# Patient Record
Sex: MALE | Race: WHITE | NOT HISPANIC OR LATINO | Employment: OTHER | URBAN - METROPOLITAN AREA
[De-identification: names, ages, dates, MRNs, and addresses within clinical notes are randomized per-mention and may not be internally consistent; named-entity substitution may affect disease eponyms.]

---

## 2018-07-31 NOTE — PROGRESS NOTES
SUBJECTIVE:   Robert Archer is a 67 year old male who presents for Preventive Visit.    New patient to this clinic.  Has been seeing a cardiologist for ongoing care of hypertension and hyperlipidemia.  Is on medications which been stable.  No major side effects.  No cardiac issues today.    Are you in the first 12 months of your Medicare coverage?  Yes  Physical   Annual:     Getting at least 3 servings of Calcium per day:  Yes    Bi-annual eye exam:  Yes    Dental care twice a year:  Yes    Sleep apnea or symptoms of sleep apnea:  None    Diet:  Regular (no restrictions)    Frequency of exercise:  6-7 days/week    Duration of exercise:  45-60 minutes    Taking medications regularly:  No    Barriers to taking medications:  None    Medication side effects:  Lightheadedness    Additional concerns today:  No    Ability to successfully perform activities of daily living: no assistance needed    Home Safety:  No safety concerns identified    Hearing Impairment: no hearing concerns (right ear plugged)    VISION   Wears glasses: worn for testing  Tool used: Rodriguez   Right eye:        10/12.5 (20/25)  Left eye:          10/25 (20/50)  Visual Acuity: Pass              Fall risk:  Fallen 2 or more times in the past year?: No  Any fall with injury in the past year?: No    COGNITIVE SCREEN  1) Repeat 3 items (Leader, Season, Table)    2) Clock draw: NORMAL  3) 3 item recall: Recalls 3 objects  Results: 3 items recalled: COGNITIVE IMPAIRMENT LESS LIKELY    Mini-CogTM Copyright S Ryan. Licensed by the author for use in [facility] [place]; reprinted with permission (grace@.[email or web address]). All rights reserved.        Reviewed and updated as needed this visit by clinical staff  Tobacco  Allergies  Meds  Med Hx  Surg Hx  Fam Hx  Soc Hx        Reviewed and updated as needed this visit by Provider        Social History   Substance Use Topics     Smoking status: Former Smoker     Years: 10.00     Smokeless tobacco: Never Used       Comment: quit 15 years ago      Alcohol use Yes      Comment: occ / 15 drinks a month        Alcohol Use 8/6/2018   If you drink alcohol do you typically have greater than 3 drinks per day OR greater than 7 drinks per week? No               Today's PHQ-2 Score:   PHQ-2 ( 1999 Pfizer) 8/6/2018   Q1: Little interest or pleasure in doing things 0   Q2: Feeling down, depressed or hopeless 0   PHQ-2 Score 0   Q1: Little interest or pleasure in doing things Not at all   Q2: Feeling down, depressed or hopeless Not at all   PHQ-2 Score 0       Do you feel safe in your environment - Yes    Do you have a Health Care Directive?: No: Advance care planning reviewed with patient; information given to patient to review.    Current providers sharing in care for this patient include:   Patient Care Team:  No Ref-Primary, Physician as PCP - General    The following health maintenance items are reviewed in Epic and correct as of today:  Health Maintenance   Topic Date Due     PHQ-2 Q1 YR  08/12/1960     TETANUS IMMUNIZATION (SYSTEM ASSIGNED)  08/12/1966     HEPATITIS C SCREENING  08/12/1966     LIPID SCREEN Q5 YR MALE (SYSTEM ASSIGNED)  08/12/1983     COLON CANCER SCREEN (SYSTEM ASSIGNED)  08/12/1998     ADVANCE DIRECTIVE PLANNING Q5 YRS  08/12/2003     FALL RISK ASSESSMENT  08/12/2013     PNEUMOCOCCAL (1 of 2 - PCV13) 08/12/2013     AORTIC ANEURYSM SCREENING (SYSTEM ASSIGNED)  08/12/2013     INFLUENZA VACCINE (1) 09/01/2018     BP Readings from Last 3 Encounters:   08/06/18 118/86    Wt Readings from Last 3 Encounters:   08/06/18 200 lb 6.4 oz (90.9 kg)                  Current Outpatient Prescriptions   Medication Sig Dispense Refill     hydrochlorothiazide (HYDRODIURIL) 25 MG tablet Take 25 mg by mouth       lisinopril (PRINIVIL/ZESTRIL) 10 MG tablet Take 10 mg by mouth       meclizine (ANTIVERT) 25 MG tablet Take 1 tablet (25 mg) by mouth every 6 hours as needed for dizziness 30 tablet 1     rosuvastatin (CRESTOR) 20 MG tablet  Take 20 mg by mouth         Pneumonia Vaccine:Adults age 65+ who received Pneumovax (PPSV23) at 65 years or older: Should be given PCV13 > 1 year after their most recent PPSV23    Screening Questionnaire for Adult Immunization    Are you sick today?   No   Do you have allergies to medications, food, a vaccine component or latex?   No   Have you ever had a serious reaction after receiving a vaccination?   No   Do you have a long-term health problem with heart disease, lung disease, asthma, kidney disease, metabolic disease (e.g. diabetes), anemia, or other blood disorder?   No   Do you have cancer, leukemia, HIV/AIDS, or any other immune system problem?   No   In the past 3 months, have you taken medications that affect  your immune system, such as prednisone, other steroids, or anticancer drugs; drugs for the treatment of rheumatoid arthritis, Crohn s disease, or psoriasis; or have you had radiation treatments?   No   Have you had a seizure, or a brain or other nervous system problem?   No   During the past year, have you received a transfusion of blood or blood     products, or been given immune (gamma) globulin or antiviral drug?   No   For women: Are you pregnant or is there a chance you could become        pregnant during the next month?   No   Have you received any vaccinations in the past 4 weeks?   No     Immunization questionnaire answers were all negative.        Per orders of Dr. Cota, injection of TDAP and PCV13 given by Luana Justice. Patient instructed to remain in clinic for 15 minutes afterwards, and to report any adverse reaction to me immediately.       Screening performed by Luana Justice on 8/6/2018 at 12:27 PM.        Review of Systems  CONSTITUTIONAL: NEGATIVE for fever, chills, change in weight  INTEGUMENTARY/SKIN: NEGATIVE for worrisome rashes, moles or lesions  EYES: NEGATIVE for vision changes or irritation  ENT/MOUTH: Reports a 5 year history of intermittent hearing changes involving the  "right ear along with recurring vertigo.  He does utilize Meclizine in the past on an as needed basis.  He has not been evaluated with an audiogram or ENT.  No major pain in the right ear.  No major sinus congestion.  RESP: NEGATIVE for significant cough or SOB  BREAST: NEGATIVE for masses, tenderness or discharge  CV: NEGATIVE for chest pain, palpitations or peripheral edema  CV: Receiving care through a cardiologist for hypertension and hyperlipidemia.  Reports no cardiac disease.  GI: NEGATIVE for nausea, abdominal pain, heartburn, or change in bowel habits  GI: Has not been screened for colorectal cancer.  Advised colonoscopy but the patient declined.  Will do a home study.  : NEGATIVE for frequency, dysuria, or hematuria   male : No major prostate symptoms noted.  MUSCULOSKELETAL: NEGATIVE for significant arthralgias or myalgia  NEURO: NEGATIVE for weakness, dizziness or paresthesias  ENDOCRINE: NEGATIVE for temperature intolerance, skin/hair changes  HEME: NEGATIVE for bleeding problems  PSYCHIATRIC: NEGATIVE for changes in mood or affect    OBJECTIVE:   /86  Pulse 65  Temp 97.3  F (36.3  C) (Temporal)  Resp 14  Ht 5' 10\" (1.778 m)  Wt 200 lb 6.4 oz (90.9 kg)  SpO2 97%  BMI 28.75 kg/m2 Estimated body mass index is 28.75 kg/(m^2) as calculated from the following:    Height as of this encounter: 5' 10\" (1.778 m).    Weight as of this encounter: 200 lb 6.4 oz (90.9 kg).  Physical Exam  GENERAL: healthy, alert and no distress  EYES: Eyes grossly normal to inspection, PERRL and conjunctivae and sclerae normal  HENT: ear canals and TM's normal, nose and mouth without ulcers or lesions  NECK: no adenopathy, no asymmetry, masses, or scars and thyroid normal to palpation  RESP: lungs clear to auscultation - no rales, rhonchi or wheezes  BREAST: normal without masses, tenderness or nipple discharge and no palpable axillary masses or adenopathy  CV: regular rate and rhythm, normal S1 S2, no S3 or S4, no " murmur, click or rub, no peripheral edema and peripheral pulses strong  ABDOMEN: soft, nontender, no hepatosplenomegaly, no masses and bowel sounds normal   (male): normal male genitalia without lesions or urethral discharge, no hernia  RECTAL: normal sphincter tone, no rectal masses, prostate 2+ enlarged, nontender and no presence of nodules or asymmetry.  MS: no gross musculoskeletal defects noted, no edema  SKIN: no suspicious lesions or rashes  NEURO: Normal strength and tone, mentation intact and speech normal  PSYCH: mentation appears normal, affect normal/bright  LYMPH: no cervical, supraclavicular, axillary, or inguinal adenopathy  Diabetic foot exam: normal DP and PT pulses, no trophic changes or ulcerative lesions and normal sensory exam    Diagnostic Test Results:  Will be returning for fasting blood studies in the next few weeks.    ASSESSMENT / PLAN:   1. Medicare annual wellness visit, initial  Initial preventative exam completed with findings as noted.  - INITIAL PREVENTIVE EXAM    2. Essential hypertension, benign  Current medications show good control of his blood pressure.  At this time, he will continue to be managed through his cardiologist.  - hydrochlorothiazide (HYDRODIURIL) 25 MG tablet; Take 25 mg by mouth  - lisinopril (PRINIVIL/ZESTRIL) 10 MG tablet; Take 10 mg by mouth  - Albumin Random Urine Quantitative with Creat Ratio; Future  - Comprehensive metabolic panel; Future  - CBC with platelets; Future  - OFFICE/OUTPT VISIT,NEW,LEVL III    3. Hyperlipidemia LDL goal <130  Lipids will be checked with fasting blood studies.  - rosuvastatin (CRESTOR) 20 MG tablet; Take 20 mg by mouth  - Comprehensive metabolic panel; Future  - Lipid panel reflex to direct LDL Fasting; Future  - TSH with free T4 reflex; Future  - OFFICE/OUTPT VISIT,CLIFLEVL III    4. Recurrent vertigo  Given the hearing changes in his right ear, may possibly related to mild Ménière's disease.  Screening studies to be done an  ENT evaluation recommended.  - meclizine (ANTIVERT) 25 MG tablet; Take 1 tablet (25 mg) by mouth every 6 hours as needed for dizziness  Dispense: 30 tablet; Refill: 1  - AUDIOLOGY ADULT REFERRAL; Future  - OTOLARYNGOLOGY REFERRAL; Future  - CBC with platelets; Future  - OFFICE/OUTPT VISIT,[facility],LEV III    5. Hearing loss of right ear, unspecified hearing loss type  As noted, audiology and ENT evaluation.  - AUDIOLOGY ADULT REFERRAL; Future  - OTOLARYNGOLOGY REFERRAL; Future  - CBC with platelets; Future  - OFFICE/OUTPT VISIT,NEW,LEVL III    6. Screen for colon cancer  Patient declined doing a colonoscopy but will look to at least do a stool screening study for early detection.  - Fecal colorectal cancer screen FIT; Future    7. Need for hepatitis C screening test  Low risk but patient agreed to have this performed  - Hepatitis C antibody; Future    8. Need for prophylactic vaccination against Streptococcus pneumoniae (pneumococcus)  Initial Prevnar 13 given with Pneumovax 23 due in 1 year.  - Pneumococcal vaccine 13 valent PCV13 IM (Prevnar) [23281]  - ADMIN: Vaccine, Initial (97755)    9. Need for prophylactic vaccination with tetanus-diphtheria (TD)  Patient has 4 grandchildren and are around the grandchildren frequently.  He does not remember when his last tetanus booster was.  Will look to vaccinate with a Tdap due to exposure to children.  - TDAP, IM (10 - 64 YRS) - Adacel  - EA ADD'L VACCINE    10. Special screening for malignant neoplasm of prostate  PSA pending  - Prostate spec antigen screen; Future    11. Screening for AAA (abdominal aortic aneurysm)  Patient did have some smoking history and AAA to be screened for.  - Abdominal Aortic Aneurysm Screening/Tracking    End of Life Planning:  Patient currently has an advanced directive: No.  I have verified the patient's ablity to prepare an advanced directive/make health care decisions.  Literature was provided to assist patient in preparing an advanced  directive.    COUNSELING:  Reviewed preventive health counseling, as reflected in patient instructions  Special attention given to:       Consider AAA screening for ages 65-75 and smoking history       Regular exercise       Healthy diet/nutrition       Hearing screening       Immunizations    Vaccinated for: Pneumococcal and TDAP             Hepatitis C screening       Colon cancer screening       Prostate cancer screening    BP Readings from Last 1 Encounters:   08/06/18 118/86            reports that he has quit smoking. He quit after 10.00 years of use. He has never used smokeless tobacco.      Appropriate preventive services were discussed with this patient, including applicable screening as appropriate for cardiovascular disease, diabetes, osteopenia/osteoporosis, and glaucoma.  As appropriate for age/gender, discussed screening for colorectal cancer, prostate cancer, breast cancer, and cervical cancer. Checklist reviewing preventive services available has been given to the patient.    Reviewed patients plan of care and provided an AVS. The Basic Care Plan (routine screening as documented in Health Maintenance) for Robert meets the Care Plan requirement. This Care Plan has been established and reviewed with the Patient.    Counseling Resources:  ATP IV Guidelines  Pooled Cohorts Equation Calculator  Breast Cancer Risk Calculator  FRAX Risk Assessment  ICSI Preventive Guidelines  Dietary Guidelines for Americans, 2010  USDA's MyPlate  ASA Prophylaxis  Lung CA Screening    Follow-up in 1 year or as needed.  Return for fasting blood studies.    The patient understood the rational for the diagnosis and treatment plan. All questions were answered to best of my ability and the patient's satisfaction.    Note: Chart documentation done in part with Dragon Voice Recognition software. Although reviewed after completion, some word and grammatical errors may remain.  Please consider this when interpreting information in  this chart.    Jun Cota MD  [facility]    Addended on 8/20/18 after visual acuity completed.  Functioning and passing visual acuity with some managed vision in left eye with corrective lenses.  Will continue with ophthalmologic care as planned.  Formal hearing to be done through audiology.    Jun Cota MD

## 2018-08-06 ENCOUNTER — OFFICE VISIT (OUTPATIENT)
Dept: FAMILY MEDICINE | Facility: CLINIC | Age: 68
End: 2018-08-06
Payer: COMMERCIAL

## 2018-08-06 ENCOUNTER — DOCUMENTATION ONLY (OUTPATIENT)
Dept: VASCULAR SURGERY | Facility: CLINIC | Age: 68
End: 2018-08-06

## 2018-08-06 VITALS
SYSTOLIC BLOOD PRESSURE: 118 MMHG | RESPIRATION RATE: 14 BRPM | DIASTOLIC BLOOD PRESSURE: 86 MMHG | TEMPERATURE: 97.3 F | OXYGEN SATURATION: 97 % | HEART RATE: 65 BPM | WEIGHT: 200.4 LBS | BODY MASS INDEX: 28.69 KG/M2 | HEIGHT: 70 IN

## 2018-08-06 DIAGNOSIS — H91.91 HEARING LOSS OF RIGHT EAR, UNSPECIFIED HEARING LOSS TYPE: ICD-10-CM

## 2018-08-06 DIAGNOSIS — R42 RECURRENT VERTIGO: ICD-10-CM

## 2018-08-06 DIAGNOSIS — Z12.11 SCREEN FOR COLON CANCER: ICD-10-CM

## 2018-08-06 DIAGNOSIS — I10 ESSENTIAL HYPERTENSION, BENIGN: ICD-10-CM

## 2018-08-06 DIAGNOSIS — E78.5 HYPERLIPIDEMIA LDL GOAL <130: ICD-10-CM

## 2018-08-06 DIAGNOSIS — Z23 NEED FOR PROPHYLACTIC VACCINATION AGAINST STREPTOCOCCUS PNEUMONIAE (PNEUMOCOCCUS): ICD-10-CM

## 2018-08-06 DIAGNOSIS — Z11.59 NEED FOR HEPATITIS C SCREENING TEST: ICD-10-CM

## 2018-08-06 DIAGNOSIS — Z00.00 MEDICARE ANNUAL WELLNESS VISIT, INITIAL: Primary | ICD-10-CM

## 2018-08-06 DIAGNOSIS — Z13.6 SCREENING FOR AAA (ABDOMINAL AORTIC ANEURYSM): ICD-10-CM

## 2018-08-06 DIAGNOSIS — Z23 NEED FOR PROPHYLACTIC VACCINATION WITH TETANUS-DIPHTHERIA (TD): ICD-10-CM

## 2018-08-06 DIAGNOSIS — Z87.891 FORMER TOBACCO USE: Primary | ICD-10-CM

## 2018-08-06 DIAGNOSIS — Z12.5 SPECIAL SCREENING FOR MALIGNANT NEOPLASM OF PROSTATE: ICD-10-CM

## 2018-08-06 PROCEDURE — 99214 OFFICE O/P EST MOD 30 MIN: CPT | Mod: 25 | Performed by: FAMILY MEDICINE

## 2018-08-06 PROCEDURE — 90715 TDAP VACCINE 7 YRS/> IM: CPT | Performed by: FAMILY MEDICINE

## 2018-08-06 PROCEDURE — 90471 IMMUNIZATION ADMIN: CPT | Performed by: FAMILY MEDICINE

## 2018-08-06 PROCEDURE — G0009 ADMIN PNEUMOCOCCAL VACCINE: HCPCS | Mod: 59 | Performed by: FAMILY MEDICINE

## 2018-08-06 PROCEDURE — 99207 ZZC FOR CODING REVIEW: CPT | Performed by: FAMILY MEDICINE

## 2018-08-06 PROCEDURE — 90670 PCV13 VACCINE IM: CPT | Performed by: FAMILY MEDICINE

## 2018-08-06 PROCEDURE — G0402 INITIAL PREVENTIVE EXAM: HCPCS | Performed by: FAMILY MEDICINE

## 2018-08-06 RX ORDER — ROSUVASTATIN CALCIUM 20 MG/1
20 TABLET, COATED ORAL
COMMUNITY
Start: 2018-04-20 | End: 2022-09-26

## 2018-08-06 RX ORDER — MECLIZINE HYDROCHLORIDE 25 MG/1
25 TABLET ORAL EVERY 6 HOURS PRN
Qty: 30 TABLET | Refills: 1 | Status: SHIPPED | OUTPATIENT
Start: 2018-08-06 | End: 2018-08-22

## 2018-08-06 RX ORDER — HYDROCHLOROTHIAZIDE 25 MG/1
25 TABLET ORAL
COMMUNITY
Start: 2018-04-20 | End: 2020-09-21

## 2018-08-06 RX ORDER — LISINOPRIL 10 MG/1
10 TABLET ORAL
COMMUNITY
Start: 2018-04-20 | End: 2021-09-22

## 2018-08-06 ASSESSMENT — ACTIVITIES OF DAILY LIVING (ADL)
CURRENT_FUNCTION: NO ASSISTANCE NEEDED
I_NEED_ASSISTANCE_FOR_THE_FOLLOWING_DAILY_ACTIVITIES:: NO ASSISTANCE IS NEEDED

## 2018-08-06 ASSESSMENT — PAIN SCALES - GENERAL: PAINLEVEL: NO PAIN (0)

## 2018-08-06 NOTE — MR AVS SNAPSHOT
After Visit Summary   8/6/2018    Robert Archer    MRN: [id number]           Patient Information     Date Of Birth          1950        Visit Information        Provider Department      8/6/2018 10:00 AM Jun Cota MD [facility]        Today's Diagnoses     Medicare annual wellness visit, initial    -  1    Essential hypertension, benign        Hyperlipidemia LDL goal <130        Screen for colon cancer        Need for hepatitis C screening test        Need for prophylactic vaccination against Streptococcus pneumoniae (pneumococcus)        Need for prophylactic vaccination with tetanus-diphtheria (TD)        Special screening for malignant neoplasm of prostate        Recurrent vertigo        Hearing loss of right ear, unspecified hearing loss type        Screening for AAA (abdominal aortic aneurysm)        Need for vaccination with 13-polyvalent pneumococcal conjugate vaccine        Need for Tdap vaccination          Care Instructions    These are new changes to your current plan of care based on today's visit:    Medications stopped    Medications to be started    Change dose of this medication None   New treatments        Follow up appointments:    1.  FOLLOW UP WITH YOUR PRIMARY CARE PROVIDER: 1 year(s) for physical    2. FOLLOW UP WITH SPECIALIST: ENT and Audiogram    3. FOLLOW UP WITH NURSE VISIT:     4. IMAGING STUDIES TO BE SCHEDULED: AAA screening    5. PROCEDURES TO BE SCHEDULED: Complete the stool examination at home    6. LABS TO BE COMPLETED PRIOR TO NEXT VISIT: Fasting lab work as planned    Jun Cota MD                Follow-ups after your visit        Additional Services     AUDIOLOGY ADULT REFERRAL       Your provider has referred you to: [facility]: [facility] [phone]   [email or web address]    Treatment:  Evaluation & Treatment  Specialty Testing:      Please be aware  that coverage of these services is subject to the terms and limitations of your health insurance plan.  Call member services at your health plan with any benefit or coverage questions.      Please bring the following to your appointment:  >>   Any x-rays, CTs or MRIs which have been performed.  Contact the facility where they were done to arrange for  prior to your scheduled appointment.   >>   List of current medications  >>   This referral request   >>   Any documents/labs given to you for this referral            OTOLARYNGOLOGY REFERRAL       Your provider has referred you to: [facility]: [facility] [phone]   [email or web address]    Please be aware that coverage of these services is subject to the terms and limitations of your health insurance plan.  Call member services at your health plan with any benefit or coverage questions.      Please bring the following with you to your appointment:    (1) Any X-Rays, CTs or MRIs which have been performed.  Contact the facility where they were done to arrange for  prior to your scheduled appointment.   (2) List of current medications  (3) This referral request   (4) Any documents/labs given to you for this referral                  Future tests that were ordered for you today     Open Future Orders        Priority Expected Expires Ordered    Prostate spec antigen screen Routine 8/7/2018 8/31/2018 8/6/2018    Albumin Random Urine Quantitative with Creat Ratio Routine 8/7/2018 8/31/2018 8/6/2018    Comprehensive metabolic panel Routine 8/7/2018 8/31/2018 8/6/2018    CBC with platelets Routine 8/7/2018 8/31/2018 8/6/2018    Lipid panel reflex to direct LDL Fasting Routine 8/7/2018 8/31/2018 8/6/2018    TSH with free T4 reflex Routine 8/7/2018 8/31/2018 8/6/2018    Hepatitis C antibody Routine 8/7/2018 8/31/2018 8/6/2018    Fecal colorectal cancer screen FIT Routine 8/13/2018  "2018    AUDIOLOGY ADULT REFERRAL Routine 2018    OTOLARYNGOLOGY REFERRAL Routine 2018            Who to contact     If you have questions or need follow up information about today's clinic visit or your schedule please contact [facility] directly at [phone].  Normal or non-critical lab and imaging results will be communicated to you by [employer], letter or phone within 4 business days after the clinic has received the results. If you do not hear from us within 7 days, please contact the clinic through [employer] or phone. If you have a critical or abnormal lab result, we will notify you by phone as soon as possible.  Submit refill requests through [employer] or call your pharmacy and they will forward the refill request to us. Please allow 3 business days for your refill to be completed.          Additional Information About Your Visit        [employer] Information     [employer] lets you send messages to your doctor, view your test results, renew your prescriptions, schedule appointments and more. To sign up, go to [email or web address] . Click on \"Log in\" on the left side of the screen, which will take you to the Welcome page. Then click on \"Sign up Now\" on the right side of the page.     You will be asked to enter the access code listed below, as well as some personal information. Please follow the directions to create your username and password.     Your access code is: [email or web address]  Expires: 2018 10:39 AM     Your access code will  in 90 days. If you need help or a new code, please call your [place] clinic or [phone].        Care EveryWhere ID     This is your Care EveryWhere ID. This could be used by other organizations to access your [place] medical records  [id number]        Your Vitals Were     Pulse Temperature Respirations Height Pulse Oximetry BMI (Body Mass Index)    65 97.3  F (36.3  C) (Temporal) 14 5' 10\" (1.778 m) " 97% 28.75 kg/m2       Blood Pressure from Last 3 Encounters:   08/06/18 118/86    Weight from Last 3 Encounters:   08/06/18 200 lb 6.4 oz (90.9 kg)              We Performed the Following     Abdominal Aortic Aneurysm Screening/Tracking     ADMIN: Vaccine, Initial (09949)     Pneumococcal vaccine 13 valent PCV13 IM (Prevnar) [41018]     TDAP, IM (10 - 64 YRS) - Adacel          Today's Medication Changes          These changes are accurate as of 8/6/18 10:39 AM.  If you have any questions, ask your nurse or doctor.               Start taking these medicines.        Dose/Directions    meclizine 25 MG tablet   Commonly known as:  ANTIVERT   Used for:  Recurrent vertigo   Started by:  Jun Cota MD        Dose:  25 mg   Take 1 tablet (25 mg) by mouth every 6 hours as needed for dizziness   Quantity:  30 tablet   Refills:  1            Where to get your medicines      These medications were sent to [facility]/pharmacy #[id number] [facility] [address] [facility] AT [address] [address]     Phone:  [phone]     meclizine 25 MG tablet                Primary Care Provider Fax #    Physician No Ref-Primary [phone]       No address on file        Equal Access to Services     JUNAID AGEE AH: Hadii aad ku hadasho Sokatharineali, waaxda luqadaha, qaybta kaalmada adeegyada, danielle calloway. So [facility] [phone].    ATENCIÓN: Si habla español, tiene a suarez disposición servicios gratuitos de asistencia lingüística. Llame al [phone].    We comply with applicable federal civil rights laws and [place] laws. We do not discriminate on the basis of race, color, national origin, age, disability, sex, sexual orientation, or gender identity.            Thank you!     Thank you for choosing [facility]  for your care. Our goal is always to provide you with excellent care. Hearing back from our patients is one way we can continue to improve our  services. Please take a few minutes to complete the written survey that you may receive in the mail after your visit with us. Thank you!             Your Updated Medication List - Protect others around you: Learn how to safely use, store and throw away your medicines at www.disposemymeds.org.          This list is accurate as of 8/6/18 10:39 AM.  Always use your most recent med list.                   Brand Name Dispense Instructions for use Diagnosis    hydrochlorothiazide 25 MG tablet    HYDRODIURIL     Take 25 mg by mouth        lisinopril 10 MG tablet    PRINIVIL/ZESTRIL     Take 10 mg by mouth        meclizine 25 MG tablet    ANTIVERT    30 tablet    Take 1 tablet (25 mg) by mouth every 6 hours as needed for dizziness    Recurrent vertigo       rosuvastatin 20 MG tablet    CRESTOR     Take 20 mg by mouth

## 2018-08-06 NOTE — PATIENT INSTRUCTIONS
These are new changes to your current plan of care based on today's visit:    Medications stopped    Medications to be started    Change dose of this medication None   New treatments        Follow up appointments:    1.  FOLLOW UP WITH YOUR PRIMARY CARE PROVIDER: 1 year(s) for physical    2. FOLLOW UP WITH SPECIALIST: ENT and Audiogram    3. FOLLOW UP WITH NURSE VISIT:     4. IMAGING STUDIES TO BE SCHEDULED: AAA screening    5. PROCEDURES TO BE SCHEDULED: Complete the stool examination at home    6. LABS TO BE COMPLETED PRIOR TO NEXT VISIT: Fasting lab work as planned    Jun Cota MD

## 2018-08-10 DIAGNOSIS — Z12.11 SCREEN FOR COLON CANCER: ICD-10-CM

## 2018-08-10 PROCEDURE — 82274 ASSAY TEST FOR BLOOD FECAL: CPT | Performed by: FAMILY MEDICINE

## 2018-08-12 LAB — HEMOCCULT STL QL IA: NEGATIVE

## 2018-08-14 DIAGNOSIS — E78.5 HYPERLIPIDEMIA LDL GOAL <130: ICD-10-CM

## 2018-08-14 DIAGNOSIS — Z11.59 NEED FOR HEPATITIS C SCREENING TEST: ICD-10-CM

## 2018-08-14 DIAGNOSIS — Z12.5 SPECIAL SCREENING FOR MALIGNANT NEOPLASM OF PROSTATE: ICD-10-CM

## 2018-08-14 DIAGNOSIS — H91.91 HEARING LOSS OF RIGHT EAR, UNSPECIFIED HEARING LOSS TYPE: ICD-10-CM

## 2018-08-14 DIAGNOSIS — I10 ESSENTIAL HYPERTENSION, BENIGN: ICD-10-CM

## 2018-08-14 DIAGNOSIS — R42 RECURRENT VERTIGO: ICD-10-CM

## 2018-08-14 LAB
ALBUMIN SERPL-MCNC: 4.1 G/DL (ref 3.4–5)
ALP SERPL-CCNC: 58 U/L (ref 40–150)
ALT SERPL W P-5'-P-CCNC: 31 U/L (ref 0–70)
ANION GAP SERPL CALCULATED.3IONS-SCNC: 8 MMOL/L (ref 3–14)
AST SERPL W P-5'-P-CCNC: 27 U/L (ref 0–45)
BILIRUB SERPL-MCNC: 1.2 MG/DL (ref 0.2–1.3)
BUN SERPL-MCNC: 14 MG/DL (ref 7–30)
CALCIUM SERPL-MCNC: 8.9 MG/DL (ref 8.5–10.1)
CHLORIDE SERPL-SCNC: 106 MMOL/L (ref 94–109)
CHOLEST SERPL-MCNC: 144 MG/DL
CO2 SERPL-SCNC: 26 MMOL/L (ref 20–32)
CREAT SERPL-MCNC: 1.05 MG/DL (ref 0.66–1.25)
CREAT UR-MCNC: 219 MG/DL
ERYTHROCYTE [DISTWIDTH] IN BLOOD BY AUTOMATED COUNT: 12.7 % (ref 10–15)
GFR SERPL CREATININE-BSD FRML MDRD: 70 ML/MIN/1.7M2
GLUCOSE SERPL-MCNC: 88 MG/DL (ref 70–99)
HCT VFR BLD AUTO: 46.1 % (ref 40–53)
HDLC SERPL-MCNC: 54 MG/DL
HGB BLD-MCNC: 15.8 G/DL (ref 13.3–17.7)
LDLC SERPL CALC-MCNC: 73 MG/DL
MCH RBC QN AUTO: 33.3 PG (ref 26.5–33)
MCHC RBC AUTO-ENTMCNC: 34.3 G/DL (ref 31.5–36.5)
MCV RBC AUTO: 97 FL (ref 78–100)
MICROALBUMIN UR-MCNC: 18 MG/L
MICROALBUMIN/CREAT UR: 8.31 MG/G CR (ref 0–17)
NONHDLC SERPL-MCNC: 90 MG/DL
PLATELET # BLD AUTO: 187 10E9/L (ref 150–450)
POTASSIUM SERPL-SCNC: 4.1 MMOL/L (ref 3.4–5.3)
PROT SERPL-MCNC: 7.4 G/DL (ref 6.8–8.8)
PSA SERPL-ACNC: 0.09 UG/L (ref 0–4)
RBC # BLD AUTO: 4.74 10E12/L (ref 4.4–5.9)
SODIUM SERPL-SCNC: 140 MMOL/L (ref 133–144)
TRIGL SERPL-MCNC: 85 MG/DL
TSH SERPL DL<=0.005 MIU/L-ACNC: 0.9 MU/L (ref 0.4–4)
WBC # BLD AUTO: 5.4 10E9/L (ref 4–11)

## 2018-08-14 PROCEDURE — 36415 COLL VENOUS BLD VENIPUNCTURE: CPT | Performed by: FAMILY MEDICINE

## 2018-08-14 PROCEDURE — 85027 COMPLETE CBC AUTOMATED: CPT | Performed by: FAMILY MEDICINE

## 2018-08-14 PROCEDURE — 82043 UR ALBUMIN QUANTITATIVE: CPT | Performed by: FAMILY MEDICINE

## 2018-08-14 PROCEDURE — G0103 PSA SCREENING: HCPCS | Performed by: FAMILY MEDICINE

## 2018-08-14 PROCEDURE — 84443 ASSAY THYROID STIM HORMONE: CPT | Performed by: FAMILY MEDICINE

## 2018-08-14 PROCEDURE — 80053 COMPREHEN METABOLIC PANEL: CPT | Performed by: FAMILY MEDICINE

## 2018-08-14 PROCEDURE — G0472 HEP C SCREEN HIGH RISK/OTHER: HCPCS | Performed by: FAMILY MEDICINE

## 2018-08-14 PROCEDURE — 80061 LIPID PANEL: CPT | Performed by: FAMILY MEDICINE

## 2018-08-15 LAB — HCV AB SERPL QL IA: NONREACTIVE

## 2018-08-20 ENCOUNTER — ALLIED HEALTH/NURSE VISIT (OUTPATIENT)
Dept: FAMILY MEDICINE | Facility: CLINIC | Age: 68
End: 2018-08-20
Payer: COMMERCIAL

## 2018-08-20 DIAGNOSIS — Z00.00 ENCOUNTER FOR ROUTINE ADULT HEALTH EXAMINATION: Primary | ICD-10-CM

## 2018-08-20 PROCEDURE — 99207 ZZC NO CHARGE NURSE ONLY: CPT

## 2018-08-20 NOTE — PROGRESS NOTES
VISION   Wears glasses: worn for testing  Tool used: Rodriguez   Right eye:        10/25 (20/50)  Left eye:          10/25 (20/50)  Visual Acuity: Pass    Completed vision screening and routed results to provider for medicare wellness visit done on 8/6/18.    Ivett Levi CMA (AAMA)

## 2018-08-20 NOTE — MR AVS SNAPSHOT
After Visit Summary   8/20/2018    Robert Archer    MRN: [id number]           Patient Information     Date Of Birth          1950        Visit Information        Provider Department      8/20/2018 10:30 AM RG FLOAT 1 [facility]        Today's Diagnoses     Encounter for routine adult health examination    -  1       Follow-ups after your visit        Your next 10 appointments already scheduled     Aug 29, 2018  8:45 AM CDT   [facility]    [address]  [place] [id number] [phone]            Aug 29, 2018  9:15 AM CDT   [facility] Visit [facility]ado [facility]   [facility]    [address]  [place] [address] [phone]              Who to contact     If you have questions or need follow up information about today's clinic visit or your schedule please contact [facility] directly at [phone].  Normal or non-critical lab and imaging results will be communicated to you by Bringmehart, letter or phone within 4 business days after the clinic has received the results. If you do not hear from us within 7 days, please contact the clinic through Bringmehart or phone. If you have a critical or abnormal lab result, we will notify you by phone as soon as possible.  Submit refill requests through Attensa or call your pharmacy and they will forward the refill request to us. Please allow 3 business days for your refill to be completed.          Additional Information About Your Visit        MyChart Information     Attensa gives you secure access to your electronic health record. If you see a primary care provider, you can also send messages to your care team and make appointments. If you have questions, please call your primary care clinic.  If you do not have a primary care provider, please call [phone] and they will  assist you.        Care EveryWhere ID     This is your Care EveryWhere ID. This could be used by other organizations to access your [place] medical records  [id number]         Blood Pressure from Last 3 Encounters:   08/06/18 118/86    Weight from Last 3 Encounters:   08/06/18 200 lb 6.4 oz (90.9 kg)              Today, you had the following     No orders found for display       Primary Care Provider Office Phone # Fax #    Jun Cota [phone] [address] [facility] [address]        Equal Access to Services     [facility]: Hadii aad ku hadasho Soomaali, waaxda luqadaha, qaybta kaalmada adeegyada, waxviraj rosenbergin hayolvin adeabundio chavira . So [facility] [phone].    ATENCIÓN: Si habla español, tiene a suarez disposición servicios gratuitos de asistencia lingüística. [facility] [phone].    We comply with applicable federal civil rights laws and [place] laws. We do not discriminate on the basis of race, color, national origin, age, disability, sex, sexual orientation, or gender identity.            Thank you!     [facility]  [facility]. Our goal is always to provide you with excellent care. Hearing back from our patients is one way we can continue to improve our services. Please take a few minutes to complete the written survey that you may receive in the mail after your visit with us. Thank you!             Your Updated Medication List - Protect others around you: Learn how to safely use, store and throw away your medicines at www.disposemymeds.org.          This list is accurate as of 8/20/18  1:45 PM.  Always use your most recent med list.                   Brand Name Dispense Instructions for use Diagnosis    hydrochlorothiazide 25 MG tablet    HYDRODIURIL     Take 25 mg by mouth    Essential hypertension, benign       lisinopril 10 MG tablet    PRINIVIL/ZESTRIL     Take 10 mg by mouth    Essential hypertension, benign        meclizine 25 MG tablet    ANTIVERT    30 tablet    Take 1 tablet (25 mg) by mouth every 6 hours as needed for dizziness    Recurrent vertigo       rosuvastatin 20 MG tablet    CRESTOR     Take 20 mg by mouth    Hyperlipidemia LDL goal <130

## 2018-08-22 DIAGNOSIS — R42 RECURRENT VERTIGO: ICD-10-CM

## 2018-08-22 RX ORDER — MECLIZINE HYDROCHLORIDE 25 MG/1
25 TABLET ORAL 3 TIMES DAILY PRN
Qty: 270 TABLET | Refills: 1 | Status: SHIPPED | OUTPATIENT
Start: 2018-08-22 | End: 2019-09-17

## 2018-08-22 NOTE — TELEPHONE ENCOUNTER
Reason for Call:  Medication or medication refill:    Do you use a [facility]?  Name of the pharmacy and phone number for the current request:  [facility] mail order    Name of the medication requested: meclizine (ANTIVERT) 25 MG tablet    Other request: patient would like a 90 day supply sent to his mail order pharmacy. Pharmacy called to request medication.        Call taken on 8/22/2018 at 8:37 AM by Ioana Reyna

## 2018-08-27 NOTE — PROGRESS NOTES
ENT Consultation    Robert Archer who is a 68 year old male seen in consultation at the request of Jun Cota MD.      History of Present Illness - Robert Archer is a 68 year old male here today with dizziness. Patient reports that about 4 years he had true vertigo that lasted at least a day, he was hospitalized. He reports that after this it turned into constant dizziness, lightheadedness, and balance issues. He denies any vertigo symptoms. No episodes of blacking out or fainting. He has not had any seizures in the past. No history of migraines. Patient has tinnitus in both ears. No scans of his head when he was hospitalized.       Body mass index is 28.7 kg/(m^2).    BP Readings from Last 1 Encounters:   08/06/18 118/86     BP noted to be well controlled today in office.     Robert IS NOT a smoker/uses chewing tobacco.       Past Medical History - No past medical history on file.    Current Medications -   Current Outpatient Prescriptions:      hydrochlorothiazide (HYDRODIURIL) 25 MG tablet, Take 25 mg by mouth, Disp: , Rfl:      lisinopril (PRINIVIL/ZESTRIL) 10 MG tablet, Take 10 mg by mouth, Disp: , Rfl:      meclizine (ANTIVERT) 25 MG tablet, Take 1 tablet (25 mg) by mouth 3 times daily as needed for dizziness, Disp: 270 tablet, Rfl: 1     rosuvastatin (CRESTOR) 20 MG tablet, Take 20 mg by mouth, Disp: , Rfl:     Allergies - No Known Allergies    Social History -   Social History     Social History     Marital status:      Spouse name: N/A     Number of children: N/A     Years of education: N/A     Social History Main Topics     Smoking status: Former Smoker     Years: 10.00     Smokeless tobacco: Never Used      Comment: quit 15 years ago      Alcohol use Yes      Comment: occ / 15 drinks a month      Drug use: No     Sexual activity: Yes     Partners: Female      Comment:       Other Topics Concern     Not on file     Social History Narrative     No narrative on file       Family  History -   Family History   Problem Relation Age of Onset     Hypertension Mother      Hypertension Father        Review of Systems - As per HPI and PMHx, otherwise review of system review of the head and neck negative.    Physical Exam  There were no vitals taken for this visit.  BMI: There is no height or weight on file to calculate BMI.    General - The patient is well nourished and well developed, and appears to have good nutritional status.  Alert and oriented to person and place, answers questions and cooperates with examination appropriately.    SKIN - No suspicious lesions or rashes.  Respiration - No respiratory distress.  Head and Face - Normocephalic and atraumatic, with no gross asymmetry noted of the contour of the facial features.  The facial nerve is intact, with strong symmetric movements.    Voice and Breathing - The patient was breathing comfortably without the use of accessory muscles. The patients voice was clear and strong, and had appropriate pitch and quality.    Ears - Bilateral pinna and EACs with normal appearing overlying skin. Tympanic membrane intact with good mobility on pneumatic otoscopy bilaterally. Bony landmarks of the ossicular chain are normal. The tympanic membranes are normal in appearance. No retraction, perforation, or masses.  No fluid or purulence was seen in the external canal or the middle ear.     Eyes - Extraocular movements intact.  Sclera were not icteric or injected, conjunctiva were pink and moist.    Mouth - Examination of the oral cavity showed pink, healthy oral mucosa. No lesions or ulcerations noted.  The tongue was mobile and midline, and the dentition were in good condition.      Throat - The walls of the oropharynx were smooth, pink, moist, symmetric, and had no lesions or ulcerations.  The tonsillar pillars and soft palate were symmetric.  The uvula was midline on elevation.    Neck - Normal midline excursion of the laryngotracheal complex during  swallowing.  Full range of motion on passive movement.  Palpation of the occipital, submental, submandibular, internal jugular chain, and supraclavicular nodes did not demonstrate any abnormal lymph nodes or masses.  The carotid pulse was palpable bilaterally.  Palpation of the thyroid was soft and smooth, with no nodules or goiter appreciated.  The trachea was mobile and midline.    Nose - External contour is symmetric, no gross deflection or scars.  Nasal mucosa is pink and moist with no abnormal mucus.  The septum was midline and non-obstructive, turbinates of normal size and position.  No polyps, masses, or purulence noted on examination.    Neuro - Nonfocal neuro exam is normal, CN 2 through 12 intact, normal gait and muscle tone. Some unsteadiness with eyes closed.       Performed in clinic today:  Audiologic Studies - An audiogram and tympanogram were performed today as part of the evaluation and personally reviewed. The tympanogram shows Type A curves on the right and Type A curves on the left, with Normal canal volumes and middle ear pressures.  The audiogram showed Mild to moderate high-frequency SNHL on the right and Mild to moderate high-frequency SNHLon the left.        A/P - Robert Archer is a 68 year old male with dizziness. I would like patient to obtain a MRI of the brain at the [facility]. I also would like patient to be evaluated at the [facility].       This document serves as a record of the services and decisions personally performed and made by Dr. Krish Hurtado MD. It was created on his behalf by Meena Matthews, a trained medical scribe. The creation of this document is based the provider's statements to the medical scribe.  Meena Matthews 8/29/2018    Provider:   The information in this document, created by the medical scribe for me, accurately reflects the services I personally performed and the decisions made by me. I have reviewed and approved this  document for accuracy prior to leaving the patient care area.  Dr. Krish Hurtado MD 8/29/2018    Krish Hurtado MD.

## 2018-08-29 ENCOUNTER — OFFICE VISIT (OUTPATIENT)
Dept: AUDIOLOGY | Facility: OTHER | Age: 68
End: 2018-08-29
Payer: COMMERCIAL

## 2018-08-29 ENCOUNTER — OFFICE VISIT (OUTPATIENT)
Dept: OTOLARYNGOLOGY | Facility: OTHER | Age: 68
End: 2018-08-29
Payer: COMMERCIAL

## 2018-08-29 VITALS
HEART RATE: 67 BPM | SYSTOLIC BLOOD PRESSURE: 118 MMHG | BODY MASS INDEX: 28.7 KG/M2 | OXYGEN SATURATION: 98 % | WEIGHT: 200 LBS | TEMPERATURE: 97.2 F | DIASTOLIC BLOOD PRESSURE: 82 MMHG

## 2018-08-29 DIAGNOSIS — H91.91 HEARING LOSS OF RIGHT EAR, UNSPECIFIED HEARING LOSS TYPE: ICD-10-CM

## 2018-08-29 DIAGNOSIS — R42 DIZZINESS: ICD-10-CM

## 2018-08-29 DIAGNOSIS — H93.13 TINNITUS OF BOTH EARS: ICD-10-CM

## 2018-08-29 DIAGNOSIS — R42 VERTIGO: Primary | ICD-10-CM

## 2018-08-29 DIAGNOSIS — H90.3 SENSORINEURAL HEARING LOSS (SNHL) OF BOTH EARS: Primary | ICD-10-CM

## 2018-08-29 DIAGNOSIS — R42 RECURRENT VERTIGO: ICD-10-CM

## 2018-08-29 DIAGNOSIS — R26.89 BALANCE PROBLEM: ICD-10-CM

## 2018-08-29 PROCEDURE — 92567 TYMPANOMETRY: CPT | Performed by: AUDIOLOGIST

## 2018-08-29 PROCEDURE — 99203 OFFICE O/P NEW LOW 30 MIN: CPT | Performed by: OTOLARYNGOLOGY

## 2018-08-29 PROCEDURE — 92557 COMPREHENSIVE HEARING TEST: CPT | Performed by: AUDIOLOGIST

## 2018-08-29 PROCEDURE — 99207 ZZC NO CHARGE LOS: CPT | Performed by: AUDIOLOGIST

## 2018-08-29 NOTE — MR AVS SNAPSHOT
After Visit Summary   8/29/2018    Robert Archer    MRN: [id number]           Patient Information     Date Of Birth          1950        Visit Information        Provider Department      8/29/2018 9:15 AM Krish Hurtado MD [facility]        Today's Diagnoses     Vertigo    -  1    Dizziness        Balance problem           Follow-ups after your visit        Additional Services     AUDIOLOGY ADULT REFERRAL           AUDIOLOGY BALANCE REFERRAL       Your provider has referred you to: [facility] [phone] [email or web address]    Audiology Balance Referral (Comprehensive) includes Videonystagmography (VNG), Rotational Chair testing, and Computerized Dynamic Posturography.  You may also select individual tests from the list below.    Procedure(s): Audiology Balance Referral Comprehensive (includes Videonystagmography (VNG), Rotational Chair Testing and Computerized Dynamic Posturography). [facility]                  Your next 10 appointments already scheduled     Sep 26, 2018  9:15 AM CDT   [facility] Visit with Krish [facility]ado [facility]   [facility]    [address] [place] [address] [address]  [facility] [id number] [phone]              Future tests that were ordered for you today     Open Future Orders        Priority Expected Expires Ordered    MR Brain w/o & w Contrast Routine  8/29/2019 8/29/2018            Who to contact     If you have questions or need follow up information about today's clinic visit or your schedule please contact [facility] directly at [phone].  Normal or non-critical lab and imaging results will be communicated to you by MyChart, letter or phone within 4 business days after the clinic has received the results. If you do not hear from us within 7 days, please contact the clinic through MyChart or phone. If you have a  critical or abnormal lab result, we will notify you by phone as soon as possible.  Submit refill requests through VANDOLAY or call your pharmacy and they will forward the refill request to us. Please allow 3 business days for your refill to be completed.          Additional Information About Your Visit        Millicanhart Information     VANDOLAY gives you secure access to your electronic health record. If you see a primary care provider, you can also send messages to your care team and make appointments. If you have questions, please call your primary care clinic.  If you do not have a primary care provider, please call [phone] and they will assist you.        Care EveryWhere ID     This is your Care EveryWhere ID. This could be used by other organizations to access your [place] medical records  [id number]        Your Vitals Were     Pulse Temperature Pulse Oximetry BMI (Body Mass Index)          67 97.2  F (36.2  C) (Temporal) 98% 28.7 kg/m2         Blood Pressure from Last 3 Encounters:   08/29/18 118/82   08/06/18 118/86    Weight from Last 3 Encounters:   08/29/18 90.7 kg (200 lb)   08/06/18 90.9 kg (200 lb 6.4 oz)              We Performed the Following     AUDIOLOGY ADULT REFERRAL     AUDIOLOGY BALANCE REFERRAL     OTOLARYNGOLOGY REFERRAL        Primary Care Provider Office Phone # Fax #    Jun Cota [phone] [address] [facility] [address]        Equal Access to Services     [facility]: Hadii aad ku hadasho Soomaali, waaxda luqadaha, qaybta kaalmada adeegyada, waxay lavaro hayseann haider chavira . So [facility] [phone].    ATENCIÓN: Si habla español, tiene a suarez disposición servicios gratuitos de asistencia lingüística. Estella al [phone].    We comply with applicable federal civil rights laws and [place] laws. We do not discriminate on the basis of race, color, national origin, age, disability, sex, sexual orientation, or gender identity.             Thank you!     Thank you for choosing [facility]  for your care. Our goal is always to provide you with excellent care. Hearing back from our patients is one way we can continue to improve our services. Please take a few minutes to complete the written survey that you may receive in the mail after your visit with us. Thank you!             Your Updated Medication List - Protect others around you: Learn how to safely use, store and throw away your medicines at www.disposemymeds.org.          This list is accurate as of 8/29/18 10:50 AM.  Always use your most recent med list.                   Brand Name Dispense Instructions for use Diagnosis    hydrochlorothiazide 25 MG tablet    HYDRODIURIL     Take 25 mg by mouth    Essential hypertension, benign       lisinopril 10 MG tablet    PRINIVIL/ZESTRIL     Take 10 mg by mouth    Essential hypertension, benign       meclizine 25 MG tablet    ANTIVERT    270 tablet    Take 1 tablet (25 mg) by mouth 3 times daily as needed for dizziness    Recurrent vertigo       rosuvastatin 20 MG tablet    CRESTOR     Take 20 mg by mouth    Hyperlipidemia LDL goal <130

## 2018-08-29 NOTE — PROGRESS NOTES
AUDIOLOGY REPORT: HEARING EXAM    SUBJECTIVE:  Robert Archer is a 68 year old male referred to audiology from ENT by Dr. Hurtado for a hearing examination. Patient reports persistent lightheadedness and imbalance for the past 2 years, aural fullness in his right ear for the past 2 months, longstanding ringing in both ears, and believes he hears better in his right ear than his left ear.    OBJECTIVE:    Otoscopy:   RIGHT: clear ear canal   LEFT:  non-occluding cerumen    Tympanometry:   RIGHT:  normal eardrum mobility   LEFT:   normal eardrum mobility    Thresholds:   Pure Tone Thresholds assessed using standard techniques with good  reliability from 250-8000 Hz bilaterally using circumaural headphones.   RIGHT:  mild sensorineural hearing loss   LEFT:   mild sensorineural hearing loss    Speech Reception Threshold:   RIGHT:  15 dB HL   LEFT:    20 dB HL    Word Recognition Score:    RIGHT:  100% at 55 dB HL using NU-6 recorded word list   LEFT:    100% at 55 dB HL using NU-6 recorded word list    ASSESSMENT:  Sensorineural hearing loss of both ears  Tinnitus of both ears    Discussed results with the patient.     PLAN:  Patient was returned to ENT for follow up.     Venkata Caputo.  Licensed Audiologist, [place] #[id number]  [facility]  8/29/2018

## 2018-08-29 NOTE — LETTER
8/29/2018         RE: Robert Archer  [id number] [address] [facility] [address]        Dear Colleague,    Thank you for referring your patient, Robert Archer, to the [facility]. Please see a copy of my visit note below.    ENT Consultation    Robert Archer who is a 68 year old male seen in consultation at the request of Jun Cota MD.      History of Present Illness - Robert Archer is a 68 year old male here today with dizziness. Patient reports that about 4 years he had true vertigo that lasted at least a day, he was hospitalized. He reports that after this it turned into constant dizziness, lightheadedness, and balance issues. He denies any vertigo symptoms. No episodes of blacking out or fainting. He has not had any seizures in the past. No history of migraines. Patient has tinnitus in both ears. No scans of his head when he was hospitalized.       Body mass index is 28.7 kg/(m^2).    BP Readings from Last 1 Encounters:   08/06/18 118/86     BP noted to be well controlled today in office.     Robert IS NOT a smoker/uses chewing tobacco.       Past Medical History - No past medical history on file.    Current Medications -   Current Outpatient Prescriptions:      hydrochlorothiazide (HYDRODIURIL) 25 MG tablet, Take 25 mg by mouth, Disp: , Rfl:      lisinopril (PRINIVIL/ZESTRIL) 10 MG tablet, Take 10 mg by mouth, Disp: , Rfl:      meclizine (ANTIVERT) 25 MG tablet, Take 1 tablet (25 mg) by mouth 3 times daily as needed for dizziness, Disp: 270 tablet, Rfl: 1     rosuvastatin (CRESTOR) 20 MG tablet, Take 20 mg by mouth, Disp: , Rfl:     Allergies - No Known Allergies    Social History -   Social History     Social History     Marital status:      Spouse name: N/A     Number of children: N/A     Years of education: N/A     Social History Main Topics     Smoking status: Former Smoker     Years: 10.00     Smokeless tobacco: Never Used      Comment: quit 15 years ago      Alcohol use Yes       Comment: occ / 15 drinks a month      Drug use: No     Sexual activity: Yes     Partners: Female      Comment:       Other Topics Concern     Not on file     Social History Narrative     No narrative on file       Family History -   Family History   Problem Relation Age of Onset     Hypertension Mother      Hypertension Father        Review of Systems - As per HPI and PMHx, otherwise review of system review of the head and neck negative.    Physical Exam  There were no vitals taken for this visit.  BMI: There is no height or weight on file to calculate BMI.    General - The patient is well nourished and well developed, and appears to have good nutritional status.  Alert and oriented to person and place, answers questions and cooperates with examination appropriately.    SKIN - No suspicious lesions or rashes.  Respiration - No respiratory distress.  Head and Face - Normocephalic and atraumatic, with no gross asymmetry noted of the contour of the facial features.  The facial nerve is intact, with strong symmetric movements.    Voice and Breathing - The patient was breathing comfortably without the use of accessory muscles. The patients voice was clear and strong, and had appropriate pitch and quality.    Ears - Bilateral pinna and EACs with normal appearing overlying skin. Tympanic membrane intact with good mobility on pneumatic otoscopy bilaterally. Bony landmarks of the ossicular chain are normal. The tympanic membranes are normal in appearance. No retraction, perforation, or masses.  No fluid or purulence was seen in the external canal or the middle ear.     Eyes - Extraocular movements intact.  Sclera were not icteric or injected, conjunctiva were pink and moist.    Mouth - Examination of the oral cavity showed pink, healthy oral mucosa. No lesions or ulcerations noted.  The tongue was mobile and midline, and the dentition were in good condition.      Throat - The walls of the oropharynx were smooth,  pink, moist, symmetric, and had no lesions or ulcerations.  The tonsillar pillars and soft palate were symmetric.  The uvula was midline on elevation.    Neck - Normal midline excursion of the laryngotracheal complex during swallowing.  Full range of motion on passive movement.  Palpation of the occipital, submental, submandibular, internal jugular chain, and supraclavicular nodes did not demonstrate any abnormal lymph nodes or masses.  The carotid pulse was palpable bilaterally.  Palpation of the thyroid was soft and smooth, with no nodules or goiter appreciated.  The trachea was mobile and midline.    Nose - External contour is symmetric, no gross deflection or scars.  Nasal mucosa is pink and moist with no abnormal mucus.  The septum was midline and non-obstructive, turbinates of normal size and position.  No polyps, masses, or purulence noted on examination.    Neuro - Nonfocal neuro exam is normal, CN 2 through 12 intact, normal gait and muscle tone. Some unsteadiness with eyes closed.       Performed in clinic today:  Audiologic Studies - An audiogram and tympanogram were performed today as part of the evaluation and personally reviewed. The tympanogram shows Type A curves on the right and Type A curves on the left, with Normal canal volumes and middle ear pressures.  The audiogram showed Mild to moderate high-frequency SNHL on the right and Mild to moderate high-frequency SNHLon the left.        A/P - Robert Archer is a 68 year old male with dizziness. I would like patient to obtain a MRI of the brain at the [facility]. I also would like patient to be evaluated at the [facility].       This document serves as a record of the services and decisions personally performed and made by Dr. Krish Hurtado MD. It was created on his behalf by Meena Matthews, a trained medical scribe. The creation of this document is based the provider's statements to the medical scribe.  Meena Matthews  8/29/2018    Provider:   The information in this document, created by the medical scribe for me, accurately reflects the services I personally performed and the decisions made by me. I have reviewed and approved this document for accuracy prior to leaving the patient care area.  Dr. Krish Hurtado MD 8/29/2018    Krish Hurtado MD.      Again, thank you for allowing me to participate in the care of your patient.        Sincerely,        Krish Hurtado MD, MD

## 2018-08-29 NOTE — MR AVS SNAPSHOT
After Visit Summary   8/29/2018    Robert Archer    MRN: [id number]           Patient Information     Date Of Birth          1950        Visit Information        Provider Department      8/29/2018 8:45 AM Min Angulo [facility]D [facility]        Today's Diagnoses     Sensorineural hearing loss (SNHL) of both ears    -  1    Hearing loss of right ear, unspecified hearing loss type        Recurrent vertigo        Tinnitus of both ears           Follow-ups after your visit        Who to contact     If you have questions or need follow up information about today's clinic visit or your schedule please contact [facility] directly at [phone].  Normal or non-critical lab and imaging results will be communicated to you by RescueTimehart, letter or phone within 4 business days after the clinic has received the results. If you do not hear from us within 7 days, please contact the clinic through RescueTimehart or phone. If you have a critical or abnormal lab result, we will notify you by phone as soon as possible.  Submit refill requests through GEO'Supp or call your pharmacy and they will forward the refill request to us. Please allow 3 business days for your refill to be completed.          Additional Information About Your Visit        MyChart Information     GEO'Supp gives you secure access to your electronic health record. If you see a primary care provider, you can also send messages to your care team and make appointments. If you have questions, please call your primary care clinic.  If you do not have a primary care provider, please call [phone] and they will assist you.        Care EveryWhere ID     This is your Care EveryWhere ID. This could be used by other organizations to access your [place] medical records  [id number]         Blood Pressure from Last 3 Encounters:   08/29/18 118/82   08/06/18 118/86    Weight from Last 3 Encounters:   08/29/18 200 lb (90.7 kg)    08/06/18 200 lb 6.4 oz (90.9 kg)              We Performed the Following     AUDIOGRAM/TYMPANOGRAM - INTERFACE     AUDIOLOGY ADULT REFERRAL     COMPREHENSIVE HEARING TEST     TYMPANOMETRY        Primary Care Provider Office Phone # Fax #    Jun Cota [phone] [address]  [place] [address]        Equal Access to Services     [facility]: Hadii aad ku hadasho Soomaali, waaxda luqadaha, qaybta kaalmada adeegyada, waxay idiin hayaan adeeg kharash la'aan . So [facility] [phone].    ATENCIÓN: Si habla español, tiene a suarez disposición servicios gratuitos de asistencia lingüística. Llame al [phone].    We comply with applicable federal civil rights laws and [place] laws. We do not discriminate on the basis of race, color, national origin, age, disability, sex, sexual orientation, or gender identity.            Thank you!     Thank you for choosing [facility]  for your care. Our goal is always to provide you with excellent care. Hearing back from our patients is one way we can continue to improve our services. Please take a few minutes to complete the written survey that you may receive in the mail after your visit with us. Thank you!             Your Updated Medication List - Protect others around you: Learn how to safely use, store and throw away your medicines at www.disposemymeds.org.          This list is accurate as of 8/29/18  9:48 AM.  Always use your most recent med list.                   Brand Name Dispense Instructions for use Diagnosis    hydrochlorothiazide 25 MG tablet    HYDRODIURIL     Take 25 mg by mouth    Essential hypertension, benign       lisinopril 10 MG tablet    PRINIVIL/ZESTRIL     Take 10 mg by mouth    Essential hypertension, benign       meclizine 25 MG tablet    ANTIVERT    270 tablet    Take 1 tablet (25 mg) by mouth 3 times daily as needed for dizziness    Recurrent vertigo       rosuvastatin 20 MG tablet    CRESTOR      Take 20 mg by mouth    Hyperlipidemia LDL goal <130

## 2018-10-08 ENCOUNTER — TRANSFERRED RECORDS (OUTPATIENT)
Dept: HEALTH INFORMATION MANAGEMENT | Facility: CLINIC | Age: 68
End: 2018-10-08

## 2018-10-24 ENCOUNTER — ALLIED HEALTH/NURSE VISIT (OUTPATIENT)
Dept: FAMILY MEDICINE | Facility: CLINIC | Age: 68
End: 2018-10-24
Payer: COMMERCIAL

## 2018-10-24 DIAGNOSIS — Z23 NEED FOR PROPHYLACTIC VACCINATION AND INOCULATION AGAINST INFLUENZA: Primary | ICD-10-CM

## 2018-10-24 PROCEDURE — 90662 IIV NO PRSV INCREASED AG IM: CPT

## 2018-10-24 PROCEDURE — 99207 ZZC NO CHARGE NURSE ONLY: CPT

## 2018-10-24 PROCEDURE — 90471 IMMUNIZATION ADMIN: CPT

## 2018-10-24 NOTE — PROGRESS NOTES
Injectable Influenza Immunization Documentation    1.  Is the person to be vaccinated sick today?   No    2. Does the person to be vaccinated have an allergy to a component   of the vaccine?   No  Egg Allergy Algorithm Link    3. Has the person to be vaccinated ever had a serious reaction   to influenza vaccine in the past?   No    4. Has the person to be vaccinated ever had Guillain-Barré syndrome?   No    Form completed by Jeanne Merrill CMA    Prior to injection verified patient identity using patient's name and date of birth.  Due to injection administration, patient instructed to remain in clinic for 15 minutes  afterwards, and to report any adverse reaction to me immediately.

## 2018-10-24 NOTE — MR AVS SNAPSHOT
After Visit Summary   10/24/2018    Robert Archer    MRN: [id number]           Patient Information     Date Of Birth          1950        Visit Information        Provider Department      10/24/2018 9:00 AM RG FLU SHOT [place]        Today's Diagnoses     Need for prophylactic vaccination and inoculation against influenza    -  1       Follow-ups after your visit        Who to contact     If you have questions or need follow up information about today's clinic visit or your schedule please contact [facility] [place] directly at [phone].  Normal or non-critical lab and imaging results will be communicated to you by Solve Mediahart, letter or phone within 4 business days after the clinic has received the results. If you do not hear from us within 7 days, please contact the clinic through [employer] Systemst or phone. If you have a critical or abnormal lab result, we will notify you by phone as soon as possible.  Submit refill requests through Widemile or call your pharmacy and they will forward the refill request to us. Please allow 3 business days for your refill to be completed.          Additional Information About Your Visit        MyChart Information     Widemile gives you secure access to your electronic health record. If you see a primary care provider, you can also send messages to your care team and make appointments. If you have questions, please call your primary care clinic.  If you do not have a primary care provider, please call [phone] and they will assist you.        Care EveryWhere ID     This is your Care EveryWhere ID. This could be used by other organizations to access your [place] medical records  [id number]         Blood Pressure from Last 3 Encounters:   08/29/18 118/82   08/06/18 118/86    Weight from Last 3 Encounters:   08/29/18 200 lb (90.7 kg)   08/06/18 200 lb 6.4 oz (90.9 kg)              We Performed the Following     FLU VACCINE, INCREASED ANTIGEN, PRESV  FREE, AGE 65+ [44483]     Vaccine Administration, Initial [[id number]]        Primary Care Provider Office Phone # Fax #    Jun Cota [phone] [address] [facility] [id number]        Equal Access to Services     [facility] CYNDIE : Hadii aad ku hadasho Soomaali, waaxda luqadaha, qaybta kaalmada adeegyada, waxay shakirain hayaan adeabundio fredaoscar lalukasz calloway. So [facility] [phone].    ATENCIÓN: Si habla español, tiene a suarez disposición servicios gratuitos de asistencia lingüística. Estella al [phone].    We comply with applicable federal civil rights laws and [place] laws. We do not discriminate on the basis of race, color, national origin, age, disability, sex, sexual orientation, or gender identity.            Thank you!     Thank you for choosing [facility]  for your care. Our goal is always to provide you with excellent care. Hearing back from our patients is one way we can continue to improve our services. Please take a few minutes to complete the written survey that you may receive in the mail after your visit with us. Thank you!             Your Updated Medication List - Protect others around you: Learn how to safely use, store and throw away your medicines at www.disposemymeds.org.          This list is accurate as of 10/24/18  9:10 AM.  Always use your most recent med list.                   Brand Name Dispense Instructions for use Diagnosis    hydrochlorothiazide 25 MG tablet    HYDRODIURIL     Take 25 mg by mouth    Essential hypertension, benign       lisinopril 10 MG tablet    PRINIVIL/ZESTRIL     Take 10 mg by mouth    Essential hypertension, benign       meclizine 25 MG tablet    ANTIVERT    270 tablet    Take 1 tablet (25 mg) by mouth 3 times daily as needed for dizziness    Recurrent vertigo       rosuvastatin 20 MG tablet    CRESTOR     Take 20 mg by mouth    Hyperlipidemia LDL goal <130

## 2018-12-21 NOTE — TELEPHONE ENCOUNTER
Forms faxed    Pt informed.  We will see what happens when he tries to fill the mediation in January.  If pharmacy informs us that a PA is needed.  We can pursue one at that time.

## 2018-12-21 NOTE — TELEPHONE ENCOUNTER
Please notify patient that his insurance has requested a change to his meclizine. The other alternatives that are recommended are not appropriate for vertigo if that is why he is on this.     Please schedule follow up if needed.

## 2018-12-21 NOTE — TELEPHONE ENCOUNTER
Forms from [employer] placed in SF box to review and sign    requesting a chosen alternative to meclizine.  Please review form and check appropriate alternative medication and fax back to [employer] at [phone]

## 2019-09-11 NOTE — PATIENT INSTRUCTIONS
Patient Education   Personalized Prevention Plan  You are due for the preventive services outlined below.  Your care team is available to assist you in scheduling these services.  If you have already completed any of these items, please share that information with your care team to update in your medical record.  Health Maintenance Due   Topic Date Due     Discuss Advance Care Planning  1950     Colonscopy  08/12/1958     Zoster (Shingles) Vaccine (1 of 2) 08/12/1998     PHQ-2  01/01/2019     Annual Wellness Visit  08/06/2019     FALL RISK ASSESSMENT  08/06/2019     Pneumococcal Vaccine (2 of 2 - PPSV23) 08/06/2019     Flu Vaccine (1) 09/01/2019        Patient Education   Personalized Prevention Plan  You are due for the preventive services outlined below.  Your care team is available to assist you in scheduling these services.  If you have already completed any of these items, please share that information with your care team to update in your medical record.  Health Maintenance Due   Topic Date Due     Discuss Advance Care Planning  1950     Colonscopy  08/12/1958     Zoster (Shingles) Vaccine (1 of 2) 08/12/1998     PHQ-2  01/01/2019     Annual Wellness Visit  08/06/2019     FALL RISK ASSESSMENT  08/06/2019     Pneumococcal Vaccine (2 of 2 - PPSV23) 08/06/2019     Flu Vaccine (1) 09/01/2019

## 2019-09-11 NOTE — PROGRESS NOTES
"SUBJECTIVE:   Robert Archer is a 69 year old male who presents for Preventive Visit.    Are you in the first 12 months of your Medicare coverage?  No    Healthy Habits:     In general, how would you rate your overall health?  Good    Frequency of exercise:  6-7 days/week    Duration of exercise:  45-60 minutes    Do you usually eat at least 4 servings of fruit and vegetables a day, include whole grains    & fiber and avoid regularly eating high fat or \"junk\" foods?  Yes    Taking medications regularly:  Yes    Barriers to taking medications:  None    Medication side effects:  Lightheadedness    Ability to successfully perform activities of daily living:  No assistance needed    Home Safety:  No safety concerns identified    Hearing Impairment:  No hearing concerns    In the past 6 months, have you been bothered by leaking of urine?  No    In general, how would you rate your overall mental or emotional health?  Excellent      PHQ-2 Total Score: 0    Additional concerns today:  No    Do you feel safe in your environment? Yes    Do you have a Health Care Directive? No: Advance care planning reviewed with patient; information given to patient to review.    Fall risk  Fallen 2 or more times in the past year?: No  Any fall with injury in the past year?: No    Cognitive Screening   1) Repeat 3 items (Leader, Season, Table)    2) Clock draw: NORMAL  3) 3 item recall: Recalls 3 objects  Results: 3 items recalled: COGNITIVE IMPAIRMENT LESS LIKELY    Mini-CogTM Copyright S Ryan. Licensed by the author for use in [facility]; reprinted with permission (grace@.[facility]). All rights reserved.      Do you have sleep apnea, excessive snoring or daytime drowsiness?: no    Reviewed and updated as needed this visit by clinical staff  Tobacco  Allergies  Meds  Med Hx  Surg Hx  Fam Hx  Soc Hx        Reviewed and updated as needed this visit by Provider        Social History     Tobacco Use     Smoking status: Former " Smoker     Years: 10.00     Smokeless tobacco: Never Used     Tobacco comment: quit 15 years ago    Substance Use Topics     Alcohol use: Yes     Comment: occ / 15 drinks a month      If you drink alcohol do you typically have >3 drinks per day or >7 drinks per week? No    Alcohol Use 9/17/2019   Prescreen: >3 drinks/day or >7 drinks/week? No       Dizziness  Onset: 6 years ago    Description:   Do you feel faint:  no   Does it feel like the surroundings (bed, room) are moving: no   Unsteady/off balance: YES  Have you passed out or fallen: no     Intensity: moderate    Progression of Symptoms:  same    Accompanying Signs & Symptoms:  Heart palpitations: no   Nausea, vomiting: no   Weakness in arms or legs: no   Fatigue: no   Vision or speech changes: no   Ringing in ears (Tinnitus): YES  Hearing Loss: no     History:   Head trauma/concussion hx: no   Previous similar symptoms: no   Recent bleeding history: no     Precipitating factors:   Worse with activity or head movement: no   Any new medications (BP?): no   Alcohol/drug abuse/withdrawal: no     Alleviating factors:   Does staying in a fixed position give relief:  YES    Therapies Tried and outcome: none   The patient states that he used to Meclizine for his dizziness. He notes that this medication helped his dizziness. He states that the dizziness occurs once a week.   He states that he does not have any medication and would like refills. He declines difficulty with medication or worsening dizziness.   He notes that the dizziness occurred after a vertigo attack.       Exercise   The patient states that he walks at least 10,000 steps daily for exercise.     Cardiology   The patient states that there is plaque noted around blood vessels surrounding his heart. He notes that he has an appointment to follow up with Cardiology in October. No history of myocardial infarction. No chest pain or shortness of breath. No other concerns.     Colon Cancer screening   The  patient states that he completed ColoGuard.     Current providers sharing in care for this patient include:   Patient Care Team:  Yanique Granados MD as PCP - General (Family Practice)  Jun Cota MD as Assigned PCP    The following health maintenance items are reviewed in Epic and correct as of today:  Health Maintenance   Topic Date Due     ADVANCE CARE PLANNING  1950     COLONOSCOPY  08/12/1958     ZOSTER IMMUNIZATION (1 of 2) 08/12/1998     PHQ-2  01/01/2019     MEDICARE ANNUAL WELLNESS VISIT  08/06/2019     FALL RISK ASSESSMENT  08/06/2019     PNEUMOCOCCAL IMMUNIZATION 65+ LOW/MEDIUM RISK (2 of 2 - PPSV23) 08/06/2019     INFLUENZA VACCINE (1) 09/01/2019     LIPID  08/14/2023     DTAP/TDAP/TD IMMUNIZATION (2 - Td) 08/06/2028     HEPATITIS C SCREENING  Completed     AORTIC ANEURYSM SCREENING (SYSTEM ASSIGNED)  Completed     IPV IMMUNIZATION  Aged Out     MENINGITIS IMMUNIZATION  Aged Out     BP Readings from Last 3 Encounters:   09/17/19 126/82   08/29/18 118/82   08/06/18 118/86    Wt Readings from Last 3 Encounters:   09/17/19 95.2 kg (209 lb 14.4 oz)   08/29/18 90.7 kg (200 lb)   08/06/18 90.9 kg (200 lb 6.4 oz)                  Patient Active Problem List   Diagnosis     Essential hypertension, benign     Hyperlipidemia LDL goal <130     History reviewed. No pertinent surgical history.    Social History     Tobacco Use     Smoking status: Former Smoker     Years: 10.00     Smokeless tobacco: Never Used     Tobacco comment: quit 15 years ago    Substance Use Topics     Alcohol use: Yes     Comment: occ / 15 drinks a month      Family History   Problem Relation Age of Onset     Hypertension Mother      Hypertension Father          Current Outpatient Medications   Medication Sig Dispense Refill     hydrochlorothiazide (HYDRODIURIL) 25 MG tablet Take 25 mg by mouth       lisinopril (PRINIVIL/ZESTRIL) 10 MG tablet Take 10 mg by mouth       meclizine (ANTIVERT) 25 MG tablet Take 1 tablet (25 mg)  "by mouth 3 times daily as needed for dizziness 270 tablet 1     rosuvastatin (CRESTOR) 20 MG tablet Take 20 mg by mouth       No Known Allergies  Recent Labs   Lab Test 08/14/18  0827   LDL 73   HDL 54   TRIG 85   ALT 31   CR 1.05   GFRESTIMATED 70   GFRESTBLACK 84   POTASSIUM 4.1   TSH 0.90        Review of Systems   Constitutional: Negative for chills and fever.   HENT: Positive for congestion. Negative for ear pain, hearing loss and sore throat.    Eyes: Negative for visual disturbance.   Respiratory: Negative for cough and shortness of breath.    Cardiovascular: Negative for chest pain, palpitations and peripheral edema.   Gastrointestinal: Negative for abdominal pain, constipation, diarrhea, heartburn, hematochezia and nausea.   Genitourinary: Negative for discharge, dysuria, frequency, genital sores, hematuria, impotence and urgency.   Musculoskeletal: Negative for arthralgias, joint swelling and myalgias.   Skin: Negative for rash.   Neurological: Positive for dizziness. Negative for weakness, headaches and paresthesias.   Psychiatric/Behavioral: Negative for mood changes. The patient is not nervous/anxious.      This document serves as a record of the services and decisions personally performed and made by Yanique Granados MD. It was created on her behalf by Ana Masterson, a trained medical scribe. The creation of this document is based the provider's statements to the medical scribe.    Ana Masterson September 17, 2019 11:38 AM  OBJECTIVE:   /82 (BP Location: Left arm, Patient Position: Sitting, Cuff Size: Adult Regular)   Pulse 69   Temp 97.9  F (36.6  C) (Temporal)   Resp 12   Ht 1.782 m (5' 10.16\")   Wt 95.2 kg (209 lb 14.4 oz)   SpO2 98%   BMI 29.98 kg/m   Estimated body mass index is 29.98 kg/m  as calculated from the following:    Height as of this encounter: 1.782 m (5' 10.16\").    Weight as of this encounter: 95.2 kg (209 lb 14.4 oz).  Physical Exam  GENERAL: healthy, alert and no " distress  EYES: Eyes grossly normal to inspection, PERRL and conjunctivae and sclerae normal  HENT: ear canals and TM's normal, nose and mouth without ulcers or lesions  NECK: no adenopathy, no asymmetry, masses, or scars and thyroid normal to palpation  RESP: lungs clear to auscultation - no rales, rhonchi or wheezes  CV: regular rate and rhythm, normal S1 S2, no S3 or S4, no murmur, click or rub, no peripheral edema and peripheral pulses strong  ABDOMEN: soft, nontender, no hepatosplenomegaly, no masses and bowel sounds normal  RECTAL: patient declined rectal and prostate exam  MS: no gross musculoskeletal defects noted, no edema  SKIN: no suspicious lesions or rashes  NEURO: Normal strength and tone, mentation intact and speech normal  PSYCH: mentation appears normal, affect normal/bright    Diagnostic Test Results:  No results found for this or any previous visit (from the past 24 hour(s)).    ASSESSMENT / PLAN:   1. Encounter for Medicare annual wellness exam  Medicare annual wellness exam was administered today.   See counseling messages below.     2. Needs flu shot  Advised patient of Flu vaccine recommendations as flu season approaches.    Flu vaccine was administered today.   - HC FLU VACCINE, INCREASED ANTIGEN, PRESV FREE [72168]    3. Recurrent vertigo  Patient reports previously utilizing Meclizine for dizziness- is out and would like refills.   He reports Meclizine relieves dizziness.   He declines side effects from medication or worsening dizziness.   Patient reports dizziness started after vertigo attack.   Doing well. Well controlled. Tolerating medication.  No change in plan.   Refills have been ordered.   - meclizine (ANTIVERT) 25 MG tablet; Take 1 tablet (25 mg) by mouth 3 times daily as needed for dizziness  Dispense: 270 tablet; Refill: 1    4. Hyperlipidemia LDL goal <130  Patient will have labs completed with Cardiology as he is not fasting today.   No change in plan.     5. Essential  "hypertension, benign  Managed with cardiology.     End of Life Planning:  Patient currently has an advanced directive: No.  I have verified the patient's ablity to prepare an advanced directive/make health care decisions.  Literature was provided to assist patient in preparing an advanced directive.    COUNSELING:  Special attention given to:       Regular exercise       Healthy diet/nutrition       Vision screening       Hearing screening       Dental care       Bladder control       Fall risk prevention    Estimated body mass index is 29.98 kg/m  as calculated from the following:    Height as of this encounter: 1.782 m (5' 10.16\").    Weight as of this encounter: 95.2 kg (209 lb 14.4 oz).    Weight management plan: Discussed healthy diet and exercise guidelines     reports that he has quit smoking. He quit after 10.00 years of use. He has never used smokeless tobacco.      Appropriate preventive services were discussed with this patient, including applicable screening as appropriate for cardiovascular disease, diabetes, osteopenia/osteoporosis, and glaucoma.  As appropriate for age/gender, discussed screening for colorectal cancer, prostate cancer, breast cancer, and cervical cancer. Checklist reviewing preventive services available has been given to the patient.    Reviewed patients plan of care and provided an AVS. The Basic Care Plan (routine screening as documented in Health Maintenance) for Robert meets the Care Plan requirement. This Care Plan has been established and reviewed with the Patient.    Counseling Resources:  ATP IV Guidelines  Pooled Cohorts Equation Calculator  Breast Cancer Risk Calculator  FRAX Risk Assessment  ICSI Preventive Guidelines  Dietary Guidelines for Americans, 2010  USDA's MyPlate  ASA Prophylaxis  Lung CA Screening    The information in this document, created by the medical scribe for me, accurately reflects the services I personally performed and the decisions made by me. I have " reviewed and approved this document for accuracy prior to leaving the patient care area.    MICHELLE ESPINOZA MD, MD  [facility]    Identified Health Risks:

## 2019-09-17 ENCOUNTER — OFFICE VISIT (OUTPATIENT)
Dept: FAMILY MEDICINE | Facility: CLINIC | Age: 69
End: 2019-09-17
Payer: COMMERCIAL

## 2019-09-17 VITALS
BODY MASS INDEX: 30.05 KG/M2 | RESPIRATION RATE: 12 BRPM | DIASTOLIC BLOOD PRESSURE: 82 MMHG | OXYGEN SATURATION: 98 % | WEIGHT: 209.9 LBS | TEMPERATURE: 97.9 F | SYSTOLIC BLOOD PRESSURE: 126 MMHG | HEART RATE: 69 BPM | HEIGHT: 70 IN

## 2019-09-17 DIAGNOSIS — Z00.00 ENCOUNTER FOR MEDICARE ANNUAL WELLNESS EXAM: Primary | ICD-10-CM

## 2019-09-17 DIAGNOSIS — I10 ESSENTIAL HYPERTENSION, BENIGN: ICD-10-CM

## 2019-09-17 DIAGNOSIS — Z23 NEEDS FLU SHOT: ICD-10-CM

## 2019-09-17 DIAGNOSIS — E78.5 HYPERLIPIDEMIA LDL GOAL <130: ICD-10-CM

## 2019-09-17 DIAGNOSIS — R42 RECURRENT VERTIGO: ICD-10-CM

## 2019-09-17 PROCEDURE — 99213 OFFICE O/P EST LOW 20 MIN: CPT | Mod: 25 | Performed by: FAMILY MEDICINE

## 2019-09-17 PROCEDURE — G0439 PPPS, SUBSEQ VISIT: HCPCS | Performed by: FAMILY MEDICINE

## 2019-09-17 PROCEDURE — 90662 IIV NO PRSV INCREASED AG IM: CPT | Performed by: FAMILY MEDICINE

## 2019-09-17 PROCEDURE — 90732 PPSV23 VACC 2 YRS+ SUBQ/IM: CPT | Performed by: FAMILY MEDICINE

## 2019-09-17 PROCEDURE — G0009 ADMIN PNEUMOCOCCAL VACCINE: HCPCS | Performed by: FAMILY MEDICINE

## 2019-09-17 PROCEDURE — G0008 ADMIN INFLUENZA VIRUS VAC: HCPCS | Performed by: FAMILY MEDICINE

## 2019-09-17 RX ORDER — MECLIZINE HYDROCHLORIDE 25 MG/1
25 TABLET ORAL 3 TIMES DAILY PRN
Qty: 270 TABLET | Refills: 1 | Status: SHIPPED | OUTPATIENT
Start: 2019-09-17 | End: 2020-09-15

## 2019-09-17 ASSESSMENT — ENCOUNTER SYMPTOMS
HEADACHES: 0
WEAKNESS: 0
DYSURIA: 0
DIZZINESS: 1
CONSTIPATION: 0
NERVOUS/ANXIOUS: 0
ABDOMINAL PAIN: 0
COUGH: 0
MYALGIAS: 0
JOINT SWELLING: 0
HEARTBURN: 0
FEVER: 0
PALPITATIONS: 0
HEMATOCHEZIA: 0
SHORTNESS OF BREATH: 0
PARESTHESIAS: 0
HEMATURIA: 0
SORE THROAT: 0
FREQUENCY: 0
NAUSEA: 0
ARTHRALGIAS: 0
CHILLS: 0
DIARRHEA: 0

## 2019-09-17 ASSESSMENT — PAIN SCALES - GENERAL: PAINLEVEL: NO PAIN (0)

## 2019-09-17 ASSESSMENT — MIFFLIN-ST. JEOR: SCORE: 1715.84

## 2019-09-17 ASSESSMENT — ACTIVITIES OF DAILY LIVING (ADL): CURRENT_FUNCTION: NO ASSISTANCE NEEDED

## 2019-09-17 NOTE — NURSING NOTE
Prior to immunization administration, verified patients identity using patient s name and date of birth. Please see Immunization Activity for additional information.     Screening Questionnaire for Adult Immunization    Are you sick today?   No   Do you have allergies to medications, food, a vaccine component or latex?   No   Have you ever had a serious reaction after receiving a vaccination?   No   Do you have a long-term health problem with heart disease, lung disease, asthma, kidney disease, metabolic disease (e.g. diabetes), anemia, or other blood disorder?   No   Do you have cancer, leukemia, HIV/AIDS, or any other immune system problem?   No   In the past 3 months, have you taken medications that affect  your immune system, such as prednisone, other steroids, or anticancer drugs; drugs for the treatment of rheumatoid arthritis, Crohn s disease, or psoriasis; or have you had radiation treatments?   No   Have you had a seizure, or a brain or other nervous system problem?   No   During the past year, have you received a transfusion of blood or blood     products, or been given immune (gamma) globulin or antiviral drug?   No   For women: Are you pregnant or is there a chance you could become        pregnant during the next month?   No   Have you received any vaccinations in the past 4 weeks?   No     Immunization questionnaire answers were all negative.        Per orders of Dr. Granados, injection of PP23 and flu given by Joseph Cantu MA. Patient instructed to remain in clinic for 15 minutes afterwards, and to report any adverse reaction to me immediately.       Screening performed by Joseph Cantu MA on 9/17/2019 at 12:10 PM.

## 2019-09-29 ENCOUNTER — HEALTH MAINTENANCE LETTER (OUTPATIENT)
Age: 69
End: 2019-09-29

## 2019-11-06 LAB
CHOLEST SERPL-MCNC: 151 MG/DL (ref 100–199)
CREAT SERPL-MCNC: 0.96 MG/DL (ref 0.72–1.25)
GFR SERPL CREATININE-BSD FRML MDRD: >60 ML/MIN/1.73M2
GLUCOSE SERPL-MCNC: 102 MG/DL (ref 65–100)
HDLC SERPL-MCNC: 49 MG/DL
LDLC SERPL CALC-MCNC: 82 MG/DL
NONHDLC SERPL-MCNC: 102 MG/DL
POTASSIUM SERPL-SCNC: 3.9 MMOL/L (ref 3.5–5)
TRIGL SERPL-MCNC: 99 MG/DL

## 2020-09-13 DIAGNOSIS — R42 RECURRENT VERTIGO: ICD-10-CM

## 2020-09-14 NOTE — PROGRESS NOTES
"SUBJECTIVE:   Robert Archer is a 70 year old male who presents for Preventive Visit.    Are you in the first 12 months of your Medicare coverage?  No    Healthy Habits:    In general, how would you rate your overall health?  Good    Frequency of exercise:  6-7 days/week    Duration of exercise:  Greater than 60 minutes    Do you usually eat at least 4 servings of fruit and vegetables a day, include whole grains    & fiber and avoid regularly eating high fat or \"junk\" foods?  Yes    Taking medications regularly:  Yes    Barriers to taking medications:  Not applicable    Medication side effects:  Not applicable    Ability to successfully perform activities of daily living:  No assistance needed    Home Safety:  No safety concerns identified    Hearing Impairment:  Feel that people are mumbling or not speaking clearly and need to ask people to speak up or repeat themselves    In the past 6 months, have you been bothered by leaking of urine?  No    In general, how would you rate your overall mental or emotional health?  Very good      PHQ-2 Total Score:    Additional concerns today:  Yes (possible peripheral neuropathy )    Patient reports feeling numbness in his bilateral feet from the midfoot to the toes for last 3-4 years. Has not progressed. Feels it affects his balance. No other concerns. He has not fallen yet because of this, but would be ok with further work-up and PT.    Discussed sun screen and hat use.    Ok with flu shot today. Encouraged shingles vaccine at his pharmacy.    Do you feel safe in your environment? Yes    Have you ever done Advance Care Planning? (For example, a Health Directive, POLST, or a discussion with a medical provider or your loved ones about your wishes): No, advance care planning information given to patient to review.  Patient plans to discuss their wishes with loved ones or provider.      Declines hearing test.    Fall risk  Fallen 2 or more times in the past year?: No  Any fall with " injury in the past year?: No    Cognitive Screening   1) Repeat 3 items (Leader, Season, Table)    2) Clock draw: NORMAL  3) 3 item recall: .Recalls 2 objects   Results: NORMAL clock, 1-2 items recalled: COGNITIVE IMPAIRMENT LESS LIKELY    Mini-CogTM Copyright S Ryan. Licensed by the author for use in [facility]; reprinted with permission (grace@[facility]). All rights reserved.      Do you have sleep apnea, excessive snoring or daytime drowsiness?: no    Reviewed and updated as needed this visit by clinical staff  Tobacco  Allergies  Meds  Problems  Med Hx  Surg Hx  Fam Hx  Soc Hx        Reviewed and updated as needed this visit by Provider  Tobacco  Allergies  Meds  Problems  Med Hx  Surg Hx  Fam Hx  Soc Hx         Social History     Tobacco Use     Smoking status: Former Smoker     Years: 10.00     Smokeless tobacco: Never Used     Tobacco comment: quit 15 years ago    Substance Use Topics     Alcohol use: Yes     Comment: occ / 15 drinks a month      Alcohol Use 9/17/2019   Prescreen: >3 drinks/day or >7 drinks/week? No   Prescreen: >3 drinks/day or >7 drinks/week? -     Current providers sharing in care for this patient include:   Patient Care Team:  Jose Barrera MD as PCP - General (Family Practice)  Yanique Granados MD as Assigned PCP    The following health maintenance items are reviewed in Epic and correct as of today:  Health Maintenance   Topic Date Due     ADVANCE CARE PLANNING  1950     ZOSTER IMMUNIZATION (1 of 2) 08/12/1998     PHQ-2  01/01/2020     INFLUENZA VACCINE (1) 09/01/2020     FALL RISK ASSESSMENT  09/17/2020     MEDICARE ANNUAL WELLNESS VISIT  09/21/2021     LIPID  08/14/2023     DTAP/TDAP/TD IMMUNIZATION (2 - Td) 08/06/2028     HEPATITIS C SCREENING  Completed     PNEUMOCOCCAL IMMUNIZATION 65+ LOW/MEDIUM RISK  Completed     AORTIC ANEURYSM SCREENING (SYSTEM ASSIGNED)  Completed     IPV IMMUNIZATION  Aged Out     MENINGITIS IMMUNIZATION  Aged Out  "    HEPATITIS B IMMUNIZATION  Aged Out     COLORECTAL CANCER SCREENING  Discontinued     BP Readings from Last 3 Encounters:   09/21/20 130/82   09/17/19 126/82   08/29/18 118/82    Wt Readings from Last 3 Encounters:   09/21/20 91.6 kg (202 lb)   09/17/19 95.2 kg (209 lb 14.4 oz)   08/29/18 90.7 kg (200 lb)          Patient Active Problem List   Diagnosis     Essential hypertension, benign     Hyperlipidemia LDL goal <130     Past Surgical History:   Procedure Laterality Date     NO HISTORY OF SURGERY         Social History     Tobacco Use     Smoking status: Former Smoker     Years: 10.00     Smokeless tobacco: Never Used     Tobacco comment: quit 15 years ago    Substance Use Topics     Alcohol use: Yes     Comment: occ / 15 drinks a month      Family History   Problem Relation Age of Onset     Hypertension Mother      Ovarian Cancer Mother      Hypertension Father      Heart Disease Father 83     Colon Cancer No family hx of      Prostate Cancer No family hx of      Breast Cancer No family hx of      Ulcerative Colitis No family hx of      Celiac Disease No family hx of      Crohn's Disease No family hx of          Current Outpatient Medications   Medication Sig Dispense Refill     lisinopril (PRINIVIL/ZESTRIL) 10 MG tablet Take 10 mg by mouth       meclizine (ANTIVERT) 25 MG tablet TAKE 1 TABLET BY MOUTH 3 TIMES DAILY AS NEEDED FOR DIZZINESS 90 tablet 0     rosuvastatin (CRESTOR) 20 MG tablet Take 20 mg by mouth       No Known Allergies    Review of Systems  Constitutional, HEENT, cardiovascular, pulmonary, GI, , musculoskeletal, neuro, skin, endocrine and psych systems are negative, except as otherwise noted.    OBJECTIVE:   /82   Pulse 68   Temp 97.4  F (36.3  C) (Temporal)   Ht 1.782 m (5' 10.16\")   Wt 91.6 kg (202 lb)   SpO2 99%   BMI 28.85 kg/m   Estimated body mass index is 28.85 kg/m  as calculated from the following:    Height as of this encounter: 1.782 m (5' 10.16\").    Weight as of this " encounter: 91.6 kg (202 lb).  Physical Exam  GENERAL: healthy, alert and no distress  EYES: Eyes grossly normal to inspection, PERRL and conjunctivae and sclerae normal  HENT: Cerumen impaction. ear canals and TM's normal, nose and mouth without ulcers or lesions  NECK: no adenopathy, no asymmetry, masses, or scars and thyroid normal to palpation  RESP: lungs clear to auscultation - no rales, rhonchi or wheezes  CV: regular rate and rhythm, normal S1 S2, no S3 or S4, no murmur, click or rub, no peripheral edema and peripheral pulses strong  ABDOMEN: soft, nontender, no hepatosplenomegaly, no masses and bowel sounds normal  MS: no gross musculoskeletal defects noted, no edema  SKIN: Multiple actinic keratosis on ears. No suspicious lesions or rashes  NEURO: Normal strength and tone, mentation intact and speech normal  PSYCH: mentation appears normal, affect normal/bright    Diagnostic Test Results:  Labs reviewed in Epic  Results for orders placed or performed in visit on 09/21/20 (from the past 24 hour(s))   Basic metabolic panel  (Ca, Cl, CO2, Creat, Gluc, K, Na, BUN)   Result Value Ref Range    Sodium 140 133 - 144 mmol/L    Potassium 4.0 3.4 - 5.3 mmol/L    Chloride 107 94 - 109 mmol/L    Carbon Dioxide 26 20 - 32 mmol/L    Anion Gap 7 3 - 14 mmol/L    Glucose 96 70 - 99 mg/dL    Urea Nitrogen 9 7 - 30 mg/dL    Creatinine 0.80 0.66 - 1.25 mg/dL    GFR Estimate 89 >60 mL/min/[1.73_m2]    GFR Estimate If Black >90 >60 mL/min/[1.73_m2]    Calcium 9.3 8.5 - 10.1 mg/dL   CBC with platelets   Result Value Ref Range    WBC 7.0 4.0 - 11.0 10e9/L    RBC Count 4.79 4.4 - 5.9 10e12/L    Hemoglobin 16.0 13.3 - 17.7 g/dL    Hematocrit 47.8 40.0 - 53.0 %     78 - 100 fl    MCH 33.4 (H) 26.5 - 33.0 pg    MCHC 33.5 31.5 - 36.5 g/dL    RDW 12.9 10.0 - 15.0 %    Platelet Count 195 150 - 450 10e9/L   TSH with free T4 reflex   Result Value Ref Range    TSH 0.66 0.40 - 4.00 mU/L   Lipid panel reflex to direct LDL Fasting    Result Value Ref Range    Cholesterol 181 <200 mg/dL    Triglycerides 135 <150 mg/dL    HDL Cholesterol 68 >39 mg/dL    LDL Cholesterol Calculated 86 <100 mg/dL    Non HDL Cholesterol 113 <130 mg/dL     ASSESSMENT / PLAN:   1. Encounter for Medicare annual wellness exam: Discussed personal health and safety. Flu shot given. Check routine labs and for conditions outlined. Follow-up in 1 year. Prevent falls with referrals. Should have skin check given multiple actinic keratosis.  - HC FLU VACCINE, INCREASED ANTIGEN, PRESV FREE [90662]  - VACCINE ADMINISTRATION, INITIAL  - DERMATOLOGY ADULT REFERRAL; Future  - PHYSICAL THERAPY REFERRAL; Future  - NEUROLOGY ADULT REFERRAL  - Basic metabolic panel  (Ca, Cl, CO2, Creat, Gluc, K, Na, BUN)  - CBC with platelets  - TSH with free T4 reflex  - Lipid panel reflex to direct LDL Fasting    2. Essential hypertension, benign: Controlled. Continue on current meds. Due for labs which are normal today.  - Basic metabolic panel  (Ca, Cl, CO2, Creat, Gluc, K, Na, BUN)    3. Hyperlipidemia LDL goal <130: Normal labs. Continue Crestor.  - Lipid panel reflex to direct LDL Fasting    4. Loss of balance: Foot exam normal and monofilament test normal. Encourage PT referral for gate training and EMG with neurology looking for neuropathy. Stable per patient for a few years now.  - PHYSICAL THERAPY REFERRAL; Future  - NEUROLOGY ADULT REFERRAL    5. Actinic keratosis  - DERMATOLOGY ADULT REFERRAL; Future    6. Need for vaccination  - HC FLU VACCINE, INCREASED ANTIGEN, PRESV FREE [90662]  - VACCINE ADMINISTRATION, INITIAL    7. Screening for diabetes mellitus  - Basic metabolic panel  (Ca, Cl, CO2, Creat, Gluc, K, Na, BUN)    8. Screening for thyroid disorder  - TSH with free T4 reflex    9. Bilateral impacted cerumen: Noted on exam. Irrigated by MA staff and then removed by myself using curette.  - HC REMOVAL IMPACTED CERUMEN IRRIGATION/LVG UNILAT  - REMOVE IMPACTED  "CERUMEN      COUNSELING:  Reviewed preventive health counseling, as reflected in patient instructions       Regular exercise       Healthy diet/nutrition       Vision screening       Hearing screening       Dental care       Bladder control       Fall risk prevention       Immunizations    Vaccinated for: Influenza         Colon cancer screening - up to date.       Prostate cancer screening - declines         Estimated body mass index is 28.85 kg/m  as calculated from the following:    Height as of this encounter: 1.782 m (5' 10.16\").    Weight as of this encounter: 91.6 kg (202 lb).    Weight management plan: Discussed healthy diet and exercise guidelines    He reports that he has quit smoking. He quit after 10.00 years of use. He has never used smokeless tobacco.      Appropriate preventive services were discussed with this patient, including applicable screening as appropriate for cardiovascular disease, diabetes, osteopenia/osteoporosis, and glaucoma.  As appropriate for age/gender, discussed screening for colorectal cancer, prostate cancer, breast cancer, and cervical cancer. Checklist reviewing preventive services available has been given to the patient.    Reviewed patients plan of care and provided an AVS. The Basic Care Plan (routine screening as documented in Health Maintenance) for Robert meets the Care Plan requirement. This Care Plan has been established and reviewed with the Patient.    Counseling Resources:  ATP IV Guidelines  Pooled Cohorts Equation Calculator  Breast Cancer Risk Calculator  Breast Cancer: Medication to Reduce Risk  FRAX Risk Assessment  ICSI Preventive Guidelines  Dietary Guidelines for Americans, 2010  USDA's MyPlate  ASA Prophylaxis  Lung CA Screening    Jose Barrera MD  [facility]    This chart is completed utilizing dictation software; typos and/or incorrect word substitutions may unintentionally occur.      Identified Health Risks:  "

## 2020-09-14 NOTE — PATIENT INSTRUCTIONS
Patient Education   Personalized Prevention Plan  You are due for the preventive services outlined below.  Your care team is available to assist you in scheduling these services.  If you have already completed any of these items, please share that information with your care team to update in your medical record.  Health Maintenance Due   Topic Date Due     Discuss Advance Care Planning  1950     Zoster (Shingles) Vaccine (1 of 2) 08/12/1998     PHQ-2  01/01/2020     Flu Vaccine (1) 09/01/2020     Annual Wellness Visit  09/17/2020     FALL RISK ASSESSMENT  09/17/2020

## 2020-09-15 RX ORDER — MECLIZINE HYDROCHLORIDE 25 MG/1
TABLET ORAL
Qty: 90 TABLET | Refills: 0 | Status: SHIPPED | OUTPATIENT
Start: 2020-09-15 | End: 2022-06-17

## 2020-09-15 NOTE — TELEPHONE ENCOUNTER
Pending Prescriptions:                       Disp   Refills    meclizine (ANTIVERT) 25 MG tablet [Pharma*90 tab*0            Sig: TAKE 1 TABLET BY MOUTH 3 TIMES DAILY AS NEEDED           FOR DIZZINESS    Prescription approved per [facility] Refill Protocol.    Ruby Izquierdo RN BSN

## 2020-09-21 ENCOUNTER — TELEPHONE (OUTPATIENT)
Dept: FAMILY MEDICINE | Facility: CLINIC | Age: 70
End: 2020-09-21

## 2020-09-21 ENCOUNTER — OFFICE VISIT (OUTPATIENT)
Dept: FAMILY MEDICINE | Facility: CLINIC | Age: 70
End: 2020-09-21
Payer: COMMERCIAL

## 2020-09-21 VITALS
SYSTOLIC BLOOD PRESSURE: 130 MMHG | WEIGHT: 202 LBS | HEIGHT: 70 IN | OXYGEN SATURATION: 99 % | BODY MASS INDEX: 28.92 KG/M2 | TEMPERATURE: 97.4 F | HEART RATE: 68 BPM | DIASTOLIC BLOOD PRESSURE: 82 MMHG

## 2020-09-21 DIAGNOSIS — E78.5 HYPERLIPIDEMIA LDL GOAL <130: ICD-10-CM

## 2020-09-21 DIAGNOSIS — L57.0 ACTINIC KERATOSIS: ICD-10-CM

## 2020-09-21 DIAGNOSIS — H61.23 BILATERAL IMPACTED CERUMEN: ICD-10-CM

## 2020-09-21 DIAGNOSIS — Z23 NEED FOR VACCINATION: ICD-10-CM

## 2020-09-21 DIAGNOSIS — Z00.00 ENCOUNTER FOR MEDICARE ANNUAL WELLNESS EXAM: Primary | ICD-10-CM

## 2020-09-21 DIAGNOSIS — Z13.29 SCREENING FOR THYROID DISORDER: ICD-10-CM

## 2020-09-21 DIAGNOSIS — Z13.1 SCREENING FOR DIABETES MELLITUS: ICD-10-CM

## 2020-09-21 DIAGNOSIS — I10 ESSENTIAL HYPERTENSION, BENIGN: ICD-10-CM

## 2020-09-21 DIAGNOSIS — R26.89 LOSS OF BALANCE: ICD-10-CM

## 2020-09-21 LAB
ANION GAP SERPL CALCULATED.3IONS-SCNC: 7 MMOL/L (ref 3–14)
BUN SERPL-MCNC: 9 MG/DL (ref 7–30)
CALCIUM SERPL-MCNC: 9.3 MG/DL (ref 8.5–10.1)
CHLORIDE SERPL-SCNC: 107 MMOL/L (ref 94–109)
CHOLEST SERPL-MCNC: 181 MG/DL
CO2 SERPL-SCNC: 26 MMOL/L (ref 20–32)
CREAT SERPL-MCNC: 0.8 MG/DL (ref 0.66–1.25)
ERYTHROCYTE [DISTWIDTH] IN BLOOD BY AUTOMATED COUNT: 12.9 % (ref 10–15)
GFR SERPL CREATININE-BSD FRML MDRD: 89 ML/MIN/{1.73_M2}
GLUCOSE SERPL-MCNC: 96 MG/DL (ref 70–99)
HCT VFR BLD AUTO: 47.8 % (ref 40–53)
HDLC SERPL-MCNC: 68 MG/DL
HGB BLD-MCNC: 16 G/DL (ref 13.3–17.7)
LDLC SERPL CALC-MCNC: 86 MG/DL
MCH RBC QN AUTO: 33.4 PG (ref 26.5–33)
MCHC RBC AUTO-ENTMCNC: 33.5 G/DL (ref 31.5–36.5)
MCV RBC AUTO: 100 FL (ref 78–100)
NONHDLC SERPL-MCNC: 113 MG/DL
PLATELET # BLD AUTO: 195 10E9/L (ref 150–450)
POTASSIUM SERPL-SCNC: 4 MMOL/L (ref 3.4–5.3)
RBC # BLD AUTO: 4.79 10E12/L (ref 4.4–5.9)
SODIUM SERPL-SCNC: 140 MMOL/L (ref 133–144)
TRIGL SERPL-MCNC: 135 MG/DL
TSH SERPL DL<=0.005 MIU/L-ACNC: 0.66 MU/L (ref 0.4–4)
WBC # BLD AUTO: 7 10E9/L (ref 4–11)

## 2020-09-21 PROCEDURE — 36415 COLL VENOUS BLD VENIPUNCTURE: CPT | Performed by: FAMILY MEDICINE

## 2020-09-21 PROCEDURE — 85027 COMPLETE CBC AUTOMATED: CPT | Performed by: FAMILY MEDICINE

## 2020-09-21 PROCEDURE — 69210 REMOVE IMPACTED EAR WAX UNI: CPT | Mod: 59 | Performed by: FAMILY MEDICINE

## 2020-09-21 PROCEDURE — 84443 ASSAY THYROID STIM HORMONE: CPT | Performed by: FAMILY MEDICINE

## 2020-09-21 PROCEDURE — 99397 PER PM REEVAL EST PAT 65+ YR: CPT | Mod: 25 | Performed by: FAMILY MEDICINE

## 2020-09-21 PROCEDURE — 90662 IIV NO PRSV INCREASED AG IM: CPT | Performed by: FAMILY MEDICINE

## 2020-09-21 PROCEDURE — 90471 IMMUNIZATION ADMIN: CPT | Performed by: FAMILY MEDICINE

## 2020-09-21 PROCEDURE — 80048 BASIC METABOLIC PNL TOTAL CA: CPT | Performed by: FAMILY MEDICINE

## 2020-09-21 PROCEDURE — 99214 OFFICE O/P EST MOD 30 MIN: CPT | Mod: 25 | Performed by: FAMILY MEDICINE

## 2020-09-21 PROCEDURE — 80061 LIPID PANEL: CPT | Performed by: FAMILY MEDICINE

## 2020-09-21 ASSESSMENT — PAIN SCALES - GENERAL: PAINLEVEL: NO PAIN (0)

## 2020-09-21 ASSESSMENT — MIFFLIN-ST. JEOR: SCORE: 1675.01

## 2020-09-21 ASSESSMENT — ACTIVITIES OF DAILY LIVING (ADL): CURRENT_FUNCTION: NO ASSISTANCE NEEDED

## 2020-09-21 NOTE — TELEPHONE ENCOUNTER
----- Message from Jose Barrera MD sent at 9/21/2020 11:20 AM CDT -----  Please call with results.     Your cell count lab results came back normal.    Please call the clinic with any questions you may have.     Have a great day,    Dr. Sepulveda

## 2020-09-21 NOTE — TELEPHONE ENCOUNTER
----- Message from Jose Barrera MD sent at 9/21/2020  1:38 PM CDT -----  Please call with results.     Your thyroid, cholesterol, kidney function, and electrolyte lab results came back normal.    Please call the clinic with any questions you may have.     Have a great day,    Dr. Sepulveda

## 2020-09-21 NOTE — NURSING NOTE
After obtaining informed consent, the immunization is given by Cecilia Bocanegra CMA ([facility])  Patient identified using two patient identifiers.  Ear exam showing wax occlusion completed by provider.  Solution: warm water was placed in the bilateral ear(s) via irrigation tool: elephant ear.  .Cecilia Bocanegra CMA ([facility])

## 2020-11-11 ENCOUNTER — TELEPHONE (OUTPATIENT)
Dept: FAMILY MEDICINE | Facility: CLINIC | Age: 70
End: 2020-11-11

## 2020-11-11 NOTE — TELEPHONE ENCOUNTER
You placed a referral for patient to neurology on 9/21/20 Patient has not scheduled as of yet.      Please review and forward back to me if follow up with the patient is needed.     Thank you!  [place]/Clinic Referrals [facility]

## 2021-01-20 ENCOUNTER — OFFICE VISIT (OUTPATIENT)
Dept: DERMATOLOGY | Facility: CLINIC | Age: 71
End: 2021-01-20
Payer: COMMERCIAL

## 2021-01-20 DIAGNOSIS — L57.0 AK (ACTINIC KERATOSIS): Primary | ICD-10-CM

## 2021-01-20 DIAGNOSIS — L81.4 LENTIGINES: ICD-10-CM

## 2021-01-20 DIAGNOSIS — D18.01 CHERRY ANGIOMA: ICD-10-CM

## 2021-01-20 PROCEDURE — 99203 OFFICE O/P NEW LOW 30 MIN: CPT | Performed by: PHYSICIAN ASSISTANT

## 2021-01-20 RX ORDER — FLUOROURACIL 50 MG/G
CREAM TOPICAL
Qty: 40 G | Refills: 0 | Status: SHIPPED | OUTPATIENT
Start: 2021-01-20 | End: 2022-04-27

## 2021-01-20 ASSESSMENT — PAIN SCALES - GENERAL: PAINLEVEL: NO PAIN (0)

## 2021-01-20 NOTE — PATIENT INSTRUCTIONS
Efudex Treatment    Today, you are being prescribed Fluorouracil (Efudex) a topical cream used for the treatment of Actinic Keratosis (AK's).  The medication is working to eliminate the unhealthy cells. Even though this treatment may be unattractive and somewhat uncomfortable.    Your treatment will last twice daily for 2-3 weeks or until the onset of irritation on the cheeks, forehead, temples and ears.  You may experience some mild discomfort while being treated.    You will want to stop any other creams such as glycolic acid products, retin A, Tazorac, etc. to the area. You may use bland makeup/cover-up as long as it doesn't sting or cause you discomfort.    Apply the cream at night as your physician recommends. Use a cotton-tipped applicator, or use gloves if applying it with your fingertips. If applied with unprotected fingertips, it is important to wash your hands well after you apply this medicine.     Keep this medication away from pets.    We recommend avoiding excessive sun exposure to the treated area    You may use moisturizing creams over bothersome areas such as Vanicream or Cetaphil cream if the reaction becomes too bothersome. Please, call the clinic if this occurs.   Potential Side Effects    Your treated areas may be unsightly during therapy.  This will improve slowly following the discontinuation of therapy.     During the first week of application, mild inflammation may occur.     During the following weeks, redness, and swelling may occur with some crusting and burning.     Lesions resolve as the skin exfoliates.     Over 1 to 2 weeks, new skin grows into the treatment area.    Keep this medication away from pets  Specific side effects that usually do not require medical attention (report to your doctor or health care professional if they continue or are bothersome) include: Red or dark-colored skin     Mild erosion (loss of upper layer of skin)     Mild eye irritation including burning,  itching, sensitivity, stinging, or watering     Increased sensitivity of the skin to sun and ultraviolet light     Pain and burning of the affected area     Dryness, scaling or swelling of the affected area     Skin rash, itching of the affected area     Tenderness     If you have severe pain, please, call the clinic immediately and indicate that you have pain. Ask for a call from the RN.   Who should I call with questions?     [facility]: [phone]     [facility]: [phone]     For urgent needs outside of business hours call the [facility] at [phone]  and ask for the dermatology resident on call        Efudex Treatment    Today, you are being prescribed Fluorouracil (Efudex) a topical cream used for the treatment of Actinic Keratosis (AK's).  The medication is working to eliminate the unhealthy cells. Even though this treatment may be unattractive and somewhat uncomfortable.    Your treatment will last twice daily for 2-3 weeks or until the onset of irritation on the face and ears.  You may experience some mild discomfort while being treated.    You will want to stop any other creams such as glycolic acid products, retin A, Tazorac, etc. to the area. You may use bland makeup/cover-up as long as it doesn't sting or cause you discomfort.    Apply the cream at night as your physician recommends. Use a cotton-tipped applicator, or use gloves if applying it with your fingertips. If applied with unprotected fingertips, it is important to wash your hands well after you apply this medicine.     Keep this medication away from pets.    We recommend avoiding excessive sun exposure to the treated area    You may use moisturizing creams over bothersome areas such as Vanicream or Cetaphil cream if the reaction becomes too bothersome. Please, call the clinic if this occurs.   Potential Side Effects    Your treated areas may be unsightly during  therapy.  This will improve slowly following the discontinuation of therapy.     During the first week of application, mild inflammation may occur.     During the following weeks, redness, and swelling may occur with some crusting and burning.     Lesions resolve as the skin exfoliates.     Over 1 to 2 weeks, new skin grows into the treatment area.    Keep this medication away from pets  Specific side effects that usually do not require medical attention (report to your doctor or health care professional if they continue or are bothersome) include: Red or dark-colored skin     Mild erosion (loss of upper layer of skin)     Mild eye irritation including burning, itching, sensitivity, stinging, or watering     Increased sensitivity of the skin to sun and ultraviolet light     Pain and burning of the affected area     Dryness, scaling or swelling of the affected area     Skin rash, itching of the affected area     Tenderness   Who should I call with questions?     [facility]: [phone]     [facility]: [phone]     For urgent needs outside of business hours call the [facility] at [phone]  and ask for the dermatology resident on call            The ABCDEs of Melanoma    Skin cancer can develop anywhere on the skin. Ask someone for help when checking your skin, especially in hard to see places. If you notice a mole different from others, or that changes, enlarges, itches, or bleeds (even if it is small), you should see a dermatologist.              Sun protective clothing and Resources     Stickybits (www.[employer])  Athleta (www.[employer] That Bug.Mind-[employer]G)  TruTag Technologies (www.[employer].Mind-NRG)  Carve Designs ([employer]ading [employer]) - affordable  Skinz (uvskinz.com)    Long sleeve - Jeff Cool DRI UPF 50 or [place] PFG UPF 50  Hoodie - [place] PFG UPF 50  Swimshirt/Rash Guard - Venus UPF 50 (on Amazon)  Neck - Outdoor [employer] Ubertubes  (www.outdoorresearch.com)

## 2021-01-20 NOTE — PROGRESS NOTES
[facility] Dermatology Note  Encounter Date: Jan 20, 2021  Office Visit     Dermatology Problem List:  1. AKs - head and ears  -current tx: Efudex initiated 1/20/21    Last FBSE: 1/20/21    ____________________________________________    Assessment & Plan:  # Seborrheic keratosis, non irritated.    - No further intervention needed.     # Solar lentigines.    - No further intervention needed.     # Cherry angioma(s).   - No further intervention needed.     # Actinic keratosis.   - Edu on etiology  - Start Efudex BID for 2-3 weeks or until the onset of irritation. Anticipated reaction discussed. Recommend washing hands after use and/or using gloves to apply, keeping medication away from pets, and avoiding sun exposure to treated area.  -Hand out provided    Procedures Performed:   None.    Follow-up: 2 months in-person, or earlier for new or changing lesions  Follow up in 1 year for an annual skin exam.    Staff and Scribe:     Scribe Disclosure:   I, Julian Rivera, am serving as a scribe to document services personally performed by Zora Peacock PA-C, based on data collection and the provider's statements to me.  Provider Disclosure:   The documentation recorded by the scribe accurately reflects the services I personally performed and the decisions made by me.    All risks, benefits and alternatives were discussed with patient.  Patient is in agreement and understands the assessment and plan.  All questions were answered.    Zora Peacock PA-C, [facility]  [facility] [place]: Phone: [phone], Fax: [phone]  [facility]: Phone: [phone],  Fax: [phone]    ____________________________________________    CC: Skin Check (Areas of concern on cheeks, temples and ears. No personal hx of SC. No family hx of SC. )      HPI:  Mr. Robert Archer is a(n) 70 year old male who presents today as a new patient for a  "skin check.    Referred to dermatology by Dr. Barrera. He has never seen a dermatologist. Note from 09/21/20 shows that on exam \"multiple actinic keratosis on ears\" were noted.    Today, she has areas of concern on the cheeks, temples and ears. His cheeks and ears are slightly tender. An area on left cheek will \"peel off\" occasionally. He rarely uses sunscreen. He worked for [employer]. He spends a fair amount of time outside, and wears a visor when outside. He uses an ointment from [employer], can not recall name. Denies family or personal history of skin cancer. Declines FBSE, waist up only.    Patient is otherwise feeling well, without additional concerns.    Labs:  NA    Physical Exam:  Vitals: There were no vitals taken for this visit.  SKIN: Waist up skin exam was performed. The exam included the head/face, neck, both arms, chest, back, abdomen, digits and/or nails.   - Handley Type I  - Scattered brown macules on sun exposed areas.   - There are waxy stuck on tan to brown papules on the face and trunk.  - There are dome shaped bright red papules on the trunk.   - There are diffuse erythematous macules with overyling adherent scale on the cheeks, ears, forehead and temples.  - No other lesions of concern on areas examined.     Medications:  Current Outpatient Medications   Medication     lisinopril (PRINIVIL/ZESTRIL) 10 MG tablet     meclizine (ANTIVERT) 25 MG tablet     rosuvastatin (CRESTOR) 20 MG tablet     No current facility-administered medications for this visit.       Past Medical/Surgical History:   Patient Active Problem List   Diagnosis     Essential hypertension, benign     Hyperlipidemia LDL goal <130     No past medical history on file.     CC [facility]  [address] [facility] [address] on close of this encounter.    CC Dr. Ribeiro on the close of this encounter.  "

## 2021-01-20 NOTE — NURSING NOTE
Robert Archer's goals for this visit include:   Chief Complaint   Patient presents with     Skin Check     Areas of concern on cheeks, temples and ears. No personal hx of SC. No family hx of SC.      He requests these members of his care team be copied on today's visit information:     PCP: Jose Barrera    Referring Provider:  Jose Barrera MD  [address] [place] [address]    There were no vitals taken for this visit.    Do you need any medication refills at today's visit? No    Lizette Matamoros LPN

## 2021-01-20 NOTE — LETTER
1/20/2021         RE: Robert Archer  [address] [facility] [address]        Dear Colleague,    Thank you for referring your patient, Robert Archer, to the [facility]. Please see a copy of my visit note below.    [facility] Dermatology Note  Encounter Date: Jan 20, 2021  Office Visit     Dermatology Problem List:  1. AKs - head and ears  -current tx: Efudex initiated 1/20/21    Last FBSE: 1/20/21    ____________________________________________    Assessment & Plan:  # Seborrheic keratosis, non irritated.    - No further intervention needed.     # Solar lentigines.    - No further intervention needed.     # Cherry angioma(s).   - No further intervention needed.     # Actinic keratosis.   - Edu on etiology  - Start Efudex BID for 2-3 weeks or until the onset of irritation. Anticipated reaction discussed. Recommend washing hands after use and/or using gloves to apply, keeping medication away from pets, and avoiding sun exposure to treated area.  -Hand out provided    Procedures Performed:   None.    Follow-up: 2 months in-person, or earlier for new or changing lesions  Follow up in 1 year for an annual skin exam.    Staff and Scribe:     Scribe Disclosure:   I, Julian Rivera, am serving as a scribe to document services personally performed by Zora Peacock PA-C, based on data collection and the provider's statements to me.  Provider Disclosure:   The documentation recorded by the scribe accurately reflects the services I personally performed and the decisions made by me.    All risks, benefits and alternatives were discussed with patient.  Patient is in agreement and understands the assessment and plan.  All questions were answered.    Zora Peacock PA-C, MPAS  [facility] [place]: Phone: [phone], Fax: [phone]  [facility]: Phone: [phone],  Fax:  "[phone]    ____________________________________________    CC: Skin Check (Areas of concern on cheeks, temples and ears. No personal hx of SC. No family hx of SC. )      HPI:  Mr. Robert Archer is a(n) 70 year old male who presents today as a new patient for a skin check.    Referred to dermatology by Dr. Barrera. He has never seen a dermatologist. Note from 09/21/20 shows that on exam \"multiple actinic keratosis on ears\" were noted.    Today, she has areas of concern on the cheeks, temples and ears. His cheeks and ears are slightly tender. An area on left cheek will \"peel off\" occasionally. He rarely uses sunscreen. He worked for [employer]. He spends a fair amount of time outside, and wears a visor when outside. He uses an ointment from [employer], can not recall name. Denies family or personal history of skin cancer. Declines FBSE, waist up only.    Patient is otherwise feeling well, without additional concerns.    Labs:  NA    Physical Exam:  Vitals: There were no vitals taken for this visit.  SKIN: Waist up skin exam was performed. The exam included the head/face, neck, both arms, chest, back, abdomen, digits and/or nails.   - Handley Type I  - Scattered brown macules on sun exposed areas.   - There are waxy stuck on tan to brown papules on the face and trunk.  - There are dome shaped bright red papules on the trunk.   - There are diffuse erythematous macules with overyling adherent scale on the cheeks, ears, forehead and temples.  - No other lesions of concern on areas examined.     Medications:  Current Outpatient Medications   Medication     lisinopril (PRINIVIL/ZESTRIL) 10 MG tablet     meclizine (ANTIVERT) 25 MG tablet     rosuvastatin (CRESTOR) 20 MG tablet     No current facility-administered medications for this visit.       Past Medical/Surgical History:   Patient Active Problem List   Diagnosis     Essential hypertension, benign     Hyperlipidemia LDL goal <130     No past medical " history on file.     CC [place]  [address] [place] [address] on close of this encounter.    CC Dr. Ribeiro on the close of this encounter.      Again, thank you for allowing me to participate in the care of your patient.        Sincerely,        Zora Peacock PA-C

## 2021-02-09 ENCOUNTER — TRANSFERRED RECORDS (OUTPATIENT)
Dept: HEALTH INFORMATION MANAGEMENT | Facility: CLINIC | Age: 71
End: 2021-02-09

## 2021-02-26 ENCOUNTER — MYC MEDICAL ADVICE (OUTPATIENT)
Dept: FAMILY MEDICINE | Facility: CLINIC | Age: 71
End: 2021-02-26

## 2021-03-17 ENCOUNTER — OFFICE VISIT (OUTPATIENT)
Dept: DERMATOLOGY | Facility: CLINIC | Age: 71
End: 2021-03-17
Payer: COMMERCIAL

## 2021-03-17 DIAGNOSIS — L57.0 AK (ACTINIC KERATOSIS): Primary | ICD-10-CM

## 2021-03-17 PROCEDURE — 17000 DESTRUCT PREMALG LESION: CPT | Performed by: PHYSICIAN ASSISTANT

## 2021-03-17 PROCEDURE — 17003 DESTRUCT PREMALG LES 2-14: CPT | Performed by: PHYSICIAN ASSISTANT

## 2021-03-17 ASSESSMENT — PAIN SCALES - GENERAL: PAINLEVEL: NO PAIN (0)

## 2021-03-17 NOTE — PATIENT INSTRUCTIONS
[facility] Dermatology Visit    Thank you for allowing us to participate in your care. Your findings, instructions and follow-up plan are as follows:         When should I call my doctor?    If you are worsening or not improving, please, contact us or seek urgent care as noted below.     Who should I call with questions (adults)?    [facility] (adult and pediatric): [phone]     [facility] (adult): [phone]    For urgent needs outside of business hours call the [facility] at [phone] and ask for the dermatology resident on call    If this is a medical emergency and you are unable to reach an ER, Call 911      Who should I call with questions (pediatric)?  [facility]- Pediatric Dermatology  Dr. Letty Sun, Dr. Deisy Underwood, Dr. Shira Weiss, Jeny Lau, PA  Dr. Tracy Minor, Dr. Annita Minaya & Dr. Donell Thompson  Non Urgent  Nurse Triage Line; [phone]- Lorelei and Reena RN Care Coordinators   Yamileth (/Complex ) [phone]    If you need a prescription refill, please contact your pharmacy. Refills are approved or denied by our Physicians during normal business hours, Monday through Fridays  Per office policy, refills will not be granted if you have not been seen within the past year (or sooner depending on your child's condition)    Scheduling Information:  Pediatric Appointment Scheduling and Call Center [phone]  Radiology Scheduling- [phone]  Sedation Unit Scheduling- [phone]  [place] Scheduling- General [phone]; Pediatric Dermatology [phone]  Main  Services: [phone]  Telugu: [phone]  Sierra Leonean: [phone]  Hmong/Upper sorbian/Vietnamese: [phone]  Preadmission Nursing Department Fax Number: [phone] (Fax all pre-operative paperwork to this number)    For urgent matters arising during evenings,  weekends, or holidays that cannot wait for normal business hours please call [phone] and ask for the Dermatology Resident On-Call to be paged.        Patient Education     Checking for Skin Cancer  You can find cancer early by checking your skin each month. There are 3 kinds of skin cancer. They are melanoma, basal cell carcinoma, and squamous cell carcinoma. Doing monthly skin checks is the best way to find new marks or skin changes. Follow the instructions below for checking your skin.   The ABCDEs of checking moles for melanoma   Check your moles or growths for signs of melanoma using ABCDE:     Asymmetry: the sides of the mole or growth don t match    Border: the edges are ragged, notched, or blurred    Color: the color within the mole or growth varies    Diameter: the mole or growth is larger than 6 mm (size of a pencil eraser)    Evolving: the size, shape, or color of the mole or growth is changing (evolving is not shown in the images below)    Checking for other types of skin cancer  Basal cell carcinoma or squamous cell carcinoma have symptoms such as:       A spot or mole that looks different from all other marks on your skin    Changes in how an area feels, such as itching, tenderness, or pain    Changes in the skin's surface, such as oozing, bleeding, or scaliness    A sore that does not heal    New swelling or redness beyond the border of a mole    Who s at risk?  Anyone can get skin cancer. But you are at greater risk if you have:     Fair skin, light-colored hair, or light-colored eyes    Many moles or abnormal moles on your skin    A history of sunburns from sunlight or tanning beds    A family history of skin cancer    A history of exposure to radiation or chemicals    A weakened immune system  If you have had skin cancer in the past, you are at risk for recurring skin cancer.   How to check your skin  Do your monthly skin checkups in front of a full-length mirror. Check all parts of your  body, including your:     Head (ears, face, neck, and scalp)    Torso (front, back, and sides)    Arms (tops, undersides, upper, and lower armpits)    Hands (palms, backs, and fingers, including under the nails)    Buttocks and genitals    Legs (front, back, and sides)    Feet (tops, soles, toes, including under the nails, and between toes)  If you have a lot of moles, take digital photos of them each month. Make sure to take photos both up close and from a distance. These can help you see if any moles change over time.   Most skin changes are not cancer. But if you see any changes in your skin, call your doctor right away. Only he or she can diagnose a problem. If you have skin cancer, seeing your doctor can be the first step toward getting the treatment that could save your life.   [employer] last reviewed this educational content on 4/1/2019 2000-2020 [employer]. [address] [place] [address]. All rights reserved. This information is not intended as a substitute for professional medical care. Always follow your healthcare professional's instructions.       When should I call my doctor?    If you are worsening or not improving, please, contact us or seek urgent care as noted below.     Who should I call with questions (adults)?    [facility] (adult and pediatric): [phone]     [facility] (adult): [phone]    For urgent needs outside of business hours call the [facility] at [phone] and ask for the dermatology resident on call    If this is a medical emergency and you are unable to reach an ER, Call [phone]      Who should I call with questions (pediatric)?  [facility]- Pediatric Dermatology  Dr. Letty Sun, Dr. Deisy Underwood, Dr. Shira Weiss, Jeny Lau, PA  Dr. Tracy Minor, Dr. Annita Minaya & Dr. Donell Thompson  Non Urgent  Nurse Triage Line; [phone]- Lorelei and  Reena RN Care Coordinators   Yamileth (/Complex ) [phone]    If you need a prescription refill, please contact your pharmacy. Refills are approved or denied by our Physicians during normal business hours, Monday through Fridays  Per office policy, refills will not be granted if you have not been seen within the past year (or sooner depending on your child's condition)    Scheduling Information:  Pediatric Appointment Scheduling and Call Center [phone]  Radiology Scheduling- [phone]  Sedation Unit Scheduling- [phone]  [place] Scheduling- General [phone]; Pediatric Dermatology [phone]  Main  Services: [phone]  Sammarinese: [phone]  Irish: [phone]  Hmong/Macedonian/Aakash: [phone]  Preadmission Nursing Department Fax Number: [phone] (Fax all pre-operative paperwork to this number)    For urgent matters arising during evenings, weekends, or holidays that cannot wait for normal business hours please call [phone] and ask for the Dermatology Resident On-Call to be paged.

## 2021-03-17 NOTE — NURSING NOTE
Robert Archer's goals for this visit include:   Chief Complaint   Patient presents with     Derm Problem     post efudex follow up        He requests these members of his care team be copied on today's visit information: Yes     PCP: Jose Barrera    Referring Provider:  No referring provider defined for this encounter.    There were no vitals taken for this visit.    Do you need any medication refills at today's visit? No   LXIONG3, MEDICAL ASSISTANT

## 2021-03-17 NOTE — PROGRESS NOTES
[facility] Dermatology Note  Encounter Date: Mar 17, 2021  Office Visit     Dermatology Problem List:  1. AKs - head and ears  -current tx: Efudex initiated 1/20/21 - completed with great response  -cryo to ears x2 sites -3/17/21     Last FBSE: 1/20/21  ____________________________________________    Assessment & Plan:     # Actinic keratosis; head and ears.   - s/p efudex, had a great response  - cryotherapy to two sites today - one on each ear       Procedures Performed:    Cryotherapy procedure note: After verbal consent and discussion of risks and benefits including but no limited to dyspigmentation/scar, blister, and pain, 2 was(were) treated with 1-2mm freeze border for 2 cycles with liquid nitrogen. Post cryotherapy instructions were provided.     Follow-up: 10 month(s) in-person, or earlier for new or changing lesions    Staff:     Scribe Disclosure:   INadeen, am serving as a scribe to document services personally performed by Zora Peacock, based on data collection and the provider's statements to me.   MATTG3, MEDICAL ASSISTANT     Provider Disclosure:   The documentation recorded by the scribe accurately reflects the services I personally performed and the decisions made by me.    All risks, benefits and alternatives were discussed with patient.  Patient is in agreement and understands the assessment and plan.  All questions were answered.    Zora Peacock PA-C, MPAS  [facility] Surgery [place]: Phone: [phone], Fax: [phone]  [facility]: Phone: [phone],  Fax: [phone]  ____________________________________________    CC: Derm Problem (post efudex follow up )    HPI:  Mr. Robert Archer is a(n) 70 year old male who presents today as a return patient for post efudex follow up. He used the cream for 2 weeks  The head, face and ears. He notes he got really red and rough, but since everything has  healed.     Patient is otherwise feeling well. No changes in medical problems. Nothing bleeding, crusting, or changing.     Labs Reviewed:  N/A    Physical Exam:  Vitals: There were no vitals taken for this visit.  SKIN: Focused examination of the head and ears was performed.  - Handley skin type: I   -scaly macule on R ear x1, also L ear x1  -Face is free from actinic-like lesions, great response to efudex  - No other lesions of concern on areas examined.     Medications:  Current Outpatient Medications   Medication     fluorouracil (EFUDEX) 5 % external cream     lisinopril (PRINIVIL/ZESTRIL) 10 MG tablet     meclizine (ANTIVERT) 25 MG tablet     rosuvastatin (CRESTOR) 20 MG tablet     No current facility-administered medications for this visit.       Past Medical/Surgical History:   Patient Active Problem List   Diagnosis     Essential hypertension, benign     Hyperlipidemia LDL goal <130     No past medical history on file.    CC Dr. Ribeiro on close of this encounter.

## 2021-03-17 NOTE — LETTER
3/17/2021         RE: Robert Archer  [address] [facility] [address]        Dear Colleague,    Thank you for referring your patient, Robert Archer, to the [facility]. Please see a copy of my visit note below.    [facility] Dermatology Note  Encounter Date: Mar 17, 2021  Office Visit     Dermatology Problem List:  1. AKs - head and ears  -current tx: Efudex initiated 1/20/21 - completed with great response  -cryo to ears x2 sites -3/17/21     Last FBSE: 1/20/21  ____________________________________________    Assessment & Plan:     # Actinic keratosis; head and ears.   - s/p efudex, had a great response  - cryotherapy to two sites today - one on each ear       Procedures Performed:    Cryotherapy procedure note: After verbal consent and discussion of risks and benefits including but no limited to dyspigmentation/scar, blister, and pain, 2 was(were) treated with 1-2mm freeze border for 2 cycles with liquid nitrogen. Post cryotherapy instructions were provided.     Follow-up: 10 month(s) in-person, or earlier for new or changing lesions    Staff:     Scribe Disclosure:   I, Nadeen, am serving as a scribe to document services personally performed by Zora Peacock, based on data collection and the provider's statements to me.   LXIONG3, MEDICAL ASSISTANT     Provider Disclosure:   The documentation recorded by the scribe accurately reflects the services I personally performed and the decisions made by me.    All risks, benefits and alternatives were discussed with patient.  Patient is in agreement and understands the assessment and plan.  All questions were answered.    Zora Peacock PA-C, MPAS  [facility] Surgery [place]: Phone: [phone], Fax: [phone]  [facility]: Phone: [phone],  Fax: [phone]  ____________________________________________    CC: Derm Problem (post efudex  follow up )    HPI:  Mr. Robert Archer is a(n) 70 year old male who presents today as a return patient for post efudex follow up. He used the cream for 2 weeks  The head, face and ears. He notes he got really red and rough, but since everything has healed.     Patient is otherwise feeling well. No changes in medical problems. Nothing bleeding, crusting, or changing.     Labs Reviewed:  N/A    Physical Exam:  Vitals: There were no vitals taken for this visit.  SKIN: Focused examination of the head and ears was performed.  - Handley skin type: I   -scaly macule on R ear x1, also L ear x1  -Face is free from actinic-like lesions, great response to efudex  - No other lesions of concern on areas examined.     Medications:  Current Outpatient Medications   Medication     fluorouracil (EFUDEX) 5 % external cream     lisinopril (PRINIVIL/ZESTRIL) 10 MG tablet     meclizine (ANTIVERT) 25 MG tablet     rosuvastatin (CRESTOR) 20 MG tablet     No current facility-administered medications for this visit.       Past Medical/Surgical History:   Patient Active Problem List   Diagnosis     Essential hypertension, benign     Hyperlipidemia LDL goal <130     No past medical history on file.    CC Dr. Ribeiro on close of this encounter.        Again, thank you for allowing me to participate in the care of your patient.        Sincerely,        Zora Peacock PA-C

## 2021-04-01 ENCOUNTER — TELEPHONE (OUTPATIENT)
Dept: FAMILY MEDICINE | Facility: CLINIC | Age: 71
End: 2021-04-01

## 2021-04-01 NOTE — TELEPHONE ENCOUNTER
Patient Quality Outreach Summary      Summary:    Patient is due/failing the following:   FIT test or cologuard   Type of outreach:    Phone, left message for patient/parent to call back.    Questions for provider review:    None                                                                                                                    Saadia Thomas CMA     Chart routed to Care Team.

## 2021-04-02 ENCOUNTER — OFFICE VISIT (OUTPATIENT)
Dept: FAMILY MEDICINE | Facility: CLINIC | Age: 71
End: 2021-04-02
Payer: COMMERCIAL

## 2021-04-02 ENCOUNTER — TELEPHONE (OUTPATIENT)
Dept: FAMILY MEDICINE | Facility: CLINIC | Age: 71
End: 2021-04-02

## 2021-04-02 VITALS
HEIGHT: 69 IN | RESPIRATION RATE: 18 BRPM | DIASTOLIC BLOOD PRESSURE: 80 MMHG | OXYGEN SATURATION: 100 % | TEMPERATURE: 97.6 F | WEIGHT: 201 LBS | SYSTOLIC BLOOD PRESSURE: 128 MMHG | BODY MASS INDEX: 29.77 KG/M2 | HEART RATE: 74 BPM

## 2021-04-02 DIAGNOSIS — E87.6 HYPOKALEMIA: ICD-10-CM

## 2021-04-02 DIAGNOSIS — R55 PRE-SYNCOPE: Primary | ICD-10-CM

## 2021-04-02 DIAGNOSIS — I10 ESSENTIAL (PRIMARY) HYPERTENSION: ICD-10-CM

## 2021-04-02 LAB
ANION GAP SERPL CALCULATED.3IONS-SCNC: 4 MMOL/L (ref 3–14)
BUN SERPL-MCNC: 10 MG/DL (ref 7–30)
CALCIUM SERPL-MCNC: 8.8 MG/DL (ref 8.5–10.1)
CHLORIDE SERPL-SCNC: 107 MMOL/L (ref 94–109)
CO2 SERPL-SCNC: 26 MMOL/L (ref 20–32)
CREAT SERPL-MCNC: 0.85 MG/DL (ref 0.66–1.25)
GFR SERPL CREATININE-BSD FRML MDRD: 86 ML/MIN/{1.73_M2}
GLUCOSE SERPL-MCNC: 91 MG/DL (ref 70–99)
POTASSIUM SERPL-SCNC: 4.1 MMOL/L (ref 3.4–5.3)
SODIUM SERPL-SCNC: 137 MMOL/L (ref 133–144)
TSH SERPL DL<=0.005 MIU/L-ACNC: 1.01 MU/L (ref 0.4–4)

## 2021-04-02 PROCEDURE — 36415 COLL VENOUS BLD VENIPUNCTURE: CPT | Performed by: FAMILY MEDICINE

## 2021-04-02 PROCEDURE — 80048 BASIC METABOLIC PNL TOTAL CA: CPT | Performed by: FAMILY MEDICINE

## 2021-04-02 PROCEDURE — 99214 OFFICE O/P EST MOD 30 MIN: CPT | Performed by: FAMILY MEDICINE

## 2021-04-02 PROCEDURE — 84443 ASSAY THYROID STIM HORMONE: CPT | Performed by: FAMILY MEDICINE

## 2021-04-02 RX ORDER — HYDROCODONE BITARTRATE AND ACETAMINOPHEN 5; 325 MG/1; MG/1
1-2 TABLET ORAL
COMMUNITY
Start: 2021-04-01 | End: 2021-09-15

## 2021-04-02 RX ORDER — IBUPROFEN 600 MG/1
600 TABLET, FILM COATED ORAL
COMMUNITY
Start: 2021-04-01 | End: 2021-04-09

## 2021-04-02 RX ORDER — METHOCARBAMOL 500 MG/1
500 TABLET, FILM COATED ORAL
COMMUNITY
Start: 2021-04-01 | End: 2021-05-21

## 2021-04-02 ASSESSMENT — MIFFLIN-ST. JEOR: SCORE: 1656.07

## 2021-04-02 ASSESSMENT — PAIN SCALES - GENERAL: PAINLEVEL: MILD PAIN (3)

## 2021-04-02 NOTE — PATIENT INSTRUCTIONS
Patient Education     Possible Causes of Dizziness or Fainting    Dizziness and fainting can have many causes. Below are some examples of possible causes your healthcare provider will look to rule out.   Benign paroxysmal positional vertigo (BPPV)  BPPV results when calcium crystals inside the inner ear shift into the wrong position. BPPV causes episodes of vertigo, a spinning sensation. Episodes most often happen when the head is moved in a certain way. This is more common in people age 65 and older.    Infection or inflammation  The semicircular canals of the ear may become infected or inflamed. In this case, they can send the wrong balance signals. This can cause vertigo.   Meniere disease  Meniere disease happens when there is too much fluid in the semicircular canals. This can cause vertigo. It also can cause hearing problems and buzzing or ringing in the ears (called tinnitus). You may also have a feeling of pressure or fullness in the ear.   Syncope  Syncope is fainting that happens when the brain doesn t get enough oxygen-rich blood. It can be caused by low heart rate or low blood pressure. This is called vasovagal syncope. It can also be caused by sitting or standing up too quickly. This is called orthostatic hypotension. Syncope may also be due to a heart valve problem, an abnormal heart rhythm, or other heart problems. Dizziness can also happen from stroke, hemorrhage in the brain, or other problems in the brain. Your healthcare provider may do certain tests to rule out these conditions.   Other causes  Other causes include:    Medicines. Certain medicines can cause dizziness and even fainting. In some cases, stopping a medicine too quickly can lead to withdrawal symptoms, including dizziness and fainting.    Anxiety. Being anxious can lead to breathing changes, such as hyperventilation. These can lead to dizziness and fainting.  Additional causes for dizziness and fainting also exist. Talk with your  healthcare provider for more information.      [employer] last reviewed this educational content on 5/1/2018 2000-2020 [employer]. All rights reserved. This information is not intended as a substitute for professional medical care. Always follow your healthcare professional's instructions.

## 2021-04-02 NOTE — TELEPHONE ENCOUNTER
Spouse calling and stating patient is having ongoing issues with vertigo. Had a fall yesterday and was seen in the ER at the [facility]. Patient stating his medication, meclizine does not seem to be working for his dizziness. Scheduled patient with provider for further evaluation today, 4/2 at 11:00 am. Karen Watts LPN

## 2021-04-02 NOTE — PROGRESS NOTES
Assessment & Plan   1. Pre-syncope: Given positional changes may be due to blood pressure.  Recommend Holter patch monitor looking for any episodes of arrhythmia.  May need to decrease his lisinopril down to 10 mg again; however, he states without the increase in changes blood pressures typically in the 180s or 160s.  Also check thyroid and BMP.  Orthostatics were unremarkable here in clinic.  Not currently symptomatic here in clinic.  I did discuss that he should go approximate 30 seconds between positional changes to allow his body to adjust to help prevent future falls in the meantime.  Will call with results when available.  Patient agreeable plan.  - Basic metabolic panel  (Ca, Cl, CO2, Creat, Gluc, K, Na, BUN)  - Orthostatic blood pressure and pulse  - Zio Patch Holter 48 Hour Adult Pediatric; Future  - TSH with free T4 reflex    2. Hypokalemia: Commended increase dietary intake.  Only mildly low.  Continue to monitor.  - Basic metabolic panel  (Ca, Cl, CO2, Creat, Gluc, K, Na, BUN)    3. Essential (primary) hypertension : Consider reducing his lisinopril to 10 mg instead of 20 mg after work-up is completed.  Currently controlled today.  - TSH with free T4 reflex     Return in about 1 week (around 4/9/2021), or if symptoms worsen or fail to improve.    Jose Barrera MD  [facility]     This chart is completed utilizing dictation software; typos and/or incorrect word substitutions may unintentionally occur.      Damon Jones is a 70 year old who presents for the following health issues  accompanied by his spouse:    [facility]     ED/UC Followup:    Facility:  [facility]  Date of visit: 4/1/2021  Reason for visit: Fall  Current Status: having back pain     Patient reports approximately 3 weeks of dizziness spells.  Spells typically occur multiple times a day.  He states these are more like his vision is going dark and feels like he is going to pass out.  They  "are mainly occur when changing positions.  He saw his cardiologist in February through [facility] and doubled his dose of lisinopril to 20 mg from 10 mg.  He denies any other recent illnesses or diarrhea etc.  He has not noticed any additional side effects with this medication.    Yesterday he suffered a fall after having 1 of these episodes and tumbled down the stairs.  He was given pain medications at the [facility] and told to follow-up with his primary care provider.  Labs there showed a slight hypokalemia with potassium of 3.4.  EKG showing sinus rhythm with occasional PVCs, nonspecific T wave findings, and leftward axis per ED note.  I do not have available to review formally.    Does have history of vertigo in the past treated with meclizine.  His meclizine is not helping with the symptoms.  Does not feel similar.  Previously was more room spinning.    Denies any headache, extremity numbness, weakness, tingling, hematochezia, abnormal stools.  Was not anemic on CBC in the ER.    Review of Systems   Constitutional, HEENT, neuro, Derm, MSK, cardiovascular, pulmonary, gi and gu systems are negative, except as otherwise noted.      Objective    /80 (BP Location: Left arm, Patient Position: Chair, Cuff Size: Adult Regular)   Pulse 74   Temp 97.6  F (36.4  C) (Temporal)   Resp 18   Ht 1.759 m (5' 9.25\")   Wt 91.2 kg (201 lb)   SpO2 100%   BMI 29.47 kg/m    Body mass index is 29.47 kg/m .  Physical Exam   General: Appears well and in no acute distress.  Accompanied by wife.  Cardiovascular: Regular rate and rhythm, normal S1 and S2 without murmur. No extra heartsounds or friction rub. Radial pulses present and equal bilaterally.  Respiratory: Lungs clear to auscultation bilaterally. No wheezing or crackles. No prolonged expiration. Symmetrical chest rise.  Musculoskeletal: No gross extremity deformities. No peripheral edema. Normal muscle bulk.   Neuro: Cranial nerves II through XII intact.  Clear " coherent speech. Good  strength. 5/5 strength of wrist, elbow, shoulder, hip, knee, and ankle flexion and extension. 5/5 strength of shoulder and hip adduction and abduction. Light touch sensation of upper and lower extremities intact. Negative Romberg, no pronator drift. No difficulty rapid alternating movements or finger to nose test. Unassisted gait stable. Biceps, triceps, patellar , and ankle reflexes are equal bilaterally and without hyperreflexia. No ankle clonus.  Able to recall 2 out of 3 objects.  Able to perform serial sevens without difficulty.  Able to name common objects.  Eldridge-Hallpike maneuver does not elicit symptoms.    Labs pending

## 2021-04-03 ENCOUNTER — E-VISIT (OUTPATIENT)
Dept: FAMILY MEDICINE | Facility: CLINIC | Age: 71
End: 2021-04-03
Payer: COMMERCIAL

## 2021-04-03 DIAGNOSIS — R55 PRE-SYNCOPE: Primary | ICD-10-CM

## 2021-04-05 NOTE — TELEPHONE ENCOUNTER
Called patient.  This submission was a mistake.  He has a Holter monitor placement scheduled for tomorrow at 2 PM.    Jose Barrera MD  [facility]

## 2021-04-06 ENCOUNTER — ANCILLARY PROCEDURE (OUTPATIENT)
Dept: CARDIOLOGY | Facility: CLINIC | Age: 71
End: 2021-04-06
Attending: FAMILY MEDICINE
Payer: COMMERCIAL

## 2021-04-06 DIAGNOSIS — R55 PRE-SYNCOPE: ICD-10-CM

## 2021-04-06 PROCEDURE — 93224 XTRNL ECG REC UP TO 48 HRS: CPT | Performed by: INTERNAL MEDICINE

## 2021-04-06 NOTE — PATIENT INSTRUCTIONS
Patient has been prescribed a ZioPatch holter for 2 days. Patient was instructed regarding the indication, function, care and prompt return of the ZioPatch holter monitor. The monitor, with S/N [id number],  was placed on the patient with instructions regarding care of the skin and monitor, as well as documentation in the patient diary. Patient demonstrated understanding of this information and [facility] questions or [facility].

## 2021-04-15 ENCOUNTER — OFFICE VISIT (OUTPATIENT)
Dept: FAMILY MEDICINE | Facility: CLINIC | Age: 71
End: 2021-04-15
Payer: COMMERCIAL

## 2021-04-15 ENCOUNTER — TELEPHONE (OUTPATIENT)
Dept: FAMILY MEDICINE | Facility: CLINIC | Age: 71
End: 2021-04-15

## 2021-04-15 VITALS
RESPIRATION RATE: 16 BRPM | HEART RATE: 73 BPM | SYSTOLIC BLOOD PRESSURE: 140 MMHG | BODY MASS INDEX: 29.03 KG/M2 | WEIGHT: 198 LBS | DIASTOLIC BLOOD PRESSURE: 80 MMHG | TEMPERATURE: 98.1 F | OXYGEN SATURATION: 98 %
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
## 2021-04-15 PROCEDURE — 99213 OFFICE O/P EST LOW 20 MIN: CPT | Performed by: FAMILY MEDICINE

## 2021-04-15 RX ORDER — LIDOCAINE 50 MG/G
OINTMENT TOPICAL PRN
Qty: 30 G | Refills: 0 | Status: SHIPPED | OUTPATIENT
Start: 2021-04-15 | End: 2021-04-28

## 2021-04-15 RX ORDER — CHLORTHALIDONE 25 MG/1
25 TABLET ORAL DAILY
COMMUNITY
Start: 2021-04-15 | End: 2022-02-16

## 2021-04-15 ASSESSMENT — PAIN SCALES - GENERAL: PAINLEVEL: EXTREME PAIN (8)

## 2021-04-15 NOTE — PROGRESS NOTES
Assessment & Plan   1. Fall down stairs, subsequent encounter: No new fractures noted on previous x-rays.  Symptoms consist continue consider dedicated CT for better visualization.  Will offer Tylenol 3 for pain control.  Discussed not take more than 4000 mg Tylenol daily.  Can also attempt topical lidocaine.  Recommended trying more active as this can also cause stiffening and prolongation of pain.  Patient agreeable.  Given concern for lightheadedness and awaiting Holter monitor results will avoid muscle relaxers at this time.  Call when these results are available.   reviewed.  - acetaminophen-codeine (TYLENOL #3) 300-30 MG tablet; Take 1 tablet by mouth every 8 hours as needed for severe pain  Dispense: 21 tablet; Refill: 0  - lidocaine (XYLOCAINE) 5 % external ointment; Apply topically as needed for moderate pain  Dispense: 30 g; Refill: 0     Return in about 1 week (around 4/22/2021), or if symptoms worsen or fail to improve.    Jose Barrera MD  [facility]    This chart is completed utilizing dictation software; typos and/or incorrect word substitutions may unintentionally occur.    Subjective     Robert Archer is a 70 year old male who presents to clinic today for the following health issues accompanied by his spouse:    HPI   Answers for HPI/ROS submitted by the patient on 4/15/2021   Your back pain is: recurring-for about 2-3 weeks  Where is your back pain located? : right lower back, left lower back, left middle of back  How would you describe your back pain? : burning  Where does your back pain spread? : nowhere  Since you noticed your back pain, how has it changed? : unchanged  Does your back pain interfere with your job?: Not applicable  Tried heat, extra strength tylenol at max dose and oxy from ER-none have helped    Patient is here for follow-up of back pain since fall on 4/1/2021.  Has been having lightheadedness spells which are improving but still  present on occasion.  This was the cause of his fall on this date.  He was previously seen on 4/2/2021.  I am awaiting results of his Holter monitor ordered at that time.  Given lightheadedness episodes have been lenient with blood pressure care.    Main concern is his back pain has been stable.  He has been mostly immobile.  Denies any radicular symptoms, saddle anesthesia.  X-ray during ER visit did show old T12 compression fracture but no other evidence of fracture.  He is looking for something else for pain control at this time as he states Tylenol is not cutting it.    Review of Systems   Constitutional, lymph, Derm, MSK, cardiovascular, pulmonary, gi and gu systems are negative, except as otherwise noted.      Objective    BP (!) 140/80   Pulse 73   Temp 98.1  F (36.7  C) (Temporal)   Resp 16   Wt 89.8 kg (198 lb)   SpO2 98%   BMI 29.03 kg/m    Body mass index is 29.03 kg/m .  Physical Exam   General: Appears well and in no acute distress.  Cardiovascular: Regular rate and rhythm, normal S1 and S2 without murmur. No extra heartsounds or friction rub. Radial pulses present and equal bilaterally.  Respiratory: Lungs clear to auscultation bilaterally. No wheezing or crackles. No prolonged expiration. Symmetrical chest rise.  Musculoskeletal: Tenderness to palpation over left paraspinal muscles and left flank.  No bony tenderness and only mild right-sided paraspinal muscle tenderness.  Straight leg raise is negative.  No gross extremity deformities. No peripheral edema. Normal muscle bulk.     Labs and imaging: None      Answers for HPI/ROS submitted by the patient on 4/15/2021   Your back pain is: recurring  Where is your back pain located? : right lower back, left lower back, left middle of back  How would you describe your back pain? : burning  Where does your back pain spread? : nowhere  Since you noticed your back pain, how has it changed? : unchanged  Does your back pain interfere with your job?: Not  applicable

## 2021-04-15 NOTE — PATIENT INSTRUCTIONS
Important Takeaway Points From This Visit:    You can take up to 4000 mg daily of tylenol.    Take 1000 mg of tylenol 3 times daily.    You can then take the tylenol 3 with codeine 3 times daily as well.      As always, please call with any questions or concerns. I look forward to seeing you again soon!    Take care,  Dr. Barrera    Your current medication list is printed. Please keep this with you - it is helpful to bring this current list to any other medical appointments. It can also be helpful if you ever go to the emergency room or hospital.    If you had lab testing today we will call you with the results. The phone number we will call with your results is # [phone] (home) . If this is not the best number please call our clinic and change the number.    If you need any refills, please call your pharmacy and they will contact us.    If you have any further concerns or wish to schedule another appointment, please call our office at [phone].    If you have a medical emergency, please call [phone].    Thank you for coming to [facility]!

## 2021-04-15 NOTE — TELEPHONE ENCOUNTER
Reason for Call:  Medication or medication refill:    Do you use a [facility]?  Name of the pharmacy and phone number for the current request:  [facility] - [phone] [facility]    Name of the medication requested: A pain med that is NOT oxy or similar.  Tylenol is not enough for the pain    Other request: n/a    Can we leave a detailed message on this number? YES    Phone number patient can be reached at: Home number on file [phone] (home)    Best Time: any    Call taken on 4/15/2021 at 11:33 AM by Shannon Dorman

## 2021-04-15 NOTE — TELEPHONE ENCOUNTER
Increase in what I assume in back pain 2 weeks after initial ER visit. Needs visit to eval. Please schedule and collect more information.    Jose Barrera MD  [facility]

## 2021-04-19 ENCOUNTER — TRANSFERRED RECORDS (OUTPATIENT)
Dept: HEALTH INFORMATION MANAGEMENT | Facility: CLINIC | Age: 71
End: 2021-04-19

## 2021-04-19 LAB
CREAT SERPL-MCNC: 0.94 MG/DL (ref 0.72–1.25)
GFR SERPL CREATININE-BSD FRML MDRD: >60 ML/MIN/1.73M2
GLUCOSE SERPL-MCNC: 97 MG/DL (ref 65–100)
POTASSIUM SERPL-SCNC: 4.2 MMOL/L (ref 3.5–5)
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
## 2021-04-27 NOTE — TELEPHONE ENCOUNTER
Per provider's note patient was to return if he is not improving.    His back still hurts. It is a little better.    He is just wanting to refill his lidocaine cream- this does seem to be helping his pain.    Please advise.    Arin Sapp RN on 4/27/2021 at 3:49 PM

## 2021-04-28 ENCOUNTER — TELEPHONE (OUTPATIENT)
Dept: FAMILY MEDICINE | Facility: CLINIC | Age: 71
End: 2021-04-28

## 2021-04-28 RX ORDER — LIDOCAINE 50 MG/G
OINTMENT TOPICAL PRN
Qty: 30 G | Refills: 0 | Status: SHIPPED | OUTPATIENT
Start: 2021-04-28 | End: 2021-09-22

## 2021-04-28 NOTE — TELEPHONE ENCOUNTER
Called to discus holter results. No answer. Left message. Please forward to me when he calls back.    Jose Barrera MD  [facility]

## 2021-04-29 NOTE — TELEPHONE ENCOUNTER
No correlation with symptoms and arrhythmia on heart monitor. Symptoms improving. Recommend monitoring symptoms for now.    Jose Barrera MD  [facility]

## 2021-05-03 ENCOUNTER — MYC MEDICAL ADVICE (OUTPATIENT)
Dept: FAMILY MEDICINE | Facility: CLINIC | Age: 71
End: 2021-05-03

## 2021-05-03 DIAGNOSIS — R42 DIZZINESS: Primary | ICD-10-CM

## 2021-05-19 ENCOUNTER — TELEPHONE (OUTPATIENT)
Dept: FAMILY MEDICINE | Facility: CLINIC | Age: 71
End: 2021-05-19

## 2021-05-19 NOTE — PROGRESS NOTES
Assessment & Plan   1. Chronic left-sided low back pain without sciatica: Ongoing pain 7-8 out of 10 for greater than 8 weeks.  Posttraumatic.  Old compression fracture noticed at the T12 area on prior x-ray from the emergency department through [facility].  Given duration of pain recommend MRI for further evaluation.  Lidocaine and helpful but will try Voltaren.  Will give another short course of Tylenol T3.  Would not use any other form of narcotic pain medication at this time.  Encourage physical therapy.  Call with results when available.  Patient agreeable plan.   reviewed.  - MR Lumbar Spine w/o Contrast; Future  - acetaminophen-codeine (TYLENOL #3) 300-30 MG tablet; Take 1 tablet by mouth every 8 hours as needed for severe pain  Dispense: 30 tablet; Refill: 0  - diclofenac (VOLTAREN) 1 % topical gel; Apply 2 g topically 3 times daily as needed for moderate pain  Dispense: 100 g; Refill: 1  - PHYSICAL THERAPY REFERRAL; Future    2. Vertigo: Symptoms ongoing.  Recommend physical therapy as above for lower extremity strengthening and gait training.  Additionally appears previously had a MRI ordered by ENT for the symptoms; however, was never obtained in 2018.  I have reordered this today as his neurology appointment is not until July.  Will call with results unavailable.  - MR Brain w/o & w Contrast; Future        Return in about 2 weeks (around 6/4/2021), or if symptoms worsen or fail to improve.    Jose Barrera MD  [facility]    This chart is completed utilizing dictation software; typos and/or incorrect word substitutions may unintentionally occur.    Damon Jones is a 70 year old who presents for the following health issues  accompanied by his spouse:    HPI     Answers for HPI/ROS submitted by the patient on 5/21/2021   Where is your back pain located? : right lower back, left lower back, left middle of back  How would you describe your back pain? :  "fullness  Where does your back pain spread? : nowhere  Since you noticed your back pain, how has it changed? : unchanged  Does your back pain interfere with your job?: Yes  If yes, which:: acetaminophen (Tylenol), Chiropractor, cold, topical pain relievers    Patient is here to follow-up in regards to his low back pain.  Is ongoing with minimal improvement.  Currently rated at 7 8 out of 10.  Topical lidocaine is not been helpful.  Tylenol with codeine takes the edge off.  No medication side effects with this.  No reinjury..  First occurred after a fall due to vertigo symptoms down stairs.  Vertigo symptoms are ongoing and longstanding.  Meclizine has been unhelpful.  He was given a neurology referral but this is not scheduled until July.  He previously saw ENT for this and an MRI was ordered but never obtained it appears.    Review of Systems   Constitutional, derm, msk, neuro, cardiovascular, pulmonary, gi and gu systems are negative, except as otherwise noted.      Objective    /88   Pulse 71   Temp 97.1  F (36.2  C) (Temporal)   Resp 14   Ht 1.759 m (5' 9.25\")   Wt 86.6 kg (191 lb)   SpO2 98%   BMI 28.00 kg/m    Body mass index is 28 kg/m .  Physical Exam   General: Appears well and in no acute distress. Accompanied by his wife.  Musculoskeletal: Tenderness to palpation over left paraspinal muscles and left flank.  No bony tenderness and only mild right-sided paraspinal muscle tenderness.  Straight leg raise is negative.  No gross extremity deformities. No peripheral edema. Normal muscle bulk.     Labs/imaging: pending.          "

## 2021-05-19 NOTE — TELEPHONE ENCOUNTER
Patient Quality Outreach Summary      Summary:    Patient is due/failing the following:   Back pain follow up and BP check     Type of outreach:    Phone, left message for patient/parent to call back.    Questions for provider review:    None                                                                                                                    Saadia Thomas CMA       Chart routed to Care Team.

## 2021-05-21 ENCOUNTER — OFFICE VISIT (OUTPATIENT)
Dept: FAMILY MEDICINE | Facility: CLINIC | Age: 71
End: 2021-05-21
Payer: COMMERCIAL

## 2021-05-21 VITALS
WEIGHT: 191 LBS | HEART RATE: 71 BPM | OXYGEN SATURATION: 98 % | DIASTOLIC BLOOD PRESSURE: 88 MMHG | SYSTOLIC BLOOD PRESSURE: 138 MMHG | RESPIRATION RATE: 14 BRPM | HEIGHT: 69 IN | BODY MASS INDEX: 28.29 KG/M2 | TEMPERATURE: 97.1 F
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
## 2021-05-21 DIAGNOSIS — R42 VERTIGO: ICD-10-CM

## 2021-05-21 DIAGNOSIS — M54.50 CHRONIC LEFT-SIDED LOW BACK PAIN WITHOUT SCIATICA: Primary | ICD-10-CM

## 2021-05-21 DIAGNOSIS — G89.29 CHRONIC LEFT-SIDED LOW BACK PAIN WITHOUT SCIATICA: Primary | ICD-10-CM

## 2021-05-21 PROCEDURE — 99214 OFFICE O/P EST MOD 30 MIN: CPT | Performed by: FAMILY MEDICINE

## 2021-05-21 ASSESSMENT — MIFFLIN-ST. JEOR: SCORE: 1610.71

## 2021-05-21 NOTE — PATIENT INSTRUCTIONS
Patient Education     Back Care Tips     Caring for your back  These are things you can do to prevent a recurrence of acute back pain and to reduce symptoms from chronic back pain:    Stay at a healthy weight. If you are overweight, losing weight will help most types of back pain.    Exercise is an important part of recovery from most types of back pain. The muscles behind and in front of the spine support the back. This means strengthening both the back muscles and the abdominal muscles will provide better support for your spine.     Swimming and brisk walking are good overall exercises to improve your fitness level.    Practice safe lifting methods (see below).    Practice good posture when sitting, standing, and walking. Don't sit for a long time. This puts more stress on the lower back than standing or walking.    Wear quality shoes with good arch support. Foot and ankle alignment can affect back symptoms. Don't wear high heels.    Therapeutic massage can help relax the back muscles without stretching them.    During the first 24 to 72 hours after an acute injury or flare-up of chronic back pain, put an ice pack on the painful area for 20 minutes and then remove it for 20 minutes. Do thisover a period of 60 to 90 minutes, or several times a day. As a safety precaution, don't use a heating pad at bedtime. Sleeping on a heating pad can lead to skin burns or tissue damage.    You can alternate using ice and heat.  Medicines  Talk with your healthcare provider before using medicines, especially if you have other health problems or are taking other medicines.    You may use over-the-counter medicines, such as acetaminophen, ibuprofen, or naprosyn to control pain, unless your healthcare provider prescribed other pain medicine. Talk with your healthcare provider before taking any medicines if you have a chronic condition such as diabetes, liver or kidney disease, stomach ulcers, or digestive bleeding, or are taking  blood thinners.    Be careful if you are given prescription pain medicines, opioids, or medicine for muscle spasm. They can cause drowsiness, and affect your coordination, reflexes, and judgment. Don't drive or operate heavy machinery while taking these types of medicines. Take prescription pain medicine only as prescribed by your healthcare provider.  Lumbar stretch  This simple stretch will help relax muscle spasm and keep your back more limber. If exercise makes your back pain worse, don t do it.    Lie on your back with your knees bent and both feet on the ground.    Slowly raise your left knee to your chest as you flatten your lower back against the floor. Hold for 5 seconds.    Relax and repeat the exercise with your right knee.    Do 10 of these exercises for each leg.  Safe lifting method    Don t bend over at the waist to lift an object off the floor.  Instead, bend your knees and hips in a squat.     Keep your back and head upright    Hold the object close to your body, directly in front of you.    Straighten your legs to lift the object.     Lower the object to the floor in the reverse fashion.    If you must slide something across the floor, push it.    Posture tips  Sitting  Sit in chairs with straight backs or low-back support. Keep your knees lower than your hips, with your feet flat on the floor.  When driving, sit up straight. Adjust the seat forward so you are not leaning toward the steering wheel.  A small pillow or rolled towel behind your lower back may help if you are driving long distances.   Standing  When standing for long periods, shift most of your weight to one leg at a time. Switch legs every few minutes.   Sleeping  The best way to sleep is on your side with your knees bent. Put a low pillow under your head to support your neck in a neutral spine position. Don't use thick pillows that bend your neck to one side. Put a pillow between your legs to further relax your lower back. If you  sleep on your back, put pillows under your knees to support your legs in a slightly flexed position. Use a firm mattress. If your mattress sags, replace it, or use a 1/2-inch plywood board under the mattress to add support.  Follow-up care  Follow up with your healthcare provider, or as advised.  If X-rays, a CT scan or an MRI scan were taken, they may be reviewed by a radiologist. You will be told of any new findings that may affect your care.  Call 911  Call 911 if any of the following occur:    Trouble breathing    Confusion    Very drowsy    Fainting or loss of consciousness    Rapid or very slow heart rate    Loss of  bowel or bladder control  When to seek medical advice  Call your healthcare provider right away if any of the following occur:    Pain becomes worse or spreads to your arms or legs    Weakness or numbness in one or both arms or legs    Numbness in the groin area  [employer] last reviewed this educational content on 11/1/2019 2000-2021 [employer]. All rights reserved. This information is not intended as a substitute for professional medical care. Always follow your healthcare professional's instructions.

## 2021-06-08 ENCOUNTER — THERAPY VISIT (OUTPATIENT)
Dept: PHYSICAL THERAPY | Facility: CLINIC | Age: 71
End: 2021-06-08
Attending: FAMILY MEDICINE
Payer: COMMERCIAL

## 2021-06-08 DIAGNOSIS — G89.29 CHRONIC LEFT-SIDED LOW BACK PAIN WITHOUT SCIATICA: ICD-10-CM

## 2021-06-08 DIAGNOSIS — M54.50 ACUTE BILATERAL LOW BACK PAIN WITHOUT SCIATICA: ICD-10-CM

## 2021-06-08 DIAGNOSIS — M54.50 CHRONIC LEFT-SIDED LOW BACK PAIN WITHOUT SCIATICA: ICD-10-CM

## 2021-06-08 PROCEDURE — 97110 THERAPEUTIC EXERCISES: CPT | Mod: GP | Performed by: PHYSICAL THERAPIST

## 2021-06-08 PROCEDURE — 97161 PT EVAL LOW COMPLEX 20 MIN: CPT | Mod: GP | Performed by: PHYSICAL THERAPIST

## 2021-06-08 NOTE — PROGRESS NOTES
Physical Therapy Initial Evaluation  Subjective:  The history is provided by the patient. No  was used.   Therapist Generated HPI Evaluation  Problem details: Was carrying two chairs and fell down a flight of stairs and low back had started to hurt. Had x-rays with no signficant findings. Then saw a doctor because of pain and was given some med. 4/2/21 was fall. Now 50% better now but still has pain. .         Type of problem:  Lumbar.    This is a new (4/2/21) condition.  Condition occurred with:  A fall/slip.  Where condition occurred: at home.  Patient reports pain:  Lumbar spine left and lumbar spine right.  Pain is described as aching and is intermittent.  Pain radiates to:  No radiation. Pain is the same all the time.  Since onset symptoms are gradually improving.  Associated with: dizziness. Exacerbated by: trying to lift things, getting out of bed going to situp.    and relieved by nothing.    Previous treatment includes chiropractic (manipulation and exercises). There was none improvement following previous treatment.  Barriers include:  None as reported by patient.    Patient Health History  Robert Archer being seen for low back pain.     Problem began: 4/2/2021.   Problem occurred: fall down stair   Pain is reported as 3/10 on pain scale.  General health as reported by patient is good.  Pertinent medical history includes: concussions/dizziness and high blood pressure.   Red flags:  None as reported by patient.  Medical allergies: none.   Surgeries include:  Orthopedic surgery. Other surgery history details: arthscopic R knee.    Current medications:  High blood pressure medication and cardiac medication.    Current occupation is retired. .   Primary job tasks include:  Lifting/carrying.   Other job/home tasks details: getting out of bed in the morning. .                                  Objective:  System         Lumbar/SI Evaluation    Lumbar Myotomes:  normal                Lumbar  Dermtomes:  normal                Neural Tension/Mobility:      Left side:Slump  negative.     Right side:   Slump  negative.   Lumbar Palpation:  Palpation (lumbar): at T12-L1 level.  Tenderness present at Left:    Erector Spinae  Tenderness present at Right: Erector Spinae    Lumbar Provocation:      Left negative with:  Mobility and PROM hip    Right negative with:  Mobility and PROM hip  Spinal Segmental Conclusions:     Level: Hypo noted at T10, T11, T12, L1, L2 and L3                                                     Adri Lumbar Evaluation    Posture:  Sitting: fair  Standing: fair  Lordosis: Reduced  Lateral Shift: no  Correction of Posture: no effect  Other Observations: thoracic kyphosis / rounded shoulders.   Movement Loss:  Flexion (Flex): nil and pain  Extension (EXT): min  Side Glide R (SG R): nil  Side Glide L (SG L): nil  Test Movements:  FIS: During: produces  After: no worse    Repeat FIS: During: produces  After: no worse    EIS: During: produces  After: no worse  Mechanical Response: no effect  Repeat EIS: During: produces  After: no worse            Static Tests:          Lying Prone in Extension: no effect x 2 min                                              ROS    Assessment/Plan:    Patient is a 70 year old male with lumbar complaints.    Patient has the following significant findings with corresponding treatment plan.                Diagnosis 1:  Acute low back pain  Pain -  hot/cold therapy, US, manual therapy, self management, education, directional preference exercise and home program  Decreased ROM/flexibility - manual therapy, therapeutic exercise, therapeutic activity and home program  Decreased strength - therapeutic exercise, therapeutic activities and home program  Decreased function - therapeutic activities and home program  Impaired posture - neuro re-education, therapeutic activities and home program    Therapy Evaluation Codes:   1) History comprised of:   Personal factors  that impact the plan of care:      None.    Comorbidity factors that impact the plan of care are:      None.     Medications impacting care: None.  2) Examination of Body Systems comprised of:   Body structures and functions that impact the plan of care:      Lumbar spine.   Activity limitations that impact the plan of care are:      Lifting and Sleeping.  3) Clinical presentation characteristics are:   Stable/Uncomplicated.  4) Decision-Making    Low complexity using standardized patient assessment instrument and/or measureable assessment of functional outcome.  Cumulative Therapy Evaluation is: Low complexity.    Previous and current functional limitations:  (See Goal Flow Sheet for this information)    Short term and Long term goals: (See Goal Flow Sheet for this information)     Communication ability:  Patient appears to be able to clearly communicate and understand verbal and written communication and follow directions correctly.  Treatment Explanation - The following has been discussed with the patient:   RX ordered/plan of care  Anticipated outcomes  Possible risks and side effects  This patient would benefit from PT intervention to resume normal activities.   Rehab potential is good.    Frequency:  1 X week, once daily  Duration:  for 8 weeks  Discharge Plan:  Achieve all LTG.  Independent in home treatment program.  Reach maximal therapeutic benefit.    Please refer to the daily flowsheet for treatment today, total treatment time and time spent performing 1:1 timed codes.

## 2021-06-11 ENCOUNTER — ANCILLARY PROCEDURE (OUTPATIENT)
Dept: MRI IMAGING | Facility: CLINIC | Age: 71
End: 2021-06-11
Attending: FAMILY MEDICINE
Payer: COMMERCIAL

## 2021-06-11 DIAGNOSIS — R42 VERTIGO: ICD-10-CM

## 2021-06-11 DIAGNOSIS — G89.29 CHRONIC LEFT-SIDED LOW BACK PAIN WITHOUT SCIATICA: ICD-10-CM

## 2021-06-11 DIAGNOSIS — M54.50 CHRONIC LEFT-SIDED LOW BACK PAIN WITHOUT SCIATICA: ICD-10-CM

## 2021-06-11 LAB
CREAT BLD-MCNC: 0.8 MG/DL (ref 0.66–1.25)
GFR SERPL CREATININE-BSD FRML MDRD: >90 ML/MIN/{1.73_M2}

## 2021-06-11 PROCEDURE — 72148 MRI LUMBAR SPINE W/O DYE: CPT | Performed by: RADIOLOGY

## 2021-06-11 PROCEDURE — 70553 MRI BRAIN STEM W/O & W/DYE: CPT | Performed by: RADIOLOGY

## 2021-06-11 PROCEDURE — 82565 ASSAY OF CREATININE: CPT | Performed by: RADIOLOGY

## 2021-06-11 PROCEDURE — A9585 GADOBUTROL INJECTION: HCPCS | Mod: JW | Performed by: RADIOLOGY

## 2021-06-11 RX ORDER — GADOBUTROL 604.72 MG/ML
10 INJECTION INTRAVENOUS ONCE
Status: COMPLETED | OUTPATIENT
Start: 2021-06-11 | End: 2021-06-11

## 2021-06-11 RX ADMIN — GADOBUTROL 9 ML: 604.72 INJECTION INTRAVENOUS at 13:38

## 2021-06-15 ENCOUNTER — THERAPY VISIT (OUTPATIENT)
Dept: PHYSICAL THERAPY | Facility: CLINIC | Age: 71
End: 2021-06-15
Payer: COMMERCIAL

## 2021-06-15 DIAGNOSIS — M54.50 ACUTE BILATERAL LOW BACK PAIN WITHOUT SCIATICA: ICD-10-CM

## 2021-06-15 PROCEDURE — 97110 THERAPEUTIC EXERCISES: CPT | Mod: GP | Performed by: PHYSICAL THERAPIST

## 2021-06-15 NOTE — PROGRESS NOTES
Subjective:  HPI  Physical Exam  Oswestry Score: 6 %                 Objective:  System    Physical Exam    General     ROS    Assessment/Plan:    DISCHARGE REPORT    Progress reporting period is from 6/8/21 to 6/15/21.       SUBJECTIVE  Patient reports he did all the exercises so not sure which ones made him worse or better. feels better overall. Getting up first thing in the morning still dealing with pain but gets better thoughout the day.     Current Pain level: 1/10.     Initial Pain level: 5/10.   Changes in function:  Yes (See Goal flowsheet attached for changes in current functional level)  Adverse reaction to treatment or activity: None    OBJECTIVE  Changes noted in objective findings:  Yes, Flex hands to toes no pain with return to standing, Lumbar extension Moderate limitation no pain bilateral SGIS WNL no pain. kyphotic thoracic spine,      ASSESSMENT/PLAN  Updated problem list and treatment plan: Diagnosis 1:  Acute low back pain  Decreased strength - home program  STG/LTGs have been met or progress has been made towards goals:  Yes (See Goal flow sheet completed today.)  Assessment of Progress: The patient has met all of their long term goals.  Self Management Plans:  Patient is independent in a home treatment program.  I have re-evaluated this patient and find that the nature, scope, duration and intensity of the therapy is appropriate for the medical condition of the patient.  Robert continues to require the following intervention to meet STG and LTG's:  PT intervention is no longer required to meet STG/LTG.    Recommendations:  This patient is ready to be discharged from therapy and continue their home treatment program.    Please refer to the daily flowsheet for treatment today, total treatment time and time spent performing 1:1 timed codes.

## 2021-06-16 DIAGNOSIS — R42 VERTIGO: Primary | ICD-10-CM

## 2021-06-16 NOTE — PROGRESS NOTES
I called with MRI results.  Unremarkable brain MRI except for chronic vascular changes.  Patient is on Crestor as well as has his blood pressure controlled.    Recommend referral back to ENT and consider PT/OT for vestibular physical therapy.    Jose Barrera MD  [facility]

## 2021-06-18 NOTE — PROGRESS NOTES
Pt has read mychart with no response. Can close.    Marcella Watts CMA ([facility])

## 2021-06-18 NOTE — PROGRESS NOTES
{2021 PROVIDER CHARTING PREFERENCE:388543}    Subjective     Robert Archer is a 70 year old male who presents to clinic today for the following health issues accompanied by his {accompanied to visit:023702}:    HPI     {SUPERLIST (Optional):465193}  {additonal problems for provider to add (Optional):214666}    Review of Systems   {ROS COMP (Optional):719215}      Objective    There were no vitals taken for this visit.  There is no height or weight on file to calculate BMI.  Physical Exam   {Exam List (Optional):280490}    {Diagnostic Test Results (Optional):461871}

## 2021-07-12 ENCOUNTER — TELEPHONE (OUTPATIENT)
Dept: FAMILY MEDICINE | Facility: CLINIC | Age: 71
End: 2021-07-12

## 2021-07-12 NOTE — TELEPHONE ENCOUNTER
Patient Quality Outreach Summary      Summary:    Patient is due/failing the following:   Colonoscopy    Type of outreach:    Sent [employer] message.    Questions for provider review:    None                                                                                                                    Saadia Thomas CMA       Chart routed to Care Team.

## 2021-07-20 ENCOUNTER — TELEPHONE (OUTPATIENT)
Dept: NEUROLOGY | Facility: CLINIC | Age: 71
End: 2021-07-20

## 2021-07-20 NOTE — TELEPHONE ENCOUNTER
[facility] Call Center    Phone Message    May a detailed message be left on voicemail: yes     Reason for Call: Other: Scheduled appointment  11/2/2021 with Dr. Zeng for dizziness. Per protocol needs clinic review. Please advise. Thank you    Action Taken: Message routed to:  Adult Clinics: Neurology p [phone]    Travel Screening: Not Applicable

## 2021-08-27 ENCOUNTER — OFFICE VISIT (OUTPATIENT)
Dept: OPTOMETRY | Facility: CLINIC | Age: 71
End: 2021-08-27
Payer: COMMERCIAL

## 2021-08-27 DIAGNOSIS — H52.4 PRESBYOPIA: ICD-10-CM

## 2021-08-27 DIAGNOSIS — H25.13 SENILE NUCLEAR SCLEROSIS, BILATERAL: Primary | ICD-10-CM

## 2021-08-27 PROCEDURE — 92015 DETERMINE REFRACTIVE STATE: CPT | Performed by: OPTOMETRIST

## 2021-08-27 PROCEDURE — 92004 COMPRE OPH EXAM NEW PT 1/>: CPT | Performed by: OPTOMETRIST

## 2021-08-27 RX ORDER — LISINOPRIL 20 MG/1
30 TABLET ORAL DAILY
COMMUNITY
Start: 2021-07-28 | End: 2022-09-26

## 2021-08-27 ASSESSMENT — SLIT LAMP EXAM - LIDS
COMMENTS: NORMAL
COMMENTS: NORMAL

## 2021-08-27 ASSESSMENT — REFRACTION_WEARINGRX
OS_SPHERE: +1.00
OS_CYLINDER: +0.25
OD_CYLINDER: +0.25
OS_ADD: +2.75
SPECS_TYPE: PAL
OD_AXIS: 037
OD_ADD: +2.75
OD_SPHERE: +1.50
OS_AXIS: 001

## 2021-08-27 ASSESSMENT — VISUAL ACUITY
METHOD: SNELLEN - LINEAR
OD_CC+: +2
OD_CC: 20/40
OS_PH_SC+: -1
OS_CC+: -2
OS_CC: 20/25
CORRECTION_TYPE: GLASSES
OD_PH_SC: 20/40
OS_PH_SC: 20/20
OD_PH_SC+: +1

## 2021-08-27 ASSESSMENT — CONF VISUAL FIELD
OS_NORMAL: 1
OD_NORMAL: 1

## 2021-08-27 ASSESSMENT — REFRACTION_MANIFEST
OS_SPHERE: +1.25
OD_SPHERE: +1.00
OD_ADD: +2.50
OD_AXIS: 037
OS_ADD: +2.50
OD_CYLINDER: +0.50
OS_CYLINDER: +0.50
OS_AXIS: 150

## 2021-08-27 ASSESSMENT — EXTERNAL EXAM - RIGHT EYE: OD_EXAM: NORMAL

## 2021-08-27 ASSESSMENT — CUP TO DISC RATIO
OS_RATIO: 0.25
OD_RATIO: 0.25

## 2021-08-27 ASSESSMENT — TONOMETRY
OD_IOP_MMHG: 18
IOP_METHOD: ICARE
OS_IOP_MMHG: 14

## 2021-08-27 ASSESSMENT — EXTERNAL EXAM - LEFT EYE: OS_EXAM: NORMAL

## 2021-08-27 NOTE — NURSING NOTE
Chief Complaints and History of Present Illnesses   Patient presents with     Annual Eye Exam       Chief Complaint(s) and History of Present Illness(es)     Annual Eye Exam     Laterality: both eyes    Associated symptoms: tearing.  Negative for eye pain, floaters and flashes              Comments     Patient here for annual eye exam. Patient states he had an eye exam last year and was not happy with the glasses he got after that exam, too much glare off the lenses. The pair he is wearing is 3 years old. Eyes tend to water a lot. No drops used.     No Pain, No Flashes, No Floaters  No hx of eye surgery.                 Domenico Condon, Ophthalmic Assistant

## 2021-08-27 NOTE — PROGRESS NOTES
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  Plan: Monitor for now. Return to clinic with vision changes- especially new difficulty with ADLs such as driving and reading.     (H52.4) Presbyopia  Comment: Mild Rx shift today  Plan: REFRACTION [[id number]]        Discussed findings with patient. New spectacle prescription dispensed to patient. Patient is welcome to return to clinic with prolonged adaptation difficulties.       Complete documentation of historical and exam elements from today's encounter can  be found in the full encounter summary report (not reduplicated in this progress  note). I personally obtained the chief complaint(s) and history of present illness. I  confirmed and edited as necessary the review of systems, past medical/surgical  history, family history, social history, and examination findings as documented by  others; and I examined the patient myself. I personally reviewed the relevant tests,  images, and reports as documented above. I formulated and edited as necessary the  assessment and plan and discussed the findings and management plan with the  patient and family.    Chris Judd, OD

## 2021-09-15 ENCOUNTER — OFFICE VISIT (OUTPATIENT)
Dept: NEUROLOGY | Facility: CLINIC | Age: 71
End: 2021-09-15
Attending: FAMILY MEDICINE
Payer: COMMERCIAL

## 2021-09-15 VITALS
SYSTOLIC BLOOD PRESSURE: 137 MMHG | HEART RATE: 68 BPM | RESPIRATION RATE: 16 BRPM | WEIGHT: 188 LBS | BODY MASS INDEX: 27.56 KG/M2 | DIASTOLIC BLOOD PRESSURE: 89 MMHG

## 2021-09-15 DIAGNOSIS — G62.9 LENGTH-DEPENDENT PERIPHERAL NEUROPATHY: Primary | ICD-10-CM

## 2021-09-15 DIAGNOSIS — G89.29 CHRONIC MIDLINE LOW BACK PAIN WITHOUT SCIATICA: ICD-10-CM

## 2021-09-15 DIAGNOSIS — R26.89 IMBALANCE: ICD-10-CM

## 2021-09-15 DIAGNOSIS — M54.50 CHRONIC MIDLINE LOW BACK PAIN WITHOUT SCIATICA: ICD-10-CM

## 2021-09-15 DIAGNOSIS — R79.89 OTHER SPECIFIED ABNORMAL FINDINGS OF BLOOD CHEMISTRY: ICD-10-CM

## 2021-09-15 PROCEDURE — 99204 OFFICE O/P NEW MOD 45 MIN: CPT | Performed by: STUDENT IN AN ORGANIZED HEALTH CARE EDUCATION/TRAINING PROGRAM

## 2021-09-15 RX ORDER — GABAPENTIN 300 MG/1
300 CAPSULE ORAL 3 TIMES DAILY
Qty: 90 CAPSULE | Refills: 3 | Status: SHIPPED | OUTPATIENT
Start: 2021-09-15 | End: 2022-01-20

## 2021-09-15 NOTE — PATIENT INSTRUCTIONS
Start gabapentin 300 mg nightly. After about one week, take 300mg twice daily. After another week, take 300mg three times daily for a total of 900mg per day  A common side effect of gabapentin is fatigue, which is why we advise patients to start taking the medication at nighttime. The side effect of fatigue should resolve with time.  Other possible side effects include swelling, and less likely diarrhea.  It is commonly a well tolerated medicine.      Try physical therapy for balance  Neuropathy blood work  Follow up February

## 2021-09-15 NOTE — PROGRESS NOTES
[facility]  Section of General Neurology  New Patient Visit      Robert Archer MRN# [id number]   Age: 71 year old YOB: 1950     Requesting physician: Jose Harmon     Reason for Consultation: gait difficulty, falls, low back pain, dizziness        History of Presenting Symptoms:   Robert Archer is a 71 year old male who presents today for evaluation of gait difficulty, falls, low back pain, dizziness,foot numbness.    He is here with his wife Ioana  They live in [place].    He is a retired hospital      Dizziness:    Dizziness specific questions:  Use a word other than dizziness to describe the sensation:  Lightheadedness/presyncopal feeling--none  Room spinning--rarely  Instability/unsteadiness--mostly an unsteadiness  Getting up, walking.    Duration of episodes? (less than 1 minute can be suggestive of BPPV)--none  Triggers: (head movements e.g.)--none  He is able to do yard work, mow the lawn somewhat easily.  The balance is the biggest limitation.    Work up that has been done prior to this appointment (appointments, imaging, testing, treatments)    He notes 2-3 years of numbness in his toes.  It hasn't progressed up word.    Neuropathy specific questions:  Location of numbness/tingling  Duration of symptoms there all day  H/o X1PF--sb   Etoh screen--no  Pain/pain affecting life--no  Falls--yes, fell down the stairs end of March, 1 prior to that too hurt his.  He might have slipped in this one.    Family history --none  Normal diet.    Low back pain: Has hx of T12 compression fracture.  MRI L spine as below reviewed with patient.   Back pain occurs everyday.  Used to limit sleep, doesn't as much anymore.  Interested in another medicine for this.  No weakness/clear radicular symptoms right now.           Past Medical History:     Patient Active Problem List   Diagnosis     Essential hypertension, benign     Hyperlipidemia LDL goal <130      No past medical history on file.     Past Surgical History:     Past Surgical History:   Procedure Laterality Date     NO HISTORY OF SURGERY          Social History:     Social History     Tobacco Use     Smoking status: Former Smoker     Years: 10.00     Smokeless tobacco: Never Used     Tobacco comment: quit 15 years ago    Substance Use Topics     Alcohol use: Yes     Comment: occ / 15 drinks a month      Drug use: No        Family History:     Family History   Problem Relation Age of Onset     Hypertension Mother      Ovarian Cancer Mother      Cataracts Mother      Hypertension Father      Heart Disease Father 83     Colon Cancer No family hx of      Prostate Cancer No family hx of      Breast Cancer No family hx of      Ulcerative Colitis No family hx of      Celiac Disease No family hx of      Crohn's Disease No family hx of         Medications:     Current Outpatient Medications   Medication Sig     acetaminophen-codeine (TYLENOL #3) 300-30 MG tablet Take 1 tablet by mouth every 8 hours as needed for severe pain     chlorthalidone (HYGROTON) 25 MG tablet Take 25 mg by mouth daily     diclofenac (VOLTAREN) 1 % topical gel Apply 2 g topically 3 times daily as needed for moderate pain     fluorouracil (EFUDEX) 5 % external cream Apply to clean skin 2 times daily for 14-21 days, or until the onset of irritation. Avoid eye area. Wash hands after use.     HYDROcodone-acetaminophen (NORCO) 5-325 MG tablet Take 1-2 tablets by mouth     lidocaine (XYLOCAINE) 5 % external ointment Apply topically as needed for moderate pain     lisinopril (PRINIVIL/ZESTRIL) 10 MG tablet Take 10 mg by mouth (Patient not taking: Reported on 8/27/2021)     lisinopril (ZESTRIL) 20 MG tablet      meclizine (ANTIVERT) 25 MG tablet TAKE 1 TABLET BY MOUTH 3 TIMES DAILY AS NEEDED FOR DIZZINESS     rosuvastatin (CRESTOR) 20 MG tablet Take 20 mg by mouth     No current facility-administered medications for this visit.        Allergies:   No  Known Allergies     Review of Systems:   As noted above     Physical Exam:   Vitals: /89   Pulse 68   Resp 16   Wt 85.3 kg (188 lb)   BMI 27.56 kg/m       CV: peripheral pulse appreciated  Lungs: breathing comfortably  Extremities: no edema  Skin: No rashes    Neuro:   General Appearance: No apparent distress, well-nourished, well-groomed, pleasant     Mental Status: Alert and oriented to person, place, and time. Speech fluent and comprehension intact. No dysarthria. Normal memory, fund of knowledge, attention/concentration, and language    Cranial Nerves:   II: Visual fields: normal  III: Pupils: 3 mm, equal, round, reactive to light   III,IV,VI: Extraocular Movements: intact   V: Facial sensation: intact to light touch  VII: Facial strength: intact without asymmetry  VIII: Hearing: intact grossly  IX: Palate: intact   XI: Shoulder shrug: intact  XII: Tongue movement: normal     Motor Exam:   Upper Extremities  Deltoid  Bicep  Tricep  Wrist Extensors  strength Intrinsic Muscles    Right  5  5  5  5 5 5    Left  5  5  5  5 5 5      Lower Extremities  Hip Flexors  Knee Extensors  Knee   Flexors  Dorsi Flexion  Plantar   Flexion    Right  5  5  5  5  5    Left  5  5  5  5  5    Cut off tip of R thumb at age 13    No drift is present. No abnormal movements. Tone is normal throughout.    Sensory: diminished vibration at R>L toe, diminished pinprick primarily dorsum of L foot but patchily    Coordination: no dysmetria with finger-to-nose bilaterally, heel-to-shin normal    Reflexes: biceps, triceps, brachioradialis 1+, patellar trace, and ankle jerks absent and symmetric. Toes are downgoing bilaterally    Gait: Cautious casual gait, short strides.  Cannot do tandem, romberg without sway.           Data: Pertinent prior to visit   Imaging:  MRI L spine 6/11/2021                                                                 Impression:   1. Subacute to chronic T12 compression fracture with loss of  60-70%  vertebral body height.  2. Extensive multilevel lumbar spondylosis, most pronounced at L3-4  where there is severe spinal canal stenosis and moderate bilateral  neural foraminal stenosis with impingement on the exiting right L3  nerve root. Also moderate to severe neural foraminal stenosis at L4-5  and L5-S1.    MRI Brain 6/11/2021    Impression:    1. No acute intracranial pathology.  2. Normal bilateral auditory and vestibular structures.  3. Mild chronic small vessel ischemic disease.    I personally reviewed the above imaging and agree with the findings in the report.        Procedures:      Laboratory:  Last Comprehensive Metabolic Panel:  Sodium   Date Value Ref Range Status   04/02/2021 137 133 - 144 mmol/L Final     Potassium   Date Value Ref Range Status   04/19/2021 4.2 3.5 - 5.0 mmol/L Final     Chloride   Date Value Ref Range Status   04/02/2021 107 94 - 109 mmol/L Final     Carbon Dioxide   Date Value Ref Range Status   04/02/2021 26 20 - 32 mmol/L Final     Anion Gap   Date Value Ref Range Status   04/02/2021 4 3 - 14 mmol/L Final     Glucose   Date Value Ref Range Status   04/19/2021 97 65 - 100 mg/dL Final     Urea Nitrogen   Date Value Ref Range Status   04/02/2021 10 7 - 30 mg/dL Final     Creatinine   Date Value Ref Range Status   04/19/2021 0.94 0.72 - 1.25 mg/dL Final     GFR Estimate   Date Value Ref Range Status   06/11/2021 >90 >60 mL/min/[1.73_m2] Final     Calcium   Date Value Ref Range Status   04/02/2021 8.8 8.5 - 10.1 mg/dL Final     Lab Results   Component Value Date    WBC 7.0 09/21/2020     Lab Results   Component Value Date    RBC 4.79 09/21/2020     Lab Results   Component Value Date    HGB 16.0 09/21/2020     Lab Results   Component Value Date    HCT 47.8 09/21/2020     No components found for: MCT  Lab Results   Component Value Date     09/21/2020     Lab Results   Component Value Date    MCH 33.4 09/21/2020     Lab Results   Component Value Date    MCHC 33.5  09/21/2020     Lab Results   Component Value Date    RDW 12.9 09/21/2020     Lab Results   Component Value Date     09/21/2020     No results found for: A1C  TSH   Date Value Ref Range Status   04/02/2021 1.01 0.40 - 4.00 mU/L Final     No b12 found         Assessment and Plan:   Assessment:  Robert Archer is a very pleasant 71 year old male who presents today for evaluation of gait difficulty, falls, low back pain, dizziness, foot numbness.  Regarding his dizziness: It sounds to be more of an unsteadiness on his feet, no clear pre syncope or vertigo to description.  Unclear, MRI brain reviewed and non contributory.  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  It is not changing per the patient, will get routine neuropathy labs  --B12/A1C/IFIX  Low back pain:  MRI reviewed, he does have some pain probably from the compression fracture and some nerve impingement but nothing clearly to exam.  Asked he try PT exercises in this regard as well  --Gabapentin titrated up to 300 mg TID given to help with pain, side effects discussed       -return to clinic in 4-5 months sooner with issues or questions.           Payam Guzman MD   of Neurology   [facility]      The total time of this encounter today amounted to 50 minutes. This time included time spent with the patient, prep work, ordering tests, and performing post visit documentation.

## 2021-09-15 NOTE — LETTER
9/15/2021         RE: Robert Archer  [id number] [facility] [address]        Dear Colleague,    Thank you for referring your patient, Robert Archer, to the [facility] [place]. Please see a copy of my visit note below.    [facility]/[place]  Section of General Neurology  New Patient Visit      Robert Archer MRN# [id number]   Age: 71 year old YOB: 1950     Requesting physician: Jose Harmon     Reason for Consultation: gait difficulty, falls, low back pain, dizziness        History of Presenting Symptoms:   Robert Archer is a 71 year old male who presents today for evaluation of gait difficulty, falls, low back pain, dizziness,foot numbness.    He is here with his wife Ioana  They live in [place].    He is a retired hospital      Dizziness:    Dizziness specific questions:  Use a word other than dizziness to describe the sensation:  Lightheadedness/presyncopal feeling--none  Room spinning--rarely  Instability/unsteadiness--mostly an unsteadiness  Getting up, walking.    Duration of episodes? (less than 1 minute can be suggestive of BPPV)--none  Triggers: (head movements e.g.)--none  He is able to do yard work, mow the lawn somewhat easily.  The balance is the biggest limitation.    Work up that has been done prior to this appointment (appointments, imaging, testing, treatments)    He notes 2-3 years of numbness in his toes.  It hasn't progressed up word.    Neuropathy specific questions:  Location of numbness/tingling  Duration of symptoms there all day  H/o H7EC--or   Etoh screen--no  Pain/pain affecting life--no  Falls--yes, fell down the stairs end of March, 1 prior to that too hurt his.  He might have slipped in this one.    Family history --none  Normal diet.    Low back pain: Has hx of T12 compression fracture.  MRI L spine as below reviewed with patient.   Back pain occurs everyday.  Used to limit sleep,  doesn't as much anymore.  Interested in another medicine for this.  No weakness/clear radicular symptoms right now.           Past Medical History:     Patient Active Problem List   Diagnosis     Essential hypertension, benign     Hyperlipidemia LDL goal <130     No past medical history on file.     Past Surgical History:     Past Surgical History:   Procedure Laterality Date     NO HISTORY OF SURGERY          Social History:     Social History     Tobacco Use     Smoking status: Former Smoker     Years: 10.00     Smokeless tobacco: Never Used     Tobacco comment: quit 15 years ago    Substance Use Topics     Alcohol use: Yes     Comment: occ / 15 drinks a month      Drug use: No        Family History:     Family History   Problem Relation Age of Onset     Hypertension Mother      Ovarian Cancer Mother      Cataracts Mother      Hypertension Father      Heart Disease Father 83     Colon Cancer No family hx of      Prostate Cancer No family hx of      Breast Cancer No family hx of      Ulcerative Colitis No family hx of      Celiac Disease No family hx of      Crohn's Disease No family hx of         Medications:     Current Outpatient Medications   Medication Sig     acetaminophen-codeine (TYLENOL #3) 300-30 MG tablet Take 1 tablet by mouth every 8 hours as needed for severe pain     chlorthalidone (HYGROTON) 25 MG tablet Take 25 mg by mouth daily     diclofenac (VOLTAREN) 1 % topical gel Apply 2 g topically 3 times daily as needed for moderate pain     fluorouracil (EFUDEX) 5 % external cream Apply to clean skin 2 times daily for 14-21 days, or until the onset of irritation. Avoid eye area. Wash hands after use.     HYDROcodone-acetaminophen (NORCO) 5-325 MG tablet Take 1-2 tablets by mouth     lidocaine (XYLOCAINE) 5 % external ointment Apply topically as needed for moderate pain     lisinopril (PRINIVIL/ZESTRIL) 10 MG tablet Take 10 mg by mouth (Patient not taking: Reported on 8/27/2021)     lisinopril (ZESTRIL)  20 MG tablet      meclizine (ANTIVERT) 25 MG tablet TAKE 1 TABLET BY MOUTH 3 TIMES DAILY AS NEEDED FOR DIZZINESS     rosuvastatin (CRESTOR) 20 MG tablet Take 20 mg by mouth     No current facility-administered medications for this visit.        Allergies:   No Known Allergies     Review of Systems:   As noted above     Physical Exam:   Vitals: /89   Pulse 68   Resp 16   Wt 85.3 kg (188 lb)   BMI 27.56 kg/m       CV: peripheral pulse appreciated  Lungs: breathing comfortably  Extremities: no edema  Skin: No rashes    Neuro:   General Appearance: No apparent distress, well-nourished, well-groomed, pleasant     Mental Status: Alert and oriented to person, place, and time. Speech fluent and comprehension intact. No dysarthria. Normal memory, fund of knowledge, attention/concentration, and language    Cranial Nerves:   II: Visual fields: normal  III: Pupils: 3 mm, equal, round, reactive to light   III,IV,VI: Extraocular Movements: intact   V: Facial sensation: intact to light touch  VII: Facial strength: intact without asymmetry  VIII: Hearing: intact grossly  IX: Palate: intact   XI: Shoulder shrug: intact  XII: Tongue movement: normal     Motor Exam:   Upper Extremities  Deltoid  Bicep  Tricep  Wrist Extensors  strength Intrinsic Muscles    Right  5  5  5  5 5 5    Left  5  5  5  5 5 5      Lower Extremities  Hip Flexors  Knee Extensors  Knee   Flexors  Dorsi Flexion  Plantar   Flexion    Right  5  5  5  5  5    Left  5  5  5  5  5    Cut off tip of R thumb at age 13    No drift is present. No abnormal movements. Tone is normal throughout.    Sensory: diminished vibration at R>L toe, diminished pinprick primarily dorsum of L foot but patchily    Coordination: no dysmetria with finger-to-nose bilaterally, heel-to-shin normal    Reflexes: biceps, triceps, brachioradialis 1+, patellar trace, and ankle jerks absent and symmetric. Toes are downgoing bilaterally    Gait: Cautious casual gait, short strides.   Cannot do tandem, romberg without sway.           Data: Pertinent prior to visit   Imaging:  MRI L spine 6/11/2021                                                                 Impression:   1. Subacute to chronic T12 compression fracture with loss of 60-70%  vertebral body height.  2. Extensive multilevel lumbar spondylosis, most pronounced at L3-4  where there is severe spinal canal stenosis and moderate bilateral  neural foraminal stenosis with impingement on the exiting right L3  nerve root. Also moderate to severe neural foraminal stenosis at L4-5  and L5-S1.    MRI Brain 6/11/2021    Impression:    1. No acute intracranial pathology.  2. Normal bilateral auditory and vestibular structures.  3. Mild chronic small vessel ischemic disease.    I personally reviewed the above imaging and agree with the findings in the report.        Procedures:      Laboratory:  Last Comprehensive Metabolic Panel:  Sodium   Date Value Ref Range Status   04/02/2021 137 133 - 144 mmol/L Final     Potassium   Date Value Ref Range Status   04/19/2021 4.2 3.5 - 5.0 mmol/L Final     Chloride   Date Value Ref Range Status   04/02/2021 107 94 - 109 mmol/L Final     Carbon Dioxide   Date Value Ref Range Status   04/02/2021 26 20 - 32 mmol/L Final     Anion Gap   Date Value Ref Range Status   04/02/2021 4 3 - 14 mmol/L Final     Glucose   Date Value Ref Range Status   04/19/2021 97 65 - 100 mg/dL Final     Urea Nitrogen   Date Value Ref Range Status   04/02/2021 10 7 - 30 mg/dL Final     Creatinine   Date Value Ref Range Status   04/19/2021 0.94 0.72 - 1.25 mg/dL Final     GFR Estimate   Date Value Ref Range Status   06/11/2021 >90 >60 mL/min/[1.73_m2] Final     Calcium   Date Value Ref Range Status   04/02/2021 8.8 8.5 - 10.1 mg/dL Final     Lab Results   Component Value Date    WBC 7.0 09/21/2020     Lab Results   Component Value Date    RBC 4.79 09/21/2020     Lab Results   Component Value Date    HGB 16.0 09/21/2020     Lab Results    Component Value Date    HCT 47.8 09/21/2020     No components found for: MCT  Lab Results   Component Value Date     09/21/2020     Lab Results   Component Value Date    MCH 33.4 09/21/2020     Lab Results   Component Value Date    MCHC 33.5 09/21/2020     Lab Results   Component Value Date    RDW 12.9 09/21/2020     Lab Results   Component Value Date     09/21/2020     No results found for: A1C  TSH   Date Value Ref Range Status   04/02/2021 1.01 0.40 - 4.00 mU/L Final     No b12 found         Assessment and Plan:   Assessment:  Robert Archer is a very pleasant 71 year old male who presents today for evaluation of gait difficulty, falls, low back pain, dizziness, foot numbness.  Regarding his dizziness: It sounds to be more of an unsteadiness on his feet, no clear pre syncope or vertigo to description.  Unclear, MRI brain reviewed and non contributory.  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  It is not changing per the patient, will get routine neuropathy labs  --B12/A1C/IFIX  Low back pain:  MRI reviewed, he does have some pain probably from the compression fracture and some nerve impingement but nothing clearly to exam.  Asked he try PT exercises in this regard as well  --Gabapentin titrated up to 300 mg TID given to help with pain, side effects discussed       -return to clinic in 4-5 months sooner with issues or questions.           Payam Guzman MD   of Neurology   [facility]      The total time of this encounter today amounted to 50 minutes. This time included time spent with the patient, prep work, ordering tests, and performing post visit documentation.        Again, thank you for allowing me to participate in the care of  your patient.        Sincerely,        Storm Guzman MD

## 2021-09-19 ENCOUNTER — MYC MEDICAL ADVICE (OUTPATIENT)
Dept: FAMILY MEDICINE | Facility: CLINIC | Age: 71
End: 2021-09-19

## 2021-09-19 ASSESSMENT — ENCOUNTER SYMPTOMS
HEARTBURN: 0
CHILLS: 0
PARESTHESIAS: 0
WEAKNESS: 0
DIZZINESS: 1
FEVER: 0
HEMATOCHEZIA: 0
SHORTNESS OF BREATH: 0
SORE THROAT: 0
NAUSEA: 0
NERVOUS/ANXIOUS: 0
PALPITATIONS: 0
DIARRHEA: 0
COUGH: 0
HEMATURIA: 0
JOINT SWELLING: 0
FREQUENCY: 0
CONSTIPATION: 0
DYSURIA: 0
EYE PAIN: 0
HEADACHES: 0
ARTHRALGIAS: 0
ABDOMINAL PAIN: 0
MYALGIAS: 0

## 2021-09-19 ASSESSMENT — ACTIVITIES OF DAILY LIVING (ADL): CURRENT_FUNCTION: NO ASSISTANCE NEEDED

## 2021-09-20 NOTE — PROGRESS NOTES
"SUBJECTIVE:   Robert Archer is a 71 year old male who presents for Preventive Visit.    Patient has been advised of split billing requirements and indicates understanding: Yes   Are you in the first 12 months of your Medicare coverage?  No    Healthy Habits:     In general, how would you rate your overall health?  Good    Frequency of exercise:  6-7 days/week    Duration of exercise:  45-60 minutes    Do you usually eat at least 4 servings of fruit and vegetables a day, include whole grains    & fiber and avoid regularly eating high fat or \"junk\" foods?  Yes    Taking medications regularly:  No    Medication side effects:  None    Ability to successfully perform activities of daily living:  No assistance needed    Home Safety:  Lack of grab bars in the bathroom    Hearing Impairment:  No hearing concerns    In the past 6 months, have you been bothered by leaking of urine?  No    In general, how would you rate your overall mental or emotional health?  Good      PHQ-2 Total Score: 0    Additional concerns today:  No    He was not impressed with meeting the neurologist he was referred to.  We did review his recommendations and discussed reasoning for their recommendations.    No other additional concerns today.    He is okay getting flu shot today.    Do you feel safe in your environment? Yes    Have you ever done Advance Care Planning? (For example, a Health Directive, POLST, or a discussion with a medical provider or your loved ones about your wishes): Yes, patient states has an Advance Care Planning document and will bring a copy to the clinic.    Fall risk  Fallen 2 or more times in the past year?: Yes  Any fall with injury in the past year?: Yes  Timed Up and Go Test (>13.5 is fall risk; contact physician) : 10    Cognitive Screening   1) Repeat 3 items (Leader, Season, Table)    2) Clock draw: NORMAL  3) 3 item recall: Recalls 2 objects   Results: NORMAL clock, 1-2 items recalled: COGNITIVE IMPAIRMENT LESS " LIKELY    Reviewed and updated as needed this visit by clinical staff  Tobacco  Allergies  Meds  Problems  Med Hx  Surg Hx  Fam Hx  Soc Hx          Reviewed and updated as needed this visit by Provider  Tobacco  Allergies  Meds  Problems  Med Hx  Surg Hx  Fam Hx       Social history reviewed.  Social History     Tobacco Use     Smoking status: Former Smoker     Years: 10.00     Smokeless tobacco: Never Used     Tobacco comment: quit 15 years ago    Substance Use Topics     Alcohol use: Yes     Comment: occ / 15 drinks a month      Alcohol Use 9/19/2021   Prescreen: >3 drinks/day or >7 drinks/week? No   Prescreen: >3 drinks/day or >7 drinks/week? -     Current providers sharing in care for this patient include: Patient Care Team:  Jose Barrera MD as PCP - General (Family Practice)  Jose Barrera MD as Assigned PCP  Zora Peacock PA-C as Assigned Surgical Provider    The following health maintenance items are reviewed in Epic and correct as of today:  Health Maintenance Due   Topic Date Due     URINE DRUG SCREEN  Never done     FALL RISK ASSESSMENT  09/21/2021     BP Readings from Last 3 Encounters:   09/22/21 109/66   09/15/21 137/89   05/21/21 138/88    Wt Readings from Last 3 Encounters:   09/22/21 83.2 kg (183 lb 8 oz)   09/15/21 85.3 kg (188 lb)   05/21/21 86.6 kg (191 lb)                  Patient Active Problem List   Diagnosis     Essential hypertension, benign     Hyperlipidemia LDL goal <130     Past Surgical History:   Procedure Laterality Date     NO HISTORY OF SURGERY         Social History     Tobacco Use     Smoking status: Former Smoker     Years: 10.00     Smokeless tobacco: Never Used     Tobacco comment: quit 15 years ago    Substance Use Topics     Alcohol use: Yes     Comment: occ / 15 drinks a month      Family History   Problem Relation Age of Onset     Hypertension Mother      Ovarian Cancer Mother      Cataracts Mother      Hypertension Father      Heart  "Disease Father 83     Colon Cancer No family hx of      Prostate Cancer No family hx of      Breast Cancer No family hx of      Ulcerative Colitis No family hx of      Celiac Disease No family hx of      Crohn's Disease No family hx of          Current Outpatient Medications   Medication Sig Dispense Refill     chlorthalidone (HYGROTON) 25 MG tablet Take 25 mg by mouth daily       fluorouracil (EFUDEX) 5 % external cream Apply to clean skin 2 times daily for 14-21 days, or until the onset of irritation. Avoid eye area. Wash hands after use. 40 g 0     gabapentin (NEURONTIN) 300 MG capsule Take 1 capsule (300 mg) by mouth 3 times daily 90 capsule 3     lisinopril (ZESTRIL) 20 MG tablet        meclizine (ANTIVERT) 25 MG tablet TAKE 1 TABLET BY MOUTH 3 TIMES DAILY AS NEEDED FOR DIZZINESS 90 tablet 0     rosuvastatin (CRESTOR) 20 MG tablet Take 20 mg by mouth       No Known Allergies     Review of Systems   Constitutional: Negative for chills and fever.   HENT: Negative for congestion, ear pain, hearing loss and sore throat.    Eyes: Negative for pain and visual disturbance.   Respiratory: Negative for cough and shortness of breath.    Cardiovascular: Negative for chest pain, palpitations and peripheral edema.   Gastrointestinal: Negative for abdominal pain, constipation, diarrhea, heartburn, hematochezia and nausea.   Genitourinary: Negative for discharge, dysuria, frequency, genital sores, hematuria, impotence and urgency.   Musculoskeletal: Negative for arthralgias, joint swelling and myalgias.   Skin: Negative for rash.   Neurological: Positive for dizziness. Negative for weakness, headaches and paresthesias.   Psychiatric/Behavioral: Negative for mood changes. The patient is not nervous/anxious.      OBJECTIVE:   /66   Pulse 65   Temp 97.5  F (36.4  C) (Temporal)   Resp 14   Ht 1.755 m (5' 9.09\")   Wt 83.2 kg (183 lb 8 oz)   SpO2 99%   BMI 27.02 kg/m   Estimated body mass index is 27.02 kg/m  as " "calculated from the following:    Height as of this encounter: 1.755 m (5' 9.09\").    Weight as of this encounter: 83.2 kg (183 lb 8 oz).  Physical Exam  GENERAL: healthy, alert and no distress  EYES: Eyes grossly normal to inspection, PERRL and conjunctivae and sclerae normal  HENT: ear canals and TM's normal, nose and mouth without ulcers or lesions  NECK: no adenopathy, no asymmetry, masses, or scars and thyroid normal to palpation  RESP: lungs clear to auscultation - no rales, rhonchi or wheezes  CV: regular rate and rhythm, normal S1 S2, no S3 or S4, no murmur, click or rub, no peripheral edema and peripheral pulses strong  ABDOMEN: soft, nontender, no hepatosplenomegaly, no masses and bowel sounds normal  MS: no gross musculoskeletal defects noted, no edema  SKIN: no suspicious lesions or rashes  NEURO: Normal strength and tone, mentation intact and speech normal  PSYCH: mentation appears normal, affect normal/bright     Labs: Pending    ASSESSMENT / PLAN:   1. Medicare annual wellness visit, subsequent: Discussed personal health and safety.  No refills needed today.  Updated flu shot.  Will do colon cancer screening.  Screen for diabetes and cholesterol check as below.  Patient is at risk for falls encourage working with PT.  Follow-up annually.    2. Hyperlipidemia LDL goal <130: Continue Crestor.  - Lipid panel reflex to direct LDL Fasting; Future    3. Essential hypertension, benign: Controlled today.  Gets medications refilled through his cardiologist.  Recently increased lisinopril to 20 mg from 10 mg to see her in February.  Recheck BMP today.  - Basic metabolic panel  (Ca, Cl, CO2, Creat, Gluc, K, Na, BUN); Future    4. Dizziness: Encourage physical therapy as recommended by neurology.  Should continue seeing neurology.  Normal MRI previously.  Work-up for Lyme dependent neuropathy through neurologist.    5. Need for prophylactic vaccination and inoculation against influenza:   - INFLUENZA, QUAD, HIGH " "DOSE, PF, 65YR + (FLUZONE HD)    6. Colon cancer screening:   - Fecal colorectal cancer screen (FIT); Future    COUNSELING:  Reviewed preventive health counseling, as reflected in patient instructions       Regular exercise       Healthy diet/nutrition       Vision screening       Hearing screening       Dental care       Bladder control       Fall risk prevention       Immunizations    Vaccinated for: Influenza         Colon cancer screening       Prostate cancer screening    Estimated body mass index is 27.02 kg/m  as calculated from the following:    Height as of this encounter: 1.755 m (5' 9.09\").    Weight as of this encounter: 83.2 kg (183 lb 8 oz).    Weight management plan: Discussed healthy diet and exercise guidelines    He reports that he has quit smoking. He quit after 10.00 years of use. He has never used smokeless tobacco.    Appropriate preventive services were discussed with this patient, including applicable screening as appropriate for cardiovascular disease, diabetes, osteopenia/osteoporosis, and glaucoma.  As appropriate for age/gender, discussed screening for colorectal cancer, prostate cancer, breast cancer, and cervical cancer. Checklist reviewing preventive services available has been given to the patient.    Reviewed patients plan of care and provided an AVS. The Basic Care Plan (routine screening as documented in Health Maintenance) for Robert meets the Care Plan requirement. This Care Plan has been established and reviewed with the Patient.    Counseling Resources:  ATP IV Guidelines  Pooled Cohorts Equation Calculator  Breast Cancer Risk Calculator  Breast Cancer: Medication to Reduce Risk  FRAX Risk Assessment  ICSI Preventive Guidelines  Dietary Guidelines for Americans, 2010  USDA's MyPlate  ASA Prophylaxis  Lung CA Screening    Jose Barrera MD  [facility]     This chart is completed utilizing dictation software; typos and/or incorrect word " substitutions may unintentionally occur.      Identified Health Risks:    He is at risk for falling and has been provided with information to reduce the risk of falling at home.

## 2021-09-22 ENCOUNTER — OFFICE VISIT (OUTPATIENT)
Dept: FAMILY MEDICINE | Facility: CLINIC | Age: 71
End: 2021-09-22
Payer: COMMERCIAL

## 2021-09-22 VITALS
SYSTOLIC BLOOD PRESSURE: 109 MMHG | DIASTOLIC BLOOD PRESSURE: 66 MMHG | HEIGHT: 69 IN | RESPIRATION RATE: 14 BRPM | BODY MASS INDEX: 27.18 KG/M2 | WEIGHT: 183.5 LBS | TEMPERATURE: 97.5 F | OXYGEN SATURATION: 99 % | HEART RATE: 65 BPM

## 2021-09-22 DIAGNOSIS — R79.89 OTHER SPECIFIED ABNORMAL FINDINGS OF BLOOD CHEMISTRY: ICD-10-CM

## 2021-09-22 DIAGNOSIS — E78.5 HYPERLIPIDEMIA LDL GOAL <130: ICD-10-CM

## 2021-09-22 DIAGNOSIS — Z00.00 MEDICARE ANNUAL WELLNESS VISIT, SUBSEQUENT: Primary | ICD-10-CM

## 2021-09-22 DIAGNOSIS — Z23 NEED FOR PROPHYLACTIC VACCINATION AND INOCULATION AGAINST INFLUENZA: ICD-10-CM

## 2021-09-22 DIAGNOSIS — I10 ESSENTIAL HYPERTENSION, BENIGN: ICD-10-CM

## 2021-09-22 DIAGNOSIS — Z12.11 COLON CANCER SCREENING: ICD-10-CM

## 2021-09-22 DIAGNOSIS — R42 DIZZINESS: ICD-10-CM

## 2021-09-22 DIAGNOSIS — G62.9 LENGTH-DEPENDENT PERIPHERAL NEUROPATHY: ICD-10-CM

## 2021-09-22 LAB
ANION GAP SERPL CALCULATED.3IONS-SCNC: 6 MMOL/L (ref 3–14)
BUN SERPL-MCNC: 15 MG/DL (ref 7–30)
CALCIUM SERPL-MCNC: 9.8 MG/DL (ref 8.5–10.1)
CHLORIDE BLD-SCNC: 105 MMOL/L (ref 94–109)
CHOLEST SERPL-MCNC: 172 MG/DL
CO2 SERPL-SCNC: 27 MMOL/L (ref 20–32)
CREAT SERPL-MCNC: 0.9 MG/DL (ref 0.66–1.25)
FASTING STATUS PATIENT QL REPORTED: YES
GFR SERPL CREATININE-BSD FRML MDRD: 84 ML/MIN/1.73M2
GLUCOSE BLD-MCNC: 94 MG/DL (ref 70–99)
HBA1C MFR BLD: 4.9 % (ref 0–5.6)
HDLC SERPL-MCNC: 64 MG/DL
LDLC SERPL CALC-MCNC: 88 MG/DL
NONHDLC SERPL-MCNC: 108 MG/DL
POTASSIUM BLD-SCNC: 4.5 MMOL/L (ref 3.4–5.3)
SODIUM SERPL-SCNC: 138 MMOL/L (ref 133–144)
TRIGL SERPL-MCNC: 100 MG/DL
VIT B12 SERPL-MCNC: 332 PG/ML (ref 193–986)

## 2021-09-22 PROCEDURE — 86334 IMMUNOFIX E-PHORESIS SERUM: CPT | Performed by: FAMILY MEDICINE

## 2021-09-22 PROCEDURE — G0008 ADMIN INFLUENZA VIRUS VAC: HCPCS | Performed by: FAMILY MEDICINE

## 2021-09-22 PROCEDURE — 80048 BASIC METABOLIC PNL TOTAL CA: CPT | Performed by: FAMILY MEDICINE

## 2021-09-22 PROCEDURE — 80061 LIPID PANEL: CPT | Performed by: FAMILY MEDICINE

## 2021-09-22 PROCEDURE — 36415 COLL VENOUS BLD VENIPUNCTURE: CPT | Performed by: FAMILY MEDICINE

## 2021-09-22 PROCEDURE — 90662 IIV NO PRSV INCREASED AG IM: CPT | Performed by: FAMILY MEDICINE

## 2021-09-22 PROCEDURE — 83036 HEMOGLOBIN GLYCOSYLATED A1C: CPT | Performed by: FAMILY MEDICINE

## 2021-09-22 PROCEDURE — 82607 VITAMIN B-12: CPT | Performed by: FAMILY MEDICINE

## 2021-09-22 PROCEDURE — 99397 PER PM REEVAL EST PAT 65+ YR: CPT | Mod: 25 | Performed by: FAMILY MEDICINE

## 2021-09-22 ASSESSMENT — MIFFLIN-ST. JEOR: SCORE: 1569.22

## 2021-09-22 ASSESSMENT — ENCOUNTER SYMPTOMS
SHORTNESS OF BREATH: 0
WEAKNESS: 0
EYE PAIN: 0
CHILLS: 0
MYALGIAS: 0
PARESTHESIAS: 0
FREQUENCY: 0
HEMATOCHEZIA: 0
NERVOUS/ANXIOUS: 0
CONSTIPATION: 0
HEADACHES: 0
SORE THROAT: 0
HEMATURIA: 0
DIZZINESS: 1
DYSURIA: 0
ABDOMINAL PAIN: 0
JOINT SWELLING: 0
FEVER: 0
DIARRHEA: 0
NAUSEA: 0
COUGH: 0
PALPITATIONS: 0
HEARTBURN: 0
ARTHRALGIAS: 0

## 2021-09-22 ASSESSMENT — ACTIVITIES OF DAILY LIVING (ADL): CURRENT_FUNCTION: NO ASSISTANCE NEEDED

## 2021-09-22 NOTE — PATIENT INSTRUCTIONS
Patient Education     Colorectal Cancer Screening  Colorectal cancer starts in cells that form the colon or rectum. It's one of the leading causes of cancer deaths in the [place] But when this cancer is found and treated early, when it's still small and hasn't spread, the chances of a full recovery are very good. Because colorectal cancer rarely causes symptoms in its early stages, screening for the disease is important. Screening is even more important if you have risk factors for this cancer. Learn more about colorectal cancer, its risk factors, and screening options. Then talk with your healthcare provider to decide what's best for you.     Risk factors for colorectal cancer  Your risk of having colorectal cancer increases if you:     Are 50 years of age or older, but it can start in people younger than 50    Have a family history or personal history of colorectal cancer or polyps    Are  or of Eastern  [place]eh[place] descent (Ashkenazic)    Have type 2 diabetes, Crohn s disease, or ulcerative colitis    Have an inherited genetic syndrome like Guerrero syndrome (HNPCC) or familial adenomatous polyposis (FAP)    Are overweight    Are not physically active    Smoke    Drink a lot of alcohol (more than 2 drinks per day for men and 1 drink per day for women)    Eat a lot of red or processed meat  The colon and rectum  The colon and rectum are part of your digestive system. Food goes from your stomach, through your small intestine, then into your colon. As it travels through the colon, water is removed and the waste that is left (stool) becomes more solid. The muscles of your intestines push the stool toward the sigmoid colon. This is the last part of the colon. The stool then moves into the rectum. It's stored there until it s ready to leave your body during a bowel movement.   How colorectal cancer starts  Polyps are growths that form on the inner lining of the colon and rectum. Most are benign, which  means they aren t cancer. But over time, some polyps can become cancer. These are called malignant. This happens when cells in these polyps start to grow out of control. In time, the cancer cells can spread to more of the colon and rectum. The cancer can also spread to nearby organs or lymph nodes, and even to other parts of the body, like the liver or lungs. Finding and removing polyps before they become cancer can help keep cancer from starting.   Colorectal cancer screening  Screening means looking for a health problem before you have symptoms. Screening for colorectal cancer starts with:     Your health history. Your healthcare provider will ask about your health history and possible cancer risk factors. Tell your healthcare provider if you have a family member who has had colorectal cancer or polyps. Also mention any health problems you have had in the past.    Physical exam, including a digital rectal exam (RAMIRO). A RAMIRO might be done as part of your physical exam. To do it, your healthcare provider puts a lubricated gloved finger into your rectum. He or she checks for any lumps or changes that could be cancer. This doesn't hurt and takes less than a minute. RAMIRO alone is not enough to screen for colorectal cancer. You'll also need one of the tests listed below.  Screening test choices  Screening advice varies among expert groups. Many suggest that people at average risk for colorectal cancer start routine screening at age 50. But the American Cancer Society (ACS) recommends starting screening at age 45. Your healthcare provider can help you decide what's best for you. It's also important to check with your health insurance provider.   Below are the most commonly used colorectal cancer screening tests. How often you should be screened depends on your risk and the test that you and your healthcare provider choose. If you have a family history of colon cancer or are at high risk for other reasons, you may need to  have screening earlier or more often.   Stool testing   Fecal occult blood test (FOBT) or fecal immunochemical test (FIT) (every year)   These tests check for blood in stool that you can t see (hidden or occult blood). Hidden blood may be a sign of colon polyps or cancer. A small sample of stool is sent to a lab where it's tested for blood. Most often, you collect this sample at home using a kit your healthcare provider gives you. Make sure you know what to do and follow the instructions carefully. For instance, you might need to not eat certain foods and not take certain medicines before collecting stool for this test.   Stool DNA test (every 3 years)  This test looks for cells in your stool that have changed DNA in them. These DNA changes might be signs of cancer or polyps. This test also looks for hidden blood in stool. For this test, you collect an entire bowel movement. This is done using a special container that's put in the toilet. The kit has instructions on how to collect, prepare, and send your stool. It goes to a lab for testing.   Visual exams  Colonoscopy (every 10 years)  This test allows your healthcare provider to find and remove polyps anywhere in your colon or rectum.   A day or 2 before the test, you'll do a bowel prep. This is a liquid diet plus a strong laxative solution or an enema. The bowel prep cleans out your colon so the lining can be seen during the test. You'll be given instructions on how to do the prep.   Just before the test, you're given a medicine to make you sleepy. Then the healthcare provider gently puts a long, flexible, lighted tube (called a colonoscope) into your rectum. The scope is guided through your entire colon. The provider looks at images of the inside of your colon on a video screen. Any polyps seen are removed and sent to a lab for testing. If a polyp can t be removed, a small piece of it is taken out for testing. If the tests show it might be cancer, the polyp might  be removed later during surgery.   You'll need to bring someone with you to drive you home after this test.   Colonoscopy is the only screening test that lets your healthcare provider see your entire colon and rectum. This test also lets your healthcare provider remove any pieces of tissue that need to be checked for cancer.   If something suspicious is found using any other colorectal cancer screening tests, you will likely need a colonoscopy.   Sigmoidoscopy (every 5 years)  This test is a lot like a colonoscopy. But it focuses only on the sigmoid colon and rectum. (The sigmoid colon is the last 2 feet or so that connects to your rectum. The entire colon is about 5 feet long.) As with colonoscopy, bowel prep must be done before this test.   You are awake during the test. But you might be given medicine to help you relax. During the test, the healthcare provider guides a thin, flexible, lighted tube called a sigmoidoscope through your rectum and lower colon. The images are displayed on a video screen. Polyps can be removed and sent to a lab for testing.   Virtual colonoscopy (every 5 years)  This test is also called a CT colonography. It uses a series of X-rays to make a 3-D image of your colon and rectum.   The day before the test, you'll need to do a bowel prep to clean out your colon. Your healthcare provider will give you instructions on how to do this.   During the test, you'll lie on a narrow table that's part of a special X-ray machine called a CT scanner. A soft, small tube will be placed into your rectum to fill your colon and rectum with air. Then, the table will move into the ring-shaped machine and pictures will be taken. A computer will combine these photos to create a 3-D image. Because the test uses X-rays, it exposes you to a small amount of radiation. This test can be done without sedation. If polyps or any suspicious changes are seen, you'll need a colonoscopy so that tissue can be removed for  testing.   Talking with your healthcare provider  Talk with your healthcare provider about which screening tests might be best for you. Each one has pros and cons. But no matter which test you choose, the most important thing is that you get screened. Keep in mind that if cancer is found at an early stage during screening, it's easier to treat and treatment is more likely to work well. Cancer can even be prevented with routine screening tests.   Note: If you choose a screening test other than a colonoscopy and have an abnormal test result, you'll need to follow-up with colonoscopy. This would not be considered a screening colonoscopy, so deductibles and co-pays may apply. Check with your health insurance provider so you know what to expect.   Know your risk: You may need to be screened using a different schedule if you have a personal or family history of colorectal cancer. A different schedule might also be needed if you have polyps or certain inherited conditions. These include familial adenomatous polyposis (FAP), Guerrero syndrome (hereditary nonpolyposis colon cancer, HNPCC), or inflammatory bowel disease such as Crohn's or ulcerative colitis. Talk with your provider about your health history to decide on the colorectal cancer screening plan that's best for you.   [employer] last reviewed this educational content on 8/1/2020 2000-2021 [employer]. All rights reserved. This information is not intended as a substitute for professional medical care. Always follow your healthcare professional's instructions.           Patient Education   Personalized Prevention Plan  You are due for the preventive services outlined below.  Your care team is available to assist you in scheduling these services.  If you have already completed any of these items, please share that information with your care team to update in your medical record.  Health Maintenance Due   Topic Date Due     URINE DRUG SCREEN  Never done      FALL RISK ASSESSMENT  09/21/2021       Preventing Falls at Home  A person can fall for many reasons. Older adults may fall because reaction time slows down as we age. Your muscles and joints may get stiff, weak, or less flexible because of illness, medicines, or a physical condition.   Other health problems that make falls more likely include:     Arthritis    Dizziness or lightheadedness when you stand up (orthostatic hypotension)    History of a stroke    Dizziness    Anemia    Certain medicines taken for mental illness or to control blood pressure.    Problems with balance or gait    Bladder or urinary problems    History of falling    Changes in vision (vision impairment)    Changes in thinking skills and memory (cognitive impairment)  Injuries from a fall can include serious injuries such as broken bones, dislocated joints, internal bleeding and cuts. Injuries like these can limit your independence.   Prevention tips  To help prevent falls and fall-related injuries, follow the tips below.    Floors  To make floors safer:     Put nonskid pads under area rugs.    Remove small rugs.    Replace worn floor coverings.    Tack carpets firmly to each step on carpeted stairs. Put nonskid strips on the edges of uncarpeted stairs.    Keep floors and stairs free of clutter and cords.    Arrange furniture so there are clear pathways.    Clean up any spills right away.  Bathrooms    To make bathrooms safer:     Install grab bars in the tub or shower.    Apply nonskid strips or put a nonskid rubber mat in the tub or shower.    Sit on a bath chair to bathe.    Use bathmats with nonskid backing.  Lighting  To improve visibility in your home:      Keep a flashlight in each room. Or put a lamp next to the bed within easy reach.    Put nightlights in the bedrooms, hallways, kitchen, and bathrooms.    Make sure all stairways have good lighting.    Take your time when going up and down stairs.    Put handrails on both sides of  stairs and in walkways for more support. To prevent injury to your wrist or arm, don t use handrails to pull yourself up.    Install grab bars to pull yourself up.    Move or rearrange items that you use often. This will make them easier to find or reach.    Look at your home to find any safety hazards. Especially look at doorways, walkways, and the driveway. Remove or repair any safety problems that you find.  Other changes to make    Look around to find any safety hazards. Look closely at doorways, walkways, and the driveway. Remove or repair any safety problems that you find.    Wear shoes that fit well.    Take your time when going up and down stairs.    Put handrails on both sides of stairs and in walkways for more support. To prevent injury to your wrist or arm, don t use handrails to pull yourself up.    Install grab bars wherever needed to pull yourself up.    Arrange items that you use often. This will make them easier to find or reach.    [employer] last reviewed this educational content on 3/1/2020    3574-8375 [employer]. All rights reserved. This information is not intended as a substitute for professional medical care. Always follow your healthcare professional's instructions.

## 2021-09-22 NOTE — RESULT ENCOUNTER NOTE
Please inform of results if patient has not viewed in [employer].    Your cholesterol, kidney function, and blood sugar lab results came back normal.    Please call the clinic with any questions you may have.     Have a great day,    Dr. Sepulveda

## 2021-09-23 LAB — PROT PATTERN SERPL IFE-IMP: NORMAL

## 2021-09-24 ENCOUNTER — LAB (OUTPATIENT)
Dept: LAB | Facility: CLINIC | Age: 71
End: 2021-09-24
Payer: COMMERCIAL

## 2021-09-24 DIAGNOSIS — Z12.11 COLON CANCER SCREENING: ICD-10-CM

## 2021-09-24 PROCEDURE — 82274 ASSAY TEST FOR BLOOD FECAL: CPT

## 2021-09-25 LAB — HEMOCCULT STL QL IA: NEGATIVE

## 2021-09-27 NOTE — RESULT ENCOUNTER NOTE
Please inform of results if patient has not viewed in [employer].    Your FIT test lab results for colon cancer screening came back normal.    Please call the clinic with any questions you may have.     Have a great day,    Dr. Sepulveda

## 2021-11-01 ENCOUNTER — HOSPITAL ENCOUNTER (OUTPATIENT)
Dept: PHYSICAL THERAPY | Facility: CLINIC | Age: 71
Setting detail: THERAPIES SERIES
End: 2021-11-01
Attending: STUDENT IN AN ORGANIZED HEALTH CARE EDUCATION/TRAINING PROGRAM
Payer: COMMERCIAL

## 2021-11-01 DIAGNOSIS — R26.89 IMBALANCE: ICD-10-CM

## 2021-11-01 PROCEDURE — 97161 PT EVAL LOW COMPLEX 20 MIN: CPT | Mod: GP | Performed by: PHYSICAL THERAPIST

## 2021-11-01 PROCEDURE — 97112 NEUROMUSCULAR REEDUCATION: CPT | Mod: GP | Performed by: PHYSICAL THERAPIST

## 2021-11-02 NOTE — PROGRESS NOTES
[facility]        OUTPATIENT PHYSICAL THERAPY FUNCTIONAL EVALUATION  PLAN OF TREATMENT FOR OUTPATIENT REHABILITATION  (COMPLETE FOR INITIAL CLAIMS ONLY)  Patient's Last Name, First Name, M.I.  YOB: 1950  ArcherRobert  AGATHA     Provider's Name   [facility]   Medical Record No.  [id number]     Start of Care Date:  11/01/21   Onset Date:  09/15/21   Type:     _X__PT   ____OT  ____SLP Medical Diagnosis:  Imbalance     PT Diagnosis:  impaired static balance, impaired dynamic balance Visits from SOC:  1                              __________________________________________________________________________________  Plan of Treatment/Functional Goals:  balance training, neuromuscular re-education, ROM, strengthening, stretching, transfer training, gait training           GOALS  DGI  Patient will increase DGI score from 16 to 21 to demonstrate a decreased risk of falling at home and in the community.   12/31/21    STS  Patient will complete 12 STS in 30 secs without UE support and with no backward LOB to improve household mobility such as transfers and stairs.   12/31/21    HEP  Patient will be compliant with HEP and demonstrate exercises with minimal cuing to improve carryover and improve patient's walking distance before instability begins.  01/30/21    SLS  Robert will demonstrate 8 secs of SLS in 2/2 trials on each LE to improve his ability to perform household tasks such as putting on socks/pants and stair climbing with improved ease.   01/30/21         Therapy Frequency:  1 time/week   Predicted Duration of Therapy Intervention:  6-8 weeks    Meena Gonzalez, PT                                    I CERTIFY THE NEED FOR THESE SERVICES FURNISHED UNDER        THIS PLAN OF TREATMENT AND WHILE UNDER MY CARE     (Physician co-signature of this document indicates review and  certification of the therapy plan).                Certification Date From:  11/02/21   Certification Date To:  01/29/22    Referring Provider:  Storm Guzman MD    Initial Assessment  See Epic Evaluation- Start of Care Date: 11/01/21

## 2021-11-02 NOTE — PROGRESS NOTES
Dynamic Gait Index (DGI):The DGI is a measure of balance during gait that is reliable and valid for the elderly and individuals with Parkinson's disease, MS, vestibular disorders, or s/p stroke. Gait assistive device used: None       11/01/21 1000   Signing Clinician's Name / Credentials   Signing clinician's name / credentials KRISHNA Lentz   Dynamic Gait Index (Jose De Jesus and Barney Mari, 1995)   Gait Level Surface 3   Change in Gait Speed 1  (reports feeling inbalanced)   Gait and Horizontal Head Turns 1  (reproduced symptoms)   Gait with Vertical Head Turns 2   Gait and Pivot Turns 2   Step Over Obstacle 2  (lost balance breifly after stepping over, but recovered)   Step Around Obstacles 2   Steps 3   Total Dynamic Gait Index Score  (A score of 19 or less has been correlated to an increased risk of falls in community dwelling older adults, patients with vestibular disorders, and patients with MS.)   Total Score (out of 24) 16       Patient score: 16/24  Scores ?19/24 indicate an increased risk for falls according to Stacie et al 2000  Minimal Detectable Change = 2.9 in community dwelling elderly according to oJse et al 2011    Assessment (rationale for performing, application to patient s function & care plan): Patient presents with static and dynamic imbalance with multifactorial causes. He is at increased risk of falls as reflected by DGI and 30 sec STS assessments. Patient would benefit from skilled PT services to address balance impairments and increase the patient's community mobility.   Minutes billed as physical performance test

## 2021-11-02 NOTE — PROGRESS NOTES
11/01/21 1000   Quick Adds   Quick Adds Certification   Type of Visit Initial OP PT Evaluation   General Information   Start of Care Date 11/01/21   Referring Physician Storm Guzman MD   Orders Evaluate and Treat as Indicated   Order Date 09/15/21   Medical Diagnosis Imbalance   Onset of illness/injury or Date of Surgery 09/15/21   Precautions/Limitations fall precautions   Surgical/Medical history reviewed Yes  (T12 compress fx, numbness/pain balls of feet, L3-S1 stenosis)   Pertinent history of current problem (include personal factors and/or comorbidities that impact the POC) Patient reports that he is always dizzy and imbalanced, but denies while seated. He started noticing it after his fall down the stairs in April. Patient reports that he hurt his back. Went to [facility], had x ray which showed no broken bones. Had brain MRI in June with no significant findings. Patient states that his imbalance is distant dependent, he notices it after 2-3 miles of walking with his wife. Has pain in back when lifting heavier household items. Denies dizziness with bed mobility. Patient reports that he experienced vertigo a few years ago, but this is different. Reports no problems with dragging his feet or tripping over obstacles. Taking Gabapentin for pain and it is helping.    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 Yes   Have you fallen and had an injury in the past year? Yes   Timed Up and Go score (seconds) 11.30   Is patient a fall risk? Yes   Fall screen comments 30 sec STS and DGI indicate that pt is at risk of falling. Patient's TUG is below standard cut off for fall risk, but is below age and gender norms (9 sec)   Abuse Screen (yes response referral indicated)   Feels Unsafe at Home or Work/School no   Feels Threatened by Someone no   Does Anyone Try to Keep You From Having Contact with Others or Doing Things Outside Your Home? no   Physical Signs of Abuse Present no   Pain   Patient currently in pain Denies   Pain comments reports toes and balls of feet can be painful on B LEs from numbness but improved with gabbapentin   Cognitive Status Examination   Orientation orientation to person, place and time   Level of Consciousness alert   Follows Commands and Answers Questions 100% of the time   Personal Safety and Judgment intact   Memory intact   Observation   Observation Patient appears stable and well groomed with no apparent distress   Posture   Posture Forward head position;Protracted shoulders;Kyphosis   Posture Comments forward shouler posture and forward head position in sitting and standing that is moderate to severe. Pt confirms that family has noticed and he has neck aches   Range of Motion (ROM)   ROM Comment overall decrease in motion-- 50% of ROM noted R to L with 50% decrease in neck extension as noted during DGI testing    Strength   Strength Comments L hip flexion 4+/5, 5/5 otherwise in LEs with major muscle groups    Transfer Skills   Transfer Comments sit to stand with preferred use of UEs, can do without though. Did have posterior LOB when trying to get out of the chair initially without UE support    Gait   Gait Comments downward gaze/forward head    Gait Special Tests   Gait Special Tests 25  FOOT TIMED WALK;FUNCTIONAL GAIT ASSESSMENT;DYNAMIC GAIT INDEX   Gait Special Tests 25 Foot Timed Walk   Seconds 5.87   Steps 14 Steps   Gait Special Tests Dynamic Gait Index   Score out of 24 16   Comments Score of 19 and below indicates risk of falls   Balance Special Tests   Balance Special Tests Single leg stance right;Single leg stance left;Modified CTSIB Conditions;Sharpened Romberg;Sit to stand reps;Timed up and go   Balance Special Tests Timed Up and Go   Seconds 11.3 Seconds   Balance Special Tests Single Leg Stance Right,   Right, seconds 5 Seconds   Balance Special Tests Single Leg Stance Left   Left, seconds 2 Seconds   Balance Special Tests Modified CTSIB Conditions   Condition 1, seconds 30 Seconds   Condition 2, seconds 30 Seconds   Condition 4, seconds 30 Seconds   Condition 5, seconds 14 Seconds   Modified CTSIB Comments feels imbalance with all tasks, visually depenent with balance    Balance Special Tests Sit to Stand Reps in 30 Seconds   Reps in 30 seconds 9   Height 18   Comments posterior LOB x 2 during performance, indicates mod risk of fall   Sensory Examination   Sensory Perception Comments numbness in toes and balls of feet B per patient, did not do formal assessment today    Oculomotor Exam   Smooth Pursuit Normal   VOR Normal   VOR Comments screened in sitting, saccadic eye smovements noted but did not create any dizziness for patient    Planned Therapy Interventions   Planned Therapy Interventions balance training;neuromuscular re-education;ROM;strengthening;stretching;transfer training;gait training   Clinical Impression   Criteria for Skilled Therapeutic Interventions Met yes, treatment indicated   PT Diagnosis impaired static balance, impaired dynamic balance   Influenced by the following impairments numbness in toes/balls of feet, posture   Functional limitations due to impairments stairs, community ambulation   Clinical Presentation Stable/Uncomplicated   Clinical Presentation  Rationale no changes since fall   Clinical Decision Making (Complexity) Low complexity   Therapy Frequency 1 time/week   Predicted Duration of Therapy Intervention (days/wks) 6-8 weeks   Risk & Benefits of therapy have been explained Yes   Patient, Family & other staff in agreement with plan of care Yes   Clinical Impression Comments Patient presents with impaired balance both statically and dynamically with no clear cause at this time. Balance issues seem multi factorial in nature-- numbness in feet, mild strenth deficits, decresaed weight shifting abilites and postural dysfunction. Patient would benefit from skilled PT services to address balance impairements for increased safety around home and in community.    GOALS   PT Eval Goals 1;3;2;4   Goal 1   Goal Identifier DGI   Goal Description Patient will increase DGI score from 16 to 21 to demonstrate a decreased risk of falling at home and in the community.    Target Date 12/31/21   Goal 2   Goal Identifier STS   Goal Description Patient will complete 12 STS in 30 secs without UE support and with no backward LOB to improve household mobility such as transfers and stairs.    Target Date 12/31/21   Goal 3   Goal Identifier HEP   Goal Description Patient will be compliant with HEP and demonstrate exercises with minimal cuing to improve carryover and improve patient's walking distance before instability begins.   Target Date 01/30/21   Goal 4   Goal Identifier SLS   Goal Description Robert will demonstrate 8 secs of SLS in 2/2 trials on each LE to improve his ability to perform household tasks such as putting on socks/pants and stair climbing with improved ease.    Target Date 01/30/21   Total Evaluation Time   PT Lila Low Complexity Minutes ([id number]) 30   Therapy Certification   Certification date from 11/02/21   Certification date to 01/29/22   Medical Diagnosis Imbalance   Certification I certify the need for these services furnished under this plan of treatment and  while under my care.  (Physician co-signature of this document indicates review and certification of the therapy plan).

## 2021-11-19 ENCOUNTER — HOSPITAL ENCOUNTER (OUTPATIENT)
Dept: PHYSICAL THERAPY | Facility: CLINIC | Age: 71
Setting detail: THERAPIES SERIES
End: 2021-11-19
Attending: STUDENT IN AN ORGANIZED HEALTH CARE EDUCATION/TRAINING PROGRAM
Payer: COMMERCIAL

## 2021-11-19 PROCEDURE — 97110 THERAPEUTIC EXERCISES: CPT | Mod: GP | Performed by: PHYSICAL THERAPIST

## 2021-11-19 PROCEDURE — 97112 NEUROMUSCULAR REEDUCATION: CPT | Mod: GP | Performed by: PHYSICAL THERAPIST

## 2021-12-02 ENCOUNTER — HOSPITAL ENCOUNTER (OUTPATIENT)
Dept: PHYSICAL THERAPY | Facility: CLINIC | Age: 71
Setting detail: THERAPIES SERIES
End: 2021-12-02
Attending: STUDENT IN AN ORGANIZED HEALTH CARE EDUCATION/TRAINING PROGRAM
Payer: COMMERCIAL

## 2021-12-02 PROCEDURE — 97110 THERAPEUTIC EXERCISES: CPT | Mod: GP | Performed by: PHYSICAL THERAPIST

## 2021-12-02 PROCEDURE — 97116 GAIT TRAINING THERAPY: CPT | Mod: GP | Performed by: PHYSICAL THERAPIST

## 2021-12-28 ENCOUNTER — HOSPITAL ENCOUNTER (OUTPATIENT)
Dept: PHYSICAL THERAPY | Facility: CLINIC | Age: 71
Setting detail: THERAPIES SERIES
End: 2021-12-28
Attending: STUDENT IN AN ORGANIZED HEALTH CARE EDUCATION/TRAINING PROGRAM
Payer: COMMERCIAL

## 2021-12-28 PROCEDURE — 97116 GAIT TRAINING THERAPY: CPT | Mod: GP | Performed by: PHYSICAL THERAPIST

## 2021-12-28 PROCEDURE — 97110 THERAPEUTIC EXERCISES: CPT | Mod: GP | Performed by: PHYSICAL THERAPIST

## 2021-12-28 PROCEDURE — 97112 NEUROMUSCULAR REEDUCATION: CPT | Mod: GP | Performed by: PHYSICAL THERAPIST

## 2021-12-28 NOTE — PROGRESS NOTES
[facility]    Outpatient Physical Therapy Progress Note  Patient: Robert Archer  : 1950    Beginning/End Dates of Reporting Period:  21 to 21    Referring Provider: Storm Guzman MD    Therapy Diagnosis: impaired static balance, impaired dynamic balance     Client Self Report: Still feeling about the same. He is doing posture exercises that were given last time. He still feels some imbalance when he walks further distances and sometimes with quicker movements in standing. He does not always know what triggers things.       Goals:  Goal Identifier DGI   Goal Description Patient will increase DGI score from 16 to 21 to demonstrate a decreased risk of falling at home and in the community.    Target Date 21   Date Met      Progress (detail required for progress note): Improved ease of head turns with gait but did not re-tested formally today.     Goal Identifier STS   Goal Description Patient will complete 12 STS in 30 secs without UE support and with no backward LOB to improve household mobility such as transfers and stairs.    Target Date 21   Date Met      Progress (detail required for progress note):  10 sit to  30 secs without any posterior LOB.     Goal Identifier HEP   Goal Description Patient will be compliant with HEP and demonstrate exercises with minimal cuing to improve carryover and improve patient's walking distance before instability begins.   Target Date 21   Date Met      Progress (detail required for progress note):  Continued goal.     Goal Identifier SLS   Goal Description Robert will demonstrate 8 secs of SLS in 2/2 trials on each LE to improve his ability to perform household tasks such as putting on socks/pants and stair climbing with improved ease.    Target Date 21   Date Met      Progress (detail required for progress note):  3-4 secs on  each LE consistently. Progressing.     Plan:  Patient has been seen for 3 PT sessions. PT recommended that he continue weekly PT sessions to focus on balance but patient declined as he wants to continue working on balance exercises at home. Patient with difficulty with anterior/posterior WS as well as noted forward shoulder and head posture which contribute to balance difficulties. Patient following up with neurologist mid February.     Discharge:  No

## 2022-01-14 ENCOUNTER — DOCUMENTATION ONLY (OUTPATIENT)
Dept: OTHER | Facility: CLINIC | Age: 72
End: 2022-01-14
Payer: COMMERCIAL

## 2022-01-20 ENCOUNTER — MYC MEDICAL ADVICE (OUTPATIENT)
Dept: NEUROLOGY | Facility: CLINIC | Age: 72
End: 2022-01-20
Payer: COMMERCIAL

## 2022-01-20 DIAGNOSIS — M54.50 CHRONIC MIDLINE LOW BACK PAIN WITHOUT SCIATICA: ICD-10-CM

## 2022-01-20 DIAGNOSIS — G89.29 CHRONIC MIDLINE LOW BACK PAIN WITHOUT SCIATICA: ICD-10-CM

## 2022-01-20 DIAGNOSIS — G62.9 LENGTH-DEPENDENT PERIPHERAL NEUROPATHY: ICD-10-CM

## 2022-01-20 RX ORDER — GABAPENTIN 300 MG/1
300 CAPSULE ORAL 3 TIMES DAILY
Qty: 90 CAPSULE | Refills: 3 | Status: SHIPPED | OUTPATIENT
Start: 2022-01-20 | End: 2022-02-16

## 2022-01-20 NOTE — TELEPHONE ENCOUNTER
Hospitalist Writer received a refill request from: the pt via [facility].     Medication: gabapentin (NEURONTIN) 300 MG capsule  Sig: Take 1 capsule (300 mg) by mouth 3 times daily  Date last written: 9/15/21  Dispensed amount: 90  Refills: 3        Pt's last office visit: 9/15/21  Next scheduled office visit: 2/16/22      Per the RN/LPN medication refill protocol, writer is unable to refill this request. Routed to Dr Guzman to review and sign.    Lalita Woodward RN   Neurology Care Coordinator

## 2022-02-16 ENCOUNTER — OFFICE VISIT (OUTPATIENT)
Dept: NEUROLOGY | Facility: CLINIC | Age: 72
End: 2022-02-16
Payer: COMMERCIAL

## 2022-02-16 VITALS
WEIGHT: 193.2 LBS | HEART RATE: 71 BPM | DIASTOLIC BLOOD PRESSURE: 110 MMHG | HEIGHT: 71 IN | SYSTOLIC BLOOD PRESSURE: 170 MMHG | BODY MASS INDEX: 27.05 KG/M2

## 2022-02-16 DIAGNOSIS — G89.29 CHRONIC MIDLINE LOW BACK PAIN WITHOUT SCIATICA: ICD-10-CM

## 2022-02-16 DIAGNOSIS — R26.89 IMBALANCE: Primary | ICD-10-CM

## 2022-02-16 DIAGNOSIS — G62.9 LENGTH-DEPENDENT PERIPHERAL NEUROPATHY: ICD-10-CM

## 2022-02-16 DIAGNOSIS — M54.50 CHRONIC MIDLINE LOW BACK PAIN WITHOUT SCIATICA: ICD-10-CM

## 2022-02-16 PROCEDURE — 99214 OFFICE O/P EST MOD 30 MIN: CPT | Performed by: STUDENT IN AN ORGANIZED HEALTH CARE EDUCATION/TRAINING PROGRAM

## 2022-02-16 RX ORDER — GABAPENTIN 300 MG/1
300 CAPSULE ORAL 3 TIMES DAILY
Qty: 270 CAPSULE | Refills: 2 | Status: SHIPPED | OUTPATIENT
Start: 2022-02-16 | End: 2022-08-17

## 2022-02-16 NOTE — PROGRESS NOTES
[facility]  Section of General Neurology  Return Patient Visit    Robert Archer MRN# [id number]   Age: 71 year old YOB: 1950     Brief history of symptoms: The patient was initially seen in neurologic consultation on 9/15 for evaluation of dizziness, low back pain, foot numbness. Please see the comprehensive neurologic consultation note from that date in the Epic records for details.       Interval history:     Now on gabapentin 300 mg TID  PT: He did not get any better subjectively but as below and per his wife there was some improvement to my estimation as well which he later conceded.    He previously enjoyed woodworking and golf.  I do think he could woodwork still, golf may be more of a challenge.    No side effects from gabapentin  No change in foot numbness        Past Medical History:     Patient Active Problem List   Diagnosis     Essential hypertension, benign     Hyperlipidemia LDL goal <130     No past medical history on file.     Past Surgical History:     Past Surgical History:   Procedure Laterality Date     NO HISTORY OF SURGERY          Social History:     Social History     Tobacco Use     Smoking status: Former Smoker     Years: 10.00     Smokeless tobacco: Never Used     Tobacco comment: quit 15 years ago    Vaping Use     Vaping Use: Never used   Substance Use Topics     Alcohol use: Yes     Comment: occ / 15 drinks a month      Drug use: No        Family History:     Family History   Problem Relation Age of Onset     Hypertension Mother      Ovarian Cancer Mother      Cataracts Mother      Hypertension Father      Heart Disease Father 83     Colon Cancer No family hx of      Prostate Cancer No family hx of      Breast Cancer No family hx of      Ulcerative Colitis No family hx of      Celiac Disease No family hx of      Crohn's Disease No family hx of         Medications:     Current Outpatient Medications   Medication Sig     chlorthalidone (HYGROTON) 25 MG  "tablet Take 25 mg by mouth daily     fluorouracil (EFUDEX) 5 % external cream Apply to clean skin 2 times daily for 14-21 days, or until the onset of irritation. Avoid eye area. Wash hands after use.     gabapentin (NEURONTIN) 300 MG capsule Take 1 capsule (300 mg) by mouth 3 times daily     lisinopril (ZESTRIL) 20 MG tablet      meclizine (ANTIVERT) 25 MG tablet TAKE 1 TABLET BY MOUTH 3 TIMES DAILY AS NEEDED FOR DIZZINESS     rosuvastatin (CRESTOR) 20 MG tablet Take 20 mg by mouth     No current facility-administered medications for this visit.        Allergies:   No Known Allergies       Physical Exam:   Vitals: BP (!) 170/110   Pulse 71   Ht 1.803 m (5' 11\")   Wt 87.6 kg (193 lb 3.2 oz)   BMI 26.95 kg/m     CV: peripheral pulse appreciated  Lungs: breathing comfortably  Extremities: no edema    Neuro:   General Appearance: No apparent distress, well-nourished, well-groomed, pleasant     Mental Status: Alert and oriented to person, place, and time. Speech fluent and comprehension intact. No dysarthria.    Cranial Nerves:   II: Visual fields: normal  III: Pupils: 3 mm, equal, round, reactive to light   III,IV,VI: Extraocular Movements: intact     Motor Exam:   5/5 diffusely    Sensory: vibration absent R great toe ~8-10 seconds on L great toe, present at ankle, diminshed to PP on R >L foot dorsum not up shin     Coordination: no dysmetria seen  Reflexes: biceps, triceps, brachioradialis, patellar, and ankle jerks 1+ and symmetric. Toes are downgoing bilaterally    Gait: normal casual gait, normal stride length tandem gait much improved, romberg without sway         Data: Pertinent prior to visit   Imaging:  MRI L spine 6/11/2021                                                                  Impression:   1. Subacute to chronic T12 compression fracture with loss of 60-70%  vertebral body height.  2. Extensive multilevel lumbar spondylosis, most pronounced at L3-4  where there is severe spinal canal stenosis " and moderate bilateral  neural foraminal stenosis with impingement on the exiting right L3  nerve root. Also moderate to severe neural foraminal stenosis at L4-5  and L5-S1.     MRI Brain 6/11/2021     Impression:    1. No acute intracranial pathology.  2. Normal bilateral auditory and vestibular structures.  3. Mild chronic small vessel ischemic disease.     I personally reviewed the above imaging and agree with the findings in the report.        Laboratory:    B12 332  Lab Results   Component Value Date    A1C 4.9 09/22/2021   IFIX negative         Assessment and Plan:   Robert Archer is a pleasant 71 year old male who presents today in follow up for evaluation of gait difficulty, low back pain, dizziness, foot numbness.  Regarding his dizziness: It sounds to be more of an unsteadiness on his feet, still with no clear pre syncope or vertigo to description. MRI brain reviewed and non contributory.  This has improved with PT, his tandem gait is better and overall unsteadiness improved  --Physical therapy completed, he will continue home exercises.  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  It is not changing per the patient, labs reviewed as above.  Low back pain:  MRI reviewed, he does have some pain probably from the compression fracture but no clear radiular symptoms to history or exam.  He will continue home PT exercises if these symptoms worsen or any weakness/sensory changes develop could consider an EMG in the future in this regard.  --Gabapentin: On 300 mg TID given to help with pain, tolerating well -if needed in future can increase to 300/300/600 potentially  --Follow up with me in ~6 months he can call or mychart with questions in the interim.            Payam Guzman MD   of Neurology   [facility]      The total time of this encounter today amounted to 31 minutes. This time included time spent  with the patient, prep work, ordering tests, and performing post visit documentation.

## 2022-02-16 NOTE — NURSING NOTE
"Robert Archer's goals for this visit include: return  He requests these members of his care team be copied on today's visit information:     PCP: Jose Barrera    Referring Provider:  No referring provider defined for this encounter.    BP (!) 170/110   Pulse 71   Ht 1.803 m (5' 11\")   Wt 87.6 kg (193 lb 3.2 oz)   BMI 26.95 kg/m      Do you need any medication refills at today's visit? n  "

## 2022-02-16 NOTE — LETTER
2/16/2022         RE: Robert Archer  [address] [address] [facility] [address]        Dear Colleague,    Thank you for referring your patient, Robert Archer, to the [facility] [place]. Please see a copy of my visit note below.    [facility]  Section of General Neurology  Return Patient Visit    Robert Archer MRN# [id number]   Age: 71 year old YOB: 1950     Brief history of symptoms: The patient was initially seen in neurologic consultation on 9/15 for evaluation of dizziness, low back pain, foot numbness. Please see the comprehensive neurologic consultation note from that date in the Epic records for details.       Interval history:     Now on gabapentin 300 mg TID  PT: He did not get any better subjectively but as below and per his wife there was some improvement to my estimation as well which he later conceded.    He previously enjoyed woodworking and golf.  I do think he could woodwork still, golf may be more of a challenge.    No side effects from gabapentin  No change in foot numbness        Past Medical History:     Patient Active Problem List   Diagnosis     Essential hypertension, benign     Hyperlipidemia LDL goal <130     No past medical history on file.     Past Surgical History:     Past Surgical History:   Procedure Laterality Date     NO HISTORY OF SURGERY          Social History:     Social History     Tobacco Use     Smoking status: Former Smoker     Years: 10.00     Smokeless tobacco: Never Used     Tobacco comment: quit 15 years ago    Vaping Use     Vaping Use: Never used   Substance Use Topics     Alcohol use: Yes     Comment: occ / 15 drinks a month      Drug use: No        Family History:     Family History   Problem Relation Age of Onset     Hypertension Mother      Ovarian Cancer Mother      Cataracts Mother      Hypertension Father      Heart Disease Father 83     Colon Cancer No family hx of      Prostate Cancer No family hx  "of      Breast Cancer No family hx of      Ulcerative Colitis No family hx of      Celiac Disease No family hx of      Crohn's Disease No family hx of         Medications:     Current Outpatient Medications   Medication Sig     chlorthalidone (HYGROTON) 25 MG tablet Take 25 mg by mouth daily     fluorouracil (EFUDEX) 5 % external cream Apply to clean skin 2 times daily for 14-21 days, or until the onset of irritation. Avoid eye area. Wash hands after use.     gabapentin (NEURONTIN) 300 MG capsule Take 1 capsule (300 mg) by mouth 3 times daily     lisinopril (ZESTRIL) 20 MG tablet      meclizine (ANTIVERT) 25 MG tablet TAKE 1 TABLET BY MOUTH 3 TIMES DAILY AS NEEDED FOR DIZZINESS     rosuvastatin (CRESTOR) 20 MG tablet Take 20 mg by mouth     No current facility-administered medications for this visit.        Allergies:   No Known Allergies       Physical Exam:   Vitals: BP (!) 170/110   Pulse 71   Ht 1.803 m (5' 11\")   Wt 87.6 kg (193 lb 3.2 oz)   BMI 26.95 kg/m     CV: peripheral pulse appreciated  Lungs: breathing comfortably  Extremities: no edema    Neuro:   General Appearance: No apparent distress, well-nourished, well-groomed, pleasant     Mental Status: Alert and oriented to person, place, and time. Speech fluent and comprehension intact. No dysarthria.    Cranial Nerves:   II: Visual fields: normal  III: Pupils: 3 mm, equal, round, reactive to light   III,IV,VI: Extraocular Movements: intact     Motor Exam:   5/5 diffusely    Sensory: vibration absent R great toe ~8-10 seconds on L great toe, present at ankle, diminshed to PP on R >L foot dorsum not up shin     Coordination: no dysmetria seen  Reflexes: biceps, triceps, brachioradialis, patellar, and ankle jerks 1+ and symmetric. Toes are downgoing bilaterally    Gait: normal casual gait, normal stride length tandem gait much improved, romberg without sway         Data: Pertinent prior to visit   Imaging:  MRI L spine " 6/11/2021                                                                  Impression:   1. Subacute to chronic T12 compression fracture with loss of 60-70%  vertebral body height.  2. Extensive multilevel lumbar spondylosis, most pronounced at L3-4  where there is severe spinal canal stenosis and moderate bilateral  neural foraminal stenosis with impingement on the exiting right L3  nerve root. Also moderate to severe neural foraminal stenosis at L4-5  and L5-S1.     MRI Brain 6/11/2021     Impression:    1. No acute intracranial pathology.  2. Normal bilateral auditory and vestibular structures.  3. Mild chronic small vessel ischemic disease.     I personally reviewed the above imaging and agree with the findings in the report.        Laboratory:    B12 332  Lab Results   Component Value Date    A1C 4.9 09/22/2021   IFIX negative         Assessment and Plan:   Robert Archer is a pleasant 71 year old male who presents today in follow up for evaluation of gait difficulty, low back pain, dizziness, foot numbness.  Regarding his dizziness: It sounds to be more of an unsteadiness on his feet, still with no clear pre syncope or vertigo to description. MRI brain reviewed and non contributory.  This has improved with PT, his tandem gait is better and overall unsteadiness improved  --Physical therapy completed, he will continue home exercises.  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  It is not changing per the patient, labs reviewed as above.  Low back pain:  MRI reviewed, he does have some pain probably from the compression fracture but no clear radiular symptoms to history or exam.  He will continue home PT exercises if these symptoms worsen or any weakness/sensory changes develop could consider an EMG in the future in this regard.  --Gabapentin: On 300 mg TID given to help with pain, tolerating well -if needed in future can increase to 300/300/600  potentially  --Follow up with me in ~6 months he can call or mychart with questions in the interim.            Payam Guzman MD   of Neurology   [facility]      The total time of this encounter today amounted to 31 minutes. This time included time spent with the patient, prep work, ordering tests, and performing post visit documentation.        Again, thank you for allowing me to participate in the care of your patient.        Sincerely,        Storm Guzman MD

## 2022-04-27 ENCOUNTER — OFFICE VISIT (OUTPATIENT)
Dept: DERMATOLOGY | Facility: CLINIC | Age: 72
End: 2022-04-27
Payer: COMMERCIAL

## 2022-04-27 DIAGNOSIS — L57.0 AK (ACTINIC KERATOSIS): ICD-10-CM

## 2022-04-27 PROCEDURE — 17000 DESTRUCT PREMALG LESION: CPT | Performed by: PHYSICIAN ASSISTANT

## 2022-04-27 PROCEDURE — 17003 DESTRUCT PREMALG LES 2-14: CPT | Performed by: PHYSICIAN ASSISTANT

## 2022-04-27 RX ORDER — FLUOROURACIL 50 MG/G
CREAM TOPICAL
Qty: 40 G | Refills: 0 | Status: SHIPPED | OUTPATIENT
Start: 2022-04-27 | End: 2024-02-07

## 2022-04-27 NOTE — LETTER
4/27/2022         RE: Robert Archer  [id number] [facility] [id number]        Dear Colleague,    Thank you for referring your patient, Robert Archer, to the [facility]. Please see a copy of my visit note below.    [facility] Dermatology Note  Encounter Date: Apr 27, 2022  Office Visit     Dermatology Problem List:  1. AKs - head and ears  -current tx: Efudex initiated 1/20/21 - completed with great response - patient still uses efudex periodically when he notices increase in lesions  -cryo     Last FBSE: 1/20/21  ____________________________________________     Assessment & Plan:      # Actinic keratosis -head/neck/ears x4  - See cryo note.   - After site has healed, start Efudex twice daily for 2-3 weeks or until the onset of irritation. Refilled today as patient uses this BID for 2 weeks at a time when he notices flares or new lesions    Procedures Performed:   - Cryotherapy procedure note, location(s):  left mid cheek x1, left helix x2 , right antihelix x1.. After verbal consent and discussion of risks and benefits including, but not limited to, dyspigmentation/scar, blister, and pain, 4 lesion(s) was(were) treated with 1-2 mm freeze border for 1-2 cycles with liquid nitrogen. Post cryotherapy instructions were provided.    Follow-up: prn for new or changing lesions    Staff and Scribe:     Scribe Disclosure:   I, Nazario Nieves, am serving as a scribe to document services personally performed by Zora Peacock PA-C, based on data collection and the provider's statements to me.  Provider Disclosure:   The documentation recorded by the scribe accurately reflects the services I personally performed and the decisions made by me.    All risks, benefits and alternatives were discussed with patient.  Patient is in agreement and understands the assessment and plan.  All questions were answered.    Zora Peacock PA-C, MPAS  [facility]  [facility]: Phone: [phone], Fax: [phone]  [facility]: Phone: [phone],  Fax: [phone]  [facility] [place]: Phone: [phone], Fax: [phone]  ____________________________________________    CC: Derm Problem (Recheck for Efudex cream)    HPI:  Mr. Robert Archer is a(n) 71 year old male who presents today as a return patient for a spot check. Declines FBSE.     Last seen on 3/17/21 for a spot check. At that time, AKs was treated with cryo.     Today, patient presents for efudex.     Patient is otherwise feeling well, without additional concerns.    Labs:  NA    Physical Exam:  Vitals: There were no vitals taken for this visit.  SKIN: Focused examination of the head and neck was performed.  - There are erythematous macules with overyling adherent scale on the left mid cheek x1, left helix x2 , right antihelix x1.  - No other lesions of concern on areas examined.     Medications:  Current Outpatient Medications   Medication     fluorouracil (EFUDEX) 5 % external cream     gabapentin (NEURONTIN) 300 MG capsule     lisinopril (ZESTRIL) 20 MG tablet     meclizine (ANTIVERT) 25 MG tablet     rosuvastatin (CRESTOR) 20 MG tablet     No current facility-administered medications for this visit.      Past Medical History:   Patient Active Problem List   Diagnosis     Essential hypertension, benign     Hyperlipidemia LDL goal <130     No past medical history on file.           Again, thank you for allowing me to participate in the care of your patient.        Sincerely,        Zora Peacock PA-C

## 2022-04-27 NOTE — PATIENT INSTRUCTIONS
Cryotherapy    What is it?  Use of a very cold liquid, such as liquid nitrogen, to freeze and destroy abnormal skin cells that need to be removed    What should I expect?  Tenderness and redness  A small blister that might grow and fill with dark purple blood. There may be crusting.  More than one treatment may be needed if the lesions do not go away.    How do I care for the treated area?  Gently wash the area with your hands when bathing.  Use a thin layer of Vaseline to help with healing. You may use a Band-Aid.   The area should heal within 7-10 days and may leave behind a pink or lighter color.   Do not use an antibiotic or Neosporin ointment.   You may take acetaminophen (Tylenol) for pain.     Call your doctor if you have:  Severe pain  Signs of infection (warmth, redness, cloudy yellow drainage, and or a bad smell)  Questions or concerns    Who should I call with questions?      [facility]: [phone]      [facility]: [phone]      For urgent needs outside of business hours call the [facility] at [phone] and ask for the dermatology resident on call

## 2022-04-27 NOTE — PROGRESS NOTES
[facility] Dermatology Note  Encounter Date: Apr 27, 2022  Office Visit     Dermatology Problem List:  1. AKs - head and ears  -current tx: Efudex initiated 1/20/21 - completed with great response - patient still uses efudex periodically when he notices increase in lesions  -cryo     Last FBSE: 1/20/21  ____________________________________________     Assessment & Plan:      # Actinic keratosis -head/neck/ears x4  - See cryo note.   - After site has healed, start Efudex twice daily for 2-3 weeks or until the onset of irritation. Refilled today as patient uses this BID for 2 weeks at a time when he notices flares or new lesions    Procedures Performed:   - Cryotherapy procedure note, location(s):  left mid cheek x1, left helix x2 , right antihelix x1.. After verbal consent and discussion of risks and benefits including, but not limited to, dyspigmentation/scar, blister, and pain, 4 lesion(s) was(were) treated with 1-2 mm freeze border for 1-2 cycles with liquid nitrogen. Post cryotherapy instructions were provided.    Follow-up: prn for new or changing lesions    Staff and Scribe:     Scribe Disclosure:   PRABHU, Nazario Nieves, am serving as a scribe to document services personally performed by Zora Peacock PA-C, based on data collection and the provider's statements to me.  Provider Disclosure:   The documentation recorded by the scribe accurately reflects the services I personally performed and the decisions made by me.    All risks, benefits and alternatives were discussed with patient.  Patient is in agreement and understands the assessment and plan.  All questions were answered.    Zora Peacock PA-C, MPAS  [facility] [place]: Phone: [phone], Fax: [phone]  [facility]: Phone: [phone],  Fax: [phone]  [facility]: Phone: [phone], Fax:  [phone]  ____________________________________________    CC: Derm Problem (Recheck for Efudex cream)    HPI:  Mr. Robert Archer is a(n) 71 year old male who presents today as a return patient for a spot check. Declines FBSE.     Last seen on 3/17/21 for a spot check. At that time, AKs was treated with cryo.     Today, patient presents for efudex.     Patient is otherwise feeling well, without additional concerns.    Labs:  NA    Physical Exam:  Vitals: There were no vitals taken for this visit.  SKIN: Focused examination of the head and neck was performed.  - There are erythematous macules with overyling adherent scale on the left mid cheek x1, left helix x2 , right antihelix x1.  - No other lesions of concern on areas examined.     Medications:  Current Outpatient Medications   Medication     fluorouracil (EFUDEX) 5 % external cream     gabapentin (NEURONTIN) 300 MG capsule     lisinopril (ZESTRIL) 20 MG tablet     meclizine (ANTIVERT) 25 MG tablet     rosuvastatin (CRESTOR) 20 MG tablet     No current facility-administered medications for this visit.      Past Medical History:   Patient Active Problem List   Diagnosis     Essential hypertension, benign     Hyperlipidemia LDL goal <130     No past medical history on file.

## 2022-04-27 NOTE — NURSING NOTE
Robert Archer's goals for this visit include:   Chief Complaint   Patient presents with     Derm Problem     Recheck for Efudex cream       He requests these members of his care team be copied on today's visit information:     PCP: Jose Barrera    Referring Provider:  Referred Self, MD  No address on file    There were no vitals taken for this visit.    Do you need any medication refills at today's visit? Yes      Cristal Martinez on 4/27/2022 at 2:05 PM

## 2022-05-31 ENCOUNTER — TELEPHONE (OUTPATIENT)
Dept: FAMILY MEDICINE | Facility: CLINIC | Age: 72
End: 2022-05-31
Payer: COMMERCIAL

## 2022-05-31 NOTE — CONFIDENTIAL NOTE
Left message to reschedule pt. Please help patient reschedule the canceled appt below:       - Provider:  Dr. Sepulveda           - Canceled appt details         Date:          Time:  9:10 a.m.         Type of visit:  Knee + back pain      - Reason for change/cancellation: clinician out       Notes to consider when reschedulin minutes       Please close encounter once the patient has been rescheduled

## 2022-06-17 ENCOUNTER — OFFICE VISIT (OUTPATIENT)
Dept: FAMILY MEDICINE | Facility: CLINIC | Age: 72
End: 2022-06-17
Payer: COMMERCIAL

## 2022-06-17 VITALS
SYSTOLIC BLOOD PRESSURE: 130 MMHG | HEIGHT: 71 IN | OXYGEN SATURATION: 100 % | BODY MASS INDEX: 25.62 KG/M2 | WEIGHT: 183 LBS | DIASTOLIC BLOOD PRESSURE: 82 MMHG | HEART RATE: 65 BPM | TEMPERATURE: 96.6 F | RESPIRATION RATE: 20 BRPM

## 2022-06-17 DIAGNOSIS — R42 DIZZINESS: ICD-10-CM

## 2022-06-17 DIAGNOSIS — M54.50 CHRONIC LOW BACK PAIN WITHOUT SCIATICA, UNSPECIFIED BACK PAIN LATERALITY: ICD-10-CM

## 2022-06-17 DIAGNOSIS — G89.29 CHRONIC LOW BACK PAIN WITHOUT SCIATICA, UNSPECIFIED BACK PAIN LATERALITY: ICD-10-CM

## 2022-06-17 DIAGNOSIS — R42 RECURRENT VERTIGO: ICD-10-CM

## 2022-06-17 DIAGNOSIS — M17.0 OSTEOARTHRITIS OF BOTH KNEES, UNSPECIFIED OSTEOARTHRITIS TYPE: Primary | ICD-10-CM

## 2022-06-17 PROCEDURE — 99214 OFFICE O/P EST MOD 30 MIN: CPT | Performed by: FAMILY MEDICINE

## 2022-06-17 RX ORDER — CYCLOBENZAPRINE HCL 10 MG
5-10 TABLET ORAL 3 TIMES DAILY PRN
Qty: 45 TABLET | Refills: 0 | Status: SHIPPED | OUTPATIENT
Start: 2022-06-17 | End: 2022-07-09

## 2022-06-17 RX ORDER — MECLIZINE HYDROCHLORIDE 25 MG/1
TABLET ORAL
Qty: 90 TABLET | Refills: 1 | Status: SHIPPED | OUTPATIENT
Start: 2022-06-17 | End: 2022-08-10

## 2022-06-17 RX ORDER — MECLIZINE HYDROCHLORIDE 25 MG/1
TABLET ORAL
Qty: 90 TABLET | Refills: 0 | Status: CANCELLED | OUTPATIENT
Start: 2022-06-17

## 2022-06-17 RX ORDER — CYCLOBENZAPRINE HCL 10 MG
5-10 TABLET ORAL 3 TIMES DAILY PRN
Qty: 45 TABLET | Refills: 0 | Status: SHIPPED | OUTPATIENT
Start: 2022-06-17 | End: 2022-06-17

## 2022-06-17 ASSESSMENT — PAIN SCALES - GENERAL: PAINLEVEL: SEVERE PAIN (7)

## 2022-06-17 NOTE — PROGRESS NOTES
"  Assessment & Plan   1. Osteoarthritis of both knees, unspecified osteoarthritis type: Exam and imaging consistent with arthritis of lateral patella facet joints bilaterally. Tried PT in the past. Recommend NSAIDS, continued use of tylenol and biofreeze etc. Will refer to ortho for further recommendations.  - Orthopedic  Referral; Future    2. Recurrent vertigo / Dizziness: Unclear etiology. Improved some with meclizine. PT/OT was not helpful. Neuro exam otherwise intact. Brain MRI done last year 6/11/21 without acute findings, some chronic small vessel ischemic changes. Recommend referral to dizziness and balance center.  - meclizine (ANTIVERT) 25 MG tablet; TAKE 1 TABLET BY MOUTH 3 TIMES DAILY AS NEEDED FOR DIZZINESS  Dispense: 90 tablet; Refill: 1  - Neurology Referral (Migraine Care Package)    3. Chronic low back pain without sciatica, unspecified back pain laterality: No reproducible on exam. No red flag symptoms or radiculopathy. Not improved with tylenol, gabapentin and PT. Add NSAIDs and flexeril. Prior MRI showing spinal canal stenosis at L3-L4 and prior compression fracture at T12. Ortho referral placed.  - cyclobenzaprine (FLEXERIL) 10 MG tablet; Take 0.5-1 tablets (5-10 mg) by mouth 3 times daily as needed for muscle spasms  Dispense: 45 tablet; Refill: 0  - Orthopedic  Referral; Future      Return if symptoms worsen or fail to improve.    Jose Barrera MD  [facility]    This chart is completed utilizing dictation software; typos and/or incorrect word substitutions may unintentionally occur.            BMI:   Estimated body mass index is 25.53 kg/m  as calculated from the following:    Height as of this encounter: 1.803 m (5' 10.98\").    Weight as of this encounter: 83 kg (183 lb).   Weight management plan: Discussed healthy diet and exercise guidelines    See Patient Instructions    Return if symptoms worsen or fail to improve.    Jose " MD Bruce  [facility]    This chart is completed utilizing dictation software; typos and/or incorrect word substitutions may unintentionally occur.     Damon Jones is a 71 year old accompanied by his spouse., presenting for the following health issues:  Pain (Bilateral Knee pain, lower back pain ) and Balance/ Vestibular    Patient states he has been having concerns about his balance.    Pain  This is a chronic problem. The current episode started more than 1 year ago. The problem occurs daily. The problem has been unchanged. The symptoms are aggravated by bending, walking and standing. He has tried NSAIDs, rest, position changes, immobilization and acetaminophen for the symptoms. The treatment provided mild relief.   History of Present Illness       Reason for visit:  Knee pain, back pain and loss of balance    He eats 2-3 servings of fruits and vegetables daily.He consumes 0 sweetened beverage(s) daily.He exercises with enough effort to increase his heart rate 10 to 19 minutes per day.  He exercises with enough effort to increase his heart rate 7 days per week.   He is taking medications regularly.     Patient is here today to follow-up on his chronic low back, bilateral knee pain, and ongoing dizziness/balance issues.    He reports that symptoms have been present for about 2 years and have not improved. Patient reports that he had a meniscus repair to his right knee about 15 years ago. He described the knees and low back pain as a 6-7/10 achy pain that worsens with walking and standing for a long period of time, especially going up and down the stairs. Patient denies any recent trauma or surgeries to back or bilateral knees. Patient denies other joint pain or swelling. Denies fever.    He reports that he has tried Tylenol, Bengay, and Biofreeze and only the Biofreeze and Bengay have helped the bilateral knee pain, but more so the back pain.    For the low back pain,  "patient has been taking Gabapentin but he reports that it does not help.    For the dizziness, patient has been taking Meclizine (Antivert), which he states he has been taking about once a week. Does seem to help his symptoms. Did not find improvement with PT/OT ordered last year.    Review of Systems   Constitutional, HEENT, cardiovascular, pulmonary, gi and gu systems are negative, except as otherwise noted.      Objective    /82   Pulse 65   Temp (!) 96.6  F (35.9  C) (Temporal)   Resp 20   Ht 1.803 m (5' 10.98\")   Wt 83 kg (183 lb)   SpO2 100%   BMI 25.53 kg/m    Body mass index is 25.53 kg/m .     Physical Exam   General: Appears well and in no acute distress. Accompanied by spouse.  Cardiovascular: Regular rate and rhythm, normal S1 and S2 without murmur. No extra heartsounds or friction rub. Radial pulses present and equal bilaterally.  Respiratory: Lungs clear to auscultation bilaterally. No wheezing or crackles. No prolonged expiration. Symmetrical chest rise.  Musculoskeletal: No pain to palpation of vertebral spinous processes or paraspinal muscles. Negative straight leg raises and RANJAN test. No gross extremity deformities. No peripheral edema.   Knee Musculoskeletal Exam: Pain to palpation over lateral patellar facet joints bilaterally. Otherwise, grossly normal in appearance. No overlying erythema or ecchymosis. No swelling noted. No tenderness to palpation over joint line. Full ROM with extension and flexion. Negative Lachman's and posterior drawer. No laxity with varus and valgus stress. Negative McMurrays. No patellar apprehension or pain with patellar compression. Pes anserine bursa nontender.    KNEE BILATERAL THREE VIEWS  6/17/2022 12:14 PM     INDICATION: Osteoarthritis of both knees, unspecified osteoarthritis  type.     COMPARISON: None available.                                                                       IMPRESSION: Moderate-severe bilateral patellofemoral compartment " knee  osteoarthritis. Mild bilateral medial and lateral compartment knee  osteoarthritis. Chondrocalcinosis. No acute knee fracture or  dislocation. Small bilateral knee joint effusions with suprapatellar  calcification bilaterally. Arterial calcification. No significant  anterior knee soft tissue swelling.

## 2022-06-17 NOTE — PATIENT INSTRUCTIONS
Important Takeaway Points From This Visit:  You can take 600 mg ibuprofen three times a day as needed with food, OR Aleve (naproxen) 500 mg twice a day with food.  In addition to the above medications, you can also take 1000 mg of tylenol (acetaminophen) three times a day.  For your back, I have ordered a muscle relaxer. This can cause some sedation (sleepiness). I would start with 5 mg (0.5 tablet) and you can take 10 mg if needed.      As always, please call with any questions or concerns. I look forward to seeing you again soon!    Take care,  Dr. Barrera    Your current medication list is printed. Please keep this with you - it is helpful to bring this current list to any other medical appointments. It can also be helpful if you ever go to the emergency room or hospital.    If you had lab testing today we will call you with the results. The phone number we will call with your results is # [phone] (home) . If this is not the best number please call our clinic and change the number.    If you need any refills, please call your pharmacy and they will contact us.    If you have any further concerns or wish to schedule another appointment, please call our office at [phone].    If you have a medical emergency, please call 326.    Thank you for coming to [facility]!

## 2022-06-29 ENCOUNTER — PRE VISIT (OUTPATIENT)
Dept: ORTHOPEDICS | Facility: CLINIC | Age: 72
End: 2022-06-29
Payer: COMMERCIAL

## 2022-06-29 ENCOUNTER — OFFICE VISIT (OUTPATIENT)
Dept: ORTHOPEDICS | Facility: CLINIC | Age: 72
End: 2022-06-29
Payer: COMMERCIAL

## 2022-06-29 DIAGNOSIS — M17.0 OSTEOARTHRITIS OF BOTH KNEES, UNSPECIFIED OSTEOARTHRITIS TYPE: ICD-10-CM

## 2022-06-29 DIAGNOSIS — G89.29 CHRONIC LOW BACK PAIN WITHOUT SCIATICA, UNSPECIFIED BACK PAIN LATERALITY: ICD-10-CM

## 2022-06-29 DIAGNOSIS — M54.50 CHRONIC LOW BACK PAIN WITHOUT SCIATICA, UNSPECIFIED BACK PAIN LATERALITY: ICD-10-CM

## 2022-06-29 PROCEDURE — 99204 OFFICE O/P NEW MOD 45 MIN: CPT | Performed by: FAMILY MEDICINE

## 2022-06-29 NOTE — PROGRESS NOTES
[facility]  SPORTS MEDICINE CLINIC VISIT     Jun 29, 2022        ASSESSMENT & PLAN    Robert was seen today for consult.    Diagnoses and all orders for this visit:    Osteoarthritis of both knees, unspecified osteoarthritis type  -     Orthopedic  Referral    Chronic low back pain without sciatica, unspecified back pain laterality  -     Orthopedic  Referral        Reviewed imaging and assessment with patient in detail  Consider using knee sleeve with cut out for the kneecap for comfort  Try using topical diclofenac on the knee 3-4 times daily as needed for pain  Follow-up in our clinic if you would like to try a steroid injection in your knees  Follow-up with physical therapy    Start taking gabapentin 300 mg 3 times daily.  After 3 to 4 days if you have noticed no side effects but minimal improvement may increase morning or afternoon dose to 600 mg.  Follow-up with medical spine specialist for further discussion about definitive management of your spinal stenosis. Is not interested in surgery at the current time.       Santiago Del Cid MD  [facility] SPORTS MEDICINE [facility]    -----  Chief Complaint   Patient presents with     Consult     Bilateral knee pain       SUBJECTIVE  Robert Archer is a/an 71 year old male who is seen as a self referral for evaluation of  Bilateral knee pain.     The patient is seen with their wife.  The patient is Right handed    Onset: 1-2 years(s) ago. Reports insidious onset without acute precipitating event.  Location of Pain: bilateral knee and chronic low back pain  Worsened by: Going up and down stairs, back pain all the time.   Better with: Nothing helps; muscle relaxer helped some  Treatments tried: physical therapy; saw neurologist and family practice. Has tried gabapentin, but did not note much improvement.   Associated symptoms: no distal numbness or tingling; denies swelling or warmth    Orthopedic/Surgical history: YES - Date:  20years ago - 3 Meniscus surgeries  Social History/Occupation: Retired      REVIEW OF SYSTEMS:    Do you have fever, chills, weight loss? No    Do you have any vision problems? No    Do you have any chest pain or edema? No    Do you have any shortness of breath or wheezing?  No    Do you have stomach problems? No    Do you have any numbness or focal weakness? No    Do you have diabetes? No    Do you have problems with bleeding or clotting? No    Do you have an rashes or other skin lesions? No    OBJECTIVE:  There were no vitals taken for this visit.     Patient is alert, No acute distress, pleasant and conversational.      bilateral knee:   Skin intact. No erythema or ecchymosis.  No effusion or soft tissue swelling.    AROM: Zero to approximately 135  with pain on terminal flexion. Crepitus noted    Palpation: No medial or lateral facet joint tenderness.  No posterior medial or posterior lateral joint line tenderness     Special Tests:  Negative bounce test, negative forced flexion and negative Pauline's.  No ligamentous laxity or pain with valgus or varus stress.      Full Isometric quad strength, extensor mechanism in place       RADIOLOGY:    3 view xrays of bilateral knees performed 6/17/22  and reviewed independently demonstrating severe patellofemoral oa bilaterally. Mild djd elsewhere in knees. Chondrocalcinosis. No acute findings. See EMR for formal radiology report.

## 2022-06-29 NOTE — PATIENT INSTRUCTIONS
Consider using knee sleeve with cut out for the kneecap for comfort  Try using topical diclofenac on the knee 3-4 times daily as needed for pain  Follow-up in our clinic if he would like to try a steroid injection in your knees  Follow-up with physical therapy    Start taking gabapentin 300 mg 3 times daily.  After 3 to 4 days if you have noticed no side effects but minimal improvement may increase morning or afternoon dose to 600 mg.  Follow-up  With medical spine specialist for further discussion about definitive management of your spinal stenosis.

## 2022-06-29 NOTE — LETTER
6/29/2022         RE: Robert Archer  [address] [place] [address]        Dear Colleague,    Thank you for referring your patient, Robert Archer, to the [facility] SPORTS MEDICINE [facility]. Please see a copy of my visit note below.      [facility]  SPORTS MEDICINE CLINIC VISIT     Jun 29, 2022        ASSESSMENT & PLAN    Robert was seen today for consult.    Diagnoses and all orders for this visit:    Osteoarthritis of both knees, unspecified osteoarthritis type  -     Orthopedic  Referral    Chronic low back pain without sciatica, unspecified back pain laterality  -     Orthopedic  Referral        Reviewed imaging and assessment with patient in detail  Consider using knee sleeve with cut out for the kneecap for comfort  Try using topical diclofenac on the knee 3-4 times daily as needed for pain  Follow-up in our clinic if you would like to try a steroid injection in your knees  Follow-up with physical therapy    Start taking gabapentin 300 mg 3 times daily.  After 3 to 4 days if you have noticed no side effects but minimal improvement may increase morning or afternoon dose to 600 mg.  Follow-up with medical spine specialist for further discussion about definitive management of your spinal stenosis. Is not interested in surgery at the current time.       Santiago Del Cid MD  [facility]    -----  Chief Complaint   Patient presents with     Consult     Bilateral knee pain       SUBJECTIVE  Robert Archer is a/an 71 year old male who is seen as a self referral for evaluation of  Bilateral knee pain.     The patient is seen with their wife.  The patient is Right handed    Onset: 1-2 years(s) ago. Reports insidious onset without acute precipitating event.  Location of Pain: bilateral knee and chronic low back pain  Worsened by: Going up and down stairs, back pain all the time.   Better with: Nothing helps; muscle relaxer helped  some  Treatments tried: physical therapy; saw neurologist and family practice. Has tried gabapentin, but did not note much improvement.   Associated symptoms: no distal numbness or tingling; denies swelling or warmth    Orthopedic/Surgical history: YES - Date: 20years ago - 3 Meniscus surgeries  Social History/Occupation: Retired      REVIEW OF SYSTEMS:    Do you have fever, chills, weight loss? No    Do you have any vision problems? No    Do you have any chest pain or edema? No    Do you have any shortness of breath or wheezing?  No    Do you have stomach problems? No    Do you have any numbness or focal weakness? No    Do you have diabetes? No    Do you have problems with bleeding or clotting? No    Do you have an rashes or other skin lesions? No    OBJECTIVE:  There were no vitals taken for this visit.     Patient is alert, No acute distress, pleasant and conversational.      bilateral knee:   Skin intact. No erythema or ecchymosis.  No effusion or soft tissue swelling.    AROM: Zero to approximately 135  with pain on terminal flexion. Crepitus noted    Palpation: No medial or lateral facet joint tenderness.  No posterior medial or posterior lateral joint line tenderness     Special Tests:  Negative bounce test, negative forced flexion and negative Pauline's.  No ligamentous laxity or pain with valgus or varus stress.      Full Isometric quad strength, extensor mechanism in place       RADIOLOGY:    3 view xrays of bilateral knees performed 6/17/22  and reviewed independently demonstrating severe patellofemoral oa bilaterally. Mild djd elsewhere in knees. Chondrocalcinosis. No acute findings. See EMR for formal radiology report.         Again, thank you for allowing me to participate in the care of your patient.        Sincerely,        Santiago Del Cid MD

## 2022-07-09 ENCOUNTER — MYC REFILL (OUTPATIENT)
Dept: FAMILY MEDICINE | Facility: CLINIC | Age: 72
End: 2022-07-09

## 2022-07-09 DIAGNOSIS — G89.29 CHRONIC LOW BACK PAIN WITHOUT SCIATICA, UNSPECIFIED BACK PAIN LATERALITY: ICD-10-CM

## 2022-07-09 DIAGNOSIS — M54.50 CHRONIC LOW BACK PAIN WITHOUT SCIATICA, UNSPECIFIED BACK PAIN LATERALITY: ICD-10-CM

## 2022-07-11 RX ORDER — CYCLOBENZAPRINE HCL 10 MG
5-10 TABLET ORAL 3 TIMES DAILY PRN
Qty: 45 TABLET | Refills: 0 | Status: SHIPPED | OUTPATIENT
Start: 2022-07-11 | End: 2023-02-27

## 2022-07-18 ENCOUNTER — TRANSFERRED RECORDS (OUTPATIENT)
Dept: FAMILY MEDICINE | Facility: CLINIC | Age: 72
End: 2022-07-18

## 2022-07-25 ENCOUNTER — MYC MEDICAL ADVICE (OUTPATIENT)
Dept: ORTHOPEDICS | Facility: CLINIC | Age: 72
End: 2022-07-25

## 2022-07-25 NOTE — TELEPHONE ENCOUNTER
SPINE PATIENTS - NEW PROTOCOL PREVISIT    RECORDS RECEIVED FROM: Internal   REASON FOR VISIT: Chronic low back pain without sciatica   Date of Appt: 08/26/2022   NOTES (FOR ALL VISITS) STATUS DETAILS   OFFICE NOTE from referring provider Internal 06/29/2022 Dr Del Cid [facility]    OFFICE NOTE from other specialist Internal 05/21/2021 Dr [facility]   DISCHARGE SUMMARY from hospital N/A    DISCHARGE REPORT from ER Care Everywhere 04/01/2021 [facility]   EMG REPORT N/A    MEDICATION LIST N/A    IMAGING  (FOR ALL VISITS)     MRI (HEAD, NECK, SPINE) Internal 06/11/2021 lumbar spine   XRAY (SPINE) *NEUROSURGERY* Received 04/01/2021 lumbar spine   CT (HEAD, NECK, SPINE) N/A       Images in PACS

## 2022-08-02 ENCOUNTER — TRANSFERRED RECORDS (OUTPATIENT)
Dept: HEALTH INFORMATION MANAGEMENT | Facility: CLINIC | Age: 72
End: 2022-08-02

## 2022-08-02 LAB — ABSTRACT IFOB-NO CHARGE: NEGATIVE

## 2022-08-09 ENCOUNTER — PRE VISIT (OUTPATIENT)
Dept: NEUROSURGERY | Facility: CLINIC | Age: 72
End: 2022-08-09

## 2022-08-09 DIAGNOSIS — R42 RECURRENT VERTIGO: ICD-10-CM

## 2022-08-10 RX ORDER — MECLIZINE HYDROCHLORIDE 25 MG/1
TABLET ORAL
Qty: 90 TABLET | Refills: 1 | Status: SHIPPED | OUTPATIENT
Start: 2022-08-10 | End: 2024-01-18

## 2022-08-12 ENCOUNTER — OFFICE VISIT (OUTPATIENT)
Dept: ORTHOPEDICS | Facility: CLINIC | Age: 72
End: 2022-08-12
Payer: COMMERCIAL

## 2022-08-12 DIAGNOSIS — M17.0 OSTEOARTHRITIS OF BOTH KNEES, UNSPECIFIED OSTEOARTHRITIS TYPE: Primary | ICD-10-CM

## 2022-08-12 PROCEDURE — 20610 DRAIN/INJ JOINT/BURSA W/O US: CPT | Mod: 50 | Performed by: FAMILY MEDICINE

## 2022-08-12 PROCEDURE — 99207 PR DROP WITH A PROCEDURE: CPT | Performed by: FAMILY MEDICINE

## 2022-08-12 RX ADMIN — TRIAMCINOLONE ACETONIDE 40 MG: 40 INJECTION, SUSPENSION INTRA-ARTICULAR; INTRAMUSCULAR at 13:54

## 2022-08-12 NOTE — LETTER
8/12/2022         RE: Robert Archer  [address] [place] [address]        Dear Colleague,    Thank you for referring your patient, Robert Archer, to the [facility] SPORTS MEDICINE CLINIC [place]. Please see a copy of my visit note below.      [facility]  SPORTS MEDICINE CLINIC VISIT     Aug 12, 2022          Large Joint Injection/Arthocentesis: bilateral knee    Date/Time: 8/12/2022 1:54 PM  Performed by: Santiago Del Cid MD  Authorized by: Santiago Del Cid MD     Indications:  Pain  Needle Size:  22 G  Guidance: landmark guided    Approach:  Anterolateral  Location:  Knee  Laterality:  Bilateral      Medications (Right):  40 mg triamcinolone 40 MG/ML  Medications (Left):  40 mg triamcinolone 40 MG/ML  Outcome:  Tolerated well, no immediate complications  Procedure discussed: discussed risks, benefits, and alternatives    Consent Given by:  Patient  Timeout: timeout called immediately prior to procedure    Prep: patient was prepped and draped in usual sterile fashion       There were no complications. The patient tolerated the procedure well. There was minimal bleeding.   The patient was instructed to ice the knees upon leaving clinic and refrain from overuse over the next 2 days.   The patient was instructed to go to the emergency room with any unusual pain, swelling, or redness occurred in the injected area.     Additionally, the patient has questions about his low back pain which we discussed briefly at his last visit.  He is currently taking 600 mg gabapentin at night only.  May be helping some but he is still having considerable pain during the day.  Suggested increasing to add morning dose of gabapentin.  Therefore he will take 300 mg in the morning and 600 mg at night.  He does have an upcoming appointment with spine specialist for further discussion.    Santiago Del Cid MD              Again, thank you for allowing me to participate in the care of your patient.         Sincerely,        Santiago Del Cid MD

## 2022-08-12 NOTE — PROGRESS NOTES
[facility]  SPORTS MEDICINE CLINIC VISIT     Aug 12, 2022          Large Joint Injection/Arthocentesis: bilateral knee    Date/Time: 8/12/2022 1:54 PM  Performed by: Santiago Del Cid MD  Authorized by: Santiago Del Cid MD     Indications:  Pain  Needle Size:  22 G  Guidance: landmark guided    Approach:  Anterolateral  Location:  Knee  Laterality:  Bilateral      Medications (Right):  40 mg triamcinolone 40 MG/ML  Medications (Left):  40 mg triamcinolone 40 MG/ML  Outcome:  Tolerated well, no immediate complications  Procedure discussed: discussed risks, benefits, and alternatives    Consent Given by:  Patient  Timeout: timeout called immediately prior to procedure    Prep: patient was prepped and draped in usual sterile fashion       There were no complications. The patient tolerated the procedure well. There was minimal bleeding.   The patient was instructed to ice the knees upon leaving clinic and refrain from overuse over the next 2 days.   The patient was instructed to go to the emergency room with any unusual pain, swelling, or redness occurred in the injected area.     Additionally, the patient has questions about his low back pain which we discussed briefly at his last visit.  He is currently taking 600 mg gabapentin at night only.  May be helping some but he is still having considerable pain during the day.  Suggested increasing to add morning dose of gabapentin.  Therefore he will take 300 mg in the morning and 600 mg at night.  He does have an upcoming appointment with spine specialist for further discussion.    Santiago Del Cid MD

## 2022-08-13 RX ORDER — TRIAMCINOLONE ACETONIDE 40 MG/ML
40 INJECTION, SUSPENSION INTRA-ARTICULAR; INTRAMUSCULAR
Status: SHIPPED | OUTPATIENT
Start: 2022-08-12

## 2022-08-17 ENCOUNTER — OFFICE VISIT (OUTPATIENT)
Dept: NEUROLOGY | Facility: CLINIC | Age: 72
End: 2022-08-17
Payer: COMMERCIAL

## 2022-08-17 VITALS
DIASTOLIC BLOOD PRESSURE: 90 MMHG | WEIGHT: 183 LBS | HEART RATE: 67 BPM | BODY MASS INDEX: 25.62 KG/M2 | HEIGHT: 71 IN | SYSTOLIC BLOOD PRESSURE: 147 MMHG

## 2022-08-17 DIAGNOSIS — G62.9 LENGTH-DEPENDENT PERIPHERAL NEUROPATHY: Primary | ICD-10-CM

## 2022-08-17 DIAGNOSIS — G89.29 CHRONIC MIDLINE LOW BACK PAIN WITHOUT SCIATICA: ICD-10-CM

## 2022-08-17 DIAGNOSIS — M48.061 SPINAL STENOSIS OF LUMBAR REGION, UNSPECIFIED WHETHER NEUROGENIC CLAUDICATION PRESENT: ICD-10-CM

## 2022-08-17 DIAGNOSIS — M54.50 CHRONIC MIDLINE LOW BACK PAIN WITHOUT SCIATICA: ICD-10-CM

## 2022-08-17 DIAGNOSIS — M54.16 LUMBAR RADICULOPATHY: ICD-10-CM

## 2022-08-17 PROCEDURE — 99213 OFFICE O/P EST LOW 20 MIN: CPT | Performed by: STUDENT IN AN ORGANIZED HEALTH CARE EDUCATION/TRAINING PROGRAM

## 2022-08-17 RX ORDER — GABAPENTIN 300 MG/1
600 CAPSULE ORAL 3 TIMES DAILY
Qty: 540 CAPSULE | Refills: 2 | Status: SHIPPED | OUTPATIENT
Start: 2022-08-17 | End: 2023-03-27

## 2022-08-17 ASSESSMENT — PAIN SCALES - GENERAL: PAINLEVEL: NO PAIN (0)

## 2022-08-17 NOTE — PATIENT INSTRUCTIONS
Gabapentin: go up to 300/300/600 and as tolerated weekly can go up to 600 mg three times a day    Spine referral for injection trial.

## 2022-08-17 NOTE — PROGRESS NOTES
[facility]  Section of General Neurology  Return Patient Visit    Robert Archer MRN# [id number]   Age: 72 year old YOB: 1950            Assessment and Plan:   Assessment:  Robert Archer is a 72 year old man seen in follow up for back pain, balance issues.  His balance issues have improved to an extent with PT.  Gabapentin has partially improved back pain.  His back pain does align with MRI findings.  There is multi level neural foraminal stenosis that could correlate with radiculopathy but also lumbar spinal stenosis that I do think could correlate with the issues that worsen with walking.  Could be elements of both.  Next steps would be consideration of injection in this regard/spine referral.   Our mutual goal would be to avoid a back surgery being required.  All questions answered.       Plan:  --Trial increasing gabapentin to 600 mg TID  --Consideration of injections via spine referral either targeting areas of radiculopathy on L spine imaging vs if possible area of lumbar stenosis that I do think is causing symptoms.    --Reached out re if he would like trial of further lumbar stenosis specific PT  --Follow up with me in 5-6 months, they will reach out with questions in the meanwhile.        Payam Guzman MD   of Neurology   [facility]      Interval history:     Low back pain: worsens with walking. Halved his pain overall. Overall no big change in this regard.    Imbalance: Questionable in his eyes but overall better than when we met.    Is getting shots in his knees for knee pain which has helped.    Discussed progression for low back pain as it relates to his previous imaging:  PT and medications have provided partial relief.  Next step would be consideration of injections.  Ultimately could consider neurosurgical intervention but they are reasonably reluctant in this regard.          Past Medical History:     Patient Active  Problem List   Diagnosis     Essential hypertension, benign     Hyperlipidemia LDL goal <130     No past medical history on file.     Past Surgical History:     Past Surgical History:   Procedure Laterality Date     NO HISTORY OF SURGERY          Social History:     Social History     Tobacco Use     Smoking status: Former Smoker     Years: 10.00     Smokeless tobacco: Never Used     Tobacco comment: quit 15 years ago    Vaping Use     Vaping Use: Never used   Substance Use Topics     Alcohol use: Yes     Comment: occ / 15 drinks a month      Drug use: No        Family History:     Family History   Problem Relation Age of Onset     Hypertension Mother      Ovarian Cancer Mother      Cataracts Mother      Hypertension Father      Heart Disease Father 83     Colon Cancer No family hx of      Prostate Cancer No family hx of      Breast Cancer No family hx of      Ulcerative Colitis No family hx of      Celiac Disease No family hx of      Crohn's Disease No family hx of         Medications:     Current Outpatient Medications   Medication Sig     cyclobenzaprine (FLEXERIL) 10 MG tablet Take 0.5-1 tablets (5-10 mg) by mouth 3 times daily as needed for muscle spasms     fluorouracil (EFUDEX) 5 % external cream Apply to clean skin 2 times daily for 14-21 days, or until the onset of irritation. Avoid eye area. Wash hands after use.     gabapentin (NEURONTIN) 300 MG capsule Take 1 capsule (300 mg) by mouth 3 times daily     lisinopril (ZESTRIL) 20 MG tablet Take 30 mg by mouth daily     meclizine (ANTIVERT) 25 MG tablet TAKE 1 TABLET BY MOUTH 3 TIMES DAILY AS NEEDED FOR DIZZINESS     rosuvastatin (CRESTOR) 20 MG tablet Take 20 mg by mouth     Current Facility-Administered Medications   Medication     triamcinolone (KENALOG-40) injection 40 mg     triamcinolone (KENALOG-40) injection 40 mg        Allergies:   No Known Allergies       Physical Exam:   Vitals: BP (!) 147/90 (BP Location: Right arm, Patient Position: Sitting,  "Cuff Size: Adult Regular)   Pulse 67   Ht 1.803 m (5' 11\")   Wt 83 kg (183 lb)   BMI 25.52 kg/m    CV: peripheral pulse appreciated  Lungs: breathing comfortably  Extremities: no edema  Skin: No rashes     Neuro:   General Appearance: No apparent distress, well-nourished, well-groomed, pleasant      Mental Status: Alert and oriented to person, place, and time. Speech fluent and comprehension intact. No dysarthria.     Cranial Nerves:   II: Visual fields: normal  III: Pupils: 3 mm, equal, round, reactive to light   III,IV,VI: Extraocular Movements: intact   V: Facial sensation: intact to light touch  VII: Facial strength: intact without asymmetry  VIII: Hearing: intact grossly  IX: Palate: intact   XI: Shoulder shrug: intact  XII: Tongue movement: normal     Motor Exam:   Upper Extremities  Deltoid  Bicep  Tricep  Wrist Extensors  strength Intrinsic Muscles    Right  5  5  5  5 5 5    Left  5  5  5  5 5 5       Lower Extremities  Hip Flexors  Knee Extensors  Knee   Flexors  Dorsi Flexion  Plantar   Flexion    Right  5  5  5  5  5    Left  5  5  5  5  5    Cut off tip of R thumb at age 13, of note  No provoking of symptoms with hip flexor/SLR     No drift is present. No abnormal movements. Tone is normal throughout.     Sensory: vibration intact at ankle today, previously diminished at great toe as documented.      Coordination: no dysmetria with finger-to-nose bilaterally, heel-to-shin normal     Reflexes: biceps, triceps, brachioradialis 1+, patellar trace, and ankle jerks absent and symmetric. Toes are downgoing bilaterally     Gait: Cautious casual gait, short strides.            Data: Pertinent prior to visit   MRI L spine 6/11/2021                                                                  Impression:   1. Subacute to chronic T12 compression fracture with loss of 60-70%  vertebral body height.  2. Extensive multilevel lumbar spondylosis, most pronounced at L3-4  where there is severe spinal canal " stenosis and moderate bilateral  neural foraminal stenosis with impingement on the exiting right L3  nerve root. Also moderate to severe neural foraminal stenosis at L4-5  and L5-S1.         MRI Brain 6/11/2021     Impression:    1. No acute intracranial pathology.  2. Normal bilateral auditory and vestibular structures.  3. Mild chronic small vessel ischemic disease.     I personally reviewed the above imaging and agree with the findings in the report.         Laboratory:     B12 332        Lab Results   Component Value Date     A1C 4.9 09/22/2021   IFIX negative           The total time of this encounter today amounted to 24 minutes. This time included time spent with the patient, prep work, ordering tests, and performing post visit documentation.

## 2022-08-17 NOTE — LETTER
8/17/2022         RE: Robert Archer  [address] [place] [address]        Dear Colleague,    Thank you for referring your patient, Robert Archer, to the [facility] NEUROLOGY CLINIC [place]. Please see a copy of my visit note below.    [facility]  Section of General Neurology  Return Patient Visit    Robert Archer MRN# [id number]   Age: 72 year old YOB: 1950            Assessment and Plan:   Assessment:  Robert Archer is a 72 year old man seen in follow up for back pain, balance issues.  His balance issues have improved to an extent with PT.  Gabapentin has partially improved back pain.  His back pain does align with MRI findings.  There is multi level neural foraminal stenosis that could correlate with radiculopathy but also lumbar spinal stenosis that I do think could correlate with the issues that worsen with walking.  Could be elements of both.  Next steps would be consideration of injection in this regard/spine referral.   Our mutual goal would be to avoid a back surgery being required.  All questions answered.       Plan:  --Trial increasing gabapentin to 600 mg TID  --Consideration of injections via spine referral either targeting areas of radiculopathy on L spine imaging vs if possible area of lumbar stenosis that I do think is causing symptoms.    --Reached out re if he would like trial of further lumbar stenosis specific PT  --Follow up with me in 5-6 months, they will reach out with questions in the meanwhile.        Payam Guzman MD   of Neurology   [facility]      Interval history:     Low back pain: worsens with walking. Halved his pain overall. Overall no big change in this regard.    Imbalance: Questionable in his eyes but overall better than when we met.    Is getting shots in his knees for knee pain which has helped.    Discussed progression for low back pain as it relates to his previous imaging:   PT and medications have provided partial relief.  Next step would be consideration of injections.  Ultimately could consider neurosurgical intervention but they are reasonably reluctant in this regard.          Past Medical History:     Patient Active Problem List   Diagnosis     Essential hypertension, benign     Hyperlipidemia LDL goal <130     No past medical history on file.     Past Surgical History:     Past Surgical History:   Procedure Laterality Date     NO HISTORY OF SURGERY          Social History:     Social History     Tobacco Use     Smoking status: Former Smoker     Years: 10.00     Smokeless tobacco: Never Used     Tobacco comment: quit 15 years ago    Vaping Use     Vaping Use: Never used   Substance Use Topics     Alcohol use: Yes     Comment: occ / 15 drinks a month      Drug use: No        Family History:     Family History   Problem Relation Age of Onset     Hypertension Mother      Ovarian Cancer Mother      Cataracts Mother      Hypertension Father      Heart Disease Father 83     Colon Cancer No family hx of      Prostate Cancer No family hx of      Breast Cancer No family hx of      Ulcerative Colitis No family hx of      Celiac Disease No family hx of      Crohn's Disease No family hx of         Medications:     Current Outpatient Medications   Medication Sig     cyclobenzaprine (FLEXERIL) 10 MG tablet Take 0.5-1 tablets (5-10 mg) by mouth 3 times daily as needed for muscle spasms     fluorouracil (EFUDEX) 5 % external cream Apply to clean skin 2 times daily for 14-21 days, or until the onset of irritation. Avoid eye area. Wash hands after use.     gabapentin (NEURONTIN) 300 MG capsule Take 1 capsule (300 mg) by mouth 3 times daily     lisinopril (ZESTRIL) 20 MG tablet Take 30 mg by mouth daily     meclizine (ANTIVERT) 25 MG tablet TAKE 1 TABLET BY MOUTH 3 TIMES DAILY AS NEEDED FOR DIZZINESS     rosuvastatin (CRESTOR) 20 MG tablet Take 20 mg by mouth     Current Facility-Administered  "Medications   Medication     triamcinolone (KENALOG-40) injection 40 mg     triamcinolone (KENALOG-40) injection 40 mg        Allergies:   No Known Allergies       Physical Exam:   Vitals: BP (!) 147/90 (BP Location: Right arm, Patient Position: Sitting, Cuff Size: Adult Regular)   Pulse 67   Ht 1.803 m (5' 11\")   Wt 83 kg (183 lb)   BMI 25.52 kg/m    CV: peripheral pulse appreciated  Lungs: breathing comfortably  Extremities: no edema  Skin: No rashes     Neuro:   General Appearance: No apparent distress, well-nourished, well-groomed, pleasant      Mental Status: Alert and oriented to person, place, and time. Speech fluent and comprehension intact. No dysarthria.     Cranial Nerves:   II: Visual fields: normal  III: Pupils: 3 mm, equal, round, reactive to light   III,IV,VI: Extraocular Movements: intact   V: Facial sensation: intact to light touch  VII: Facial strength: intact without asymmetry  VIII: Hearing: intact grossly  IX: Palate: intact   XI: Shoulder shrug: intact  XII: Tongue movement: normal     Motor Exam:   Upper Extremities  Deltoid  Bicep  Tricep  Wrist Extensors  strength Intrinsic Muscles    Right  5  5  5  5 5 5    Left  5  5  5  5 5 5       Lower Extremities  Hip Flexors  Knee Extensors  Knee   Flexors  Dorsi Flexion  Plantar   Flexion    Right  5  5  5  5  5    Left  5  5  5  5  5    Cut off tip of R thumb at age 13, of note  No provoking of symptoms with hip flexor/SLR     No drift is present. No abnormal movements. Tone is normal throughout.     Sensory: vibration intact at ankle today, previously diminished at great toe as documented.      Coordination: no dysmetria with finger-to-nose bilaterally, heel-to-shin normal     Reflexes: biceps, triceps, brachioradialis 1+, patellar trace, and ankle jerks absent and symmetric. Toes are downgoing bilaterally     Gait: Cautious casual gait, short strides.            Data: Pertinent prior to visit   MRI L spine " 6/11/2021                                                                  Impression:   1. Subacute to chronic T12 compression fracture with loss of 60-70%  vertebral body height.  2. Extensive multilevel lumbar spondylosis, most pronounced at L3-4  where there is severe spinal canal stenosis and moderate bilateral  neural foraminal stenosis with impingement on the exiting right L3  nerve root. Also moderate to severe neural foraminal stenosis at L4-5  and L5-S1.         MRI Brain 6/11/2021     Impression:    1. No acute intracranial pathology.  2. Normal bilateral auditory and vestibular structures.  3. Mild chronic small vessel ischemic disease.     I personally reviewed the above imaging and agree with the findings in the report.         Laboratory:     B12 332        Lab Results   Component Value Date     A1C 4.9 09/22/2021   IFIX negative           The total time of this encounter today amounted to 24 minutes. This time included time spent with the patient, prep work, ordering tests, and performing post visit documentation.        Again, thank you for allowing me to participate in the care of your patient.        Sincerely,        Storm Guzman MD

## 2022-08-17 NOTE — NURSING NOTE
"Robert Archer's goals for this visit include:   Chief Complaint   Patient presents with     RECHECK     Imbalance,LDPN,chronic midline backpain// follow up in 6 months       He requests these members of his care team be copied on today's visit information: yes    PCP: Jose Barrera    Referring Provider:  No referring provider defined for this encounter.    BP (!) 147/90 (BP Location: Right arm, Patient Position: Sitting, Cuff Size: Adult Regular)   Pulse 67   Ht 1.803 m (5' 11\")   Wt 83 kg (183 lb)   BMI 25.52 kg/m      Do you need any medication refills at today's visit? Yes gabapentin check  SELMA Mendez, CMA (AAMA)      "

## 2022-08-26 ENCOUNTER — PRE VISIT (OUTPATIENT)
Dept: NEUROSURGERY | Facility: CLINIC | Age: 72
End: 2022-08-26

## 2022-09-01 ENCOUNTER — OFFICE VISIT (OUTPATIENT)
Dept: NEUROSURGERY | Facility: CLINIC | Age: 72
End: 2022-09-01
Attending: FAMILY MEDICINE
Payer: COMMERCIAL

## 2022-09-01 VITALS
HEART RATE: 81 BPM | BODY MASS INDEX: 25.1 KG/M2 | WEIGHT: 180 LBS | SYSTOLIC BLOOD PRESSURE: 152 MMHG | DIASTOLIC BLOOD PRESSURE: 84 MMHG

## 2022-09-01 DIAGNOSIS — M54.50 CHRONIC LOW BACK PAIN WITHOUT SCIATICA, UNSPECIFIED BACK PAIN LATERALITY: ICD-10-CM

## 2022-09-01 DIAGNOSIS — G89.29 CHRONIC LOW BACK PAIN WITHOUT SCIATICA, UNSPECIFIED BACK PAIN LATERALITY: ICD-10-CM

## 2022-09-01 PROCEDURE — 99203 OFFICE O/P NEW LOW 30 MIN: CPT | Performed by: PHYSICIAN ASSISTANT

## 2022-09-01 NOTE — LETTER
9/1/2022         RE: Robert Archer  [address] [place] [address]        Dear Colleague,    Thank you for referring your patient, Robert Archer, to the [facility] [place]. Please see a copy of my visit note below.    Neurosurgery Consult    HPI    Mr. Archer is a 72-year-old male who presents to clinic for evaluation of back pain.  He states the pain is bandlike in his low back just above his belt line.  Little bit more to the left than the right.  He denies any radicular symptoms numbness weakness or bowel or bladder symptoms or saddle anesthesia.  He has not done physical therapy recently for his low back, he was more recently doing physical therapy for balance.  He has not had any injections or procedures.  He states actually his pain is getting little bit better.  He reports he is able to walk for about a mile and a half in the morning and his back pain is not necessarily limiting his ability to walk.    Medical history  Hyperlipidemia  Hypertension    Social history  Noncontributory      B/P: 152/84, T: Data Unavailable, P: 81, R: Data Unavailable       Exam    Alert and oriented no acute distress  Bilateral lower extremities with 5/5 strength  Reflexes absent patella/ankle  Negative straight leg raise bilaterally  Negative ankle clonus negative Babinski bilaterally  Lumbar spine nontender to palpation  Able to stand on heels and toes  Gait is normal    Imaging    Lumbar MRI from 2021 demonstrated severe spinal stenosis at L3-4, with other multilevel degenerative changes and foraminal stenosis noted.    Assessment    Back pain without radiculopathy  Spinal stenosis without neurogenic claudication    Plan:      I recommend the patient begin a trial of physical therapy specifically directed in his back pain, and follow-up with us as needed if is not improving.          Again, thank you for allowing me to participate in the care of your patient.         Sincerely,        Anuj Mcneal PA-C

## 2022-09-01 NOTE — PROGRESS NOTES
Neurosurgery Consult    HPI    Mr. Archer is a 72-year-old male who presents to clinic for evaluation of back pain.  He states the pain is bandlike in his low back just above his belt line.  Little bit more to the left than the right.  He denies any radicular symptoms numbness weakness or bowel or bladder symptoms or saddle anesthesia.  He has not done physical therapy recently for his low back, he was more recently doing physical therapy for balance.  He has not had any injections or procedures.  He states actually his pain is getting little bit better.  He reports he is able to walk for about a mile and a half in the morning and his back pain is not necessarily limiting his ability to walk.    Medical history  Hyperlipidemia  Hypertension    Social history  Noncontributory      B/P: 152/84, T: Data Unavailable, P: 81, R: Data Unavailable       Exam    Alert and oriented no acute distress  Bilateral lower extremities with 5/5 strength  Reflexes absent patella/ankle  Negative straight leg raise bilaterally  Negative ankle clonus negative Babinski bilaterally  Lumbar spine nontender to palpation  Able to stand on heels and toes  Gait is normal    Imaging    Lumbar MRI from 2021 demonstrated severe spinal stenosis at L3-4, with other multilevel degenerative changes and foraminal stenosis noted.    Assessment    Back pain without radiculopathy  Spinal stenosis without neurogenic claudication    Plan:      I recommend the patient begin a trial of physical therapy specifically directed in his back pain, and follow-up with us as needed if is not improving.

## 2022-09-09 ENCOUNTER — TELEPHONE (OUTPATIENT)
Dept: PALLIATIVE MEDICINE | Facility: CLINIC | Age: 72
End: 2022-09-09

## 2022-09-14 ENCOUNTER — THERAPY VISIT (OUTPATIENT)
Dept: PHYSICAL THERAPY | Facility: CLINIC | Age: 72
End: 2022-09-14
Attending: PHYSICIAN ASSISTANT
Payer: COMMERCIAL

## 2022-09-14 DIAGNOSIS — M54.50 CHRONIC LOW BACK PAIN WITHOUT SCIATICA, UNSPECIFIED BACK PAIN LATERALITY: ICD-10-CM

## 2022-09-14 DIAGNOSIS — G89.29 CHRONIC BILATERAL LOW BACK PAIN WITHOUT SCIATICA: ICD-10-CM

## 2022-09-14 DIAGNOSIS — G89.29 CHRONIC LOW BACK PAIN WITHOUT SCIATICA, UNSPECIFIED BACK PAIN LATERALITY: ICD-10-CM

## 2022-09-14 DIAGNOSIS — M54.50 CHRONIC BILATERAL LOW BACK PAIN WITHOUT SCIATICA: ICD-10-CM

## 2022-09-14 PROCEDURE — 97161 PT EVAL LOW COMPLEX 20 MIN: CPT | Mod: GP | Performed by: PHYSICAL THERAPIST

## 2022-09-14 PROCEDURE — 97110 THERAPEUTIC EXERCISES: CPT | Mod: GP | Performed by: PHYSICAL THERAPIST

## 2022-09-14 NOTE — PROGRESS NOTES
Physical Therapy Initial Evaluation  Subjective:    Patient Health History  Robert Archer being seen for Spine evaluation.     Problem began: 4/5/2020.   Problem occurred: Fell down stairs   Pain is reported as 8/10 on pain scale.  General health as reported by patient is good.  Pertinent medical history includes: concussions/dizziness and high blood pressure.   Red flags:  None as reported by patient.  Medical allergies: none.   Surgeries include:  None.    Current medications:  Cardiac medication and high blood pressure medication.    Current occupation is retired.   Primary job tasks include:  Computer work, prolonged sitting and prolonged standing.   Other job/home tasks details: walking house hold tasks ..                Therapist Generated HPI Evaluation  Problem details: Fell down the stairs a 1.5 year ago and has been dealing with pain since. This pain is just not improving. .         Type of problem:  Lumbar.    This is a chronic (1.5 years. 9/1/22) condition.  Condition occurred with:  A fall/slip.  Where condition occurred: at home.  Patient reports pain:  Lower lumbar spine and lumbar spine left.  Pain is described as aching and is intermittent.  Pain radiates to:  No radiation. Pain is the same all the time.  Since onset symptoms are unchanged.  Associated symptoms:  Loss of strength and loss of motion/stiffness. Exacerbated by: trying to do to much activity, lifting, walking to far. use to walk 5 miles per day now only walk 1 to 1.5 miles.  Relieved by: bengay.   Special tests included:  X-ray and MRI (see chart, stenosis, T12 compression fx. ).    Barriers include:  None as reported by patient.    Patient Health History  Robert Archer being seen for Spine evaluation.     Problem began: 4/5/2020.   Problem occurred: Fell down stairs   Pain is reported as 8/10 on pain scale.  General health as reported by patient is good.  Pertinent medical history includes: concussions/dizziness and high blood  pressure.   Red flags:  None as reported by patient.  Medical allergies: none.   Surgeries include:  None.    Current medications:  Cardiac medication and high blood pressure medication.    Current occupation is retired.   Primary job tasks include:  Computer work, prolonged sitting and prolonged standing.   Other job/home tasks details: walking house hold tasks ..                                  Objective:  Standing Alignment:    Cervical/Thoracic:  Thoracic kyphosis increased  Shoulder/UE:  Rounded shoulders  Lumbar:  Lordosis incr                           Lumbar/SI Evaluation    Lumbar Myotomes:  normal (knee flexion 5/5 bilaterally. )                Lumbar Dermtomes:  normal                Neural Tension/Mobility:  Lumbar:  Normal        Lumbar Palpation:    Tenderness present at Left:    Quadratus Lumborum and Erector Spinae    Tenderness not present at Right:  Quadratus Lumborum or Erector Spinae    Lumbar Provocation:      Left negative with:  PROM hip    Right negative with:  PROM hip  Spinal Segmental Conclusions:     Level: Hypo noted at T10, T11, T12, L1, L2, L3, L4, L5 and S1                                                     Adri Lumbar Evaluation      Movement Loss:  Flexion (Flex): nil  Extension (EXT): mod  Side Glide R (SG R): min  Side Glide L (SG L): min  Test Movements:  FIS: During: no effect  After: no effect    Repeat FIS: During: no effect  After: no effect    EIS: During: produces  After: no worse    Repeat EIS: During: produces  After: no worse      EIL: During: produces  After: no worse    Repeat EIL: During: produces  After: no worse  Mechanical Response: no effect        Conclusion: dysfunction                                         ROS    Assessment/Plan:    Patient is a 72 year old male with lumbar complaints.    Patient has the following significant findings with corresponding treatment plan.                Diagnosis 1:  Low back pain / stenosis  Pain -  hot/cold therapy, US,  mechanical traction, manual therapy, self management, education, directional preference exercise and home program  Decreased ROM/flexibility - manual therapy, therapeutic exercise, therapeutic activity and home program  Decreased joint mobility - manual therapy, therapeutic exercise, therapeutic activity and home program  Decreased strength - therapeutic exercise, therapeutic activities and home program  Decreased function - therapeutic activities and home program  Impaired posture - neuro re-education, therapeutic activities and home program    Therapy Evaluation Codes:   1) History comprised of:   Personal factors that impact the plan of care:      Age and Time since onset of symptoms.    Comorbidity factors that impact the plan of care are:      None.     Medications impacting care: Cardiac and High blood pressure.  2) Examination of Body Systems comprised of:   Body structures and functions that impact the plan of care:      Lumbar spine.   Activity limitations that impact the plan of care are:      Standing and Walking.  3) Clinical presentation characteristics are:   Stable/Uncomplicated.  4) Decision-Making    Low complexity using standardized patient assessment instrument and/or measureable assessment of functional outcome.  Cumulative Therapy Evaluation is: Low complexity.    Previous and current functional limitations:  (See Goal Flow Sheet for this information)    Short term and Long term goals: (See Goal Flow Sheet for this information)     Communication ability:  Patient appears to be able to clearly communicate and understand verbal and written communication and follow directions correctly.  Treatment Explanation - The following has been discussed with the patient:   RX ordered/plan of care  Anticipated outcomes  Possible risks and side effects  This patient would benefit from PT intervention to resume normal activities.   Rehab potential is good.    Frequency:  1 X week, once daily  Duration:  for 8  weeks  Discharge Plan:  Achieve all LTG.  Independent in home treatment program.  Reach maximal therapeutic benefit.    Please refer to the daily flowsheet for treatment today, total treatment time and time spent performing 1:1 timed codes.

## 2022-09-14 NOTE — PROGRESS NOTES
[facility]    OUTPATIENT Physical Therapy ORTHOPEDIC EVALUATION  PLAN OF TREATMENT FOR OUTPATIENT REHABILITATION  (COMPLETE FOR INITIAL CLAIMS ONLY)  Patient's Last Name, First Name, M.I.  YOB: 1950  Robert Archer    Provider s Name:  [facility]   Medical Record No.  [id number]   Start of Care Date:  09/14/22   Onset Date:   09/01/22   Type:     _X__PT   ___OT Medical Diagnosis:    Encounter Diagnoses   Name Primary?    Chronic low back pain without sciatica, unspecified back pain laterality     Chronic bilateral low back pain without sciatica         Treatment Diagnosis:  chronic left low back pain        Goals:     09/14/22 0500   Body Part   Goals listed below are for low back left   Goal #1   Goal #1 ambulation   Previous Functional Level No restrictions   Current Functional Level Miles patient can walk   Performance Level 1.5 miles   STG Target Performance Miles patient will be able to  walk   Performance Level 3 miles   Rationale for safe household ambulation;for safe community ambulation;to promote a healthy and active lifestyle   Due Date 10/12/22    LTG Target Performance Miles patient will be able to  walk   Performance Level 5 miles   Rationale for safe household ambulation;for safe community ambulation;to promote a healthy and active lifestyle   Due Date 11/09/22       Therapy Frequency:  1 x/ week  Predicted Duration of Therapy Intervention:  8 weeks    Checo Huang PT                 I CERTIFY THE NEED FOR THESE SERVICES FURNISHED UNDER        THIS PLAN OF TREATMENT AND WHILE UNDER MY CARE     (Physician attestation of this document indicates review and certification of the therapy plan).                     Certification Date From:  09/14/22   Certification Date To:  11/09/22    Referring Provider:  Anuj Dsouza  Assessment        See Epic Evaluation SOC Date: 09/14/22

## 2022-09-19 ASSESSMENT — ACTIVITIES OF DAILY LIVING (ADL): CURRENT_FUNCTION: NO ASSISTANCE NEEDED

## 2022-09-19 ASSESSMENT — ENCOUNTER SYMPTOMS
DYSURIA: 0
FREQUENCY: 0
SHORTNESS OF BREATH: 0
PARESTHESIAS: 0
SORE THROAT: 0
EYE PAIN: 0
WEAKNESS: 0
HEARTBURN: 0
CONSTIPATION: 0
FEVER: 0
NAUSEA: 0
MYALGIAS: 0
DIZZINESS: 1
COUGH: 0
CHILLS: 0
DIARRHEA: 0
HEADACHES: 0
JOINT SWELLING: 0
HEMATOCHEZIA: 0
NERVOUS/ANXIOUS: 0
HEMATURIA: 0
PALPITATIONS: 0
ARTHRALGIAS: 0
ABDOMINAL PAIN: 0

## 2022-09-20 ENCOUNTER — THERAPY VISIT (OUTPATIENT)
Dept: PHYSICAL THERAPY | Facility: CLINIC | Age: 72
End: 2022-09-20
Payer: COMMERCIAL

## 2022-09-20 DIAGNOSIS — M54.50 CHRONIC BILATERAL LOW BACK PAIN WITHOUT SCIATICA: Primary | ICD-10-CM

## 2022-09-20 DIAGNOSIS — G89.29 CHRONIC BILATERAL LOW BACK PAIN WITHOUT SCIATICA: Primary | ICD-10-CM

## 2022-09-20 PROCEDURE — 97110 THERAPEUTIC EXERCISES: CPT | Mod: GP | Performed by: PHYSICAL THERAPIST

## 2022-09-26 ENCOUNTER — OFFICE VISIT (OUTPATIENT)
Dept: FAMILY MEDICINE | Facility: CLINIC | Age: 72
End: 2022-09-26
Payer: COMMERCIAL

## 2022-09-26 VITALS
BODY MASS INDEX: 26.36 KG/M2 | HEIGHT: 69 IN | RESPIRATION RATE: 14 BRPM | DIASTOLIC BLOOD PRESSURE: 80 MMHG | OXYGEN SATURATION: 98 % | SYSTOLIC BLOOD PRESSURE: 132 MMHG | TEMPERATURE: 97.6 F | WEIGHT: 178 LBS | HEART RATE: 60 BPM

## 2022-09-26 DIAGNOSIS — M17.0 OSTEOARTHRITIS OF BOTH KNEES, UNSPECIFIED OSTEOARTHRITIS TYPE: ICD-10-CM

## 2022-09-26 DIAGNOSIS — I10 ESSENTIAL (PRIMARY) HYPERTENSION: ICD-10-CM

## 2022-09-26 DIAGNOSIS — L57.0 ACTINIC KERATOSIS: ICD-10-CM

## 2022-09-26 DIAGNOSIS — Z28.21 INFLUENZA VACCINATION DECLINED: ICD-10-CM

## 2022-09-26 DIAGNOSIS — Z53.20 PROSTATE CANCER SCREENING DECLINED: ICD-10-CM

## 2022-09-26 DIAGNOSIS — Z00.00 MEDICARE ANNUAL WELLNESS VISIT, SUBSEQUENT: Primary | ICD-10-CM

## 2022-09-26 DIAGNOSIS — E78.5 HYPERLIPIDEMIA LDL GOAL <130: ICD-10-CM

## 2022-09-26 LAB
ALBUMIN SERPL-MCNC: 4 G/DL (ref 3.4–5)
ALP SERPL-CCNC: 58 U/L (ref 40–150)
ALT SERPL W P-5'-P-CCNC: 22 U/L (ref 0–70)
ANION GAP SERPL CALCULATED.3IONS-SCNC: 6 MMOL/L (ref 3–14)
AST SERPL W P-5'-P-CCNC: 17 U/L (ref 0–45)
BILIRUB SERPL-MCNC: 0.9 MG/DL (ref 0.2–1.3)
BUN SERPL-MCNC: 16 MG/DL (ref 7–30)
CALCIUM SERPL-MCNC: 9.1 MG/DL (ref 8.5–10.1)
CHLORIDE BLD-SCNC: 108 MMOL/L (ref 94–109)
CHOLEST SERPL-MCNC: 139 MG/DL
CO2 SERPL-SCNC: 27 MMOL/L (ref 20–32)
CREAT SERPL-MCNC: 0.78 MG/DL (ref 0.66–1.25)
FASTING STATUS PATIENT QL REPORTED: YES
GFR SERPL CREATININE-BSD FRML MDRD: >90 ML/MIN/1.73M2
GLUCOSE BLD-MCNC: 91 MG/DL (ref 70–99)
HDLC SERPL-MCNC: 73 MG/DL
LDLC SERPL CALC-MCNC: 54 MG/DL
NONHDLC SERPL-MCNC: 66 MG/DL
POTASSIUM BLD-SCNC: 4.2 MMOL/L (ref 3.4–5.3)
PROT SERPL-MCNC: 7.1 G/DL (ref 6.8–8.8)
SODIUM SERPL-SCNC: 141 MMOL/L (ref 133–144)
TRIGL SERPL-MCNC: 59 MG/DL

## 2022-09-26 PROCEDURE — 36415 COLL VENOUS BLD VENIPUNCTURE: CPT | Performed by: FAMILY MEDICINE

## 2022-09-26 PROCEDURE — 80053 COMPREHEN METABOLIC PANEL: CPT | Performed by: FAMILY MEDICINE

## 2022-09-26 PROCEDURE — G0439 PPPS, SUBSEQ VISIT: HCPCS | Performed by: FAMILY MEDICINE

## 2022-09-26 PROCEDURE — 99214 OFFICE O/P EST MOD 30 MIN: CPT | Mod: 25 | Performed by: FAMILY MEDICINE

## 2022-09-26 PROCEDURE — 80061 LIPID PANEL: CPT | Performed by: FAMILY MEDICINE

## 2022-09-26 RX ORDER — ROSUVASTATIN CALCIUM 20 MG/1
20 TABLET, COATED ORAL DAILY
Qty: 90 TABLET | Refills: 3 | Status: SHIPPED | OUTPATIENT
Start: 2022-09-26 | End: 2023-10-04

## 2022-09-26 RX ORDER — LISINOPRIL 20 MG/1
30 TABLET ORAL DAILY
Qty: 135 TABLET | Refills: 3 | Status: SHIPPED | OUTPATIENT
Start: 2022-09-26 | End: 2023-09-11

## 2022-09-26 ASSESSMENT — ENCOUNTER SYMPTOMS
NERVOUS/ANXIOUS: 0
PALPITATIONS: 0
SORE THROAT: 0
NAUSEA: 0
CONSTIPATION: 0
HEMATURIA: 0
CHILLS: 0
HEARTBURN: 0
MYALGIAS: 0
EYE PAIN: 0
DIZZINESS: 1
DIARRHEA: 0
ARTHRALGIAS: 0
SHORTNESS OF BREATH: 0
DYSURIA: 0
WEAKNESS: 0
ABDOMINAL PAIN: 0
JOINT SWELLING: 0
HEMATOCHEZIA: 0
FREQUENCY: 0
FEVER: 0
HEADACHES: 0
COUGH: 0
PARESTHESIAS: 0

## 2022-09-26 ASSESSMENT — ACTIVITIES OF DAILY LIVING (ADL): CURRENT_FUNCTION: NO ASSISTANCE NEEDED

## 2022-09-26 NOTE — RESULT ENCOUNTER NOTE
Please inform of results if patient has not viewed in [facility] within 3 business days.    CMP Results - Your blood sugar was 91. Your kidney function, electrolytes, and liver function were normal.    Please call the clinic with any questions you may have.     Have a great day,    Dr. Sepulveda

## 2022-09-26 NOTE — PATIENT INSTRUCTIONS
Patient Education   Personalized Prevention Plan  You are due for the preventive services outlined below.  Your care team is available to assist you in scheduling these services.  If you have already completed any of these items, please share that information with your care team to update in your medical record.  Health Maintenance Due   Topic Date Due     URINE DRUG SCREEN  Never done     Flu Vaccine (1) 09/01/2022

## 2022-09-26 NOTE — PROGRESS NOTES
"SUBJECTIVE:   Robert is a 72 year old who presents for Preventive Visit.      Patient has been advised of split billing requirements and indicates understanding: Yes  Are you in the first 12 months of your Medicare coverage?  No    Healthy Habits:     In general, how would you rate your overall health?  Good    Frequency of exercise:  6-7 days/week    Duration of exercise:  30-45 minutes    Do you usually eat at least 4 servings of fruit and vegetables a day, include whole grains    & fiber and avoid regularly eating high fat or \"junk\" foods?  Yes    Taking medications regularly:  Yes    Medication side effects:  None    Ability to successfully perform activities of daily living:  No assistance needed    Home Safety:  No safety concerns identified    Hearing Impairment:  No hearing concerns    In the past 6 months, have you been bothered by leaking of urine?  No    In general, how would you rate your overall mental or emotional health?  Excellent      PHQ-2 Total Score: 0    Additional concerns today:  No    No side effects with current meds.    Do you feel safe in your environment? Yes    Have you ever done Advance Care Planning? (For example, a Health Directive, POLST, or a discussion with a medical provider or your loved ones about your wishes): Yes, advance care planning is on file.    Fall risk  Fallen 2 or more times in the past year?: No  Any fall with injury in the past year?: No    Cognitive Screening   1) Repeat 3 items (Leader, Season, Table)      2) Clock draw:   NORMAL  3) 3 item recall:   Recalls 3 objects  Results: 3 items recalled: COGNITIVE IMPAIRMENT LESS LIKELY    Mini-CogTM Copyright ATTILA Davis. Licensed by the author for use in [facility]; reprinted with permission (grace@.[facility]). All rights reserved.      Do you have sleep apnea, excessive snoring or daytime drowsiness?: no    Reviewed and updated as needed this visit by clinical staff   Tobacco  Allergies  Meds  Problems  Med Hx  " Surg Hx  Fam Hx            Reviewed and updated as needed this visit by Provider   Tobacco  Allergies  Meds  Problems  Med Hx  Surg Hx  Fam Hx         Social history reviewed.  Social History     Tobacco Use     Smoking status: Former Smoker     Years: 10.00     Smokeless tobacco: Never Used     Tobacco comment: quit 15 years ago    Substance Use Topics     Alcohol use: Yes     Comment: occ / 15 drinks a month      If you drink alcohol do you typically have >3 drinks per day or >7 drinks per week? No    No flowsheet data found.    Current providers sharing in care for this patient include:   Patient Care Team:  Jose Barrera MD as PCP - General (Family Practice)  Jose Barrera MD as Assigned PCP  Zora Peacock PA-C as Assigned Surgical Provider  Storm Guzman MD as Assigned Neuroscience Provider  Santiago eDl Cid MD as Assigned Musculoskeletal Provider    The following health maintenance items are reviewed in Epic and correct as of today:  Health Maintenance   Topic Date Due     URINE DRUG SCREEN  Never done     INFLUENZA VACCINE (1) 09/01/2022     MEDICARE ANNUAL WELLNESS VISIT  09/26/2023     ANNUAL REVIEW OF HM ORDERS  09/26/2023     FALL RISK ASSESSMENT  09/26/2023     LIPID  09/22/2026     ADVANCE CARE PLANNING  09/26/2027     DTAP/TDAP/TD IMMUNIZATION (2 - Td or Tdap) 08/06/2028     HEPATITIS C SCREENING  Completed     PHQ-2 (once per calendar year)  Completed     Pneumococcal Vaccine: 65+ Years  Completed     ZOSTER IMMUNIZATION  Completed     AORTIC ANEURYSM SCREENING (SYSTEM ASSIGNED)  Completed     COVID-19 Vaccine  Completed     IPV IMMUNIZATION  Aged Out     MENINGITIS IMMUNIZATION  Aged Out     HEPATITIS B IMMUNIZATION  Aged Out     COLORECTAL CANCER SCREENING  Discontinued     LUNG CANCER SCREENING  Discontinued     BP Readings from Last 3 Encounters:   09/26/22 132/80   09/01/22 (!) 152/84   08/17/22 (!) 147/90    Wt Readings from Last 3 Encounters:    09/26/22 80.7 kg (178 lb)   09/01/22 81.6 kg (180 lb)   08/17/22 83 kg (183 lb)                  Patient Active Problem List   Diagnosis     Essential hypertension, benign     Hyperlipidemia LDL goal <130     Chronic bilateral low back pain without sciatica     Past Surgical History:   Procedure Laterality Date     NO HISTORY OF SURGERY         Social History     Tobacco Use     Smoking status: Former Smoker     Years: 10.00     Smokeless tobacco: Never Used     Tobacco comment: quit 15 years ago    Substance Use Topics     Alcohol use: Yes     Comment: occ / 15 drinks a month      Family History   Problem Relation Age of Onset     Hypertension Mother      Ovarian Cancer Mother      Cataracts Mother      Hypertension Father      Heart Disease Father 83     Depression Grandson      Colon Cancer No family hx of      Prostate Cancer No family hx of      Breast Cancer No family hx of      Ulcerative Colitis No family hx of      Celiac Disease No family hx of      Crohn's Disease No family hx of          Current Outpatient Medications   Medication Sig Dispense Refill     cyclobenzaprine (FLEXERIL) 10 MG tablet Take 0.5-1 tablets (5-10 mg) by mouth 3 times daily as needed for muscle spasms 45 tablet 0     fluorouracil (EFUDEX) 5 % external cream Apply to clean skin 2 times daily for 14-21 days, or until the onset of irritation. Avoid eye area. Wash hands after use. 40 g 0     gabapentin (NEURONTIN) 300 MG capsule Take 2 capsules (600 mg) by mouth 3 times daily (Patient taking differently: Take 600 mg by mouth 3 times daily Taking 2 times a day) 540 capsule 2     lisinopril (ZESTRIL) 20 MG tablet Take 1.5 tablets (30 mg) by mouth daily 135 tablet 3     meclizine (ANTIVERT) 25 MG tablet TAKE 1 TABLET BY MOUTH 3 TIMES DAILY AS NEEDED FOR DIZZINESS 90 tablet 1     rosuvastatin (CRESTOR) 20 MG tablet Take 1 tablet (20 mg) by mouth daily 90 tablet 3     No Known Allergies    Review of Systems   Constitutional: Negative for  "chills and fever.   HENT: Negative for congestion, ear pain, hearing loss and sore throat.    Eyes: Negative for pain and visual disturbance.   Respiratory: Negative for cough and shortness of breath.    Cardiovascular: Negative for chest pain, palpitations and peripheral edema.   Gastrointestinal: Negative for abdominal pain, constipation, diarrhea, heartburn, hematochezia and nausea.   Genitourinary: Negative for dysuria, frequency, genital sores, hematuria, impotence, penile discharge and urgency.   Musculoskeletal: Negative for arthralgias, joint swelling and myalgias.   Skin: Negative for rash.   Neurological: Positive for dizziness. Negative for weakness, headaches and paresthesias.   Psychiatric/Behavioral: Negative for mood changes. The patient is not nervous/anxious.      OBJECTIVE:   /80   Pulse 60   Temp 97.6  F (36.4  C) (Temporal)   Resp 14   Ht 1.753 m (5' 9\")   Wt 80.7 kg (178 lb)   SpO2 98%   BMI 26.29 kg/m   Estimated body mass index is 26.29 kg/m  as calculated from the following:    Height as of this encounter: 1.753 m (5' 9\").    Weight as of this encounter: 80.7 kg (178 lb).  Physical Exam  GENERAL: healthy, alert and no distress  EYES: Eyes grossly normal to inspection, PERRL and conjunctivae and sclerae normal  HENT: ear canals and TM's normal, nose and mouth without ulcers or lesions  NECK: no adenopathy, no asymmetry, masses, or scars and thyroid normal to palpation  RESP: lungs clear to auscultation - no rales, rhonchi or wheezes  CV: regular rate and rhythm, normal S1 S2, no S3 or S4, no murmur, click or rub, no peripheral edema and peripheral pulses strong  ABDOMEN: soft, nontender, no hepatosplenomegaly, no masses and bowel sounds normal  MS: no gross musculoskeletal defects noted, no edema  SKIN: Actinic keratoses on scalp.  No suspicious lesions or rashes  NEURO: Normal strength and tone, mentation intact and speech normal  PSYCH: mentation appears normal, affect " normal/bright    Diagnostic Test Results: Labs pending    ASSESSMENT / PLAN:   1. Medicare annual wellness visit, subsequent: Discussed personal health and safety.  Routine screenings for cholesterol and diabetes.  Completed fit test.  Declines PSA screening for prostate cancer.  Will get flu shot later this fall.  Follow-up annually.  - REVIEW OF HEALTH MAINTENANCE PROTOCOL ORDERS  - PRIMARY CARE FOLLOW-UP SCHEDULING; Future  - Lipid panel reflex to direct LDL Fasting; Future  - Comprehensive metabolic panel (BMP + Alb, Alk Phos, ALT, AST, Total. Bili, TP); Future  - REVIEW OF HEALTH MAINTENANCE PROTOCOL ORDERS    2. Essential (primary) hypertension : Controlled.  Refills given.  Recheck BMP.  - Comprehensive metabolic panel (BMP + Alb, Alk Phos, ALT, AST, Total. Bili, TP); Future  - lisinopril (ZESTRIL) 20 MG tablet; Take 1.5 tablets (30 mg) by mouth daily  Dispense: 135 tablet; Refill: 3    3. Hyperlipidemia LDL goal <130: Controlled.  Refills given.  Recheck lipids.  - Lipid panel reflex to direct LDL Fasting; Future  - Comprehensive metabolic panel (BMP + Alb, Alk Phos, ALT, AST, Total. Bili, TP); Future  - rosuvastatin (CRESTOR) 20 MG tablet; Take 1 tablet (20 mg) by mouth daily  Dispense: 90 tablet; Refill: 3    4. Actinic keratosis: Follows with dermatology.  Declines cryotherapy today.  Sees them next in February.    5. Osteoarthritis of both knees, unspecified osteoarthritis type: Received joint injections.  Will return at a later date if required.    6. Influenza vaccination declined: Plans on getting this later this fall.    7. Prostate cancer screening declined    COUNSELING:  Reviewed preventive health counseling, as reflected in patient instructions       Regular exercise       Healthy diet/nutrition       Vision screening       Hearing screening       Dental care       Bladder control       Fall risk prevention    Estimated body mass index is 26.29 kg/m  as calculated from the following:    Height as  "of this encounter: 1.753 m (5' 9\").    Weight as of this encounter: 80.7 kg (178 lb).    Weight management plan: Discussed healthy diet and exercise guidelines    He reports that he has quit smoking. He quit after 10.00 years of use. He has never used smokeless tobacco.      Appropriate preventive services were discussed with this patient, including applicable screening as appropriate for cardiovascular disease, diabetes, osteopenia/osteoporosis, and glaucoma.  As appropriate for age/gender, discussed screening for colorectal cancer, prostate cancer, breast cancer, and cervical cancer. Checklist reviewing preventive services available has been given to the patient.    Reviewed patients plan of care and provided an AVS. The Basic Care Plan (routine screening as documented in Health Maintenance) for Robert meets the Care Plan requirement. This Care Plan has been established and reviewed with the Patient.    Counseling Resources:  ATP IV Guidelines  Pooled Cohorts Equation Calculator  Breast Cancer Risk Calculator  Breast Cancer: Medication to Reduce Risk  FRAX Risk Assessment  ICSI Preventive Guidelines  Dietary Guidelines for Americans, 2010  [employer]'s MyPlate  ASA Prophylaxis  Lung CA Screening    Jose Barrera MD  [facility]    This chart is completed utilizing dictation software; typos and/or incorrect word substitutions may unintentionally occur.     Identified Health Risks:  "

## 2022-09-27 NOTE — RESULT ENCOUNTER NOTE
Please inform of results if patient has not viewed in [facility] within 3 business days.    Lipids - Your cholesterol lab results were normal.    Please call the clinic with any questions you may have.     Have a great day,    Dr. Sepulveda

## 2022-10-15 ENCOUNTER — HEALTH MAINTENANCE LETTER (OUTPATIENT)
Age: 72
End: 2022-10-15

## 2022-10-27 NOTE — PROGRESS NOTES
Patient seen at the request of Dr Storm Guzman for an opinion and evaluation of low back and lumbar radicular pain.      HISTORY OF PRESENT ILLNESS:  Robert Archer is a 72 year old male who presents with a chief complaint of low back pain.     Pain began ~1.5 years ago and is associated with trauma. Patient reports a fall down 11 stairs when carrying chairs and fell down onto his back. Denies any major back pain or issues prior to that time. He was seen [facility] at that time in the ED with T12 compression fracture and anterior wedging, suspected L5 spondylolysis bilaterally but no other spinal fracture or dislocation seen.  He did follow-up with his primary care physician and was referred to neurology.  He has since been seen and evaluated both by neurology and neurosurgery who recommended a trial of physical therapy.  He has been working on neuropathic medication optimization with neurology given underlying neck dependent peripheral polyneuropathy.  Patient did attend 2 sessions of physical therapy with Zeyad Huang, however, states that it was not particularly helpful.  He did receive home exercise program which he does inconsistently.    He localizes pain symptoms to the lower lumbar spine without radicular complaints.  Symptoms are worse on the left compared to the right (70: 30%).  He denies weakness, progressive numbness/tingling, or bowel/bladder issues.  Symptoms are worse with walking, lumbar extension and lifting and improved with topical Bengay.  Pain score 5/10 on average, 0/10 today at rest, and 6/10 its worse in the last week.  Describes pain as constant dull and aching in nature.  He does not report significant pain related sleep disturbance.    PRIOR INJURIES/TREATMENT:   Ice/Heat: +  Physical Therapy:   PT x2 sessions in September with Zeyad Huang      - Current Pain Medications -   gabapentin 300mg BID  voltaren BID   Bengay topical helps      - Prior/Trialed Pain Medications -   NSAIDs:  naproxen, ibuprofen  Muscle relaxer: flexeril prn     Prior Procedures:  Date    Procedure   Improvement (%)  None              Prior Related Surgery: None   Other (acupuncture, OMT, CMM, TENS, DME, etc.):   Chiropractic care - not helpful     Specialists Seen - (with most recent, available notes and clinic visits reviewed)   1.  Neurology - Dr. Guzman  2.  Neurosurgery - Anuj Mcneal PA-C   3.  Primary care sports medicine -Dr. Del Cid    IMAGING - reviewed   04/01/2021 XR L spine ([facility])  IMPRESSION: Three views lumbar spine series.     Moderate superior endplate compression deformity with estimated 50% anterior vertebral height loss at T12, age indeterminate. No retropulsion is seen. Correlate for acuity of injury with point tenderness by direct palpation.     Mild levoconvex mid lumbar scoliosis with grade 1 anterolisthesis at L5-S1. Suspect L5 spondylolysis bilaterally. No lumbar spinal fracture or dislocation is seen. Prominent L4-5 and moderate L2-3 lumbar disc degeneration is identified.    Visualized osseous pelvis is without fracture. The sacroiliac joints are mildly degenerated.       06/11/2021 MRI lumbar spine  Findings: There are 5 lumbar-type vertebrae assumed for the purposes  of this dictation.  Superior endplate compression deformity of the T12  vertebral body with loss of 60-70% vertebral body height along with  mild/moderate osseous edema. The tip of the conus medullaris is at  T12-L1.  Grade 1 anterolisthesis of L3 on L4 and L5 on S1. Multilevel  disc desiccation. Moderate loss of disc height at L4-5. Mild loss of  disc height at L2-3, L3-4, and L5-S1.  Multilevel degenerative  endplate marrow signal changes including edema most pronounced at  L4-5.     On a level by level basis:     T12-L1: Circumferential disc osteophyte complex and bilateral facet  hypertrophy. Mild spinal canal narrowing. Moderate bilateral neural  foraminal stenosis.     L1-2: Posterior disc bulge and bilateral facet  hypertrophy. Mild  spinal canal narrowing. Moderate bilateral neural foraminal stenosis.     L2-3: Posterior disc osteophyte complex and bilateral facet  hypertrophy. Ligamentum flavum thickening. Mild to moderate spinal  canal stenosis. Moderate bilateral neural foraminal stenosis, right  greater than left.     L3-4: Grade 1 anterolisthesis with uncovering of the disc and  posterior disc osteophyte complex. Bilateral facet hypertrophy.  Ligamentum flavum thickening. Severe spinal canal stenosis. Moderate  bilateral neural foraminal stenosis with impingement on the exiting  right L3 nerve root.     L4-5: Posterior disc bulge and bilateral facet hypertrophy. Mild to  moderate spinal canal narrowing. Moderate right and severe left neural  foraminal stenosis. Likely impingement on the exiting left L4 nerve  root.     L5-S1: Grade 1 anterolisthesis. Bilateral facet hypertrophy. No spinal  canal stenosis. Moderate to severe bilateral neural foraminal  stenosis.     Paraspinous tissues are within normal limits.     Partially visualized round T2 hyperintense lesion in the left kidney,  presumably representing a cyst.                                                                      Impression:   1. Subacute to chronic T12 compression fracture with loss of 60-70%  vertebral body height.  2. Extensive multilevel lumbar spondylosis, most pronounced at L3-4  where there is severe spinal canal stenosis and moderate bilateral  neural foraminal stenosis with impingement on the exiting right L3  nerve root. Also moderate to severe neural foraminal stenosis at L4-5  and L5-S1.    Review Of Systems:  I am responding to those symptoms which are directly relevant to the specific indication for my consultation. I recommend that the patient follow up with their primary or referring provider to pursue any other symptoms which may be of concern.       Medical History:  He has a history of knee osteoarthritis, length dependent  neuropathy, HTN, dizziness   He  has a past surgical history that includes no history of surgery.    Family History  His family history includes Cataracts in his mother; Depression in his grandson; Heart Disease (age of onset: 83) in his father; Hypertension in his father and mother; Ovarian Cancer in his mother.     Social History:  Work: [employer]   Current living situation: Lives with wife. Enjoys walking.   He  reports that he has quit smoking. He has never used smokeless tobacco. He reports current alcohol use. He reports that he does not use drugs.        Current Medications:   He has a current medication list which includes the following prescription(s): cyclobenzaprine, fluorouracil, gabapentin, lisinopril, meclizine, and rosuvastatin, and the following Facility-Administered Medications: triamcinolone and triamcinolone.     Allergies:   No Known Allergies    PHYSICAL EXAMINATION:  BP (!) 147/83 (BP Location: Right arm, Patient Position: Sitting, Cuff Size: Adult Regular)   Pulse 68   Wt 79.4 kg (175 lb)   BMI 25.84 kg/m     General: Pleasant, straightforward, WDWN individual.  Mental Status: Pleasant, direct, appropriate mood and affect  Resp: breathing is unlabored without audible wheeze  Vascular: Palpable pedal pulses, no cyanosis, no venous stasis changes  Heme: no visible ecchymosis or erythema on extremities  Skin: No notable rash    Neurologic:  Strength: All major muscle groups of the bilateral lower extremities have normal and symmetric muscle strength     Sensation: SILT in lower extremities bilaterally L3-S1     DTRs: bilateral lower extremity stretch reflexes are equal and symmetric   Clonus: none    Musculoskeletal:  Increased thoracic kyphosis  Lumbar Spine: Anterior pelvic tilts without gross pelvic obliquity.  Positive step-off at approximately T12-L1 with palpation to the spinous processes.  Mod reduced ROM all planes, tender to palpation left lumbar paraspinal muscles with  associated muscle fullness, facet loading with pain on the left> right, SI joint maneuvers negative. Str Leg Raise with low back pain only, hip provocative maneuvers negative, however, with limited RANJAN due to significant hamstring tightness.          ASSESSMENT:  Robert Archer is a pleasant 72 year old male who presents with predominantly axial/mechanical low back pain worse on the left compared to the right (70: 30%):    #.  Lumbar spondylosis  #.  Lumbar facet arthropathy  #.  Chronic T12 compression fracture  #.  Multilevel neural foraminal stenosis and severe L3-4 spinal canal stenosis which is nonspecific and overall appears to be relatively noncontributory given lack of claudicatory symptoms.  However, on occasion this could be an atypical presentation with lumbosacral pain rather than or more conventional lower extremity neurogenic claudication.    There are no objective findings for radiculopathy or myelopathy on exam today.    PLAN:  -MRI reviewed with patient today.   -We had a good, long discussion about the importance of PT and transition to home exercise program for improved postural control, neuromuscular education and lumbopelvic balance.  Patient defers additional physical therapy at this time.  He does have his home exercise program and I stressed the importance of home exercise program when it comes to achieving meaningful, long-lasting pain relief.   -The pathway from diagnostic medial branch blocks to radiofrequency denervation was discussed in detail with the patient today. Could consider Left L3-4, L4-5, L5-S1 diagnostic medial branch blocks in the future.  Patient would like to think about this and consider  -Can trial OTC lidoderm patch (eg salonpas)   -RTC prn     Ready to learn, no apparent learning barriers.  Education provided on treatment plan according to patient's preferred learning style.  Patient verbalizes understanding.   __________________________________  Brandie Rosario  MD  Physical Medicine & Rehabilitation        45 minutes spent on the date of the encounter doing chart review, review of outside records, patient visit and documentation

## 2022-10-28 ENCOUNTER — OFFICE VISIT (OUTPATIENT)
Dept: NEUROSURGERY | Facility: CLINIC | Age: 72
End: 2022-10-28
Payer: COMMERCIAL

## 2022-10-28 VITALS
BODY MASS INDEX: 25.84 KG/M2 | SYSTOLIC BLOOD PRESSURE: 147 MMHG | WEIGHT: 175 LBS | DIASTOLIC BLOOD PRESSURE: 83 MMHG | HEART RATE: 68 BPM

## 2022-10-28 DIAGNOSIS — M47.816 LUMBAR FACET ARTHROPATHY: ICD-10-CM

## 2022-10-28 DIAGNOSIS — M51.369 DDD (DEGENERATIVE DISC DISEASE), LUMBAR: ICD-10-CM

## 2022-10-28 DIAGNOSIS — M54.50 CHRONIC MIDLINE LOW BACK PAIN WITHOUT SCIATICA: Primary | ICD-10-CM

## 2022-10-28 DIAGNOSIS — M47.816 LUMBAR SPONDYLOSIS: ICD-10-CM

## 2022-10-28 DIAGNOSIS — G89.29 CHRONIC MIDLINE LOW BACK PAIN WITHOUT SCIATICA: Primary | ICD-10-CM

## 2022-10-28 PROCEDURE — 99215 OFFICE O/P EST HI 40 MIN: CPT | Performed by: PHYSICAL MEDICINE & REHABILITATION

## 2022-10-28 ASSESSMENT — PAIN SCALES - GENERAL: PAINLEVEL: MODERATE PAIN (5)

## 2022-10-28 NOTE — LETTER
10/28/2022         RE: Robert Archer  [address] [place] [address]        Dear Colleague,    Thank you for referring your patient, Robert Archer, to the [facility] [place]. Please see a copy of my visit note below.    Patient seen at the request of Dr Storm Guzman for an opinion and evaluation of low back and lumbar radicular pain.      HISTORY OF PRESENT ILLNESS:  Robert Archer is a 72 year old male who presents with a chief complaint of low back pain.     Pain began ~1.5 years ago and is associated with trauma. Patient reports a fall down 11 stairs when carrying chairs and fell down onto his back. Denies any major back pain or issues prior to that time. He was seen [facility] at that time in the ED with T12 compression fracture and anterior wedging, suspected L5 spondylolysis bilaterally but no other spinal fracture or dislocation seen.  He did follow-up with his primary care physician and was referred to neurology.  He has since been seen and evaluated both by neurology and neurosurgery who recommended a trial of physical therapy.  He has been working on neuropathic medication optimization with neurology given underlying neck dependent peripheral polyneuropathy.  Patient did attend 2 sessions of physical therapy with Zeyad Huang, however, states that it was not particularly helpful.  He did receive home exercise program which he does inconsistently.    He localizes pain symptoms to the lower lumbar spine without radicular complaints.  Symptoms are worse on the left compared to the right (70: 30%).  He denies weakness, progressive numbness/tingling, or bowel/bladder issues.  Symptoms are worse with walking, lumbar extension and lifting and improved with topical Bengay.  Pain score 5/10 on average, 0/10 today at rest, and 6/10 its worse in the last week.  Describes pain as constant dull and aching in nature.  He does not report significant pain related sleep  disturbance.    PRIOR INJURIES/TREATMENT:   Ice/Heat: +  Physical Therapy:   PT x2 sessions in September with Zeyad Huang      - Current Pain Medications -   gabapentin 300mg BID  voltaren BID   Bengay topical helps      - Prior/Trialed Pain Medications -   NSAIDs: naproxen, ibuprofen  Muscle relaxer: flexeril prn     Prior Procedures:  Date    Procedure   Improvement (%)  None              Prior Related Surgery: None   Other (acupuncture, OMT, CMM, TENS, DME, etc.):   Chiropractic care - not helpful     Specialists Seen - (with most recent, available notes and clinic visits reviewed)   1.  Neurology - Dr. Guzman  2.  Neurosurgery - Anuj Mcneal PA-C   3.  Primary care sports medicine -Dr. Del Cid    IMAGING - reviewed   04/01/2021 XR L spine ([facility])  IMPRESSION: Three views lumbar spine series.     Moderate superior endplate compression deformity with estimated 50% anterior vertebral height loss at T12, age indeterminate. No retropulsion is seen. Correlate for acuity of injury with point tenderness by direct palpation.     Mild levoconvex mid lumbar scoliosis with grade 1 anterolisthesis at L5-S1. Suspect L5 spondylolysis bilaterally. No lumbar spinal fracture or dislocation is seen. Prominent L4-5 and moderate L2-3 lumbar disc degeneration is identified.    Visualized osseous pelvis is without fracture. The sacroiliac joints are mildly degenerated.       06/11/2021 MRI lumbar spine  Findings: There are 5 lumbar-type vertebrae assumed for the purposes  of this dictation.  Superior endplate compression deformity of the T12  vertebral body with loss of 60-70% vertebral body height along with  mild/moderate osseous edema. The tip of the conus medullaris is at  T12-L1.  Grade 1 anterolisthesis of L3 on L4 and L5 on S1. Multilevel  disc desiccation. Moderate loss of disc height at L4-5. Mild loss of  disc height at L2-3, L3-4, and L5-S1.  Multilevel degenerative  endplate marrow signal changes including edema most  pronounced at  L4-5.     On a level by level basis:     T12-L1: Circumferential disc osteophyte complex and bilateral facet  hypertrophy. Mild spinal canal narrowing. Moderate bilateral neural  foraminal stenosis.     L1-2: Posterior disc bulge and bilateral facet hypertrophy. Mild  spinal canal narrowing. Moderate bilateral neural foraminal stenosis.     L2-3: Posterior disc osteophyte complex and bilateral facet  hypertrophy. Ligamentum flavum thickening. Mild to moderate spinal  canal stenosis. Moderate bilateral neural foraminal stenosis, right  greater than left.     L3-4: Grade 1 anterolisthesis with uncovering of the disc and  posterior disc osteophyte complex. Bilateral facet hypertrophy.  Ligamentum flavum thickening. Severe spinal canal stenosis. Moderate  bilateral neural foraminal stenosis with impingement on the exiting  right L3 nerve root.     L4-5: Posterior disc bulge and bilateral facet hypertrophy. Mild to  moderate spinal canal narrowing. Moderate right and severe left neural  foraminal stenosis. Likely impingement on the exiting left L4 nerve  root.     L5-S1: Grade 1 anterolisthesis. Bilateral facet hypertrophy. No spinal  canal stenosis. Moderate to severe bilateral neural foraminal  stenosis.     Paraspinous tissues are within normal limits.     Partially visualized round T2 hyperintense lesion in the left kidney,  presumably representing a cyst.                                                                      Impression:   1. Subacute to chronic T12 compression fracture with loss of 60-70%  vertebral body height.  2. Extensive multilevel lumbar spondylosis, most pronounced at L3-4  where there is severe spinal canal stenosis and moderate bilateral  neural foraminal stenosis with impingement on the exiting right L3  nerve root. Also moderate to severe neural foraminal stenosis at L4-5  and L5-S1.    Review Of Systems:  I am responding to those symptoms which are directly relevant to the  specific indication for my consultation. I recommend that the patient follow up with their primary or referring provider to pursue any other symptoms which may be of concern.       Medical History:  He has a history of knee osteoarthritis, length dependent neuropathy, HTN, dizziness   He  has a past surgical history that includes no history of surgery.    Family History  His family history includes Cataracts in his mother; Depression in his grandson; Heart Disease (age of onset: 83) in his father; Hypertension in his father and mother; Ovarian Cancer in his mother.     Social History:  Work: [employer]   Current living situation: Lives with wife. Enjoys walking.   He  reports that he has quit smoking. He has never used smokeless tobacco. He reports current alcohol use. He reports that he does not use drugs.        Current Medications:   He has a current medication list which includes the following prescription(s): cyclobenzaprine, fluorouracil, gabapentin, lisinopril, meclizine, and rosuvastatin, and the following Facility-Administered Medications: triamcinolone and triamcinolone.     Allergies:   No Known Allergies    PHYSICAL EXAMINATION:  BP (!) 147/83 (BP Location: Right arm, Patient Position: Sitting, Cuff Size: Adult Regular)   Pulse 68   Wt 79.4 kg (175 lb)   BMI 25.84 kg/m     General: Pleasant, straightforward, WDWN individual.  Mental Status: Pleasant, direct, appropriate mood and affect  Resp: breathing is unlabored without audible wheeze  Vascular: Palpable pedal pulses, no cyanosis, no venous stasis changes  Heme: no visible ecchymosis or erythema on extremities  Skin: No notable rash    Neurologic:  Strength: All major muscle groups of the bilateral lower extremities have normal and symmetric muscle strength     Sensation: SILT in lower extremities bilaterally L3-S1     DTRs: bilateral lower extremity stretch reflexes are equal and symmetric   Clonus:  none    Musculoskeletal:  Increased thoracic kyphosis  Lumbar Spine: Anterior pelvic tilts without gross pelvic obliquity.  Positive step-off at approximately T12-L1 with palpation to the spinous processes.  Mod reduced ROM all planes, tender to palpation left lumbar paraspinal muscles with associated muscle fullness, facet loading with pain on the left> right, SI joint maneuvers negative. Str Leg Raise with low back pain only, hip provocative maneuvers negative, however, with limited RANJAN due to significant hamstring tightness.          ASSESSMENT:  Robert Archer is a pleasant 72 year old male who presents with predominantly axial/mechanical low back pain worse on the left compared to the right (70: 30%):    #.  Lumbar spondylosis  #.  Lumbar facet arthropathy  #.  Chronic T12 compression fracture  #.  Multilevel neural foraminal stenosis and severe L3-4 spinal canal stenosis which is nonspecific and overall appears to be relatively noncontributory given lack of claudicatory symptoms.  However, on occasion this could be an atypical presentation with lumbosacral pain rather than or more conventional lower extremity neurogenic claudication.    There are no objective findings for radiculopathy or myelopathy on exam today.    PLAN:  -MRI reviewed with patient today.   -We had a good, long discussion about the importance of PT and transition to home exercise program for improved postural control, neuromuscular education and lumbopelvic balance.  Patient defers additional physical therapy at this time.  He does have his home exercise program and I stressed the importance of home exercise program when it comes to achieving meaningful, long-lasting pain relief.   -The pathway from diagnostic medial branch blocks to radiofrequency denervation was discussed in detail with the patient today. Could consider Left L3-4, L4-5, L5-S1 diagnostic medial branch blocks in the future.  Patient would like to think about this and  consider  -Can trial OTC lidoderm patch (eg salonpas)   -RTC prn     Ready to learn, no apparent learning barriers.  Education provided on treatment plan according to patient's preferred learning style.  Patient verbalizes understanding.   __________________________________  Brandie Rosario MD  Physical Medicine & Rehabilitation        45 minutes spent on the date of the encounter doing chart review, review of outside records, patient visit and documentation           Again, thank you for allowing me to participate in the care of your patient.        Sincerely,        Brandie Rosario MD

## 2022-10-28 NOTE — PATIENT INSTRUCTIONS
#. OTC lidoderm patch (eg salonpas)       Medial Branch Blocks and Radiofrequency Ablation (RFA) aka Neurotomy  Back or neck pain may be due to problems with certain nerves near your spine. If so, a medial branch neurotomy can help relieve your pain. Neurotomy destroys a nerve to relieve pain or stop involuntary movements. In some cases, your doctor may give you a nerve block to see how your body will respond to neurotomy. The treatment uses heat, cold, radiofrequency, or chemicals to destroy the nerves near a problem joint. This keeps some pain messages from traveling to your brain, and helps relieve your symptoms.   Medial branch nerves  Each vertebra in your spine has facets (flat surfaces). They touch where the vertebrae fit together. This forms a facet joint. Each facet joint has at least 2 medial branch nerves. They are part of the nerve pathway to and from each facet joint. A facet joint in your back or neck can become inflamed (swollen and irritated). Pain messages may then travel along the nerve pathway from the facet joint to your brain.     Blocking pain messages  Medial branch nerves in each facet joint send and carry messages about back or neck pain. Destroying a few of these nerves can keep certain pain messages from reaching your brain. This can help bring you relief. The relief typically lasts for months to years.   Risks and complications  Risks and complications are rare, but can include:  Infection  Increased pain, numbness, or weakness  Nerve damage  Bleeding  Failure to relieve pain  Gail last reviewed this educational content on 4/1/2018 2000-2021 [employer]. All rights reserved. This information is not intended as a substitute for professional medical care. Always follow your healthcare professional's instructions.        Preparing for Spine Injection Therapy              Why Do I Need Spine Injection Therapy?  Your Health Care Provider is recommending spine injection  therapy to  help relieve your back and neck pain. This will be in addition to other therapies such as medications and physical therapy. The purpose of these injections is to reduce the amount of inflammation (swelling, pain, heat, redness, loss of body function) around the nerves thus reducing the amount of pain.     The medications you will receive with the injections will include:   Anesthetic - medication to numb the painful area.     To reduce your discomfort during the injection procedure, you will receive a numbing medication injection prior to the placement of the needles. You will be lying on your stomach during the injection procedure. We will use a low-dose x-ray (fluoroscopy) to help guide needle placement.  You must have a  for this procedure. We will need to reschedule your injection if you do not have a  with you.      What are the different types of injections and procedure?  Below, is a brief description of the different types of injections we use to deliver pain medication as close as possible to the nerves in the painful area:    Medial Branch Block injections: This is a test to see if your pain is      coming from a specific nerve. This injection is similar to a facet joint injection but contains only the numbing medication.  You will keep a pain score diary for the rest of the day and the following morning after receiving the injection.    Radiofrequency ablation: This is a procedure that uses radio waves and numbing medication to block the nerves that feel pain at the joint. The pain relief effects can last for a long time, but are not permanent. The procedure is similar to the Medial Branch Block but requires additional testing to ensure that the needle is near the nerve before numbing and ablating it.  Doctors often order sedation for this procedure.  Please see the sections on sedation below.      What Should I Expect if I Receive Sedation? THIS IS ORDERED BY THE PHYSICIAN  This  is conscious sedation.  The medications we give to you will help you relax and reduce your anxiety.  You will still be awake for the procedure so we can ask you questions and hear your answers. You will NOT receive sedation for the diagnostic test blocks in order to better help evaluate your response to the injection.     What Are My Responsibilities With Sedation?  You must stop eating and drinking 8 (eight) hours before your procedure. You can have clear fluids (water, coffee/tea without milk) up to 2 hours prior to the injections. Nothing by mouth for 2 hours prior to injection.  This includes gum, mints, or chew.  Take your morning medications with a small sip of water.    You must have a  who will check-in with you, and stay in the building while the procedure is underway.  If you do not have a  your sedation will be cancelled.  We will monitor you for at least 30 minutes after the procedure before being discharged home.      What Are the Risks and Complications For This Procedure?  Risks and complication are rare, but can still occur.  You should understand, discuss, and accept these risks before agreeing to the procedure. They include, but are not limited to:  infection  nerve damage   paralysis  injection failure or a need for further injections or additional procedures  continued or worsening of symptoms/pain,   medication reaction,  dural leak (into the hole covering around the spinal cord. This may cause a a spinal headache)  leak of the medication into the spinal canal, nerves, or blood vessel.  death       What do I need to do before the procedure?   If you do not follow these instructions your procedure may be cancelled.  Tell us if you are on major blood thinners such as Coumadin, Xarelto , Plavix, Eliquis , Pradaxa , or others.    Contact the doctor who prescribed your blood thinner to ask for permission to stop taking it before you have the injection.    Schedule your pain injection  procedure after your doctor gave their permission.   We will notify you when to stop and re-start your blood thinner.  Tell us if you have any allergies to contrast dye.  If you do, we may give additional medications to take before the procedure.  Tell us if you are pregnant, or possibly pregnant.  If so, you cannot receive steroid medications or be exposed to fluoroscopic X-rays.  Tell us if you have been sick during the 10 days before the procedure. This includes:   colds   gastrointestinal illness or discomfort  dental sores,   skin infection, or any other type of infection.  Tell us if you have taken antibiotics during the 10 days prior to the procedure.  Do not drink alcohol the night before or on the day of the procedure.  You must shower the night before and on the day of your procedure.  Wear comfortable, clean clothing.  If you have an outside MRI (Magnetic Resonance Imaging photo), please bring it with you.    What Will Happen After the Procedure?  If you did not receive sedation we will monitor you for 15 minutes after the procedure. If you received sedation, we will monitor you for at least 30 minutes after the procedure     How soon can I expect pain relief?  You have received 2 types of medications with your injection:    Anesthetic - numbing medication which only acts for a few hours    Steroid which may take 3-14 days to be effective.   You can expect to feel your normal pain after the anesthetic wears off, until the steroid becomes effective.    How should I care for myself at home?  Get plenty of rest and avoid twisting, bending movements, heavy lifting, or strenuous activity for the first 24 hours. This will help the steroid be more effective. Medial Branch Block injections will have different discharge instructions.  Those will be discussed at that injection appointment.  Resume your pain medications  Apply ice packs (on for 20 minutes at a time), every 2-3 hours to your injection area for the  first 2-3 days to help with pain control.    Avoid heat (pads or water bottles), which can cause the veins to open up, making the steroid less effective. You can use heat after 48 hours.  Take showers only for the first 48 hours.  No baths, hot tubs, swimming, or soaking for 48 hours to reduce the risk of infection.     When should I call the doctor?  Call us if you have any of the following:   Fever more than 100.5 degrees Fahrenheit   Signs of infection   Severe headache   Severe back pain   Increased numbness or weakness in your legs or arms   Loss of bladder or bowel control  Nausea   Other concerns    What is the contact information?  During business hours Monday-Friday 8a-5p call [phone].   After business hours, on weekends and holidays call [phone] and ask for the PM&R doctor on call

## 2022-11-09 ENCOUNTER — TRANSFERRED RECORDS (OUTPATIENT)
Dept: HEALTH INFORMATION MANAGEMENT | Facility: CLINIC | Age: 72
End: 2022-11-09

## 2022-11-10 NOTE — PROGRESS NOTES
DISCHARGE SUMMARY    Robert Archer was seen 2 times for evaluation and treatment.  Patient did not return for further treatment and current status is unknown.  Due to short treatment duration, no objective or functional changes were made.  Please see goal flow sheet from episode noted date below and initial evaluation for further information.  Patient is discharged from therapy and therapy episode is resolved as of 11/10/22.      Linked Episodes   Type: Episode: Status: Noted: Resolved: Last update: Updated by:   PHYSICAL THERAPY low back pain 9/14/22 Active 9/14/2022 9/20/2022  1:44 PM Checo Huang, PT      Comments:

## 2023-02-17 ENCOUNTER — TELEPHONE (OUTPATIENT)
Dept: FAMILY MEDICINE | Facility: CLINIC | Age: 73
End: 2023-02-17
Payer: COMMERCIAL

## 2023-02-17 ENCOUNTER — MEDICAL CORRESPONDENCE (OUTPATIENT)
Dept: HEALTH INFORMATION MANAGEMENT | Facility: CLINIC | Age: 73
End: 2023-02-17

## 2023-02-17 NOTE — TELEPHONE ENCOUNTER
Darnell PT from [facility] calling wanting the following orders:    Nurse and OT evaluation  PT  2 X per week for 3 weeks  PT 1 X per week X 2 weeks    Can take a verbal order on his confidential line.    Kymberly Thompson RN  [facility] ~ Registered Nurse  Clinic Triage ~ [place] [facility]  February 17, 2023

## 2023-02-19 ENCOUNTER — MEDICAL CORRESPONDENCE (OUTPATIENT)
Dept: HEALTH INFORMATION MANAGEMENT | Facility: CLINIC | Age: 73
End: 2023-02-19

## 2023-02-20 ENCOUNTER — TELEPHONE (OUTPATIENT)
Dept: FAMILY MEDICINE | Facility: CLINIC | Age: 73
End: 2023-02-20
Payer: COMMERCIAL

## 2023-02-20 NOTE — TELEPHONE ENCOUNTER
RN called [facility] and got Darnell's number. [phone].   Verbal provided.     JANN Hughes, RN, PHN  [place]  February 20, 2023

## 2023-02-20 NOTE — TELEPHONE ENCOUNTER
Reason for call:  Form  Reason for Call:  Form, our goal is to have forms completed with 72 hours, however, some forms may require a visit or additional information.    Type of letter, form or note:  medical    Who is the form from?: [facility] (if other please explain)    Where did the form come from: form was faxed in    What clinic location was the form placed at?: [facility] [phone]    Where the form was placed: Forms Box/Folder    What number is listed as a contact on the form?: fax [phone]       Additional comments:     Call taken on 2/20/2023 at 8:49 AM by Marcella Watts, CMA

## 2023-02-22 ENCOUNTER — TELEPHONE (OUTPATIENT)
Dept: FAMILY MEDICINE | Facility: CLINIC | Age: 73
End: 2023-02-22
Payer: COMMERCIAL

## 2023-02-22 ENCOUNTER — MEDICAL CORRESPONDENCE (OUTPATIENT)
Dept: HEALTH INFORMATION MANAGEMENT | Facility: CLINIC | Age: 73
End: 2023-02-22

## 2023-02-22 NOTE — TELEPHONE ENCOUNTER
Reason for call:  Form  Reason for Call:  Form, our goal is to have forms completed with 72 hours, however, some forms may require a visit or additional information.     Type of letter, form or note:  Order id: [id number] Instruct Impaired Cardiac Function     Who is the form from?: [facility] (if other please explain)     Where did the form come from: form was faxed in     What clinic location was the form placed at?: [facility] [phone]     Where the form was placed: Forms Box/Folder     What number is listed as a contact on the form?: fax [phone]       Additional comments:

## 2023-02-27 ENCOUNTER — OFFICE VISIT (OUTPATIENT)
Dept: FAMILY MEDICINE | Facility: CLINIC | Age: 73
End: 2023-02-27
Payer: COMMERCIAL

## 2023-02-27 ENCOUNTER — MEDICAL CORRESPONDENCE (OUTPATIENT)
Dept: HEALTH INFORMATION MANAGEMENT | Facility: CLINIC | Age: 73
End: 2023-02-27

## 2023-02-27 ENCOUNTER — TELEPHONE (OUTPATIENT)
Dept: FAMILY MEDICINE | Facility: CLINIC | Age: 73
End: 2023-02-27

## 2023-02-27 VITALS
TEMPERATURE: 97.2 F | HEIGHT: 68 IN | BODY MASS INDEX: 27.89 KG/M2 | SYSTOLIC BLOOD PRESSURE: 134 MMHG | RESPIRATION RATE: 16 BRPM | HEART RATE: 56 BPM | WEIGHT: 184 LBS | DIASTOLIC BLOOD PRESSURE: 68 MMHG

## 2023-02-27 DIAGNOSIS — I47.20 V TACH (H): ICD-10-CM

## 2023-02-27 DIAGNOSIS — Z09 HOSPITAL DISCHARGE FOLLOW-UP: Primary | ICD-10-CM

## 2023-02-27 DIAGNOSIS — S72.92XD CLOSED FRACTURE OF LEFT FEMUR WITH ROUTINE HEALING, UNSPECIFIED FRACTURE MORPHOLOGY, UNSPECIFIED PORTION OF FEMUR, SUBSEQUENT ENCOUNTER: ICD-10-CM
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
## 2023-02-27 DIAGNOSIS — Z87.310 H/O HEALED FRAGILITY FRACTURE: ICD-10-CM

## 2023-02-27 DIAGNOSIS — M79.89 LEFT LEG SWELLING: ICD-10-CM

## 2023-02-27 PROCEDURE — 99495 TRANSJ CARE MGMT MOD F2F 14D: CPT | Performed by: FAMILY MEDICINE

## 2023-02-27 RX ORDER — ASPIRIN 81 MG/1
81 TABLET, CHEWABLE ORAL 2 TIMES DAILY
COMMUNITY
Start: 2022-02-16 | End: 2023-06-14

## 2023-02-27 RX ORDER — METOPROLOL SUCCINATE 25 MG/1
1 TABLET, EXTENDED RELEASE ORAL DAILY
COMMUNITY
Start: 2023-02-14 | End: 2023-02-27

## 2023-02-27 RX ORDER — METOPROLOL SUCCINATE 25 MG/1
25 TABLET, EXTENDED RELEASE ORAL DAILY
Qty: 90 TABLET | Refills: 1 | Status: SHIPPED | OUTPATIENT
Start: 2023-02-27 | End: 2023-10-04

## 2023-02-27 RX ORDER — LIDOCAINE 4 G/G
1 PATCH TOPICAL DAILY PRN
COMMUNITY
Start: 2023-02-16 | End: 2023-06-14

## 2023-02-27 ASSESSMENT — PAIN SCALES - GENERAL: PAINLEVEL: MODERATE PAIN (4)

## 2023-02-27 NOTE — PROGRESS NOTES
Assessment & Plan   1. Hospital discharge follow-up: Plan as below    2. Fall, subsequent encounter: Continue working with PT.  Disability parking placard given.  OT signed off per patient.  Consider Holter monitor pending cardiology recommendations.    3. Closed fracture of left femur with routine healing, unspecified fracture morphology, unspecified portion of femur, subsequent encounter: Follow-up with orthopedics tomorrow.  Appears to be well-healing currently.  Bandages still in place.  Given femur fracture from fall standing height recommend DEXA.  - DX Hip/Pelvis/Spine; Future    4. V tach vs atrial fib: Was monitored on telemetry.  Mentioned in the consult notes.  Now off amiodarone.  Discharged with and will continue on  metoprolol.  Refills given.  Follow-up with cardiology as recommended.  Asymptomatic currently.  Does not sound as though this was a cause for his fall.  May consider Holter monitor pending cardiology recommendations.  - metoprolol succinate ER (TOPROL XL) 25 MG 24 hr tablet; Take 1 tablet (25 mg) by mouth daily  Dispense: 90 tablet; Refill: 1    5. H/O healed fragility fracture: Recommend DEXA given fracture history above.  - DX Hip/Pelvis/Spine; Future    6. Left leg swelling: Left lower extremity swelling likely due to injury; however, given calf pain recommend evaluation with her ADS tomorrow morning at 930 to rule out DVT and treat as appropriate if found to be positive.  Otherwise recommend compression socks, elevation.  Patient agreeable.  Could consider short course of Lasix.  - Referral to Acute and Diagnostic Services (Day of diagnostic / First order acute); Future    Jose Barrera MD  [facility]    Disclaimer: This note consists of symbols derived from keyboarding, dictation and/or voice recognition software. As a result, there may be errors in the script that have gone undetected. Please consider this when interpreting information found  in this chart.    This chart is completed utilizing dictation software; typos and/or incorrect word substitutions may unintentionally occur.     Damon Jones is a 72 year old accompanied by his spouse, presenting for the following health issues:  Hospital F/U    HPI     [facility]. But uses [facility] if there is new medications to be picked up today.    Patient states he does not think pain medication is working.     Hospital Follow-up Visit:    Hospital/Nursing Home/IP Rehab Facility: [facility] [place]  Date of Admission: 2/12/2023  Date of Discharge: 2/15/2023  Reason(s) for Admission: Closed displaced intertrochanteric fracture of left femur, initial encounter (HC)      Was your hospitalization related to COVID-19? No   Problems taking medications regularly:  None  Medication changes since discharge: None  Problems adhering to non-medication therapy:  None    Summary of hospitalization:  Discharge summary unavailable through care-everywhere  Diagnostic Tests/Treatments reviewed.  Follow up needed: Cardiology scheduled in May and orthopedic surgery scheduled tomorrow.  Other Healthcare Providers Involved in Patient s Care:         Homecare and Physical Therapy  Update since discharge: improved.     Patient is here today to follow-up on hospitalization that occurred on 2/12/2023.  He states he had a fall from standing height.  From this he sustained a left femur fracture.  He had surgery for this on 2/13/2023.  While being monitored at the hospital he developed a symptomatic atrial fib versus V. tach per chart records.  A [facility] cardiology consultation resulted in initiation of amiodarone then switch to metoprolol for which she was discharged.    Does not appear a full discharge summary is available.  This was obtained via reading the progress note and consultation notes.    Today he is most concerned about pain in his left thigh.  It is improved with use.  Worse with rest.  Was  "rated as a 7 out of 10 this morning.  Now is 2-3 out of 10.  He has been taking 1000 mg of Tylenol 3 times daily.  He also been taking ibuprofen 400 mg twice daily.    He was discharged on twice daily baby aspirin for DVT prevention.  Home care and PT has been out to work with him here.  He believes OT has signed off.    He has a follow-up appointment for suture removal and wound care with orthopedics tomorrow.    After initiation of metoprolol in the hospital recommend he follow-up with his cardiologist.  This is currently scheduled in May.      Cause of the fall is unclear.    He has no other questions or concerns today other than pain control including his left calf.  Left calf has been stable this past week but has noted significant swelling.  Denies fevers or chills.     Review of Systems   Constitutional, HEENT, cardiovascular, pulmonary, gi and gu systems are negative, except as otherwise noted.      Objective    /68 (BP Location: Left arm, Patient Position: Chair, Cuff Size: Adult Regular)   Pulse 56   Temp 97.2  F (36.2  C) (Temporal)   Resp 16   Ht 1.721 m (5' 7.75\")   Wt 83.5 kg (184 lb)   BMI 28.18 kg/m    Body mass index is 28.18 kg/m .  Physical Exam   General: Appears well and in no acute distress.  Cardiovascular: Regular rate and rhythm, normal S1 and S2 without murmur. No extra heartsounds or friction rub. Radial pulses present and equal bilaterally.  Respiratory: Lungs clear to auscultation bilaterally. No wheezing or crackles. No prolonged expiration. Symmetrical chest rise.  Musculoskeletal: Left calf measuring 38 cm circumference compared to 34 cm of the right calf.  Significant ecchymosis and edema of the left leg.      Labs: None            "

## 2023-02-27 NOTE — PATIENT INSTRUCTIONS
Important Takeaway Points From This Visit:  Compression socks, close toed, knee-thigh high, 15-20 mmHg      As always, please call with any questions or concerns. I look forward to seeing you again soon!    Take care,  Dr. Barrera    Your current medication list is printed. Please keep this with you - it is helpful to bring this current list to any other medical appointments. It can also be helpful if you ever go to the emergency room or hospital.    If you had lab testing today we will call you with the results. The phone number we will call with your results is # [phone] (home) . If this is not the best number please call our clinic and change the number.    If you need any refills, please call your pharmacy and they will contact us.    If you have any further concerns or wish to schedule another appointment, please call our office at [phone].    If you have a medical emergency, please call [phone].    Thank you for coming to [facility]!

## 2023-02-27 NOTE — TELEPHONE ENCOUNTER
Forms completed and placed in TC in basket.    Jose Barrera MD  [facility]

## 2023-02-27 NOTE — TELEPHONE ENCOUNTER
Forms/Letter Request    Type of form/letter: Orders    Have you been seen for this request: Yes     Do we have the form/letter: Yes:     When is form/letter needed by: ASAP    How would you like the form/letter returned: Fax    Please sign, date, and fax back [phone]

## 2023-02-28 ENCOUNTER — ANCILLARY PROCEDURE (OUTPATIENT)
Dept: ULTRASOUND IMAGING | Facility: CLINIC | Age: 73
End: 2023-02-28
Attending: FAMILY MEDICINE
Payer: COMMERCIAL

## 2023-02-28 ENCOUNTER — OFFICE VISIT (OUTPATIENT)
Dept: PEDIATRICS | Facility: CLINIC | Age: 73
End: 2023-02-28
Payer: COMMERCIAL

## 2023-02-28 VITALS
SYSTOLIC BLOOD PRESSURE: 128 MMHG | TEMPERATURE: 97.2 F | BODY MASS INDEX: 28.18 KG/M2 | DIASTOLIC BLOOD PRESSURE: 72 MMHG | RESPIRATION RATE: 13 BRPM | OXYGEN SATURATION: 99 % | WEIGHT: 184 LBS | HEART RATE: 56 BPM

## 2023-02-28 DIAGNOSIS — M79.605 PAIN AND SWELLING OF LOWER EXTREMITY, LEFT: ICD-10-CM

## 2023-02-28 DIAGNOSIS — M79.89 PAIN AND SWELLING OF LOWER EXTREMITY, LEFT: Primary | ICD-10-CM

## 2023-02-28 DIAGNOSIS — M79.89 PAIN AND SWELLING OF LOWER EXTREMITY, LEFT: ICD-10-CM

## 2023-02-28 DIAGNOSIS — M79.605 PAIN AND SWELLING OF LOWER EXTREMITY, LEFT: Primary | ICD-10-CM

## 2023-02-28 PROCEDURE — 93971 EXTREMITY STUDY: CPT | Mod: LT | Performed by: RADIOLOGY

## 2023-02-28 PROCEDURE — 99214 OFFICE O/P EST MOD 30 MIN: CPT

## 2023-02-28 ASSESSMENT — PAIN SCALES - GENERAL: PAINLEVEL: MILD PAIN (2)

## 2023-02-28 NOTE — Clinical Note
Robert Roper's US was normal. He was seeing ortho later today. I did give him an RX for knee high compression stockings but recommended he see the ortho before filling incase they recommended something different. Thanks. Jose Erickson MD

## 2023-02-28 NOTE — PATIENT INSTRUCTIONS
Wearing the compression stocking when your foot is down will help with the swelling and probably help the pain.     Follow up with the orthopedist as they recommended.

## 2023-02-28 NOTE — PROGRESS NOTES
Assessment & Plan     Pain and swelling of lower extremity, left  Negative for dvt. Likely swelling due to surgery/fracture. Discussed compression stockings and elevation and activity will likely help with this. He has a surgery appt later today. Will also discussed treatment with them.   - US Lower Extremity Venous Duplex Left; Future  - Compression Sleeve/Stocking Order for DME - ONLY FOR DME    No follow-ups on file.    MG ADS PROVIDER  [facility] SAMANTHA Jones is a 72 year old, presenting for the following health issues:  Musculoskeletal Problem  seen yesterday by his primary for follow up s/p hosp and ORIF left femur fracture now iwht left leg swelling and pain in his calf. The patient has no history of dvt on asa prophylaxis. The fracture was 2 weeks ago. currenlty on ibu and tyl for pain.    HPI     Pain History:  When did you first notice your pain? - Acute Pain   Have you seen anyone else for your pain? No  Where in your body do you have pain? Musculoskeletal problem/pain  Onset/Duration: <1 week  Description  Location: calf - left  Joint Swelling: YES  Redness: No  Pain: YES- in left calf and thigh  Warmth: No  Intensity:  moderate  Progression of Symptoms:  waxing and waning  Accompanying signs and symptoms:   Fevers: No  Numbness/tingling/weakness: No  History  Trauma to the area: YES- broke femur 2/13/2023  Recent illness:  No/  Previous similar problem: pt has osteoarthritis  Previous evaluation:  YES- orthopedics and PT  Precipitating or alleviating factors:  Aggravating factors include: lifting  Therapies tried and outcome: ice          Review of Systems   NO resp nor cv symptoms. The patient has no history of swelling in his legs.       Objective    /72 (BP Location: Right arm, Patient Position: Sitting, Cuff Size: Adult Regular)   Pulse 56   Temp 97.2  F (36.2  C) (Oral)   Resp 13   Wt 83.5 kg (184 lb)   SpO2 99%   BMI 28.18 kg/m    Body mass index is  28.18 kg/m .  Physical Exam   bruising in thigh and brown-yellowish discoloration of skin in his left lower extremity diffusely with diffuse swelling and tight skin. The right lower extremity is without edema or discoloration. No palpable cords. Tender anteriorly to pressure.   L clear  CV: rrr    Results for orders placed or performed in visit on 02/28/23   US Lower Extremity Venous Duplex Left     Status: None    Narrative    EXAMINATION: US LOWER EXTREMITY VENOUS DUPLEX LEFT, 2/28/2023 11:32 AM      COMPARISON: None.    HISTORY:  Pain and swelling of lower extremity, left; Pain and swelling of lower  extremity, left    TECHNIQUE:  Gray-scale evaluation with compression, spectral flow, and  color Doppler assessment of the deep venous system of the left leg  from groin to knee, and then at the ankle.    FINDINGS:  In the left lower extremity, the common femoral, superficial femoral,  popliteal and posterior tibial veins demonstrate normal  compressibility and blood flow. There is normal compressibility,  phasicity, and augmentation in the right common femoral vein.  Subcutaneous edema in the left calf region and mid thigh.      Impression    IMPRESSION:  1.  No evidence left lower extremity deep venous thrombosis.    KAREN COLEMAN MD         SYSTEM ID:  [id number]

## 2023-03-01 ENCOUNTER — TELEPHONE (OUTPATIENT)
Dept: FAMILY MEDICINE | Facility: CLINIC | Age: 73
End: 2023-03-01
Payer: COMMERCIAL

## 2023-03-01 ENCOUNTER — MEDICAL CORRESPONDENCE (OUTPATIENT)
Dept: HEALTH INFORMATION MANAGEMENT | Facility: CLINIC | Age: 73
End: 2023-03-01
Payer: COMMERCIAL

## 2023-03-01 NOTE — TELEPHONE ENCOUNTER
Forms completed and placed in TC in basket.    Jose Barrera MD  [facility]

## 2023-03-01 NOTE — TELEPHONE ENCOUNTER
Forms/Letter Request    Type of form/letter: Plan of Care    Have you been seen for this request: N/A    Do we have the form/letter: No    When is form/letter needed by: as time permits    How would you like the form/letter returned: Fax to:    [facility] [phone]    Patient Notified form requests are processed in 3-5 business days:No    Could we send this information to you in [employer] or would you prefer to receive a phone call?:   Patient would like to be contacted via [employer]

## 2023-03-06 NOTE — TELEPHONE ENCOUNTER
Forms completed and placed in TC in basket.    Jose Barrera MD  [facility]

## 2023-03-10 ENCOUNTER — TELEPHONE (OUTPATIENT)
Dept: FAMILY MEDICINE | Facility: CLINIC | Age: 73
End: 2023-03-10
Payer: COMMERCIAL

## 2023-03-10 NOTE — TELEPHONE ENCOUNTER
Forms/Letter Request     Type of form/letter: Plan of Care     Have you been seen for this request: N/A     Do we have the form/letter: Yes     When is form/letter needed by: as time permits     How would you like the form/letter returned: Fax to:     [facility] [phone]     Patient Notified form requests are processed in 3-5 business days:No     Could we send this information to you in [employer] or would you prefer to receive a phone call?:   Patient would like to be contacted via [employer]

## 2023-03-17 ENCOUNTER — MEDICAL CORRESPONDENCE (OUTPATIENT)
Dept: HEALTH INFORMATION MANAGEMENT | Facility: CLINIC | Age: 73
End: 2023-03-17

## 2023-03-17 ENCOUNTER — TELEPHONE (OUTPATIENT)
Dept: FAMILY MEDICINE | Facility: CLINIC | Age: 73
End: 2023-03-17
Payer: COMMERCIAL

## 2023-03-17 NOTE — TELEPHONE ENCOUNTER
Forms/Letter Request     Type of form/letter: Plan of Care Order id:   [id number]     Have you been seen for this request: N/A     Do we have the form/letter: Yes     When is form/letter needed by: as time permits     How would you like the form/letter returned: Fax to:     [facility] [phone]     Patient Notified form requests are processed in 3-5 business days:No     Could we send this information to you in [employer] or would you prefer to receive a phone call?:   Patient would like to be contacted via [employer]

## 2023-03-20 ENCOUNTER — TELEPHONE (OUTPATIENT)
Dept: FAMILY MEDICINE | Facility: CLINIC | Age: 73
End: 2023-03-20
Payer: COMMERCIAL

## 2023-03-20 ENCOUNTER — MEDICAL CORRESPONDENCE (OUTPATIENT)
Dept: HEALTH INFORMATION MANAGEMENT | Facility: CLINIC | Age: 73
End: 2023-03-20

## 2023-03-20 NOTE — TELEPHONE ENCOUNTER
Forms/Letter Request    Type of form/letter: Home Health    Have you been seen for this request: Yes     Do we have the form/letter: Yes:     When is form/letter needed by: ASAP    How would you like the form/letter returned: Fax    Please sign,date and fax back to [phone]

## 2023-03-24 DIAGNOSIS — G89.29 CHRONIC MIDLINE LOW BACK PAIN WITHOUT SCIATICA: ICD-10-CM

## 2023-03-24 DIAGNOSIS — G62.9 LENGTH-DEPENDENT PERIPHERAL NEUROPATHY: ICD-10-CM

## 2023-03-24 DIAGNOSIS — M54.50 CHRONIC MIDLINE LOW BACK PAIN WITHOUT SCIATICA: ICD-10-CM

## 2023-03-24 NOTE — TELEPHONE ENCOUNTER
Pending Prescriptions:                       Disp   Refills    gabapentin (NEURONTIN) 300 MG capsule     540 ca*2            Sig: Take 2 capsules (600 mg) by mouth 3 times daily    Requested Pharmacy: [facility]    Pt's last office visit: 08/17/2022  Next scheduled office visit: 08/24/2023      Per the RN/LPN medication refill protocol, writer is unable to refill this request.

## 2023-03-27 ENCOUNTER — TELEPHONE (OUTPATIENT)
Dept: FAMILY MEDICINE | Facility: CLINIC | Age: 73
End: 2023-03-27
Payer: COMMERCIAL

## 2023-03-27 RX ORDER — GABAPENTIN 300 MG/1
600 CAPSULE ORAL 3 TIMES DAILY
Qty: 540 CAPSULE | Refills: 2 | Status: SHIPPED | OUTPATIENT
Start: 2023-03-27 | End: 2023-06-14

## 2023-03-27 NOTE — TELEPHONE ENCOUNTER
Forms/Letter Request    Type of form/letter: Orders    Have you been seen for this request: Yes     Do we have the form/letter: Yes:     When is form/letter needed by: ASAP    How would you like the form/letter returned: Fax    Please sign, date and fax back to [phone]

## 2023-03-28 ENCOUNTER — ANCILLARY PROCEDURE (OUTPATIENT)
Dept: BONE DENSITY | Facility: CLINIC | Age: 73
End: 2023-03-28
Attending: FAMILY MEDICINE
Payer: COMMERCIAL

## 2023-03-28 DIAGNOSIS — Z87.310 H/O HEALED FRAGILITY FRACTURE: ICD-10-CM

## 2023-03-28 DIAGNOSIS — S72.92XD CLOSED FRACTURE OF LEFT FEMUR WITH ROUTINE HEALING, UNSPECIFIED FRACTURE MORPHOLOGY, UNSPECIFIED PORTION OF FEMUR, SUBSEQUENT ENCOUNTER: ICD-10-CM

## 2023-03-28 PROCEDURE — 77081 DXA BONE DENSITY APPENDICULR: CPT | Mod: XU | Performed by: RADIOLOGY

## 2023-03-28 PROCEDURE — 77080 DXA BONE DENSITY AXIAL: CPT | Performed by: RADIOLOGY

## 2023-03-30 ENCOUNTER — MEDICAL CORRESPONDENCE (OUTPATIENT)
Dept: HEALTH INFORMATION MANAGEMENT | Facility: CLINIC | Age: 73
End: 2023-03-30

## 2023-03-30 DIAGNOSIS — E83.59 OTHER DISORDERS OF CALCIUM METABOLISM: ICD-10-CM

## 2023-03-30 DIAGNOSIS — M80.00XA OSTEOPOROSIS WITH CURRENT PATHOLOGICAL FRACTURE, UNSPECIFIED OSTEOPOROSIS TYPE, INITIAL ENCOUNTER: Primary | ICD-10-CM

## 2023-03-30 RX ORDER — ALENDRONATE SODIUM 70 MG/1
70 TABLET ORAL
Qty: 12 TABLET | Refills: 0 | Status: SHIPPED | OUTPATIENT
Start: 2023-03-30 | End: 2023-05-31

## 2023-03-30 RX ORDER — CHOLECALCIFEROL (VITAMIN D3) 50 MCG
1 TABLET ORAL DAILY
Qty: 90 TABLET | Refills: 3 | Status: SHIPPED | OUTPATIENT
Start: 2023-03-30

## 2023-03-30 NOTE — CONFIDENTIAL NOTE
Supplement vitamin D and start fosamax. Osteoporosis noted on dexa and fragility fracture. 6-7 weeks out from initial fracture. Work-up for causes of osteoporosis in men. No previous elevated serum calcium, renal abnormality, or liver abnormality.     Check CMP, TSH, parathyroid, vitamin d, estradiol, testosterone, and celiac.    Patient to make lab only visit for this.    Recheck dexa in 1-2 years.    Jose Barrera MD  [facility]    Disclaimer: This note consists of symbols derived from keyboarding, dictation and/or voice recognition software. As a result, there may be errors in the script that have gone undetected. Please consider this when interpreting information found in this chart.

## 2023-04-03 ENCOUNTER — MEDICAL CORRESPONDENCE (OUTPATIENT)
Dept: HEALTH INFORMATION MANAGEMENT | Facility: CLINIC | Age: 73
End: 2023-04-03

## 2023-04-03 ENCOUNTER — TELEPHONE (OUTPATIENT)
Dept: FAMILY MEDICINE | Facility: CLINIC | Age: 73
End: 2023-04-03
Payer: COMMERCIAL

## 2023-04-03 NOTE — TELEPHONE ENCOUNTER
Forms/Letter Request    Type of form/letter: Home Care    Have you been seen for this request: Yes     Do we have the form/letter: Yes:     Who is the form from? Home care    Where did/will the form come from? form was faxed in    When is form/letter needed by: ASAP    How would you like the form/letter returned: Fax : [phone]    Please sign, date and fax back

## 2023-04-05 ENCOUNTER — LAB (OUTPATIENT)
Dept: LAB | Facility: CLINIC | Age: 73
End: 2023-04-05
Payer: COMMERCIAL

## 2023-04-05 DIAGNOSIS — M80.00XA OSTEOPOROSIS WITH CURRENT PATHOLOGICAL FRACTURE, UNSPECIFIED OSTEOPOROSIS TYPE, INITIAL ENCOUNTER: ICD-10-CM

## 2023-04-05 DIAGNOSIS — E83.59 OTHER DISORDERS OF CALCIUM METABOLISM: ICD-10-CM

## 2023-04-05 LAB
ALBUMIN SERPL-MCNC: 4.4 G/DL (ref 3.4–5)
ALP SERPL-CCNC: 95 U/L (ref 40–150)
ALT SERPL W P-5'-P-CCNC: 16 U/L (ref 0–70)
ANION GAP SERPL CALCULATED.3IONS-SCNC: 4 MMOL/L (ref 3–14)
AST SERPL W P-5'-P-CCNC: 15 U/L (ref 0–45)
BILIRUB SERPL-MCNC: 0.6 MG/DL (ref 0.2–1.3)
BUN SERPL-MCNC: 13 MG/DL (ref 7–30)
CALCIUM SERPL-MCNC: 9.1 MG/DL (ref 8.5–10.1)
CALCIUM UR-MCNC: 13.6 MG/DL
CALCIUM/CREAT UR: 0.12 G/G CR (ref 0.05–0.27)
CHLORIDE BLD-SCNC: 110 MMOL/L (ref 94–109)
CO2 SERPL-SCNC: 26 MMOL/L (ref 20–32)
CREAT SERPL-MCNC: 0.79 MG/DL (ref 0.66–1.25)
CREAT UR-MCNC: 118 MG/DL
DEPRECATED CALCIDIOL+CALCIFEROL SERPL-MC: 50 UG/L (ref 20–75)
ESTRADIOL SERPL-MCNC: 9 PG/ML
GFR SERPL CREATININE-BSD FRML MDRD: >90 ML/MIN/1.73M2
GLUCOSE BLD-MCNC: 92 MG/DL (ref 70–99)
PHOSPHATE SERPL-MCNC: 3.3 MG/DL (ref 2.5–4.5)
POTASSIUM BLD-SCNC: 4.3 MMOL/L (ref 3.4–5.3)
PROT SERPL-MCNC: 7.3 G/DL (ref 6.8–8.8)
PTH-INTACT SERPL-MCNC: 31 PG/ML (ref 15–65)
SODIUM SERPL-SCNC: 140 MMOL/L (ref 133–144)
TSH SERPL DL<=0.005 MIU/L-ACNC: 0.74 MU/L (ref 0.4–4)

## 2023-04-05 PROCEDURE — 82340 ASSAY OF CALCIUM IN URINE: CPT

## 2023-04-05 PROCEDURE — 36415 COLL VENOUS BLD VENIPUNCTURE: CPT

## 2023-04-05 PROCEDURE — 84403 ASSAY OF TOTAL TESTOSTERONE: CPT

## 2023-04-05 PROCEDURE — 86364 TISS TRNSGLTMNASE EA IG CLAS: CPT

## 2023-04-05 PROCEDURE — 82670 ASSAY OF TOTAL ESTRADIOL: CPT

## 2023-04-05 PROCEDURE — 80053 COMPREHEN METABOLIC PANEL: CPT

## 2023-04-05 PROCEDURE — 82306 VITAMIN D 25 HYDROXY: CPT

## 2023-04-05 PROCEDURE — 82784 ASSAY IGA/IGD/IGG/IGM EACH: CPT

## 2023-04-05 PROCEDURE — 84443 ASSAY THYROID STIM HORMONE: CPT

## 2023-04-05 PROCEDURE — 84100 ASSAY OF PHOSPHORUS: CPT

## 2023-04-05 PROCEDURE — 83970 ASSAY OF PARATHORMONE: CPT

## 2023-04-05 NOTE — RESULT ENCOUNTER NOTE
Please inform of results if patient has not viewed in [employer] within 3 business days.    Your kidney excretion of calcium was normal.    Your parathyroid hormone (glands in your neck that regulate your bodies calcium) was normal.    Please call the clinic with any questions you may have.     Have a great day,    Dr. Sepulveda

## 2023-04-05 NOTE — RESULT ENCOUNTER NOTE
Please inform of results if patient has not viewed in [employer] within 3 business days.    CMP Results - Your blood sugar was 92. Your kidney function, electrolytes, and liver function were normal.    TSH - Your thyroid lab results were normal.    Your other labs are pending.    Please call the clinic with any questions you may have.     Have a great day,    Dr. Sepulveda

## 2023-04-05 NOTE — RESULT ENCOUNTER NOTE
Please inform of results if patient has not viewed in [employer] within 3 business days.    Your estradiol (female sex hormone, but still present in men), was mildly low and may be contributing to the low bone density, but would not change our management.    Please call the clinic with any questions you may have.     Have a great day,    Dr. Sepulveda

## 2023-04-06 LAB — IGA SERPL-MCNC: 255 MG/DL (ref 84–499)

## 2023-04-06 NOTE — RESULT ENCOUNTER NOTE
Please inform of results if patient has not viewed in [employer] within 3 business days.    Your vitamin D level was normal.    Please call the clinic with any questions you may have.     Have a great day,    Dr. Sepulveda

## 2023-04-07 LAB — TESTOST SERPL-MCNC: 258 NG/DL (ref 240–950)

## 2023-04-08 LAB
TTG IGA SER-ACNC: 0.7 U/ML
TTG IGG SER-ACNC: <0.6 U/ML

## 2023-04-10 NOTE — RESULT ENCOUNTER NOTE
Please inform of results if patient has not viewed in [employer] within 3 business days.    Your screenings for low testosterone and celiac disease were normal.    Please call the clinic with any questions you may have.     Have a great day,    Dr. Sepulveda

## 2023-04-17 ENCOUNTER — TELEPHONE (OUTPATIENT)
Dept: ORTHOPEDICS | Facility: CLINIC | Age: 73
End: 2023-04-17

## 2023-04-17 NOTE — TELEPHONE ENCOUNTER
Pt is scheduled 4/26/23, if this appt is for gel injections please place orders and send back to the FC team.        Thank you,    ROBBY Rivera  Financial Counselor  [facility]  [address] [address]  [phone]

## 2023-04-26 ENCOUNTER — OFFICE VISIT (OUTPATIENT)
Dept: ORTHOPEDICS | Facility: CLINIC | Age: 73
End: 2023-04-26
Payer: COMMERCIAL

## 2023-04-26 DIAGNOSIS — M17.0 OSTEOARTHRITIS OF BOTH KNEES, UNSPECIFIED OSTEOARTHRITIS TYPE: Primary | ICD-10-CM

## 2023-04-26 PROCEDURE — 20610 DRAIN/INJ JOINT/BURSA W/O US: CPT | Mod: 50 | Performed by: FAMILY MEDICINE

## 2023-04-26 RX ORDER — TRIAMCINOLONE ACETONIDE 40 MG/ML
40 INJECTION, SUSPENSION INTRA-ARTICULAR; INTRAMUSCULAR
Status: SHIPPED | OUTPATIENT
Start: 2023-04-26

## 2023-04-26 RX ADMIN — TRIAMCINOLONE ACETONIDE 40 MG: 40 INJECTION, SUSPENSION INTRA-ARTICULAR; INTRAMUSCULAR at 12:20

## 2023-04-26 NOTE — PROGRESS NOTES
[facility] [place]  [facility]ISIT     2023         [facility]   ORTHOPEDICS & SPORTS MEDICINE  [address] [place] [address]  Dept: [phone]  ______________________________________________________________________________    Patient: Robert Archer   : 1950   MRN: [id number]   2023    INVASIVE PROCEDURE SAFETY CHECKLIST    Date: 23   Procedure:Bilateral knee injections  Patient Name: Robert Archer  MRN: [id number]  YOB: 1950    Action: Complete sections as appropriate. Any discrepancy results in a HARD COPY until resolved.     PRE PROCEDURE:  Patient ID verified with 2 identifiers (name and  or MRN): Yes  Procedure and site verified with patient/designee (when able): Yes  Accurate consent documentation in medical record: Yes  H&P (or appropriate assessment) documented in medical record: Yes  H&P must be up to 20 days prior to procedure and updates within 24 hours of procedure as applicable: Yes  Relevant diagnostic and radiology test results appropriately labeled and displayed as applicable: Yes  Procedure site(s) marked with provider initials: Yes    TIMEOUT:  Time-Out performed immediately prior to starting procedure, including verbal and active participation of all team members addressing the following:Yes  * Correct patient identify  * Confirmed that the correct side and site are marked  * An accurate procedure consent form  * Agreement on the procedure to be done  * Correct patient position  * Relevant images and results are properly labeled and appropriately displayed  * The need to administer antibiotics or fluids for irrigation purposes during the procedure as applicable   * Safety precautions based on patient history or medication use    DURING PROCEDURE: Verification of correct person, site, and procedures any time the responsibility for care of the patient is transferred to another member of the care team.       Prior  to injection, verified patient identity using patient's name and date of birth.  Due to injection administration, patient instructed to remain in clinic for 15 minutes  afterwards, and to report any adverse reaction to me immediately.    Joint injection was performed.      Drug Amount Wasted:  None.  Vial/Syringe: Single dose vial  Expiration Date:  12/31/24      Celena AJanice Tray, EMT  April 26, 2023       Large Joint Injection/Arthocentesis: bilateral knee    Date/Time: 4/26/2023 12:20 PM    Performed by: Santiago Del Cid MD  Authorized by: Santiago Del Cid MD    Indications:  Osteoarthritis  Needle Size:  22 G  Guidance: landmark guided    Approach:  Anterolateral  Location:  Knee  Laterality:  Bilateral      Medications (Right):  40 mg triamcinolone 40 MG/ML  Medications (Left):  40 mg triamcinolone 40 MG/ML  Outcome:  Tolerated well, no immediate complications  Procedure discussed: discussed risks, benefits, and alternatives    Consent Given by:  Patient  Timeout: timeout called immediately prior to procedure    Prep: patient was prepped and draped in usual sterile fashion       There were no complications. The patient tolerated the procedure well. There was minimal bleeding.   The patient was instructed to ice the knees upon leaving clinic and refrain from overuse over the next 2 days.   The patient was instructed to go to the emergency room with any unusual pain, swelling, or redness occurred in the injected area.     Santiago Del Cid MD

## 2023-04-26 NOTE — LETTER
2023         RE: Robert Archer  [address] [place] [address]        Dear Colleague,    Thank you for referring your patient, Robert Archer, to the [facility] SPORTS MEDICINE [facility]INIC [place]. Please see a copy of my visit note below.      [facility] [place]  [facility]     2023         [facility]   ORTHOPEDICS & SPORTS MEDICINE  [address] [place] [address]  Dept: [phone]  ______________________________________________________________________________    Patient: Robert Archer   : 1950   MRN: [id number]   2023    INVASIVE PROCEDURE SAFETY CHECKLIST    Date: 23   Procedure:Bilateral knee injections  Patient Name: Robert Archer  MRN: [id number]  YOB: 1950    Action: Complete sections as appropriate. Any discrepancy results in a HARD COPY until resolved.     PRE PROCEDURE:  Patient ID verified with 2 identifiers (name and  or MRN): Yes  Procedure and site verified with patient/designee (when able): Yes  Accurate consent documentation in medical record: Yes  H&P (or appropriate assessment) documented in medical record: Yes  H&P must be up to 20 days prior to procedure and updates within 24 hours of procedure as applicable: Yes  Relevant diagnostic and radiology test results appropriately labeled and displayed as applicable: Yes  Procedure site(s) marked with provider initials: Yes    TIMEOUT:  Time-Out performed immediately prior to starting procedure, including verbal and active participation of all team members addressing the following:Yes  * Correct patient identify  * Confirmed that the correct side and site are marked  * An accurate procedure consent form  * Agreement on the procedure to be done  * Correct patient position  * Relevant images and results are properly labeled and appropriately displayed  * The need to administer antibiotics or fluids for irrigation purposes during the procedure as  applicable   * Safety precautions based on patient history or medication use    DURING PROCEDURE: Verification of correct person, site, and procedures any time the responsibility for care of the patient is transferred to another member of the care team.       Prior to injection, verified patient identity using patient's name and date of birth.  Due to injection administration, patient instructed to remain in clinic for 15 minutes  afterwards, and to report any adverse reaction to me immediately.    Joint injection was performed.      Drug Amount Wasted:  None.  Vial/Syringe: Single dose vial  Expiration Date:  12/31/24      Celena Feliz, EMT  April 26, 2023       Large Joint Injection/Arthocentesis: bilateral knee    Date/Time: 4/26/2023 12:20 PM    Performed by: Santiago Del Cid MD  Authorized by: Santiago Del Cid MD    Needle Size:  22 G  Guidance: landmark guided    Approach:  Anterolateral  Location:  Knee  Laterality:  Bilateral      Medications (Right):  40 mg triamcinolone 40 MG/ML  Medications (Left):  40 mg triamcinolone 40 MG/ML  Outcome:  Tolerated well, no immediate complications  Procedure discussed: discussed risks, benefits, and alternatives    Consent Given by:  Patient  Timeout: timeout called immediately prior to procedure    Prep: patient was prepped and draped in usual sterile fashion       There were no complications. The patient tolerated the procedure well. There was minimal bleeding.   The patient was instructed to ice the knees upon leaving clinic and refrain from overuse over the next 2 days.   The patient was instructed to go to the emergency room with any unusual pain, swelling, or redness occurred in the injected area.     Santiago Del Cid MD              Again, thank you for allowing me to participate in the care of your patient.        Sincerely,        Santiago Del Cid MD

## 2023-05-30 DIAGNOSIS — M80.00XA OSTEOPOROSIS WITH CURRENT PATHOLOGICAL FRACTURE, UNSPECIFIED OSTEOPOROSIS TYPE, INITIAL ENCOUNTER: ICD-10-CM

## 2023-05-31 RX ORDER — ALENDRONATE SODIUM 70 MG/1
70 TABLET ORAL
Qty: 12 TABLET | Refills: 0 | Status: SHIPPED | OUTPATIENT
Start: 2023-05-31 | End: 2023-06-14

## 2023-06-13 ENCOUNTER — TELEPHONE (OUTPATIENT)
Dept: NEUROLOGY | Facility: CLINIC | Age: 73
End: 2023-06-13

## 2023-06-13 NOTE — TELEPHONE ENCOUNTER
Writer called patient back to discuss appointment change. Writer previously offered Thurs 6/15/23 appt to patient but patient declined. Writer called back to offer Wed 6/14 , 10 30 slot  Low back pain. Patient is scheduled for 6/14/23 wed at 1030 AM.  SELMA Mendez CMA ([facility])

## 2023-06-13 NOTE — TELEPHONE ENCOUNTER
[facility] Call Center    Phone Message    May a detailed message be left on voicemail: yes     Reason for Call: Other: Pt is calling because he is scheduled to see Dr. Guzman on 08/24 but Dr. Guzman wanted to see pt sooner. Pt was offered 06/15 at 9 am but declined. Pt wouls like to see him on 06/15 if it's still available. Please call pt to confirm.    Action Taken: Message routed to:  Adult Clinics: Neurology p [id number]    Travel Screening: Not Applicable

## 2023-06-14 ENCOUNTER — OFFICE VISIT (OUTPATIENT)
Dept: NEUROLOGY | Facility: CLINIC | Age: 73
End: 2023-06-14
Payer: COMMERCIAL

## 2023-06-14 VITALS
WEIGHT: 184 LBS | HEIGHT: 68 IN | SYSTOLIC BLOOD PRESSURE: 166 MMHG | BODY MASS INDEX: 27.89 KG/M2 | HEART RATE: 59 BPM | DIASTOLIC BLOOD PRESSURE: 80 MMHG

## 2023-06-14 DIAGNOSIS — M54.50 CHRONIC MIDLINE LOW BACK PAIN WITHOUT SCIATICA: ICD-10-CM

## 2023-06-14 DIAGNOSIS — G62.9 LENGTH-DEPENDENT PERIPHERAL NEUROPATHY: ICD-10-CM

## 2023-06-14 DIAGNOSIS — R26.89 IMBALANCE: Primary | ICD-10-CM

## 2023-06-14 DIAGNOSIS — G89.29 CHRONIC MIDLINE LOW BACK PAIN WITHOUT SCIATICA: ICD-10-CM

## 2023-06-14 PROCEDURE — 99214 OFFICE O/P EST MOD 30 MIN: CPT | Performed by: STUDENT IN AN ORGANIZED HEALTH CARE EDUCATION/TRAINING PROGRAM

## 2023-06-14 RX ORDER — GABAPENTIN 300 MG/1
600 CAPSULE ORAL 2 TIMES DAILY
Qty: 360 CAPSULE | Refills: 1 | Status: SHIPPED | OUTPATIENT
Start: 2023-06-14 | End: 2023-11-08

## 2023-06-14 ASSESSMENT — PAIN SCALES - GENERAL: PAINLEVEL: MILD PAIN (2)

## 2023-06-14 NOTE — NURSING NOTE
"Robert Archer's goals for this visit include:   Chief Complaint   Patient presents with     RECHECK       He requests these members of his care team be copied on today's visit information: yes    PCP: Jose Barrera    Referring Provider:  No referring provider defined for this encounter.    BP (!) 166/80   Pulse 59   Ht 1.721 m (5' 7.75\")   Wt 83.5 kg (184 lb)   BMI 28.18 kg/m      Do you need any medication refills at today's visit? Yes  SELMA Mendez, GARY ([facility])      "

## 2023-06-14 NOTE — PATIENT INSTRUCTIONS
Go down to gabapentin 600 mg twice a day.  After a month if no change in pain control can trial 300 mg in the morning, 600 mg at night.      [facility]another [facility].      Consider more walker/cane use.

## 2023-06-14 NOTE — PROGRESS NOTES
[facility]  Section of General Neurology  Return Patient Visit    Robert Archer MRN# [id number]   Age: 72 year old YOB: 1950            Assessment and Plan:   Assessment:  Robert Archer is a pleasant 72 year old man seen in follow up for neuropathy, balance issues/dizziness.  He has also established with our spine time for his back pain previously.  Regarding balance--His balance issues had improved at our last visit but he had a fall this winter for unclear reasons.  He feels better now and is using a cane/walker.   Gabapentin has partially improved back pain but he wonders if his dose could be exacerbating his dizziness.  Dizziness is quite chronic and likely multifactorial, his balance issues likely have a component from his neuropathy given vibration loss therein and we discussed this too.  Would be reasonable to trial coming down on gabapentin though and see if pain control is similar and if it could perhaps improve balance/dizziness.       Plan:  Go down to gabapentin 600 mg twice a day.  After a month if no change in pain control can trial 300 mg in the morning, 600 mg at night.    Should pain worsen on a lower dose and we not elect to increase gabapentin I would favor effexor as our next option, discussed.    [facility]---revisiting this would another good option.    He will reach out if he would like to do additional BP for balance training through [facility]  All questions answered.  Follow up in ~6 months, sooner with issues questions or changes       Payam Guzman MD   of Neurology   [facility]      Interval history:     Had a fall in February with a hip/femur fracture.    Had home therapy subsequently  Feeling better/more like himself.    Gabapentin still helping, taking this 3x per day.  No side effects that he knows of but he is dizzy a lot.    Is on metoprolol.   Dizzy a lot.  Feels off  balance.  No longer doing PT but he did find this helpful.  At times doing the exercises.    Previous MRI brain reviewed  If nerve pain worsens---effexor or cymbalta     [facility]     A/P at previous appt  Robert Archer is a 72 year old man seen in follow up for back pain, balance issues.  His balance issues have improved to an extent with PT.  Gabapentin has partially improved back pain.  His back pain does align with MRI findings.  There is multi level neural foraminal stenosis that could correlate with radiculopathy but also lumbar spinal stenosis that I do think could correlate with the issues that worsen with walking.  Could be elements of both.  Next steps would be consideration of injection in this regard/spine referral.   Our mutual goal would be to avoid a back surgery being required.  All questions answered.       Plan:  --Trial increasing gabapentin to 600 mg TID  --Consideration of injections via spine referral either targeting areas of radiculopathy on L spine imaging vs if possible area of lumbar stenosis that I do think is causing symptoms.    --Reached out re if he would like trial of further lumbar stenosis specific PT  --Follow up with me in 5-6 months, they will reach out with questions in the meanwhile    Dr. Rosario 10/2022 note reviewed:  Robert Archer is a pleasant 72 year old male who presents with predominantly axial/mechanical low back pain worse on the left compared to the right (70: 30%):     #.  Lumbar spondylosis  #.  Lumbar facet arthropathy  #.  Chronic T12 compression fracture  #.  Multilevel neural foraminal stenosis and severe L3-4 spinal canal stenosis which is nonspecific and overall appears to be relatively noncontributory given lack of claudicatory symptoms.  However, on occasion this could be an atypical presentation with lumbosacral pain rather than or more conventional lower extremity neurogenic claudication.     There are no objective  findings for radiculopathy or myelopathy on exam today.     PLAN:  -MRI reviewed with patient today.   -We had a good, long discussion about the importance of PT and transition to home exercise program for improved postural control, neuromuscular education and lumbopelvic balance.  Patient defers additional physical therapy at this time.  He does have his home exercise program and I stressed the importance of home exercise program when it comes to achieving meaningful, long-lasting pain relief.   -The pathway from diagnostic medial branch blocks to radiofrequency denervation was discussed in detail with the patient today. Could consider Left L3-4, L4-5, L5-S1 diagnostic medial branch blocks in the future.  Patient would like to think about this and consider  -Can trial OTC lidoderm patch (eg salonpas)   -RTC prn     Past Medical History:     Patient Active Problem List   Diagnosis     Essential hypertension, benign     Hyperlipidemia LDL goal <130     Chronic bilateral low back pain without sciatica     Osteoporosis with current pathological fracture, unspecified osteoporosis type, initial encounter     No past medical history on file.     Past Surgical History:     Past Surgical History:   Procedure Laterality Date     NO HISTORY OF SURGERY          Social History:     Social History     Tobacco Use     Smoking status: Former     Years: 10.00     Types: Cigarettes     Smokeless tobacco: Never     Tobacco comments:     quit 15 years ago    Vaping Use     Vaping status: Never Used   Substance Use Topics     Alcohol use: Yes     Comment: occ / 15 drinks a month      Drug use: No        Family History:     Family History   Problem Relation Age of Onset     Hypertension Mother      Ovarian Cancer Mother      Cataracts Mother      Hypertension Father      Heart Disease Father 83     Depression Grandson      Colon Cancer No family hx of      Prostate Cancer No family hx of      Breast Cancer No family hx of       "Ulcerative Colitis No family hx of      Celiac Disease No family hx of      Crohn's Disease No family hx of         Medications:     Current Outpatient Medications   Medication Sig     alendronate (FOSAMAX) 70 MG tablet Take 1 tablet (70 mg) by mouth every 7 days     aspirin (ASA) 81 MG chewable tablet Take 81 mg by mouth 2 times daily     fluorouracil (EFUDEX) 5 % external cream Apply to clean skin 2 times daily for 14-21 days, or until the onset of irritation. Avoid eye area. Wash hands after use.     gabapentin (NEURONTIN) 300 MG capsule Take 2 capsules (600 mg) by mouth 3 times daily     Lidocaine (LIDOCARE) 4 % Patch Place 1 patch onto the skin daily as needed for pain     lisinopril (ZESTRIL) 20 MG tablet Take 1.5 tablets (30 mg) by mouth daily     meclizine (ANTIVERT) 25 MG tablet TAKE 1 TABLET BY MOUTH 3 TIMES DAILY AS NEEDED FOR DIZZINESS     metoprolol succinate ER (TOPROL XL) 25 MG 24 hr tablet Take 1 tablet (25 mg) by mouth daily     rosuvastatin (CRESTOR) 20 MG tablet Take 1 tablet (20 mg) by mouth daily     vitamin D3 (CHOLECALCIFEROL) 50 mcg (2000 units) tablet Take 1 tablet (50 mcg) by mouth daily     Current Facility-Administered Medications   Medication     triamcinolone (KENALOG-40) injection 40 mg     triamcinolone (KENALOG-40) injection 40 mg     triamcinolone (KENALOG-40) injection 40 mg     triamcinolone (KENALOG-40) injection 40 mg        Allergies:   No Known Allergies       Physical Exam:   Vitals: BP (!) 166/80   Pulse 59   Ht 1.721 m (5' 7.75\")   Wt 83.5 kg (184 lb)   BMI 28.18 kg/m       Neuro:   General Appearance: No apparent distress, well-nourished, well-groomed, pleasant      Mental Status: Alert and oriented to person, place, and time. Speech fluent and comprehension intact. No dysarthria.     Cranial Nerves:   II: Visual fields: normal  III: Pupils: 3 mm, equal, round, reactive to light   III,IV,VI: Extraocular Movements: intact   V: Facial sensation: intact to light " touch  VII: Facial strength: intact without asymmetry  VIII: Hearing: intact grossly  IX: Palate: intact   XI: Shoulder shrug: intact  XII: Tongue movement: normal     Motor Exam:   5/5 diffusely  Cut off tip of R thumb at age 13, of note     Sensory: vibration again intact at ankle today, previously diminished at great toes     Coordination: no dysmetria noted     Reflexes: biceps, triceps, brachioradialis 1+, patellar 1+, and ankle jerks absent and symmetric. Toes are downgoing bilaterally     Gait: Cautious casual gait, short strides. Uses cane.            Data: Pertinent prior to visit   MRI L spine 6/11/2021                                                                  Impression:   1. Subacute to chronic T12 compression fracture with loss of 60-70%  vertebral body height.  2. Extensive multilevel lumbar spondylosis, most pronounced at L3-4  where there is severe spinal canal stenosis and moderate bilateral  neural foraminal stenosis with impingement on the exiting right L3  nerve root. Also moderate to severe neural foraminal stenosis at L4-5  and L5-S1.          MRI Brain 6/11/2021     Impression:    1. No acute intracranial pathology.  2. Normal bilateral auditory and vestibular structures.  3. Mild chronic small vessel ischemic disease.      Laboratory:    IFIX negative  Lab Results   Component Value Date    A1C 4.9 09/22/2021     Previous B12 wnl         The total time of this encounter today amounted to 33 minutes. This time included time spent with the patient, prep work, ordering tests, and performing post visit documentation.

## 2023-06-14 NOTE — LETTER
6/14/2023         RE: Robert Archer  [address] [place] [address]        Dear Colleague,    Thank you for referring your patient, Robert Archer, to the [facility] [place]. Please see a copy of my visit note below.    [facility]  Section of General Neurology  Return Patient Visit    Robert Archer MRN# [id number]   Age: 72 year old YOB: 1950            Assessment and Plan:   Assessment:  Robert Archer is a pleasant 72 year old man seen in follow up for neuropathy, balance issues/dizziness.  He has also established with our spine time for his back pain previously.  Regarding balance--His balance issues had improved at our last visit but he had a fall this winter for unclear reasons.  He feels better now and is using a cane/walker.   Gabapentin has partially improved back pain but he wonders if his dose could be exacerbating his dizziness.  Dizziness is quite chronic and likely multifactorial, his balance issues likely have a component from his neuropathy given vibration loss therein and we discussed this too.  Would be reasonable to trial coming down on gabapentin though and see if pain control is similar and if it could perhaps improve balance/dizziness.       Plan:  Go down to gabapentin 600 mg twice a day.  After a month if no change in pain control can trial 300 mg in the morning, 600 mg at night.    Should pain worsen on a lower dose and we not elect to increase gabapentin I would favor effexor as our next option, discussed.    [facility]---revisiting this would another good option.    He will reach out if he would like to do additional BP for balance training through [facility]  All questions answered.  Follow up in ~6 months, sooner with issues questions or changes       Payam Guzman MD   of Neurology   [facility]      Interval history:     Had a fall in February  with a hip/femur fracture.    Had home therapy subsequently  Feeling better/more like himself.    Gabapentin still helping, taking this 3x per day.  No side effects that he knows of but he is dizzy a lot.    Is on metoprolol.   Dizzy a lot.  Feels off balance.  No longer doing PT but he did find this helpful.  At times doing the exercises.    Previous MRI brain reviewed  If nerve pain worsens---effexor or cymbalta     [facility]     A/P at previous appt  Robert Archer is a 72 year old man seen in follow up for back pain, balance issues.  His balance issues have improved to an extent with PT.  Gabapentin has partially improved back pain.  His back pain does align with MRI findings.  There is multi level neural foraminal stenosis that could correlate with radiculopathy but also lumbar spinal stenosis that I do think could correlate with the issues that worsen with walking.  Could be elements of both.  Next steps would be consideration of injection in this regard/spine referral.   Our mutual goal would be to avoid a back surgery being required.  All questions answered.       Plan:  --Trial increasing gabapentin to 600 mg TID  --Consideration of injections via spine referral either targeting areas of radiculopathy on L spine imaging vs if possible area of lumbar stenosis that I do think is causing symptoms.    --Reached out re if he would like trial of further lumbar stenosis specific PT  --Follow up with me in 5-6 months, they will reach out with questions in the meanwhile    Dr. Rosario 10/2022 note reviewed:  Robert Archer is a pleasant 72 year old male who presents with predominantly axial/mechanical low back pain worse on the left compared to the right (70: 30%):     #.  Lumbar spondylosis  #.  Lumbar facet arthropathy  #.  Chronic T12 compression fracture  #.  Multilevel neural foraminal stenosis and severe L3-4 spinal canal stenosis which is nonspecific and overall appears to  be relatively noncontributory given lack of claudicatory symptoms.  However, on occasion this could be an atypical presentation with lumbosacral pain rather than or more conventional lower extremity neurogenic claudication.     There are no objective findings for radiculopathy or myelopathy on exam today.     PLAN:  -MRI reviewed with patient today.   -We had a good, long discussion about the importance of PT and transition to home exercise program for improved postural control, neuromuscular education and lumbopelvic balance.  Patient defers additional physical therapy at this time.  He does have his home exercise program and I stressed the importance of home exercise program when it comes to achieving meaningful, long-lasting pain relief.   -The pathway from diagnostic medial branch blocks to radiofrequency denervation was discussed in detail with the patient today. Could consider Left L3-4, L4-5, L5-S1 diagnostic medial branch blocks in the future.  Patient would like to think about this and consider  -Can trial OTC lidoderm patch (eg salonpas)   -RTC prn     Past Medical History:     Patient Active Problem List   Diagnosis     Essential hypertension, benign     Hyperlipidemia LDL goal <130     Chronic bilateral low back pain without sciatica     Osteoporosis with current pathological fracture, unspecified osteoporosis type, initial encounter     No past medical history on file.     Past Surgical History:     Past Surgical History:   Procedure Laterality Date     NO HISTORY OF SURGERY          Social History:     Social History     Tobacco Use     Smoking status: Former     Years: 10.00     Types: Cigarettes     Smokeless tobacco: Never     Tobacco comments:     quit 15 years ago    Vaping Use     Vaping status: Never Used   Substance Use Topics     Alcohol use: Yes     Comment: occ / 15 drinks a month      Drug use: No        Family History:     Family History   Problem Relation Age of Onset     Hypertension  "Mother      Ovarian Cancer Mother      Cataracts Mother      Hypertension Father      Heart Disease Father 83     Depression Grandson      Colon Cancer No family hx of      Prostate Cancer No family hx of      Breast Cancer No family hx of      Ulcerative Colitis No family hx of      Celiac Disease No family hx of      Crohn's Disease No family hx of         Medications:     Current Outpatient Medications   Medication Sig     alendronate (FOSAMAX) 70 MG tablet Take 1 tablet (70 mg) by mouth every 7 days     aspirin (ASA) 81 MG chewable tablet Take 81 mg by mouth 2 times daily     fluorouracil (EFUDEX) 5 % external cream Apply to clean skin 2 times daily for 14-21 days, or until the onset of irritation. Avoid eye area. Wash hands after use.     gabapentin (NEURONTIN) 300 MG capsule Take 2 capsules (600 mg) by mouth 3 times daily     Lidocaine (LIDOCARE) 4 % Patch Place 1 patch onto the skin daily as needed for pain     lisinopril (ZESTRIL) 20 MG tablet Take 1.5 tablets (30 mg) by mouth daily     meclizine (ANTIVERT) 25 MG tablet TAKE 1 TABLET BY MOUTH 3 TIMES DAILY AS NEEDED FOR DIZZINESS     metoprolol succinate ER (TOPROL XL) 25 MG 24 hr tablet Take 1 tablet (25 mg) by mouth daily     rosuvastatin (CRESTOR) 20 MG tablet Take 1 tablet (20 mg) by mouth daily     vitamin D3 (CHOLECALCIFEROL) 50 mcg (2000 units) tablet Take 1 tablet (50 mcg) by mouth daily     Current Facility-Administered Medications   Medication     triamcinolone (KENALOG-40) injection 40 mg     triamcinolone (KENALOG-40) injection 40 mg     triamcinolone (KENALOG-40) injection 40 mg     triamcinolone (KENALOG-40) injection 40 mg        Allergies:   No Known Allergies       Physical Exam:   Vitals: BP (!) 166/80   Pulse 59   Ht 1.721 m (5' 7.75\")   Wt 83.5 kg (184 lb)   BMI 28.18 kg/m       Neuro:   General Appearance: No apparent distress, well-nourished, well-groomed, pleasant      Mental Status: Alert and oriented to person, place, and time. " Speech fluent and comprehension intact. No dysarthria.     Cranial Nerves:   II: Visual fields: normal  III: Pupils: 3 mm, equal, round, reactive to light   III,IV,VI: Extraocular Movements: intact   V: Facial sensation: intact to light touch  VII: Facial strength: intact without asymmetry  VIII: Hearing: intact grossly  IX: Palate: intact   XI: Shoulder shrug: intact  XII: Tongue movement: normal     Motor Exam:   5/5 diffusely  Cut off tip of R thumb at age 13, of note     Sensory: vibration again intact at ankle today, previously diminished at great toes     Coordination: no dysmetria noted     Reflexes: biceps, triceps, brachioradialis 1+, patellar 1+, and ankle jerks absent and symmetric. Toes are downgoing bilaterally     Gait: Cautious casual gait, short strides. Uses cane.            Data: Pertinent prior to visit   MRI L spine 6/11/2021                                                                  Impression:   1. Subacute to chronic T12 compression fracture with loss of 60-70%  vertebral body height.  2. Extensive multilevel lumbar spondylosis, most pronounced at L3-4  where there is severe spinal canal stenosis and moderate bilateral  neural foraminal stenosis with impingement on the exiting right L3  nerve root. Also moderate to severe neural foraminal stenosis at L4-5  and L5-S1.          MRI Brain 6/11/2021     Impression:    1. No acute intracranial pathology.  2. Normal bilateral auditory and vestibular structures.  3. Mild chronic small vessel ischemic disease.      Laboratory:    IFIX negative  Lab Results   Component Value Date    A1C 4.9 09/22/2021     Previous B12 wnl         The total time of this encounter today amounted to 33 minutes. This time included time spent with the patient, prep work, ordering tests, and performing post visit documentation.      Again, thank you for allowing me to participate in the care of your patient.        Sincerely,        Storm Guzman MD

## 2023-08-16 ENCOUNTER — NURSE TRIAGE (OUTPATIENT)
Dept: FAMILY MEDICINE | Facility: CLINIC | Age: 73
End: 2023-08-16
Payer: COMMERCIAL

## 2023-08-16 NOTE — TELEPHONE ENCOUNTER
Spoke with patient.  Swelling in pubic area about 1-2 weeks now. Has been the same the whole time.  No change in urine stream or bowel movements. No itching or redness. Walking helps.  Rates pain about 7-8/10. Reports that its not the scrotom or testicles, its above and to the left.     Advised he should be seen for full evaluation in the emergency room.    He agreed,  will go into  ED.        Reason for Disposition   Hernia is painful or tender to touch    Additional Information   Negative: SEVERE abdominal pain   Negative: Swelling of scrotum and has not previously been diagnosed with a hernia    Protocols used: Hernia-A-[place]

## 2023-09-11 DIAGNOSIS — I10 ESSENTIAL (PRIMARY) HYPERTENSION: ICD-10-CM

## 2023-09-11 RX ORDER — LISINOPRIL 20 MG/1
30 TABLET ORAL DAILY
Qty: 135 TABLET | Refills: 0 | Status: SHIPPED | OUTPATIENT
Start: 2023-09-11 | End: 2023-11-28

## 2023-09-21 ENCOUNTER — OFFICE VISIT (OUTPATIENT)
Dept: FAMILY MEDICINE | Facility: CLINIC | Age: 73
End: 2023-09-21
Payer: COMMERCIAL

## 2023-09-21 VITALS
TEMPERATURE: 97.7 F | HEIGHT: 68 IN | DIASTOLIC BLOOD PRESSURE: 62 MMHG | BODY MASS INDEX: 25.84 KG/M2 | SYSTOLIC BLOOD PRESSURE: 136 MMHG | OXYGEN SATURATION: 99 % | WEIGHT: 170.5 LBS | RESPIRATION RATE: 14 BRPM | HEART RATE: 67 BPM

## 2023-09-21 DIAGNOSIS — K40.90 LEFT INGUINAL HERNIA: Primary | ICD-10-CM

## 2023-09-21 DIAGNOSIS — S72.92XD CLOSED FRACTURE OF LEFT FEMUR WITH ROUTINE HEALING, UNSPECIFIED FRACTURE MORPHOLOGY, UNSPECIFIED PORTION OF FEMUR, SUBSEQUENT ENCOUNTER: ICD-10-CM

## 2023-09-21 PROCEDURE — 99214 OFFICE O/P EST MOD 30 MIN: CPT | Performed by: FAMILY MEDICINE

## 2023-09-21 ASSESSMENT — PAIN SCALES - GENERAL: PAINLEVEL: MODERATE PAIN (4)

## 2023-09-21 NOTE — PATIENT INSTRUCTIONS
You can take 1000 mg of tylenol up to three times daily, as long as another provider has not restricted you from taking this type of medication.    You can take 400mg of Ibuprofen up to three times daily with food, as long as another provider has restricted you from taking this type of medication.

## 2023-09-21 NOTE — PROGRESS NOTES
Assessment and Plan:  1. Left inguinal hernia  Referral given to surgery. Reviewed outside [place] records with patient permission.  - Adult General Surg Referral; Future    2. Closed fracture of left femur with routine healing, unspecified fracture morphology, unspecified portion of femur, subsequent encounter  Forms filled out today for disability parking.    Jose Barrera MD  [facility]    Disclaimer: This note consists of symbols derived from keyboarding, dictation and/or voice recognition software. As a result, there may be errors in the script that have gone undetected. Please consider this when interpreting information found in this chart.      Damon Jones is a 73 year old, presenting for the following health issues:  Hernia        9/21/2023     8:13 AM   Additional Questions   Roomed by YK   Accompanied by Spouse     History of Present Illness       Reason for visit:  Hernia  Symptom onset:  More than a month  Symptoms include:  Swelling and pain  Symptom intensity:  Moderate  Symptom progression:  Staying the same  Had these symptoms before:  No  What makes it worse:  Mowing the lawn  What makes it better:  No    He eats 2-3 servings of fruits and vegetables daily.He consumes 0 sweetened beverage(s) daily.He exercises with enough effort to increase his heart rate 30 to 60 minutes per day.  He exercises with enough effort to increase his heart rate 7 days per week. He is missing 7 dose(s) of medications per week.  He is not taking prescribed medications regularly due to other.     Seen in ER 8/16/23 with CT showing left sided inguinal hernia containing small bowel. Pain is off and on, mainly with mowing the law. No fever, nausea, constipation, hematochezia, melena, etc.    Would also like to renew his disability parking permit for his left hip fracture. Walking with cane.    Review of Systems   Constitutional, HEENT, cardiovascular, pulmonary, GI, ,  "musculoskeletal, neuro, skin, endocrine and psych systems are negative, except as otherwise noted.      Objective    /62   Pulse 67   Temp 97.7  F (36.5  C) (Temporal)   Resp 14   Ht 1.721 m (5' 7.76\")   Wt 77.3 kg (170 lb 8 oz)   SpO2 99%   BMI 26.11 kg/m    Body mass index is 26.11 kg/m .  Physical Exam   General: Appears well and in no acute distress. Accompanied by wife.  Cardiovascular: Regular rate and rhythm, normal S1 and S2 without murmur. No extra heartsounds or friction rub.  Respiratory: Lungs clear to auscultation bilaterally. No wheezing or crackles.  Musculoskeletal: No gross extremity deformities. No peripheral edema. Normal muscle bulk.    Labs: none              "

## 2023-09-27 ENCOUNTER — TRANSFERRED RECORDS (OUTPATIENT)
Dept: HEALTH INFORMATION MANAGEMENT | Facility: CLINIC | Age: 73
End: 2023-09-27
Payer: COMMERCIAL

## 2023-09-28 ASSESSMENT — ENCOUNTER SYMPTOMS
HEADACHES: 0
NERVOUS/ANXIOUS: 0
COUGH: 0
SHORTNESS OF BREATH: 0
FEVER: 0
CONSTIPATION: 0
MYALGIAS: 0
PALPITATIONS: 0
ARTHRALGIAS: 0
WEAKNESS: 0
HEMATOCHEZIA: 0
SORE THROAT: 0
DIARRHEA: 0
EYE PAIN: 0
HEARTBURN: 0
ABDOMINAL PAIN: 0
HEMATURIA: 0
JOINT SWELLING: 0
FREQUENCY: 0
CHILLS: 0
DYSURIA: 0
PARESTHESIAS: 0
NAUSEA: 0
DIZZINESS: 1

## 2023-09-28 ASSESSMENT — ACTIVITIES OF DAILY LIVING (ADL): CURRENT_FUNCTION: NO ASSISTANCE NEEDED

## 2023-10-04 ENCOUNTER — OFFICE VISIT (OUTPATIENT)
Dept: FAMILY MEDICINE | Facility: CLINIC | Age: 73
End: 2023-10-04
Payer: COMMERCIAL

## 2023-10-04 VITALS
DIASTOLIC BLOOD PRESSURE: 76 MMHG | WEIGHT: 170 LBS | RESPIRATION RATE: 16 BRPM | SYSTOLIC BLOOD PRESSURE: 120 MMHG | BODY MASS INDEX: 25.76 KG/M2 | HEIGHT: 68 IN | TEMPERATURE: 98.3 F | OXYGEN SATURATION: 98 % | HEART RATE: 61 BPM

## 2023-10-04 DIAGNOSIS — L57.0 AK (ACTINIC KERATOSIS): ICD-10-CM

## 2023-10-04 DIAGNOSIS — E78.5 HYPERLIPIDEMIA LDL GOAL <130: ICD-10-CM

## 2023-10-04 DIAGNOSIS — I47.20 V TACH (H): ICD-10-CM

## 2023-10-04 DIAGNOSIS — Z13.1 SCREENING FOR DIABETES MELLITUS: ICD-10-CM

## 2023-10-04 DIAGNOSIS — I10 ESSENTIAL (PRIMARY) HYPERTENSION: ICD-10-CM

## 2023-10-04 DIAGNOSIS — Z23 NEED FOR IMMUNIZATION AGAINST INFLUENZA: ICD-10-CM

## 2023-10-04 DIAGNOSIS — Z00.00 ENCOUNTER FOR MEDICARE ANNUAL WELLNESS EXAM: Primary | ICD-10-CM

## 2023-10-04 DIAGNOSIS — K40.90 LEFT INGUINAL HERNIA: ICD-10-CM

## 2023-10-04 DIAGNOSIS — M80.00XA OSTEOPOROSIS WITH CURRENT PATHOLOGICAL FRACTURE, UNSPECIFIED OSTEOPOROSIS TYPE, INITIAL ENCOUNTER: ICD-10-CM

## 2023-10-04 LAB
ANION GAP SERPL CALCULATED.3IONS-SCNC: 10 MMOL/L (ref 7–15)
BUN SERPL-MCNC: 9.4 MG/DL (ref 8–23)
CALCIUM SERPL-MCNC: 9.4 MG/DL (ref 8.8–10.2)
CHLORIDE SERPL-SCNC: 107 MMOL/L (ref 98–107)
CHOLEST SERPL-MCNC: 141 MG/DL
CREAT SERPL-MCNC: 0.76 MG/DL (ref 0.67–1.17)
DEPRECATED HCO3 PLAS-SCNC: 25 MMOL/L (ref 22–29)
EGFRCR SERPLBLD CKD-EPI 2021: >90 ML/MIN/1.73M2
GLUCOSE SERPL-MCNC: 91 MG/DL (ref 70–99)
HDLC SERPL-MCNC: 62 MG/DL
LDLC SERPL CALC-MCNC: 62 MG/DL
NONHDLC SERPL-MCNC: 79 MG/DL
POTASSIUM SERPL-SCNC: 4.4 MMOL/L (ref 3.4–5.3)
SODIUM SERPL-SCNC: 142 MMOL/L (ref 135–145)
TRIGL SERPL-MCNC: 83 MG/DL

## 2023-10-04 PROCEDURE — 80048 BASIC METABOLIC PNL TOTAL CA: CPT | Performed by: FAMILY MEDICINE

## 2023-10-04 PROCEDURE — 36415 COLL VENOUS BLD VENIPUNCTURE: CPT | Performed by: FAMILY MEDICINE

## 2023-10-04 PROCEDURE — G0439 PPPS, SUBSEQ VISIT: HCPCS | Performed by: FAMILY MEDICINE

## 2023-10-04 PROCEDURE — 99214 OFFICE O/P EST MOD 30 MIN: CPT | Mod: 25 | Performed by: FAMILY MEDICINE

## 2023-10-04 PROCEDURE — 90662 IIV NO PRSV INCREASED AG IM: CPT | Performed by: FAMILY MEDICINE

## 2023-10-04 PROCEDURE — G0008 ADMIN INFLUENZA VIRUS VAC: HCPCS | Performed by: FAMILY MEDICINE

## 2023-10-04 PROCEDURE — 80061 LIPID PANEL: CPT | Performed by: FAMILY MEDICINE

## 2023-10-04 RX ORDER — ALENDRONATE SODIUM 70 MG/1
70 TABLET ORAL
COMMUNITY
Start: 2023-06-06 | End: 2024-01-18

## 2023-10-04 RX ORDER — METOPROLOL SUCCINATE 25 MG/1
25 TABLET, EXTENDED RELEASE ORAL DAILY
Qty: 90 TABLET | Refills: 1 | Status: SHIPPED | OUTPATIENT
Start: 2023-10-04 | End: 2024-04-23

## 2023-10-04 RX ORDER — ROSUVASTATIN CALCIUM 20 MG/1
20 TABLET, COATED ORAL DAILY
Qty: 90 TABLET | Refills: 3 | Status: SHIPPED | OUTPATIENT
Start: 2023-10-04 | End: 2024-09-04

## 2023-10-04 ASSESSMENT — ENCOUNTER SYMPTOMS
DIZZINESS: 1
JOINT SWELLING: 0
FEVER: 0
SHORTNESS OF BREATH: 0
CHILLS: 0
HEMATOCHEZIA: 0
NAUSEA: 0
WEAKNESS: 0
ARTHRALGIAS: 0
FREQUENCY: 0
HEADACHES: 0
DIARRHEA: 0
CONSTIPATION: 0
PALPITATIONS: 0
ABDOMINAL PAIN: 0
NERVOUS/ANXIOUS: 0
EYE PAIN: 0
COUGH: 0
MYALGIAS: 0
HEARTBURN: 0
HEMATURIA: 0
DYSURIA: 0
SORE THROAT: 0
PARESTHESIAS: 0

## 2023-10-04 ASSESSMENT — ACTIVITIES OF DAILY LIVING (ADL): CURRENT_FUNCTION: NO ASSISTANCE NEEDED

## 2023-10-04 NOTE — PROGRESS NOTES
"SUBJECTIVE:   Robert is a 73 year old who presents for Preventive Visit.      10/4/2023    10:26 AM   Additional Questions   Roomed by YK   Accompanied by Self     Are you in the first 12 months of your Medicare coverage?  No    Patient states he is not able to get into general surgery for hernia until Thanksgiving, wondering if he can be referred elsewhere.    Healthy Habits:     In general, how would you rate your overall health?  Good    Frequency of exercise:  6-7 days/week    Duration of exercise:  30-45 minutes    Do you usually eat at least 4 servings of fruit and vegetables a day, include whole grains    & fiber and avoid regularly eating high fat or \"junk\" foods?  Yes    Taking medications regularly:  No    Barriers to taking medications:  None    Medication side effects:  Not applicable    Ability to successfully perform activities of daily living:  No assistance needed    Home Safety:  No safety concerns identified    Hearing Impairment:  No hearing concerns    In the past 6 months, have you been bothered by leaking of urine?  No    In general, how would you rate your overall mental or emotional health?  Good    Additional concerns today:  No      Today's PHQ-2 Score:       10/4/2023    10:00 AM   PHQ-2 ( 1999 [employer])   Q1: Little interest or pleasure in doing things 0   Q2: Feeling down, depressed or hopeless 0   PHQ-2 Score 0   Q1: Little interest or pleasure in doing things Not at all   Q2: Feeling down, depressed or hopeless Not at all   PHQ-2 Score 0     Have you ever done Advance Care Planning? (For example, a Health Directive, POLST, or a discussion with a medical provider or your loved ones about your wishes): Yes, advance care planning is on file.     Fall risk  Fallen 2 or more times in the past year?: No  Any fall with injury in the past year?: Yes    Cognitive Screening   1) Repeat 3 items (Leader, Season, Table)    2) Clock draw: NORMAL  3) 3 item recall: Recalls 2 objects   Results: NORMAL " clock, 1-2 items recalled: COGNITIVE IMPAIRMENT LESS LIKELY    Mini-CogTM Copyright ATTILA Davis. Licensed by the author for use in [facility]; reprinted with permission (grace@[facility]). All rights reserved.      Do you have sleep apnea, excessive snoring or daytime drowsiness? : yes    Reviewed and updated as needed this visit by clinical staff   Tobacco  Allergies  Meds  Problems  Med Hx  Surg Hx  Fam Hx        Reviewed and updated as needed this visit by Provider   Tobacco  Allergies  Meds  Problems  Med Hx  Surg Hx  Fam Hx       Social Hx Reviewed  Social History     Tobacco Use     Smoking status: Former     Packs/day: 1.00     Years: 16.00     Pack years: 16.00     Types: Cigarettes     Start date: 1985     Quit date: 2005     Years since quittin.0     Smokeless tobacco: Never     Tobacco comments:     quit 15 years ago    Substance Use Topics     Alcohol use: Yes     Comment: occ / 15 drinks a month          2023     9:32 AM   Alcohol Use   Prescreen: >3 drinks/day or >7 drinks/week? No          No data to display              Do you have a current opioid prescription? No  Do you use any other controlled substances or medications that are not prescribed by a provider? None    Current providers sharing in care for this patient include:   Patient Care Team:  Jose Barrera MD as PCP - General (Family Practice)  Jose Barrera MD as Assigned PCP  Zora Peacock PA-C as Assigned Surgical Provider  Storm Guzman MD as Assigned Neuroscience Provider  Santiago Del Cid MD as Assigned Musculoskeletal Provider  Storm Guzman MD as MD Kirk, Patrick Dick MD as MD (Surgery)    The following health maintenance items are reviewed in Epic and correct as of today:  Health Maintenance   Topic Date Due     INFLUENZA VACCINE (1) 2023     COVID-19 Vaccine ( season) 2023     LIPID  2023     BMP  2024     ANNUAL  REVIEW OF HM ORDERS  2024     MEDICARE ANNUAL WELLNESS VISIT  10/04/2024     FALL RISK ASSESSMENT  10/04/2024     DTAP/TDAP/TD IMMUNIZATION (2 - Td or Tdap) 2028     ADVANCE CARE PLANNING  10/04/2028     HEPATITIS C SCREENING  Completed     PHQ-2 (once per calendar year)  Completed     Pneumococcal Vaccine: 65+ Years  Completed     ZOSTER IMMUNIZATION  Completed     AORTIC ANEURYSM SCREENING (SYSTEM ASSIGNED)  Completed     IPV IMMUNIZATION  Aged Out     HPV IMMUNIZATION  Aged Out     MENINGITIS IMMUNIZATION  Aged Out     COLORECTAL CANCER SCREENING  Discontinued     Lab work is in process  BP Readings from Last 3 Encounters:   10/04/23 120/76   23 136/62   23 (!) 166/80    Wt Readings from Last 3 Encounters:   10/04/23 77.1 kg (170 lb)   23 77.3 kg (170 lb 8 oz)   23 83.5 kg (184 lb)          Patient Active Problem List   Diagnosis     Essential hypertension, benign     Hyperlipidemia LDL goal <130     Chronic bilateral low back pain without sciatica     Osteoporosis with current pathological fracture, unspecified osteoporosis type, initial encounter     Past Surgical History:   Procedure Laterality Date     NO HISTORY OF SURGERY         Social History     Tobacco Use     Smoking status: Former     Packs/day: 1.00     Years: 16.00     Pack years: 16.00     Types: Cigarettes     Start date: 1985     Quit date: 2005     Years since quittin.0     Smokeless tobacco: Never     Tobacco comments:     quit 15 years ago    Substance Use Topics     Alcohol use: Yes     Comment: occ / 15 drinks a month      Family History   Problem Relation Age of Onset     Hypertension Mother      Ovarian Cancer Mother      Cataracts Mother      Hypertension Father      Heart Disease Father 83     Coronary Artery Disease Father      Depression Grandson      Colon Cancer No family hx of      Prostate Cancer No family hx of      Breast Cancer No family hx of      Ulcerative Colitis No family hx  of      Celiac Disease No family hx of      Crohn's Disease No family hx of          Current Outpatient Medications   Medication Sig Dispense Refill     alendronate (FOSAMAX) 70 MG tablet Take 70 mg by mouth every 7 days       fluorouracil (EFUDEX) 5 % external cream Apply to clean skin 2 times daily for 14-21 days, or until the onset of irritation. Avoid eye area. Wash hands after use. 40 g 0     gabapentin (NEURONTIN) 300 MG capsule Take 2 capsules (600 mg) by mouth 2 times daily 360 capsule 1     lisinopril (ZESTRIL) 20 MG tablet Take 1.5 tablets (30 mg) by mouth daily 135 tablet 0     meclizine (ANTIVERT) 25 MG tablet TAKE 1 TABLET BY MOUTH 3 TIMES DAILY AS NEEDED FOR DIZZINESS 90 tablet 1     metoprolol succinate ER (TOPROL XL) 25 MG 24 hr tablet Take 1 tablet (25 mg) by mouth daily 90 tablet 1     rosuvastatin (CRESTOR) 20 MG tablet Take 1 tablet (20 mg) by mouth daily 90 tablet 3     vitamin D3 (CHOLECALCIFEROL) 50 mcg (2000 units) tablet Take 1 tablet (50 mcg) by mouth daily 90 tablet 3     No Known Allergies    Review of Systems   Constitutional:  Negative for chills and fever.   HENT:  Negative for congestion, ear pain, hearing loss and sore throat.    Eyes:  Negative for pain and visual disturbance.   Respiratory:  Negative for cough and shortness of breath.    Cardiovascular:  Negative for chest pain, palpitations and peripheral edema.   Gastrointestinal:  Negative for abdominal pain, constipation, diarrhea, heartburn, hematochezia and nausea.   Genitourinary:  Negative for dysuria, frequency, genital sores, hematuria, impotence, penile discharge and urgency.   Musculoskeletal:  Negative for arthralgias, joint swelling and myalgias.   Skin:  Negative for rash.   Neurological:  Positive for dizziness. Negative for weakness, headaches and paresthesias.   Psychiatric/Behavioral:  Negative for mood changes. The patient is not nervous/anxious.      OBJECTIVE:   /76   Pulse 61   Temp 98.3  F (36.8  C)  "(Temporal)   Resp 16   Ht 1.735 m (5' 8.31\")   Wt 77.1 kg (170 lb)   SpO2 98%   BMI 25.62 kg/m   Estimated body mass index is 25.62 kg/m  as calculated from the following:    Height as of this encounter: 1.735 m (5' 8.31\").    Weight as of this encounter: 77.1 kg (170 lb).  Physical Exam  GENERAL: healthy, alert and no distress  EYES: Eyes grossly normal to inspection, PERRL and conjunctivae and sclerae normal  HENT: ear canals and TM's normal, nose and mouth without ulcers or lesions  NECK: no adenopathy, no asymmetry, masses, or scars and thyroid normal to palpation  RESP: lungs clear to auscultation - no rales, rhonchi or wheezes  CV: regular rate and rhythm, normal S1 S2, no S3 or S4, no murmur, click or rub, no peripheral edema and peripheral pulses strong  ABDOMEN: soft, nontender, no hepatosplenomegaly, no masses and bowel sounds normal  MS: Right thumb amputation. No gross musculoskeletal defects noted, no edema  SKIN: AK. No suspicious lesions or rashes  NEURO: Normal strength and tone, mentation intact and speech normal  PSYCH: mentation appears normal, affect normal/bright    Diagnostic Test Results: pending    ASSESSMENT / PLAN:   1. Encounter for Medicare annual wellness exam  Discussed personal health and safety. Routine screenings as below. Appropriate anticipatory guidance, vaccinations, and health screening recommendations delivered according to the USPSTF and other appropriate society guidelines.  Patient understands and is agreeable with the plan ordered below.  - INFLUENZA VACCINE 65+ (FLUZONE HD)  - Lipid panel reflex to direct LDL Non-fasting; Future  - Basic metabolic panel  (Ca, Cl, CO2, Creat, Gluc, K, Na, BUN); Future  - PRIMARY CARE FOLLOW-UP SCHEDULING; Future  - VACCINE ADMINISTRATION, INITIAL    2. Hyperlipidemia LDL goal <130  Check labs to monitor efficacy of interventions (medication, lifestyle, etc). No side effects reported. Continue on current therapy. See medication list for " "more details. Ok for refill of current lipid lowering medications for next 1 year. Will need follow-up visit with labs in 1 year.  - Lipid panel reflex to direct LDL Non-fasting; Future  - rosuvastatin (CRESTOR) 20 MG tablet; Take 1 tablet (20 mg) by mouth daily  Dispense: 90 tablet; Refill: 3  - Lipid panel reflex to direct LDL Non-fasting    3. Essential (primary) hypertension   Monitoring labs ordered. Medication refilled as below. Tolerating medication well.  - Basic metabolic panel  (Ca, Cl, CO2, Creat, Gluc, K, Na, BUN); Future    4. Osteoporosis with current pathological fracture, unspecified osteoporosis type, initial encounter  On fosamax. Consider recheck March 2024. Encourage weight bearing exercise and vitamin D.  - alendronate (FOSAMAX) 70 MG tablet; Take 70 mg by mouth every 7 days    5. V tach (H)  Follow up with cardiology as recommended November 2023.  - Lipid panel reflex to direct LDL Non-fasting; Future  - metoprolol succinate ER (TOPROL XL) 25 MG 24 hr tablet; Take 1 tablet (25 mg) by mouth daily  Dispense: 90 tablet; Refill: 1  - Basic metabolic panel  (Ca, Cl, CO2, Creat, Gluc, K, Na, BUN); Future    6. AK (actinic keratosis)  See dermatology.    7. Left inguinal hernia  Will try getting in sooner outside of [place]. Referral given.  - Adult General Surg Referral; Future    8. Screening for diabetes mellitus  - Basic metabolic panel  (Ca, Cl, CO2, Creat, Gluc, K, Na, BUN); Future    9. Need for immunization against influenza  - INFLUENZA VACCINE 65+ (FLUZONE HD)  - VACCINE ADMINISTRATION, INITIAL      COUNSELING:  Reviewed preventive health counseling, as reflected in patient instructions       Regular exercise       Healthy diet/nutrition       Vision screening       Hearing screening       Colon cancer screening       Prostate cancer screening    BMI:   Estimated body mass index is 25.62 kg/m  as calculated from the following:    Height as of this encounter: 1.735 m (5' 8.31\").    Weight as " of this encounter: 77.1 kg (170 lb).   Weight management plan: Discussed healthy diet and exercise guidelines    He reports that he quit smoking about 18 years ago. His smoking use included cigarettes. He started smoking about 38 years ago. He has a 16.00 pack-year smoking history. He has never used smokeless tobacco.      Appropriate preventive services were discussed with this patient, including applicable screening as appropriate for fall prevention, nutrition, physical activity, Tobacco-use cessation, weight loss and cognition.  Checklist reviewing preventive services available has been given to the patient.    Reviewed patients plan of care and provided an AVS. The Basic Care Plan (routine screening as documented in Health Maintenance) for Robert meets the Care Plan requirement. This Care Plan has been established and reviewed with the Patient.    Jose Barrera MD  [facility]    Disclaimer: This note consists of symbols derived from keyboarding, dictation and/or voice recognition software. As a result, there may be errors in the script that have gone undetected. Please consider this when interpreting information found in this chart.    Identified Health Risks:  I have reviewed Opioid Use Disorder and Substance Use Disorder risk factors and made any needed referrals.

## 2023-10-04 NOTE — PATIENT INSTRUCTIONS
See your dermatologist.    See your cardiologist November 2023.    Call [facility] in [place] and see if they can get you in sooner, check with insurance to make sure they are in network    Take the vitamin D given your broken bone history and low bone density    Preventive Health Recommendations:     See your health care provider every year to  Review health changes.   Discuss preventive care.    Review your medicines if your doctor has prescribed any.    Talk with your health care provider about whether you should have a test to screen for prostate cancer (PSA).  Every 3 years, have a diabetes test (fasting glucose). If you are at risk for diabetes, you should have this test more often.  Every 5 years, have a cholesterol test. Have this test more often if you are at risk for high cholesterol or heart disease.   Every 10 years, have a colonoscopy. Or, have a yearly FIT test (stool test). These exams will check for colon cancer.  Talk to with your health care provider about screening for Abdominal Aortic Aneurysm if you have a family history of AAA or have a history of smoking.    Shots:   Get a flu shot each year.   Get a tetanus shot every 10 years.   Talk to your doctor about your pneumonia vaccines. There are now two you should receive - Pneumovax (PPSV 23) and Prevnar (PCV 13).   Talk to your pharmacist about a shingles vaccine.   Talk to your doctor about the hepatitis B vaccine.  Nutrition:   Eat at least 5 servings of fruits and vegetables each day.   Eat whole-grain bread, whole-wheat pasta and brown rice instead of white grains and rice.   Get adequate Calcium and Vitamin D.   Lifestyle  Exercise for at least 150 minutes a week (30 minutes a day, 5 days a week). This will help you control your weight and prevent disease.   Limit alcohol to one drink per day.   No smoking.   Wear sunscreen to prevent skin cancer.  See your dentist every six months for an exam and cleaning.  See your eye doctor every 1 to 2 years  to screen for conditions such as glaucoma, macular degeneration, cataracts, etc.    Personalized Prevention Plan  You are due for the preventive services outlined below.  Your care team is available to assist you in scheduling these services.  If you have already completed any of these items, please share that information with your care team to update in your medical record.  Health Maintenance Due   Topic Date Due    Flu Vaccine (1) 09/01/2023    COVID-19 Vaccine (6 - 2023-24 season) 09/01/2023    Cholesterol Lab  09/26/2023    Annual Wellness Visit  09/26/2023     Patient Education   Personalized Prevention Plan  You are due for the preventive services outlined below.  Your care team is available to assist you in scheduling these services.  If you have already completed any of these items, please share that information with your care team to update in your medical record.  Health Maintenance Due   Topic Date Due    Flu Vaccine (1) 09/01/2023    COVID-19 Vaccine (6 - 2023-24 season) 09/01/2023    Cholesterol Lab  09/26/2023    Annual Wellness Visit  09/26/2023

## 2023-10-05 NOTE — RESULT ENCOUNTER NOTE
Please inform of results if patient has not viewed in [facility] within 3 business days.    Lipids - Your cholesterol lab results were normal.    BMP - Your blood sugar was 91. Your kidney function and electrolytes were normal.    Please call the clinic with any questions you may have.     Have a great day,    Dr. Sepulveda

## 2023-11-08 ENCOUNTER — TELEPHONE (OUTPATIENT)
Dept: NEUROLOGY | Facility: CLINIC | Age: 73
End: 2023-11-08
Payer: COMMERCIAL

## 2023-11-08 DIAGNOSIS — M54.50 CHRONIC MIDLINE LOW BACK PAIN WITHOUT SCIATICA: ICD-10-CM

## 2023-11-08 DIAGNOSIS — G89.29 CHRONIC MIDLINE LOW BACK PAIN WITHOUT SCIATICA: ICD-10-CM

## 2023-11-08 DIAGNOSIS — G62.9 LENGTH-DEPENDENT PERIPHERAL NEUROPATHY: ICD-10-CM

## 2023-11-08 NOTE — TELEPHONE ENCOUNTER
Medication: gabapentin (NEURONTIN) 300 MG capsule   Sig: Take 2 capsules (600 mg) by mouth 2 times daily   Date last written: 6/14/23  Dispensed amount: 360 Caps  Refills: 1    Requested Pharmacy: [email or web address] - [place], [place] - [address]     Pt's last office visit: 6/14/23  Next scheduled office visit:       Per the RN/LPN medication refill protocol, writer is unable to refill this request.   RICHY Mendez., CMA ([facility])

## 2023-11-09 RX ORDER — GABAPENTIN 300 MG/1
300 CAPSULE ORAL 2 TIMES DAILY
Qty: 180 CAPSULE | Refills: 1 | Status: SHIPPED | OUTPATIENT
Start: 2023-11-09 | End: 2024-01-18

## 2023-11-09 NOTE — TELEPHONE ENCOUNTER
Writer left detailed message requesting patient call back to discuss gabapentin dose and if he has tried to reduce dose to 300mg in the AM and 600mg at night. Will wait for return phone call.       Diane Harris, RNCC  Neurology/Neurosurgery

## 2023-11-09 NOTE — TELEPHONE ENCOUNTER
[facility] Call Center    Phone Message    May a detailed message be left on voicemail: yes     Reason for Call: Other: Pt is returning [facility] call. Please call Pt back      Action Taken: Message routed to:  Other: [facility]     Travel Screening: Not Applicable

## 2023-11-09 NOTE — TELEPHONE ENCOUNTER
Pt was able to decrease his gabapentin dose and is now taking gabapentin 300mg twice a day. He reports doing well on this dosage so prescription will be updated and pended to provider to review and sign. He will see Dr. Guzman in December as scheduled.       Diane Harris, RNCC  Neurology/Neurosurgery

## 2023-11-22 ENCOUNTER — OFFICE VISIT (OUTPATIENT)
Dept: SURGERY | Facility: CLINIC | Age: 73
End: 2023-11-22
Attending: FAMILY MEDICINE
Payer: COMMERCIAL

## 2023-11-22 ENCOUNTER — HOSPITAL ENCOUNTER (OUTPATIENT)
Facility: AMBULATORY SURGERY CENTER | Age: 73
End: 2023-11-22
Attending: SURGERY
Payer: COMMERCIAL

## 2023-11-22 ENCOUNTER — TELEPHONE (OUTPATIENT)
Dept: SURGERY | Facility: CLINIC | Age: 73
End: 2023-11-22

## 2023-11-22 VITALS
HEART RATE: 66 BPM | HEIGHT: 68 IN | DIASTOLIC BLOOD PRESSURE: 72 MMHG | SYSTOLIC BLOOD PRESSURE: 147 MMHG | BODY MASS INDEX: 26.52 KG/M2 | WEIGHT: 175 LBS

## 2023-11-22 DIAGNOSIS — K40.90 LEFT INGUINAL HERNIA: ICD-10-CM

## 2023-11-22 PROCEDURE — 99203 OFFICE O/P NEW LOW 30 MIN: CPT | Performed by: SURGERY

## 2023-11-22 NOTE — LETTER
11/22/2023         RE: Robert SNIDER Archer  [id number] [place] [address]        Dear Colleague,    Thank you for referring your patient, Robert Archer, to the [facility]. Please see a copy of my visit note below.    Patient seen in consultation for left inguinal hernia by Jose Barrera    HPI:  Patient is a 73 year old male  with complaints of painful lump left groin  The patient noticed the symptoms about 3-4 months ago.    Shortly after noticed it went to the ER since did not know what it was. Pain about 8/10, takes advil for it which helps a little.  Bowels moving a little slower. When does have bowel movement does not seem to do anything to the pain.  nothing makes the episode better.  Right side feels normal  Patient has not family history of hernia problems    Review Of Systems    Skin: negative  Ears/Nose/Throat: negative  Respiratory: No shortness of breath, dyspnea on exertion, cough, or hemoptysis  Cardiovascular: negative  Gastrointestinal: as above  Genitourinary: negative  Musculoskeletal: as above  Neurologic: negative  Hematologic/Lymphatic/Immunologic: negative  Endocrine: negative      Past Medical History:   Diagnosis Date     Hypertension 4-1-2012     Patient Active Problem List   Diagnosis     Essential hypertension, benign     Hyperlipidemia LDL goal <130     Chronic bilateral low back pain without sciatica     Osteoporosis with current pathological fracture, unspecified osteoporosis type, initial encounter         Past Surgical History:   Procedure Laterality Date     NO HISTORY OF SURGERY     Had surgery for broken femur last Feb    Social History     Socioeconomic History     Marital status:      Spouse name: Not on file     Number of children: Not on file     Years of education: Not on file     Highest education level: Not on file   Occupational History     Not on file   Tobacco Use     Smoking status: Former     Packs/day: 1.00     Years: 16.00      Additional pack years: 0.00     Total pack years: 16.00     Types: Cigarettes     Start date: 1985     Quit date: 2005     Years since quittin.1     Smokeless tobacco: Never     Tobacco comments:     quit 15 years ago    Vaping Use     Vaping Use: Never used   Substance and Sexual Activity     Alcohol use: Yes     Comment: occ / 15 drinks a month      Drug use: No     Sexual activity: Not Currently     Partners: Female     Birth control/protection: None     Comment:     Other Topics Concern     Parent/sibling w/ CABG, MI or angioplasty before 65F 55M? Yes     Comment: 2017   Social History Narrative     Not on file     Social Determinants of Health     Financial Resource Strain: Low Risk  (2023)    Financial Resource Strain      Within the past 12 months, have you or your family members you live with been unable to get utilities (heat, electricity) when it was really needed?: No   Food Insecurity: Low Risk  (2023)    Food Insecurity      Within the past 12 months, did you worry that your food would run out before you got money to buy more?: No      Within the past 12 months, did the food you bought just not last and you didn t have money to get more?: No   Transportation Needs: Low Risk  (2023)    Transportation Needs      Within the past 12 months, has lack of transportation kept you from medical appointments, getting your medicines, non-medical meetings or appointments, work, or from getting things that you need?: No   Physical Activity: Not on file   Stress: Not on file   Social Connections: Not on file   Interpersonal Safety: Low Risk  (10/4/2023)    Interpersonal Safety      Do you feel physically and emotionally safe where you currently live?: Yes      Within the past 12 months, have you been hit, slapped, kicked or otherwise physically hurt by someone?: No      Within the past 12 months, have you been humiliated or emotionally abused in other ways by your partner or  "ex-partner?: No   Housing Stability: Low Risk  (9/21/2023)    Housing Stability      Do you have housing? : Yes      Are you worried about losing your housing?: No       Current Outpatient Medications   Medication Sig Dispense Refill     alendronate (FOSAMAX) 70 MG tablet Take 70 mg by mouth every 7 days       fluorouracil (EFUDEX) 5 % external cream Apply to clean skin 2 times daily for 14-21 days, or until the onset of irritation. Avoid eye area. Wash hands after use. 40 g 0     gabapentin (NEURONTIN) 300 MG capsule Take 1 capsule (300 mg) by mouth 2 times daily 180 capsule 1     lisinopril (ZESTRIL) 20 MG tablet Take 1.5 tablets (30 mg) by mouth daily 135 tablet 0     meclizine (ANTIVERT) 25 MG tablet TAKE 1 TABLET BY MOUTH 3 TIMES DAILY AS NEEDED FOR DIZZINESS 90 tablet 1     metoprolol succinate ER (TOPROL XL) 25 MG 24 hr tablet Take 1 tablet (25 mg) by mouth daily 90 tablet 1     rosuvastatin (CRESTOR) 20 MG tablet Take 1 tablet (20 mg) by mouth daily 90 tablet 3     vitamin D3 (CHOLECALCIFEROL) 50 mcg (2000 units) tablet Take 1 tablet (50 mcg) by mouth daily 90 tablet 3       Medications and history reviewed    Physical exam:  Vitals: BP (!) 147/72   Pulse 66   Ht 1.727 m (5' 8\")   Wt 79.4 kg (175 lb)   BMI 26.61 kg/m    BMI= Body mass index is 26.61 kg/m .    Constitutional: healthy, alert, and no distress  Head: Normocephalic. No masses, lesions, tenderness or abnormalities  Gastrointestinal: Abdomen soft, non-tender. BS normal. No masses, organomegaly  : Normal external genitalia without lesions and male positive for moderate reducible left inguinal hernia. Right feels normal  Musculoskeletal: extremities normal- no gross deformities noted, gait normal, and normal muscle tone  Skin: no suspicious lesions or rashes  Psychiatric: mentation appears normal and affect normal/bright      Imaging shows:  CT ABDOMEN & PELVIS W/O ORAL W/O IV CON  Order: [id number]  Impression    IMPRESSION:    1.  Limited " study without intravenous or oral contrast.  2.  There is a left inguinal hernia containing a knuckle of small bowel. No bowel dilatation to confirm the presence of a mechanical bowel obstruction.  3.  Large amount of stool throughout the colon.  4.  Severe compression fracture deformity of the T12 vertebral body, possibly old but correlate clinically for confirmation.  5.  Other findings as detailed above.    Assessment:     ICD-10-CM    1. Left inguinal hernia  K40.90 Adult General Surg Referral     Case Request: HERNIORRHAPHY, INGUINAL, ROBOT-ASSISTED, LAPAROSCOPIC, USING DA LUCI XI left possible bilateral        Plan: Offered a minimally invasive robotic approach to repair of his symptomatic left inguinal hernia.  Also discussed availability with this approach to look at the right side intraoperatively and could fix if small hernia found there.  I have low suspicion for this with normal exam and nothing noticed on recent CT.  Risks of surgery discussed including, but not limited to bleeding, infection, recurrence, damage to intra-abdominal contents such as nerves, blood vessels, bowel or other organs.  Risks of anesthesia also discussed.  Although mesh is a better long term repair if it gets infected it must be removed. Mesh problems such as fracture, erosion or adhesions are possible.  If there is evidence of an infection at time of surgery it will be cancelled and rescheduled to when well.    Discussed massaging hernia back in and using ice if becomes more painful.  If not able to reduce then go to emergency room.      Patrick Kirk MD      Again, thank you for allowing me to participate in the care of your patient.        Sincerely,        Patrick Kirk MD

## 2023-11-22 NOTE — PROGRESS NOTES
Patient seen in consultation for left inguinal hernia by Jose Barrera    HPI:  Patient is a 73 year old male  with complaints of painful lump left groin  The patient noticed the symptoms about 3-4 months ago.    Shortly after noticed it went to the ER since did not know what it was. Pain about 8/10, takes advil for it which helps a little.  Bowels moving a little slower. When does have bowel movement does not seem to do anything to the pain.  nothing makes the episode better.  Right side feels normal  Patient has not family history of hernia problems    Review Of Systems    Skin: negative  Ears/Nose/Throat: negative  Respiratory: No shortness of breath, dyspnea on exertion, cough, or hemoptysis  Cardiovascular: negative  Gastrointestinal: as above  Genitourinary: negative  Musculoskeletal: as above  Neurologic: negative  Hematologic/Lymphatic/Immunologic: negative  Endocrine: negative      Past Medical History:   Diagnosis Date    Hypertension 2012     Patient Active Problem List   Diagnosis    Essential hypertension, benign    Hyperlipidemia LDL goal <130    Chronic bilateral low back pain without sciatica    Osteoporosis with current pathological fracture, unspecified osteoporosis type, initial encounter         Past Surgical History:   Procedure Laterality Date    NO HISTORY OF SURGERY     Had surgery for broken femur last Feb    Social History     Socioeconomic History    Marital status:      Spouse name: Not on file    Number of children: Not on file    Years of education: Not on file    Highest education level: Not on file   Occupational History    Not on file   Tobacco Use    Smoking status: Former     Packs/day: 1.00     Years: 16.00     Additional pack years: 0.00     Total pack years: 16.00     Types: Cigarettes     Start date: 1985     Quit date: 2005     Years since quittin.1    Smokeless tobacco: Never    Tobacco comments:     quit 15 years ago    Vaping Use    Vaping  Use: Never used   Substance and Sexual Activity    Alcohol use: Yes     Comment: occ / 15 drinks a month     Drug use: No    Sexual activity: Not Currently     Partners: Female     Birth control/protection: None     Comment:     Other Topics Concern    Parent/sibling w/ CABG, MI or angioplasty before 65F 55M? Yes     Comment: 5-1-2017   Social History Narrative    Not on file     Social Determinants of Health     Financial Resource Strain: Low Risk  (9/21/2023)    Financial Resource Strain     Within the past 12 months, have you or your family members you live with been unable to get utilities (heat, electricity) when it was really needed?: No   Food Insecurity: Low Risk  (9/21/2023)    Food Insecurity     Within the past 12 months, did you worry that your food would run out before you got money to buy more?: No     Within the past 12 months, did the food you bought just not last and you didn t have money to get more?: No   Transportation Needs: Low Risk  (9/21/2023)    Transportation Needs     Within the past 12 months, has lack of transportation kept you from medical appointments, getting your medicines, non-medical meetings or appointments, work, or from getting things that you need?: No   Physical Activity: Not on file   Stress: Not on file   Social Connections: Not on file   Interpersonal Safety: Low Risk  (10/4/2023)    Interpersonal Safety     Do you feel physically and emotionally safe where you currently live?: Yes     Within the past 12 months, have you been hit, slapped, kicked or otherwise physically hurt by someone?: No     Within the past 12 months, have you been humiliated or emotionally abused in other ways by your partner or ex-partner?: No   Housing Stability: Low Risk  (9/21/2023)    Housing Stability     Do you have housing? : Yes     Are you worried about losing your housing?: No       Current Outpatient Medications   Medication Sig Dispense Refill    alendronate (FOSAMAX) 70 MG tablet  "Take 70 mg by mouth every 7 days      fluorouracil (EFUDEX) 5 % external cream Apply to clean skin 2 times daily for 14-21 days, or until the onset of irritation. Avoid eye area. Wash hands after use. 40 g 0    gabapentin (NEURONTIN) 300 MG capsule Take 1 capsule (300 mg) by mouth 2 times daily 180 capsule 1    lisinopril (ZESTRIL) 20 MG tablet Take 1.5 tablets (30 mg) by mouth daily 135 tablet 0    meclizine (ANTIVERT) 25 MG tablet TAKE 1 TABLET BY MOUTH 3 TIMES DAILY AS NEEDED FOR DIZZINESS 90 tablet 1    metoprolol succinate ER (TOPROL XL) 25 MG 24 hr tablet Take 1 tablet (25 mg) by mouth daily 90 tablet 1    rosuvastatin (CRESTOR) 20 MG tablet Take 1 tablet (20 mg) by mouth daily 90 tablet 3    vitamin D3 (CHOLECALCIFEROL) 50 mcg (2000 units) tablet Take 1 tablet (50 mcg) by mouth daily 90 tablet 3       Medications and history reviewed    Physical exam:  Vitals: BP (!) 147/72   Pulse 66   Ht 1.727 m (5' 8\")   Wt 79.4 kg (175 lb)   BMI 26.61 kg/m    BMI= Body mass index is 26.61 kg/m .    Constitutional: healthy, alert, and no distress  Head: Normocephalic. No masses, lesions, tenderness or abnormalities  Gastrointestinal: Abdomen soft, non-tender. BS normal. No masses, organomegaly  : Normal external genitalia without lesions and male positive for moderate reducible left inguinal hernia. Right feels normal  Musculoskeletal: extremities normal- no gross deformities noted, gait normal, and normal muscle tone  Skin: no suspicious lesions or rashes  Psychiatric: mentation appears normal and affect normal/bright      Imaging shows:  CT ABDOMEN & PELVIS W/O ORAL W/O IV CON  Order: [id number]  Impression    IMPRESSION:    1.  Limited study without intravenous or oral contrast.  2.  There is a left inguinal hernia containing a knuckle of small bowel. No bowel dilatation to confirm the presence of a mechanical bowel obstruction.  3.  Large amount of stool throughout the colon.  4.  Severe compression fracture " deformity of the T12 vertebral body, possibly old but correlate clinically for confirmation.  5.  Other findings as detailed above.    Assessment:     ICD-10-CM    1. Left inguinal hernia  K40.90 Adult General Surg Referral     Case Request: HERNIORRHAPHY, INGUINAL, ROBOT-ASSISTED, LAPAROSCOPIC, USING DA LUCI XI left possible bilateral        Plan: Offered a minimally invasive robotic approach to repair of his symptomatic left inguinal hernia.  Also discussed availability with this approach to look at the right side intraoperatively and could fix if small hernia found there.  I have low suspicion for this with normal exam and nothing noticed on recent CT.  Risks of surgery discussed including, but not limited to bleeding, infection, recurrence, damage to intra-abdominal contents such as nerves, blood vessels, bowel or other organs.  Risks of anesthesia also discussed.  Although mesh is a better long term repair if it gets infected it must be removed. Mesh problems such as fracture, erosion or adhesions are possible.  If there is evidence of an infection at time of surgery it will be cancelled and rescheduled to when well.    Discussed massaging hernia back in and using ice if becomes more painful.  If not able to reduce then go to emergency room.      Patrick Kirk MD

## 2023-11-22 NOTE — TELEPHONE ENCOUNTER
Type of surgery: HERNIORRHAPHY, INGUINAL, ROBOT-ASSISTED, LAPAROSCOPIC, USING DA LUCI XI left possible bilateral (Left)   Location of surgery: [facility]  Date and time of surgery: 2-15-24  Surgeon: Yelena  Pre-Op Appt Date: 2-1-24  Post-Op Appt Date: 2-28-24   Packet sent out: Yes  Pre-cert/Authorization completed:    Date:

## 2023-12-08 ENCOUNTER — OFFICE VISIT (OUTPATIENT)
Dept: FAMILY MEDICINE | Facility: CLINIC | Age: 73
End: 2023-12-08
Payer: COMMERCIAL

## 2023-12-08 VITALS
DIASTOLIC BLOOD PRESSURE: 72 MMHG | SYSTOLIC BLOOD PRESSURE: 130 MMHG | OXYGEN SATURATION: 98 % | RESPIRATION RATE: 12 BRPM | WEIGHT: 174 LBS | HEIGHT: 68 IN | HEART RATE: 74 BPM | BODY MASS INDEX: 26.37 KG/M2 | TEMPERATURE: 97.9 F

## 2023-12-08 DIAGNOSIS — E78.5 HYPERLIPIDEMIA LDL GOAL <130: ICD-10-CM

## 2023-12-08 DIAGNOSIS — M80.00XA OSTEOPOROSIS WITH CURRENT PATHOLOGICAL FRACTURE, UNSPECIFIED OSTEOPOROSIS TYPE, INITIAL ENCOUNTER: ICD-10-CM

## 2023-12-08 DIAGNOSIS — I10 ESSENTIAL (PRIMARY) HYPERTENSION: ICD-10-CM

## 2023-12-08 DIAGNOSIS — M54.50 CHRONIC BILATERAL LOW BACK PAIN WITHOUT SCIATICA: ICD-10-CM

## 2023-12-08 DIAGNOSIS — K40.90 LEFT INGUINAL HERNIA: ICD-10-CM

## 2023-12-08 DIAGNOSIS — Z01.818 PRE-OPERATIVE EXAMINATION: Primary | ICD-10-CM

## 2023-12-08 DIAGNOSIS — G89.29 CHRONIC BILATERAL LOW BACK PAIN WITHOUT SCIATICA: ICD-10-CM

## 2023-12-08 LAB
ANION GAP SERPL CALCULATED.3IONS-SCNC: 9 MMOL/L (ref 7–15)
BUN SERPL-MCNC: 10.1 MG/DL (ref 8–23)
CALCIUM SERPL-MCNC: 8.9 MG/DL (ref 8.8–10.2)
CHLORIDE SERPL-SCNC: 110 MMOL/L (ref 98–107)
CREAT SERPL-MCNC: 0.78 MG/DL (ref 0.67–1.17)
DEPRECATED HCO3 PLAS-SCNC: 24 MMOL/L (ref 22–29)
EGFRCR SERPLBLD CKD-EPI 2021: >90 ML/MIN/1.73M2
GLUCOSE SERPL-MCNC: 84 MG/DL (ref 70–99)
POTASSIUM SERPL-SCNC: 4 MMOL/L (ref 3.4–5.3)
SODIUM SERPL-SCNC: 143 MMOL/L (ref 135–145)

## 2023-12-08 PROCEDURE — 93000 ELECTROCARDIOGRAM COMPLETE: CPT | Performed by: FAMILY MEDICINE

## 2023-12-08 PROCEDURE — 36415 COLL VENOUS BLD VENIPUNCTURE: CPT | Performed by: FAMILY MEDICINE

## 2023-12-08 PROCEDURE — 80048 BASIC METABOLIC PNL TOTAL CA: CPT | Performed by: FAMILY MEDICINE

## 2023-12-08 PROCEDURE — 99214 OFFICE O/P EST MOD 30 MIN: CPT | Mod: 25 | Performed by: FAMILY MEDICINE

## 2023-12-08 RX ORDER — LISINOPRIL 20 MG/1
30 TABLET ORAL DAILY
Qty: 135 TABLET | Refills: 3 | Status: SHIPPED | OUTPATIENT
Start: 2023-12-08

## 2023-12-08 RX ORDER — RESPIRATORY SYNCYTIAL VIRUS VACCINE 120MCG/0.5
0.5 KIT INTRAMUSCULAR ONCE
Qty: 1 EACH | Refills: 0 | Status: CANCELLED | OUTPATIENT
Start: 2023-12-08 | End: 2023-12-08

## 2023-12-08 ASSESSMENT — PAIN SCALES - GENERAL: PAINLEVEL: NO PAIN (0)

## 2023-12-08 NOTE — RESULT ENCOUNTER NOTE
Please inform of results if patient has not viewed in [facility] within 3 business days.    BMP - Your blood sugar was 84. Your kidney function and electrolytes were normal.    Please call the clinic with any questions you may have.     Have a great day,    Dr. Sepulveda

## 2023-12-08 NOTE — PROGRESS NOTES
[facility] [place]  [address], [address]  [place] [address]  Phone: [phone]  Fax: [phone]  Primary Provider: Kandis Barrera  Pre-op Performing Provider: KANDIS BARRERA    PREOPERATIVE EVALUATION:  Today's date: 12/8/2023    Robert is a 73 year old, presenting for the following:  Pre-Op Exam        12/8/2023     6:55 AM   Additional Questions   Roomed by Marcella Watts CMA         12/8/2023     6:55 AM   Patient Reported Additional Medications   Patient reports taking the following new medications none       Surgical Information:  Surgery/Procedure: ROBOTIC XI ASSISTED LEFT INGUINAL HERNIA REPAIR WITH MESH   Surgery Location: [facility] Operating Room   Surgeon: David Borden MD   Surgery Date: 12/14/2023  Time of Surgery: TBD  Where patient plans to recover: At home with family  Fax number for surgical facility: [phone]     Assessment & Plan     The proposed surgical procedure is considered INTERMEDIATE risk.    1. Pre-operative examination  Medically optimized for proposed procedure.  May proceed as planned.  Up-to-date on tetanus.  Chronic conditions controlled.  Able to tolerate 4 METS.     2. Left inguinal hernia  Plan for procedure above.    3. Essential (primary) hypertension   Controled. Hold lisinopril morning of surgery. Continue metoprolol. BMP ordered.  - lisinopril (ZESTRIL) 20 MG tablet; Take 1.5 tablets (30 mg) by mouth daily  Dispense: 135 tablet; Refill: 3  - Basic metabolic panel  (Ca, Cl, CO2, Creat, Gluc, K, Na, BUN); Future  - EKG 12-lead complete w/read - Clinics    4. Hyperlipidemia LDL goal <130  Continue statin    5. Chronic bilateral low back pain without sciatica  Continue gabapentin.    6. Osteoporosis with current pathological fracture, unspecified osteoporosis type, initial encounter  Hold fosamax morning of surgery.        - No identified additional risk factors other than previously addressed    Antiplatelet or  Anticoagulation Medication Instructions:   - Patient is on no antiplatelet or anticoagulation medications.    Additional Medication Instructions:   - ACE/ARB: HOLD on day of surgery (minimum 11 hours for general anesthesia).   - bisphosphonates (alendronate, ibandronate, risedronate): Hold morning of procedure   - pregabalin, gabapentin: Continue without modification.   - Topicals: HOLD day of surgery.    RECOMMENDATION:  APPROVAL GIVEN to proceed with proposed procedure, without further diagnostic evaluation.    Jose Barrera MD  [facility]    Disclaimer: This note consists of symbols derived from keyboarding, dictation and/or voice recognition software. As a result, there may be errors in the script that have gone undetected. Please consider this when interpreting information found in this chart.    Subjective   HPI related to upcoming procedure: Left symptomatic inguinal hernia. Plan for repair as above.    Hx of HLD on statin.    Hx of HTN controlled on lisinopril. Takes at 5 pm.    Hx of osteoporosis with pathological fracture. Supposed to be on fosamax, but stopped taking this for the last month for unknown reason.        12/7/2023     1:27 PM   Preop Questions   1. Have you ever had a heart attack or stroke? No   2. Have you ever had surgery on your heart or blood vessels, such as a stent placement, a coronary artery bypass, or surgery on an artery in your head, neck, heart, or legs? No   3. Do you have chest pain with activity? No   4. Do you have a history of  heart failure? No   5. Do you currently have a cold, bronchitis or symptoms of other infection? No   6. Do you have a cough, shortness of breath, or wheezing? No   7. Do you or anyone in your family have previous history of blood clots? No   8. Do you or does anyone in your family have a serious bleeding problem such as prolonged bleeding following surgeries or cuts? No   9. Have you ever had problems with  anemia or been told to take iron pills? No   10. Have you had any abnormal blood loss such as black, tarry or bloody stools? No   11. Have you ever had a blood transfusion? No   12. Are you willing to have a blood transfusion if it is medically needed before, during, or after your surgery? Yes   13. Have you or any of your relatives ever had problems with anesthesia? No   14. Do you have sleep apnea, excessive snoring or daytime drowsiness? No   15. Do you have any artifical heart valves or other implanted medical devices like a pacemaker, defibrillator, or continuous glucose monitor? No   16. Do you have artificial joints? No   17. Are you allergic to latex? No       Health Care Directive:  Patient has a Health Care Directive on file    Preoperative Review of :   reviewed - no record of controlled substances prescribed.    Status of Chronic Conditions:  See problem list for active medical problems.  Problems all longstanding and stable, except as noted/documented.  See ROS for pertinent symptoms related to these conditions.    Review of Systems  Constitutional, neuro, ENT, endocrine, pulmonary, cardiac, gastrointestinal, genitourinary, musculoskeletal, integument and psychiatric systems are negative, except as otherwise noted.    Patient Active Problem List    Diagnosis Date Noted    Left inguinal hernia 11/22/2023     Priority: Medium    Osteoporosis with current pathological fracture, unspecified osteoporosis type, initial encounter 03/30/2023     Priority: Medium    Chronic bilateral low back pain without sciatica 09/14/2022     Priority: Medium    Essential hypertension, benign 08/06/2018     Priority: Medium    Hyperlipidemia LDL goal <130 08/06/2018     Priority: Medium      Past Medical History:   Diagnosis Date    Hypertension 4-1-2012     Past Surgical History:   Procedure Laterality Date    NO HISTORY OF SURGERY       Current Outpatient Medications   Medication Sig Dispense Refill    alendronate  "(FOSAMAX) 70 MG tablet Take 70 mg by mouth every 7 days      fluorouracil (EFUDEX) 5 % external cream Apply to clean skin 2 times daily for 14-21 days, or until the onset of irritation. Avoid eye area. Wash hands after use. 40 g 0    gabapentin (NEURONTIN) 300 MG capsule Take 1 capsule (300 mg) by mouth 2 times daily 180 capsule 1    lisinopril (ZESTRIL) 20 MG tablet Take 1.5 tablets (30 mg) by mouth daily 135 tablet 0    meclizine (ANTIVERT) 25 MG tablet TAKE 1 TABLET BY MOUTH 3 TIMES DAILY AS NEEDED FOR DIZZINESS 90 tablet 1    metoprolol succinate ER (TOPROL XL) 25 MG 24 hr tablet Take 1 tablet (25 mg) by mouth daily 90 tablet 1    rosuvastatin (CRESTOR) 20 MG tablet Take 1 tablet (20 mg) by mouth daily 90 tablet 3    vitamin D3 (CHOLECALCIFEROL) 50 mcg (2000 units) tablet Take 1 tablet (50 mcg) by mouth daily 90 tablet 3       No Known Allergies     Social History     Tobacco Use    Smoking status: Former     Packs/day: 1.00     Years: 16.00     Additional pack years: 0.00     Total pack years: 16.00     Types: Cigarettes     Start date: 1985     Quit date: 2005     Years since quittin.2    Smokeless tobacco: Never    Tobacco comments:     quit 15 years ago    Substance Use Topics    Alcohol use: Yes     Comment: occ / 15 drinks a month      Family History   Problem Relation Age of Onset    Hypertension Mother     Ovarian Cancer Mother     Cataracts Mother     Hypertension Father     Heart Disease Father 83    Coronary Artery Disease Father     Depression Grandson     Colon Cancer No family hx of     Prostate Cancer No family hx of     Breast Cancer No family hx of     Ulcerative Colitis No family hx of     Celiac Disease No family hx of     Crohn's Disease No family hx of      History   Drug Use No         Objective     /72   Pulse 74   Temp 97.9  F (36.6  C) (Temporal)   Resp 12   Ht 1.727 m (5' 7.99\")   Wt 78.9 kg (174 lb)   SpO2 98%   BMI 26.46 kg/m      Physical Exam    GENERAL " APPEARANCE: healthy, alert and no distress     EYES: EOMI,  PERRL     HENT: ear canals and TM's normal and nose and mouth without ulcers or lesions     NECK: no adenopathy, no asymmetry, masses, or scars and thyroid normal to palpation     RESP: lungs clear to auscultation - no rales, rhonchi or wheezes     CV: regular rates and rhythm, normal S1 S2, no S3 or S4 and no murmur, click or rub     ABDOMEN:  soft, nontender, no HSM or masses and bowel sounds normal     MS: extremities normal- no gross deformities noted, no evidence of inflammation in joints, FROM in all extremities.     SKIN: no suspicious lesions or rashes     NEURO: Normal strength and tone, sensory exam grossly normal, mentation intact and speech normal     PSYCH: mentation appears normal. and affect normal/bright     LYMPHATICS: No cervical adenopathy    Diagnostics:   Latest Reference Range & Units 12/08/23 07:59   Sodium 135 - 145 mmol/L 143   Potassium 3.4 - 5.3 mmol/L 4.0   Chloride 98 - 107 mmol/L 110 (H)   Carbon Dioxide (CO2) 22 - 29 mmol/L 24   Urea Nitrogen 8.0 - 23.0 mg/dL 10.1   Creatinine 0.67 - 1.17 mg/dL 0.78   GFR Estimate >60 mL/min/1.73m2 >90   Calcium 8.8 - 10.2 mg/dL 8.9   Anion Gap 7 - 15 mmol/L 9   Glucose 70 - 99 mg/dL 84   (H): Data is abnormally high     EKG: Normal sinus rhythm. Normal progression of precordial leads. No ST segment, or T wave changes concerning for acute ischemia.    Revised Cardiac Risk Index (RCRI):  The patient has the following serious cardiovascular risks for perioperative complications:   - No serious cardiac risks = 0 points     RCRI Interpretation: 0 points: Class I (very low risk - 0.4% complication rate)       Signed Electronically by: Jose Barrera MD  Copy of this evaluation report is provided to requesting physician.

## 2023-12-08 NOTE — PATIENT INSTRUCTIONS
Important Takeaway Points From This Visit:  Hold your lisinopril and fosamax the morning of surgery.  Do not apply topical medications within 24 hours of the procedure.  Continue all other normal medications.  Do not take ibuprofen or aleve prior to surgery, tylenol (acetaminophen) is ok.      As always, please call with any questions or concerns. I look forward to seeing you again soon!    Take care,  Dr. Barrera    Your current medication list is printed. Please keep this with you - it is helpful to bring this current list to any other medical appointments. It can also be helpful if you ever go to the emergency room or hospital.    If you had lab testing today we will call you with the results. The phone number we will call with your results is # [phone] (home) . If this is not the best number please call our clinic and change the number.    If you need any refills, please call your pharmacy and they will contact us.    If you have any further concerns or wish to schedule another appointment, please call our office at [phone].    If you have a medical emergency, please call [phone].    Thank you for coming to [facility]!      Preparing for Your Surgery  Getting started  A nurse will call you to review your health history and instructions. They will give you an arrival time based on your scheduled surgery time. Please be ready to share:  Your doctor's clinic name and phone number  Your medical, surgical, and anesthesia history  A list of allergies and sensitivities  A list of medicines, including herbal treatments and over-the-counter drugs  Whether the patient has a legal guardian (ask how to send us the papers in advance)  Please tell us if you're pregnant--or if there's any chance you might be pregnant. Some surgeries may injure a fetus (unborn baby), so they require a pregnancy test. Surgeries that are safe for a fetus don't always need a test, and you can choose whether to have  one.   If you have a child who's having surgery, please ask for a copy of Preparing for Your Child's Surgery.    Preparing for surgery  Within 10 to 30 days of surgery: Have a pre-op exam (sometimes called an H&P, or History and Physical). This can be done at a clinic or pre-operative center.  If you're having a , you may not need this exam. Talk to your care team.  At your pre-op exam, talk to your care team about all medicines you take. If you need to stop any medicines before surgery, ask when to start taking them again.  We do this for your safety. Many medicines can make you bleed too much during surgery. Some change how well surgery (anesthesia) drugs work.  Call your insurance company to let them know you're having surgery. (If you don't have insurance, call [phone].)  Call your clinic if there's any change in your health. This includes signs of a cold or flu (sore throat, runny nose, cough, rash, fever). It also includes a scrape or scratch near the surgery site.  If you have questions on the day of surgery, call your hospital or surgery center.  Eating and drinking guidelines  For your safety: Unless your surgeon tells you otherwise, follow the guidelines below.  Eat and drink as usual until 8 hours before you arrive for surgery. After that, no food or milk.  Drink clear liquids until 2 hours before you arrive. These are liquids you can see through, like water, Gatorade, and Propel Water. They also include plain black coffee and tea (no cream or milk), candy, and breath mints. You can spit out gum when you arrive.  If you drink alcohol: Stop drinking it the night before surgery.  If your care team tells you to take medicine on the morning of surgery, it's okay to take it with a sip of water.  Preventing infection  Shower or bathe the night before and morning of your surgery. Follow the instructions your clinic gave you. (If no instructions, use regular soap.)  Don't shave or clip hair near  your surgery site. We'll remove the hair if needed.  Don't smoke or vape the morning of surgery. You may chew nicotine gum up to 2 hours before surgery. A nicotine patch is okay.  Note: Some surgeries require you to completely quit smoking and nicotine. Check with your surgeon.  Your care team will make every effort to keep you safe from infection. We will:  Clean our hands often with soap and water (or an alcohol-based hand rub).  Clean the skin at your surgery site with a special soap that kills germs.  Give you a special gown to keep you warm. (Cold raises the risk of infection.)  Wear special hair covers, masks, gowns and gloves during surgery.  Give antibiotic medicine, if prescribed. Not all surgeries need antibiotics.  What to bring on the day of surgery  Photo ID and insurance card  Copy of your health care directive, if you have one  Glasses and hearing aids (bring cases)  You can't wear contacts during surgery  Inhaler and eye drops, if you use them (tell us about these when you arrive)  CPAP machine or breathing device, if you use them  A few personal items, if spending the night  If you have . . .  A pacemaker, ICD (cardiac defibrillator) or other implant: Bring the ID card.  An implanted stimulator: Bring the remote control.  A legal guardian: Bring a copy of the certified (court-stamped) guardianship papers.  Please remove any jewelry, including body piercings. Leave jewelry and other valuables at home.  If you're going home the day of surgery  You must have a responsible adult drive you home. They should stay with you overnight as well.  If you don't have someone to stay with you, and you aren't safe to go home alone, we may keep you overnight. Insurance often won't pay for this.  After surgery  If it's hard to control your pain or you need more pain medicine, please call your surgeon's office.  Questions?   If you have any questions for your care team, list them here:  _________________________________________________________________________________________________________________________________________________________________________ ____________________________________ ____________________________________ ____________________________________  For informational purposes only. Not to replace the advice of your health care provider. Copyright   2003, 2019 [facility]. All rights reserved. Clinically reviewed by Aleena Mixon MD. [employer] 423283 - REV 12/22.

## 2023-12-10 ENCOUNTER — TELEPHONE (OUTPATIENT)
Dept: FAMILY MEDICINE | Facility: CLINIC | Age: 73
End: 2023-12-10
Payer: COMMERCIAL

## 2023-12-13 ENCOUNTER — OFFICE VISIT (OUTPATIENT)
Dept: NEUROLOGY | Facility: CLINIC | Age: 73
End: 2023-12-13
Payer: COMMERCIAL

## 2023-12-13 VITALS
SYSTOLIC BLOOD PRESSURE: 162 MMHG | WEIGHT: 171 LBS | HEART RATE: 60 BPM | BODY MASS INDEX: 25.91 KG/M2 | HEIGHT: 68 IN | DIASTOLIC BLOOD PRESSURE: 78 MMHG

## 2023-12-13 DIAGNOSIS — R26.89 BALANCE PROBLEMS: Primary | ICD-10-CM

## 2023-12-13 DIAGNOSIS — M54.16 LUMBAR RADICULOPATHY: ICD-10-CM

## 2023-12-13 DIAGNOSIS — G62.9 LENGTH-DEPENDENT PERIPHERAL NEUROPATHY: ICD-10-CM

## 2023-12-13 PROCEDURE — 99214 OFFICE O/P EST MOD 30 MIN: CPT | Performed by: STUDENT IN AN ORGANIZED HEALTH CARE EDUCATION/TRAINING PROGRAM

## 2023-12-13 NOTE — PATIENT INSTRUCTIONS
See Dr. Rosario for injections    Stand up slowlly, make sure you have your bearings/not feeling lightheaded before walking    PT for fall prevention/balance training, ? Other assistive device     OT --[facility] driving evaluation.

## 2023-12-13 NOTE — PROGRESS NOTES
[facility]/[place]  Section of General Neurology  Return Patient Visit    Robert Archer MRN# [id number]   Age: 73 year old YOB: 1950            Assessment and Plan:   Assessment:  Robert Archer is a pleasant 72 year old man seen in follow up for neuropathy, balance issues/dizziness.  He has also established with our spine time for his back pain previously per my recommendation, notes reviewed as below.     Today --balance remains an issues.  Seems sometimes he gets a bit orthostatic when standing up.  Discussed will be a continued issue and should work with PT in this regard  Off of gabapentin, low back pain persists.  Recommended he see Dr. Rosario again to further discuss injections as off of/not interested in nerve pain medications  Mild neuropathy appears stable.         Plan:  Encouraged to stand up slowlly, to make sure he has his bearings/not feeling lightheaded before walking  PT for fall prevention/balance training, ? Other assistive device need (walker?)  OT --[facility] driving evaluation.  He says he was given a letter saying he can't drive for unclear reasons from the state.  I would recommend a driving evaluation in this regard to test reflexes, sign recognition etc  Follow up can be in 1 year or PRN pending how he is doing.         Payam Guzman MD   of Neurology   [facility]      Interval history:     Has hernia surgery tomorrow  Gabapentin dosing--doing OK off of it.   Fell 3 days ago. When standing up.  Doing OK now.    Low back pain persists  No other big health changes    A/P at last visit  Robert Archer is a pleasant 72 year old man seen in follow up for neuropathy, balance issues/dizziness.  He has also established with our spine time for his back pain previously.  Regarding balance--His balance issues had improved at our last visit but he had a fall this winter for unclear reasons.  He feels better now and is using  a cane/walker.   Gabapentin has partially improved back pain but he wonders if his dose could be exacerbating his dizziness.  Dizziness is quite chronic and likely multifactorial, his balance issues likely have a component from his neuropathy given vibration loss therein and we discussed this too.  Would be reasonable to trial coming down on gabapentin though and see if pain control is similar and if it could perhaps improve balance/dizziness.       Plan:  Go down to gabapentin 600 mg twice a day.  After a month if no change in pain control can trial 300 mg in the morning, 600 mg at night.    Should pain worsen on a lower dose and we not elect to increase gabapentin I would favor effexor as our next option, discussed.    [facility]---revisiting this would another good option.    He will reach out if he would like to do additional PT for balance training through [facility]  All questions answered.  Follow up in ~6 months, sooner with issues questions or changes         Dr. Rosario 10/2022 note reviewed:  Robert Archer is a pleasant 72 year old male who presents with predominantly axial/mechanical low back pain worse on the left compared to the right (70: 30%):     #.  Lumbar spondylosis  #.  Lumbar facet arthropathy  #.  Chronic T12 compression fracture  #.  Multilevel neural foraminal stenosis and severe L3-4 spinal canal stenosis which is nonspecific and overall appears to be relatively noncontributory given lack of claudicatory symptoms.  However, on occasion this could be an atypical presentation with lumbosacral pain rather than or more conventional lower extremity neurogenic claudication.     There are no objective findings for radiculopathy or myelopathy on exam today.     PLAN:  -MRI reviewed with patient today.   -We had a good, long discussion about the importance of PT and transition to home exercise program for improved postural control, neuromuscular education and lumbopelvic  balance.  Patient defers additional physical therapy at this time.  He does have his home exercise program and I stressed the importance of home exercise program when it comes to achieving meaningful, long-lasting pain relief.   -The pathway from diagnostic medial branch blocks to radiofrequency denervation was discussed in detail with the patient today. Could consider Left L3-4, L4-5, L5-S1 diagnostic medial branch blocks in the future.  Patient would like to think about this and consider  -Can trial OTC lidoderm patch (eg salonpas)   -RTC prn        Past Medical History:     Patient Active Problem List   Diagnosis    Essential hypertension, benign    Hyperlipidemia LDL goal <130    Chronic bilateral low back pain without sciatica    Osteoporosis with current pathological fracture, unspecified osteoporosis type, initial encounter    Left inguinal hernia     Past Medical History:   Diagnosis Date    Hypertension 2012        Past Surgical History:     Past Surgical History:   Procedure Laterality Date    NO HISTORY OF SURGERY          Social History:     Social History     Tobacco Use    Smoking status: Former     Packs/day: 1.00     Years: 16.00     Additional pack years: 0.00     Total pack years: 16.00     Types: Cigarettes     Start date: 1985     Quit date: 2005     Years since quittin.2    Smokeless tobacco: Never    Tobacco comments:     quit 15 years ago    Vaping Use    Vaping Use: Never used   Substance Use Topics    Alcohol use: Yes     Comment: occ / 15 drinks a month     Drug use: No        Family History:     Family History   Problem Relation Age of Onset    Hypertension Mother     Ovarian Cancer Mother     Cataracts Mother     Hypertension Father     Heart Disease Father 83    Coronary Artery Disease Father     Depression Grandson     Colon Cancer No family hx of     Prostate Cancer No family hx of     Breast Cancer No family hx of     Ulcerative Colitis No family hx of     Celiac  "Disease No family hx of     Crohn's Disease No family hx of         Medications:     Current Outpatient Medications   Medication Sig    alendronate (FOSAMAX) 70 MG tablet Take 70 mg by mouth every 7 days    fluorouracil (EFUDEX) 5 % external cream Apply to clean skin 2 times daily for 14-21 days, or until the onset of irritation. Avoid eye area. Wash hands after use.    gabapentin (NEURONTIN) 300 MG capsule Take 1 capsule (300 mg) by mouth 2 times daily    lisinopril (ZESTRIL) 20 MG tablet Take 1.5 tablets (30 mg) by mouth daily    meclizine (ANTIVERT) 25 MG tablet TAKE 1 TABLET BY MOUTH 3 TIMES DAILY AS NEEDED FOR DIZZINESS    metoprolol succinate ER (TOPROL XL) 25 MG 24 hr tablet Take 1 tablet (25 mg) by mouth daily    rosuvastatin (CRESTOR) 20 MG tablet Take 1 tablet (20 mg) by mouth daily    vitamin D3 (CHOLECALCIFEROL) 50 mcg (2000 units) tablet Take 1 tablet (50 mcg) by mouth daily     Current Facility-Administered Medications   Medication    triamcinolone (KENALOG-40) injection 40 mg    triamcinolone (KENALOG-40) injection 40 mg    triamcinolone (KENALOG-40) injection 40 mg    triamcinolone (KENALOG-40) injection 40 mg        Allergies:   No Known Allergies       Physical Exam:   Vitals: BP (!) 162/78   Pulse 60   Ht 1.727 m (5' 8\")   Wt 77.6 kg (171 lb)   BMI 26.00 kg/m       Neuro:   General Appearance: No apparent distress, well-nourished, well-groomed, pleasant      Mental Status: Alert and oriented to person, place, and time. Speech fluent and comprehension intact. No dysarthria.     Cranial Nerves:   II: Visual fields: normal  III: Pupils: 3 mm, equal, round, reactive to light   III,IV,VI: Extraocular Movements: intact   V: Facial sensation: intact to light touch  VII: Facial strength: intact without asymmetry  VIII: Hearing: intact grossly  IX: Palate: intact   XI: Shoulder shrug: intact  XII: Tongue movement: normal     Motor Exam:   5/5 diffusely  Cut off tip of R thumb at age 13, as previously " noted     Sensory: vibration again intact at ankle today, slightly diminished at great toes. PP intact     Coordination: no dysmetria noted     Reflexes: biceps, triceps, brachioradialis 1+, patellar 1+, and ankle jerks absent and symmetric. Toes are downgoing bilaterally     Gait: Cautious casual gait, short strides. Uses cane.          Data: Pertinent prior to visit   MRI L spine 6/11/2021                                                                  Impression:   1. Subacute to chronic T12 compression fracture with loss of 60-70%  vertebral body height.  2. Extensive multilevel lumbar spondylosis, most pronounced at L3-4  where there is severe spinal canal stenosis and moderate bilateral  neural foraminal stenosis with impingement on the exiting right L3  nerve root. Also moderate to severe neural foraminal stenosis at L4-5  and L5-S1.          MRI Brain 6/11/2021     Impression:    1. No acute intracranial pathology.  2. Normal bilateral auditory and vestibular structures.  3. Mild chronic small vessel ischemic disease.       Laboratory:     IFIX negative        Lab Results   Component Value Date     A1C 4.9 09/22/2021      Previous B12 wnl                   The total time of this encounter today amounted to 30 minutes. This time included time spent with the patient, prep work, ordering tests, and performing post visit documentation.

## 2023-12-13 NOTE — NURSING NOTE
"Robert Archer's goals for this visit include:   Chief Complaint   Patient presents with    RECHECK      Length dependant peripheral neuropathy //6 Month follow up       He requests these members of his care team be copied on today's visit information: yes    PCP: Jose Barrera    Referring Provider:  No referring provider defined for this encounter.    BP (!) 162/78   Pulse 60   Ht 1.727 m (5' 8\")   Wt 77.6 kg (171 lb)   BMI 26.00 kg/m      Do you need any medication refills at today's visit? No    SELMA Mendez, CMA (AAMA)      "

## 2023-12-13 NOTE — LETTER
12/13/2023         RE: Robert Archer  [address] [place] [address]        Dear Colleague,    Thank you for referring your patient, Robert Archer, to the [facility] [place]. Please see a copy of my visit note below.    [facility]  Section of General Neurology  Return Patient Visit    Robert Archer MRN# [id number]   Age: 73 year old YOB: 1950            Assessment and Plan:   Assessment:  Robert Archer is a pleasant 72 year old man seen in follow up for neuropathy, balance issues/dizziness.  He has also established with our spine time for his back pain previously per my recommendation, notes reviewed as below.     Today --balance remains an issues.  Seems sometimes he gets a bit orthostatic when standing up.  Discussed will be a continued issue and should work with PT in this regard  Off of gabapentin, low back pain persists.  Recommended he see Dr. Rosario again to further discuss injections as off of/not interested in nerve pain medications  Mild neuropathy appears stable.         Plan:  Encouraged to stand up slowlly, to make sure he has his bearings/not feeling lightheaded before walking  PT for fall prevention/balance training, ? Other assistive device need (walker?)  OT --[facility] driving evaluation.  He says he was given a letter saying he can't drive for unclear reasons from the state.  I would recommend a driving evaluation in this regard to test reflexes, sign recognition etc  Follow up can be in 1 year or PRN pending how he is doing.         Payam Guzman MD   of Neurology   [facility]      Interval history:     Has hernia surgery tomorrow  Gabapentin dosing--doing OK off of it.   Fell 3 days ago. When standing up.  Doing OK now.    Low back pain persists  No other big health changes    A/P at last visit  Robert Archer is a pleasant 72 year old man seen in follow up for neuropathy,  balance issues/dizziness.  He has also established with our spine time for his back pain previously.  Regarding balance--His balance issues had improved at our last visit but he had a fall this winter for unclear reasons.  He feels better now and is using a cane/walker.   Gabapentin has partially improved back pain but he wonders if his dose could be exacerbating his dizziness.  Dizziness is quite chronic and likely multifactorial, his balance issues likely have a component from his neuropathy given vibration loss therein and we discussed this too.  Would be reasonable to trial coming down on gabapentin though and see if pain control is similar and if it could perhaps improve balance/dizziness.       Plan:  Go down to gabapentin 600 mg twice a day.  After a month if no change in pain control can trial 300 mg in the morning, 600 mg at night.    Should pain worsen on a lower dose and we not elect to increase gabapentin I would favor effexor as our next option, discussed.    [facility]---revisiting this would another good option.    He will reach out if he would like to do additional PT for balance training through [facility]  All questions answered.  Follow up in ~6 months, sooner with issues questions or changes         Dr. Rosario 10/2022 note reviewed:  Robert Archer is a pleasant 72 year old male who presents with predominantly axial/mechanical low back pain worse on the left compared to the right (70: 30%):     #.  Lumbar spondylosis  #.  Lumbar facet arthropathy  #.  Chronic T12 compression fracture  #.  Multilevel neural foraminal stenosis and severe L3-4 spinal canal stenosis which is nonspecific and overall appears to be relatively noncontributory given lack of claudicatory symptoms.  However, on occasion this could be an atypical presentation with lumbosacral pain rather than or more conventional lower extremity neurogenic claudication.     There are no objective findings for  radiculopathy or myelopathy on exam today.     PLAN:  -MRI reviewed with patient today.   -We had a good, long discussion about the importance of PT and transition to home exercise program for improved postural control, neuromuscular education and lumbopelvic balance.  Patient defers additional physical therapy at this time.  He does have his home exercise program and I stressed the importance of home exercise program when it comes to achieving meaningful, long-lasting pain relief.   -The pathway from diagnostic medial branch blocks to radiofrequency denervation was discussed in detail with the patient today. Could consider Left L3-4, L4-5, L5-S1 diagnostic medial branch blocks in the future.  Patient would like to think about this and consider  -Can trial OTC lidoderm patch (eg salonpas)   -RTC prn        Past Medical History:     Patient Active Problem List   Diagnosis     Essential hypertension, benign     Hyperlipidemia LDL goal <130     Chronic bilateral low back pain without sciatica     Osteoporosis with current pathological fracture, unspecified osteoporosis type, initial encounter     Left inguinal hernia     Past Medical History:   Diagnosis Date     Hypertension 2012        Past Surgical History:     Past Surgical History:   Procedure Laterality Date     NO HISTORY OF SURGERY          Social History:     Social History     Tobacco Use     Smoking status: Former     Packs/day: 1.00     Years: 16.00     Additional pack years: 0.00     Total pack years: 16.00     Types: Cigarettes     Start date: 1985     Quit date: 2005     Years since quittin.2     Smokeless tobacco: Never     Tobacco comments:     quit 15 years ago    Vaping Use     Vaping Use: Never used   Substance Use Topics     Alcohol use: Yes     Comment: occ / 15 drinks a month      Drug use: No        Family History:     Family History   Problem Relation Age of Onset     Hypertension Mother      Ovarian Cancer Mother       "Cataracts Mother      Hypertension Father      Heart Disease Father 83     Coronary Artery Disease Father      Depression Grandson      Colon Cancer No family hx of      Prostate Cancer No family hx of      Breast Cancer No family hx of      Ulcerative Colitis No family hx of      Celiac Disease No family hx of      Crohn's Disease No family hx of         Medications:     Current Outpatient Medications   Medication Sig     alendronate (FOSAMAX) 70 MG tablet Take 70 mg by mouth every 7 days     fluorouracil (EFUDEX) 5 % external cream Apply to clean skin 2 times daily for 14-21 days, or until the onset of irritation. Avoid eye area. Wash hands after use.     gabapentin (NEURONTIN) 300 MG capsule Take 1 capsule (300 mg) by mouth 2 times daily     lisinopril (ZESTRIL) 20 MG tablet Take 1.5 tablets (30 mg) by mouth daily     meclizine (ANTIVERT) 25 MG tablet TAKE 1 TABLET BY MOUTH 3 TIMES DAILY AS NEEDED FOR DIZZINESS     metoprolol succinate ER (TOPROL XL) 25 MG 24 hr tablet Take 1 tablet (25 mg) by mouth daily     rosuvastatin (CRESTOR) 20 MG tablet Take 1 tablet (20 mg) by mouth daily     vitamin D3 (CHOLECALCIFEROL) 50 mcg (2000 units) tablet Take 1 tablet (50 mcg) by mouth daily     Current Facility-Administered Medications   Medication     triamcinolone (KENALOG-40) injection 40 mg     triamcinolone (KENALOG-40) injection 40 mg     triamcinolone (KENALOG-40) injection 40 mg     triamcinolone (KENALOG-40) injection 40 mg        Allergies:   No Known Allergies       Physical Exam:   Vitals: BP (!) 162/78   Pulse 60   Ht 1.727 m (5' 8\")   Wt 77.6 kg (171 lb)   BMI 26.00 kg/m       Neuro:   General Appearance: No apparent distress, well-nourished, well-groomed, pleasant      Mental Status: Alert and oriented to person, place, and time. Speech fluent and comprehension intact. No dysarthria.     Cranial Nerves:   II: Visual fields: normal  III: Pupils: 3 mm, equal, round, reactive to light   III,IV,VI: Extraocular " Movements: intact   V: Facial sensation: intact to light touch  VII: Facial strength: intact without asymmetry  VIII: Hearing: intact grossly  IX: Palate: intact   XI: Shoulder shrug: intact  XII: Tongue movement: normal     Motor Exam:   5/5 diffusely  Cut off tip of R thumb at age 13, as previously noted     Sensory: vibration again intact at ankle today, slightly diminished at great toes. PP intact     Coordination: no dysmetria noted     Reflexes: biceps, triceps, brachioradialis 1+, patellar 1+, and ankle jerks absent and symmetric. Toes are downgoing bilaterally     Gait: Cautious casual gait, short strides. Uses cane.          Data: Pertinent prior to visit   MRI L spine 6/11/2021                                                                  Impression:   1. Subacute to chronic T12 compression fracture with loss of 60-70%  vertebral body height.  2. Extensive multilevel lumbar spondylosis, most pronounced at L3-4  where there is severe spinal canal stenosis and moderate bilateral  neural foraminal stenosis with impingement on the exiting right L3  nerve root. Also moderate to severe neural foraminal stenosis at L4-5  and L5-S1.          MRI Brain 6/11/2021     Impression:    1. No acute intracranial pathology.  2. Normal bilateral auditory and vestibular structures.  3. Mild chronic small vessel ischemic disease.       Laboratory:     IFIX negative        Lab Results   Component Value Date     A1C 4.9 09/22/2021      Previous B12 wnl                   The total time of this encounter today amounted to 30 minutes. This time included time spent with the patient, prep work, ordering tests, and performing post visit documentation.      Again, thank you for allowing me to participate in the care of your patient.        Sincerely,        Storm Guzman MD

## 2023-12-17 ENCOUNTER — MYC MEDICAL ADVICE (OUTPATIENT)
Dept: FAMILY MEDICINE | Facility: CLINIC | Age: 73
End: 2023-12-17
Payer: COMMERCIAL

## 2023-12-17 NOTE — TELEPHONE ENCOUNTER
Patient Quality Outreach    Patient is due for the following:   Hypertension -  BP check    Next Steps:   Schedule a nurse only visit for blood pressure check    Type of outreach:    Sent [employer] message.  Call in 1 week if not read/completed; send letter in 2 weeks if still not read or returned call.     Questions for provider review:    None           Marcella Watts, [facility]  Chart routed to Care Team.

## 2023-12-18 ENCOUNTER — ALLIED HEALTH/NURSE VISIT (OUTPATIENT)
Dept: FAMILY MEDICINE | Facility: CLINIC | Age: 73
End: 2023-12-18
Payer: COMMERCIAL

## 2023-12-18 ENCOUNTER — TELEPHONE (OUTPATIENT)
Dept: FAMILY MEDICINE | Facility: CLINIC | Age: 73
End: 2023-12-18

## 2023-12-18 VITALS
RESPIRATION RATE: 16 BRPM | OXYGEN SATURATION: 98 % | SYSTOLIC BLOOD PRESSURE: 162 MMHG | DIASTOLIC BLOOD PRESSURE: 80 MMHG | HEART RATE: 58 BPM

## 2023-12-18 DIAGNOSIS — I10 ESSENTIAL (PRIMARY) HYPERTENSION: Primary | ICD-10-CM

## 2023-12-18 PROCEDURE — 99207 PR NO CHARGE NURSE ONLY: CPT

## 2023-12-18 ASSESSMENT — PAIN SCALES - GENERAL: PAINLEVEL: NO PAIN (0)

## 2023-12-18 NOTE — PROGRESS NOTES
Robert Archer is a 73 year old patient who comes in today for a Blood Pressure check with a RN because of ongoing blood pressure monitoring.  Initial BP:  There were no vitals taken for this visit.     Blood pressure is high.   Repeat Blood pressure: 162/80  Patient is taking medication as prescribed.   metoprolol succinate ER (TOPROL XL) 25 MG 24 hr tablet one tablet daily  and lisinopril (ZESTRIL) 20 MG tablet 1.5 tablets daily     Patient is tolerating medications well however does report some light headedness with position changes.   Patient is not monitoring Blood Pressure at home. Has machine but is not taking at home.   Current Symptoms: none    Disposition: Follow up with PCP  Abnormal Blood Pressure yes  Yes: Huddled with: Dr. Sepulveda due to abnormal result. Create a telephone encounter copy this note and route to requesting provider/PCP.    Huddled with PCP. Due to patient's recent surgery patient to return for recheck of blood pressure in two weeks. If still elevated follow up with PCP on possible changes to medications.     RN advised patient of the above from provider. Patient in agreement to return in two weeks for blood pressure check. Patient has appointment with PCP for annual preventative on 1/22/24.     Appointments in Next Year      Dec 18, 2023 10:00 AM  Nurse Only with KELLIE REESE  [facility] [phone]     Jan 03, 2024  9:00 AM  MA Visit with KELLIE NUÑEZ/LPN  [facility] [phone]     Jan 22, 2024  8:00 AM  (Arrive by 7:40 AM)  Annual Wellness Visit with Jose Barrera MD  [facility] [phone]     JANN Silva, RN  [facility] ~ Registered Nurse  Clinic Triage ~ [place] [facility]  December 18, 2023

## 2023-12-18 NOTE — TELEPHONE ENCOUNTER
Robert Archer is a 73 year old patient who comes in today for a Blood Pressure check with a RN because of ongoing blood pressure monitoring.  Initial BP:  There were no vitals taken for this visit.     Blood pressure is high.   Repeat Blood pressure: 162/80  Patient is taking medication as prescribed.   metoprolol succinate ER (TOPROL XL) 25 MG 24 hr tablet one tablet daily  and lisinopril (ZESTRIL) 20 MG tablet 1.5 tablets daily     Patient is tolerating medications well however does report some light headedness with position changes.   Patient is not monitoring Blood Pressure at home. Has machine but is not taking at home.   Current Symptoms: none     Disposition: Follow up with PCP  Abnormal Blood Pressure yes  Yes: Huddled with: Dr. Sepulveda due to abnormal result. Create a telephone encounter copy this note and route to requesting provider/PCP.     Huddled with PCP. Due to patient's recent surgery patient to return for recheck of blood pressure in two weeks. If still elevated follow up with PCP on possible changes to medications.      RN advised patient of the above from provider. Patient in agreement to return in two weeks for blood pressure check. Patient has appointment with PCP for annual preventative on 1/22/24.      Appointments in Next Year       Dec 18, 2023 10:00 AM  Nurse Only with KELLIE REESE  [facility] [phone]      Jan 03, 2024  9:00 AM  MA Visit with KELLIE NUÑEZ/LPN  [facility] [phone]      Jan 22, 2024  8:00 AM  (Arrive by 7:40 AM)  Annual Wellness Visit with Jose Barrera MD  [facility] ([facility] [phone]      JANN Silva, RN  [facility] ~ Registered Nurse  Clinic Triage ~ [place] [facility]  December 18, 2023

## 2023-12-21 ENCOUNTER — TELEPHONE (OUTPATIENT)
Dept: NEUROLOGY | Facility: CLINIC | Age: 73
End: 2023-12-21
Payer: COMMERCIAL

## 2023-12-21 NOTE — TELEPHONE ENCOUNTER
Writer faxed signed license agreement to [employer] at Dr Guzman's request.  Verified delivered by right fax.  Copy sent to HIMs for scan.  RICHY Mendez., GARY ([facility])

## 2023-12-21 NOTE — TELEPHONE ENCOUNTER
"\"Doned\" by provider.  Closing encounter.    GORDY SilvaN, RN  [facility] ~ Registered Nurse  Clinic Triage ~ [place] [facility] [place]  December 21, 2023    "

## 2023-12-26 NOTE — TELEPHONE ENCOUNTER
Read in MyChart  12/18/2023  8:55 AM by Robert Archer     Pt scheduled appt     Closing encounter

## 2024-01-03 ENCOUNTER — ALLIED HEALTH/NURSE VISIT (OUTPATIENT)
Dept: FAMILY MEDICINE | Facility: CLINIC | Age: 74
End: 2024-01-03
Payer: COMMERCIAL

## 2024-01-03 VITALS — SYSTOLIC BLOOD PRESSURE: 156 MMHG | DIASTOLIC BLOOD PRESSURE: 80 MMHG

## 2024-01-03 DIAGNOSIS — I10 ESSENTIAL (PRIMARY) HYPERTENSION: Primary | ICD-10-CM

## 2024-01-03 PROCEDURE — 99207 PR NO CHARGE NURSE ONLY: CPT

## 2024-01-03 NOTE — PROGRESS NOTES
Robert Archer is a 73 year old patient who comes in today for a Blood Pressure check.  Initial BP:  BP (!) 156/80 (BP Location: Left arm, Patient Position: Sitting, Cuff Size: Adult Regular)      Data Unavailable  Disposition: follow-up as previously indicated by provider and results routed to provider  Patient has an appt with dr lackey on 01/18/24 for a wellness.   Please advise if he needs to be seen sooner  He does not report any symptoms of high blood pressure.

## 2024-01-05 ENCOUNTER — TELEPHONE (OUTPATIENT)
Dept: FAMILY MEDICINE | Facility: CLINIC | Age: 74
End: 2024-01-05
Payer: COMMERCIAL

## 2024-01-05 NOTE — TELEPHONE ENCOUNTER
RN Triage    Patient Contact    Attempt # 1    Was call answered?  No.  Left message on voicemail with information to call me back.    Upon callback please advise patient okay to wait until appointment with PCP on 1/18/24 to review elevated blood pressures. If patient starts to have symptoms please call clinic and speak to triage nurse.     MyChart also sent.     Postponing to make sure patient read MyChart.     JANN Silva, RN  [facility] ~ Registered Nurse  Clinic Triage ~ [place] [facility] [place]  January 5, 2024

## 2024-01-05 NOTE — PROGRESS NOTES
See telephone and MyChart encounters 1/5/2023.    GORDY SilvaN, RN  [facility] ~ Registered Nurse  Clinic Triage ~ [place] [facility] [place]  January 5, 2024

## 2024-01-08 NOTE — TELEPHONE ENCOUNTER
Patient read Verna Last read by Robert Archer at  9:04 AM on 1/5/2024.     Rachael Cullen RN  [facility] - Registered Nurse  Clinic Triage [place]

## 2024-01-11 ENCOUNTER — MEDICAL CORRESPONDENCE (OUTPATIENT)
Dept: HEALTH INFORMATION MANAGEMENT | Facility: CLINIC | Age: 74
End: 2024-01-11
Payer: COMMERCIAL

## 2024-01-12 ENCOUNTER — OFFICE VISIT (OUTPATIENT)
Dept: ORTHOPEDICS | Facility: CLINIC | Age: 74
End: 2024-01-12
Payer: COMMERCIAL

## 2024-01-12 DIAGNOSIS — M17.0 OSTEOARTHRITIS OF BOTH KNEES, UNSPECIFIED OSTEOARTHRITIS TYPE: Primary | ICD-10-CM

## 2024-01-12 PROCEDURE — 20610 DRAIN/INJ JOINT/BURSA W/O US: CPT | Mod: 50 | Performed by: FAMILY MEDICINE

## 2024-01-12 RX ORDER — TRIAMCINOLONE ACETONIDE 40 MG/ML
40 INJECTION, SUSPENSION INTRA-ARTICULAR; INTRAMUSCULAR
Status: SHIPPED | OUTPATIENT
Start: 2024-01-12

## 2024-01-12 RX ADMIN — TRIAMCINOLONE ACETONIDE 40 MG: 40 INJECTION, SUSPENSION INTRA-ARTICULAR; INTRAMUSCULAR at 11:03

## 2024-01-12 NOTE — LETTER
1/12/2024         RE: Robert Archer  [address] [place] [address]        Dear Colleague,    Thank you for referring your patient, Robert Archer, to the [facility] [place]. Please see a copy of my visit note below.      [facility]  SPORTS MEDICINE CLINIC VISIT     Jan 12, 2024         Large Joint Injection/Arthocentesis: bilateral knee    Date/Time: 1/12/2024 11:03 AM    Performed by: Snatiago Del Cid MD  Authorized by: Santiago Del Cid MD    Indications:  Pain  Needle Size:  22 G  Guidance: landmark guided    Approach:  Anterolateral  Location:  Knee  Laterality:  Bilateral      Medications (Right):  40 mg triamcinolone 40 MG/ML  Medications (Left):  40 mg triamcinolone 40 MG/ML  Outcome:  Tolerated well, no immediate complications  Procedure discussed: discussed risks, benefits, and alternatives    Consent Given by:  Patient  Timeout: timeout called immediately prior to procedure    Prep: patient was prepped and draped in usual sterile fashion     There were no complications. The patient tolerated the procedure well. There was minimal bleeding.   The patient was instructed to ice the knees upon leaving clinic and refrain from overuse over the next 2 days.   The patient was instructed to go to the emergency room with any unusual pain, swelling, or redness occurred in the injected area.     Santiago Del Cid MD            Again, thank you for allowing me to participate in the care of your patient.        Sincerely,        Santiago Del Cid MD

## 2024-01-12 NOTE — PROGRESS NOTES
[facility]  SPORTS MEDICINE CLINIC VISIT     Jan 12, 2024         Large Joint Injection/Arthocentesis: bilateral knee    Date/Time: 1/12/2024 11:03 AM    Performed by: Santiago Del Cid MD  Authorized by: Santiago Del Cid MD    Indications:  Pain  Needle Size:  22 G  Guidance: landmark guided    Approach:  Anterolateral  Location:  Knee  Laterality:  Bilateral      Medications (Right):  40 mg triamcinolone 40 MG/ML  Medications (Left):  40 mg triamcinolone 40 MG/ML  Outcome:  Tolerated well, no immediate complications  Procedure discussed: discussed risks, benefits, and alternatives    Consent Given by:  Patient  Timeout: timeout called immediately prior to procedure    Prep: patient was prepped and draped in usual sterile fashion     There were no complications. The patient tolerated the procedure well. There was minimal bleeding.   The patient was instructed to ice the knees upon leaving clinic and refrain from overuse over the next 2 days.   The patient was instructed to go to the emergency room with any unusual pain, swelling, or redness occurred in the injected area.     Santiago Del Cid MD

## 2024-01-16 ENCOUNTER — TELEPHONE (OUTPATIENT)
Dept: FAMILY MEDICINE | Facility: CLINIC | Age: 74
End: 2024-01-16
Payer: COMMERCIAL

## 2024-01-17 ASSESSMENT — ENCOUNTER SYMPTOMS
PALPITATIONS: 0
EYE PAIN: 0
HEMATURIA: 0
JOINT SWELLING: 0
FREQUENCY: 0
WEAKNESS: 0
SHORTNESS OF BREATH: 0
HEMATOCHEZIA: 0
DYSURIA: 0
CONSTIPATION: 0
ARTHRALGIAS: 1
MYALGIAS: 0
COUGH: 0
FEVER: 0
DIARRHEA: 0
CHILLS: 0
DIZZINESS: 1
NAUSEA: 0
NERVOUS/ANXIOUS: 0
PARESTHESIAS: 0
HEADACHES: 0
HEARTBURN: 0
ABDOMINAL PAIN: 0
SORE THROAT: 0

## 2024-01-17 ASSESSMENT — ACTIVITIES OF DAILY LIVING (ADL): CURRENT_FUNCTION: NO ASSISTANCE NEEDED

## 2024-01-18 ENCOUNTER — TELEPHONE (OUTPATIENT)
Dept: FAMILY MEDICINE | Facility: CLINIC | Age: 74
End: 2024-01-18

## 2024-01-18 ENCOUNTER — OFFICE VISIT (OUTPATIENT)
Dept: FAMILY MEDICINE | Facility: CLINIC | Age: 74
End: 2024-01-18
Payer: COMMERCIAL

## 2024-01-18 VITALS
HEART RATE: 78 BPM | HEIGHT: 68 IN | RESPIRATION RATE: 16 BRPM | TEMPERATURE: 98.4 F | OXYGEN SATURATION: 97 % | SYSTOLIC BLOOD PRESSURE: 142 MMHG | WEIGHT: 166.5 LBS | DIASTOLIC BLOOD PRESSURE: 92 MMHG | BODY MASS INDEX: 25.23 KG/M2

## 2024-01-18 DIAGNOSIS — Z00.00 ADULT GENERAL MEDICAL EXAM: Primary | ICD-10-CM

## 2024-01-18 PROCEDURE — 99215 OFFICE O/P EST HI 40 MIN: CPT | Performed by: FAMILY MEDICINE

## 2024-01-18 ASSESSMENT — PAIN SCALES - GENERAL: PAINLEVEL: NO PAIN (0)

## 2024-01-18 ASSESSMENT — ACTIVITIES OF DAILY LIVING (ADL): CURRENT_FUNCTION: NO ASSISTANCE NEEDED

## 2024-01-18 NOTE — LETTER
January 18, 2024      Robert Archer  [address] [place] [address]        To Whom It May Concern,     I have completed a full physical exam for Robert Archer which is normal. He reports no citations, accidents, or loss of consciousness. I have found no physical, neurological, or psychiatric reasons that would limit his ability to operate a motor vehicle safely.     Furthermore, I do not see the reason this was discontinued to begin with. He has no evidence of diabetes, or use of any sedating medications that may alter his ability to use a motor vehicle.     I am recommending reinstatement of his driving privileges in the [facility].     Sincerely,        Jose Barrera MD

## 2024-01-18 NOTE — PROGRESS NOTES
Assessment & Plan   1. Adult general medical exam  I have completed a full physical exam for Robert Archer which is normal. He reports no citations, accidents, or loss of consciousness. I have found no physical, neurological, or psychiatric reasons that would limit his ability to operate a motor vehicle safely.     Furthermore, I do not see the reason this was discontinued to begin with. He has no evidence of diabetes, or use of any sedating medications that may alter his ability to use a motor vehicle.     I am recommending reinstatement of his driving privileges in the state [facility].     Addendum: I was able to get a hold of the DMV who stated a parking disability certificate was submitted with my signature on it for permanent disability certificate dated 12/20/23 (the date his license was canceled). I do not see a forms encounter from that date, nor an office visit from that date. Reviewing my schedule, I was also not in the office that week due to the Christmas Holiday, so not sure how this occurred. My statement above should stand.    Jose Barrera MD  [facility]    Disclaimer: This note consists of symbols derived from keyboarding, dictation and/or voice recognition software. As a result, there may be errors in the script that have gone undetected. Please consider this when interpreting information found in this chart.    Total time spent today for this visit is 40 minutes including precharting, patient interview, exam, review of labs/imaging, developing plan together with the patient, and medical documentation.    Damon Jones is a 73 year old, presenting for the following health issues:  Forms        1/18/2024    11:05 AM   Additional Questions   Roomed by Priya MACIE   Accompanied by Wife         1/18/2024    11:05 AM   Patient Reported Additional Medications   Patient reports taking the following new medications NA     Healthy Habits:     In general, how would  "you rate your overall health?  Good    Frequency of exercise:  None    Do you usually eat at least 4 servings of fruit and vegetables a day, include whole grains    & fiber and avoid regularly eating high fat or \"junk\" foods?  Yes    Taking medications regularly:  Yes    Medication side effects:  None    Ability to successfully perform activities of daily living:  No assistance needed    Home Safety:  Lack of grab bars in the bathroom    Hearing Impairment:  No hearing concerns    In the past 6 months, have you been bothered by leaking of urine?  No    In general, how would you rate your overall mental or emotional health?  Good    Additional concerns today:  Yes     Patient received form in the mail regarding his revoked driving privileges as of 12/20/23. Does not know why they were revoked. Brings in disability parking certification form. Shows the box checked stating he should be able to exercise control of a motor vehicle.     Unsure the reason for revoked license. State reason is \"not physically qualified\". Letter ID number: [id number].            No reported accidents, or other forms filled out by other providers.    License is valid until 8/12/2025.    Needs a physical exam and statement from his provider stating is is ok for him to drive.    Reviewed medications, allergies, surgical, medical, family, and social histories.    Attempted to call DMV for clarification of reason for driving privileges being revoked and what is needed for re-instatement by calling provider number at [phone] on hold for 40+ minutes while preforming patient interview, exam, and documentation.    Review of Systems  Constitutional, HEENT, cardiovascular, pulmonary, GI, , musculoskeletal, neuro, skin, endocrine and psych systems are negative, except as otherwise noted.      Objective    BP (!) 142/92   Pulse 78   Temp 98.4  F (36.9  C) (Temporal)   Resp 16   Ht 1.728 m (5' 8.03\")   Wt 75.5 kg (166 lb 8 oz)   SpO2 97%   " BMI 25.29 kg/m    Body mass index is 25.29 kg/m .  Physical Exam   GENERAL: alert and no distress. Accompanied by wife.  EYES: Eyes grossly normal to inspection, PERRL and conjunctivae and sclerae normal  HENT: ear canals and TM's normal, nose and mouth without ulcers or lesions  NECK: no adenopathy, no asymmetry, masses, or scars  RESP: lungs clear to auscultation - no rales, rhonchi or wheezes  CV: regular rate and rhythm, normal S1 S2, no S3 or S4, no murmur, click or rub, no peripheral edema  ABDOMEN: soft, nontender, no hepatosplenomegaly, no masses and bowel sounds normal  MS: no gross musculoskeletal defects noted, no edema  SKIN: no suspicious lesions or rashes  NEURO: Normal strength and tone, mentation intact and speech normal. Able to do 3 item immediate and delayed recall, count by serial 7's, name common object, tell right and left directions, 5/5 strength in all extremities. Normal fine motor movement. Normal peripheral vision. No ankle clonus. Normal reflexes.  PSYCH: mentation appears normal, affect normal/bright  LYMPH: no cervical, supraclavicular, axillary, or inguinal adenopathy     Latest Reference Range & Units 12/08/23 07:59   Glucose 70 - 99 mg/dL 84           Signed Electronically by: Jose Barrera MD

## 2024-01-18 NOTE — TELEPHONE ENCOUNTER
Forms/Letter Request    Type of form/letter: Form for license     Do we have the form/letter: Yes: in exam room with pt     Who is the form from? Patient    Where did/will the form come from? Patient or family brought in       When is form/letter needed by: as time permits     How would you like the form/letter returned:  TBD    Patient Notified form requests are processed in 5-7 business days:No

## 2024-01-18 NOTE — PROGRESS NOTES
PM&R Follow-Up Visit -     Date of Initial Visit: 10/28/23  LOV: 10/28/233  TD: 1/19/2024     Recall: Robert Archer is a 73 year old male with history of chronic T12 compression fracture, severe L3-4 central canal stenosis and axial/mechanical lumbosacral pain worse on the left compared to the right (70: 30%) who presents for follow up    INTERVAL HISTORY:  Patient was seen for his initial evaluation October 20, 2023.  At that visit, we had reviewed his MRI, encouraged more consistent exercise program, and discussed the possibility of diagnostic medial branch blocks in the pathway to radiofrequency ablation.    Today, he denies any significant, ongoing back pain or issues; but rather, his primary concern is with regards to dizziness.  He states that he has dizziness worse with position changes, stairs, but also experiences persistent dizziness when he is walking on unsteady surface.  He has seen the general neurology clinic who has referred him for balance and neurorehab PT which is scheduled.  Describes his dizziness as feeling as though he is spinning.  He notes significant quality of life limitations as result of his ongoing and chronic symptoms.    RECALL HISTORY OF PRESENT ILLNESS:  Robert Archer is a 72 year old male who presents with a chief complaint of low back pain.     Pain began ~1.5 years ago and is associated with trauma. Patient reports a fall down 11 stairs when carrying chairs and fell down onto his back. Denies any major back pain or issues prior to that time. He was seen [facility] at that time in the ED with T12 compression fracture and anterior wedging, suspected L5 spondylolysis bilaterally but no other spinal fracture or dislocation seen.  He did follow-up with his primary care physician and was referred to neurology.  He has since been seen and evaluated both by neurology and neurosurgery who recommended a trial of physical therapy.  He has been working on neuropathic medication  optimization with neurology given underlying neck dependent peripheral polyneuropathy.  Patient did attend 2 sessions of physical therapy with Zeyad Huang, however, states that it was not particularly helpful.  He did receive home exercise program which he does inconsistently.    He localizes pain symptoms to the lower lumbar spine without radicular complaints.  Symptoms are worse on the left compared to the right (70: 30%).  He denies weakness, progressive numbness/tingling, or bowel/bladder issues.  Symptoms are worse with walking, lumbar extension and lifting and improved with topical Bengay.  Pain score 5/10 on average, 0/10 today at rest, and 6/10 its worse in the last week.  Describes pain as constant dull and aching in nature.  He does not report significant pain related sleep disturbance.    PRIOR INJURIES/TREATMENT:   Ice/Heat: +  Physical Therapy:   PT x2 sessions in September with Zeyad Huang      - Current Pain Medications -   Bengay topical helps      - Prior/Trialed Pain Medications -   NSAIDs: naproxen, ibuprofen, diclofenac   Muscle relaxer: flexeril prn   gabapentin 300mg BID      Prior Procedures:  Date    Procedure   Improvement (%)  None              Prior Related Surgery: None   Other (acupuncture, OMT, CMM, TENS, DME, etc.):   Chiropractic care - not helpful     Specialists Seen - (with most recent, available notes and clinic visits reviewed)   1.  Neurology - Dr. Guzman  2.  Neurosurgery - Anuj Mcneal PA-C   3.  Primary care sports medicine -Dr. Del Cid    IMAGING - reviewed   04/01/2021 XR L spine ([facility])  IMPRESSION: Three views lumbar spine series.     Moderate superior endplate compression deformity with estimated 50% anterior vertebral height loss at T12, age indeterminate. No retropulsion is seen. Correlate for acuity of injury with point tenderness by direct palpation.     Mild levoconvex mid lumbar scoliosis with grade 1 anterolisthesis at L5-S1. Suspect L5 spondylolysis bilaterally.  No lumbar spinal fracture or dislocation is seen. Prominent L4-5 and moderate L2-3 lumbar disc degeneration is identified.    Visualized osseous pelvis is without fracture. The sacroiliac joints are mildly degenerated.       06/11/2021 MRI lumbar spine  Findings: There are 5 lumbar-type vertebrae assumed for the purposes  of this dictation.  Superior endplate compression deformity of the T12  vertebral body with loss of 60-70% vertebral body height along with  mild/moderate osseous edema. The tip of the conus medullaris is at  T12-L1.  Grade 1 anterolisthesis of L3 on L4 and L5 on S1. Multilevel  disc desiccation. Moderate loss of disc height at L4-5. Mild loss of  disc height at L2-3, L3-4, and L5-S1.  Multilevel degenerative  endplate marrow signal changes including edema most pronounced at  L4-5.     On a level by level basis:     T12-L1: Circumferential disc osteophyte complex and bilateral facet  hypertrophy. Mild spinal canal narrowing. Moderate bilateral neural  foraminal stenosis.     L1-2: Posterior disc bulge and bilateral facet hypertrophy. Mild  spinal canal narrowing. Moderate bilateral neural foraminal stenosis.     L2-3: Posterior disc osteophyte complex and bilateral facet  hypertrophy. Ligamentum flavum thickening. Mild to moderate spinal  canal stenosis. Moderate bilateral neural foraminal stenosis, right  greater than left.     L3-4: Grade 1 anterolisthesis with uncovering of the disc and  posterior disc osteophyte complex. Bilateral facet hypertrophy.  Ligamentum flavum thickening. Severe spinal canal stenosis. Moderate  bilateral neural foraminal stenosis with impingement on the exiting  right L3 nerve root.     L4-5: Posterior disc bulge and bilateral facet hypertrophy. Mild to  moderate spinal canal narrowing. Moderate right and severe left neural  foraminal stenosis. Likely impingement on the exiting left L4 nerve  root.     L5-S1: Grade 1 anterolisthesis. Bilateral facet hypertrophy. No  spinal  canal stenosis. Moderate to severe bilateral neural foraminal  stenosis.     Paraspinous tissues are within normal limits.     Partially visualized round T2 hyperintense lesion in the left kidney,  presumably representing a cyst.                                                                      Impression:   1. Subacute to chronic T12 compression fracture with loss of 60-70%  vertebral body height.  2. Extensive multilevel lumbar spondylosis, most pronounced at L3-4  where there is severe spinal canal stenosis and moderate bilateral  neural foraminal stenosis with impingement on the exiting right L3  nerve root. Also moderate to severe neural foraminal stenosis at L4-5  and L5-S1.    Medical History:  He has a history of knee osteoarthritis, length dependent neuropathy, HTN, dizziness   He  has a past surgical history that includes no history of surgery.    Family History  His family history includes Cataracts in his mother; Depression in his grandson; Heart Disease (age of onset: 83) in his father; Hypertension in his father and mother; Ovarian Cancer in his mother.     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 Enjoys walking.   He  reports that he has quit smoking. He has never used smokeless tobacco. He reports current alcohol use. He reports that he does not use drugs.        Current Medications:   He has a current medication list which includes the following prescription(s): cyclobenzaprine, fluorouracil, gabapentin, lisinopril, meclizine, and rosuvastatin, and the following Facility-Administered Medications: triamcinolone and triamcinolone.     Allergies:   No Known Allergies    PHYSICAL EXAMINATION:  BP (!) 144/85 (BP Location: Right arm, Patient Position: Chair, Cuff Size: Adult Regular)   Pulse 65    General: Pleasant, straightforward, WDWN individual.  Mental Status: Pleasant, direct, appropriate mood and affect  Resp: breathing  is unlabored without audible wheeze  Vascular: No visible cyanosis, no venous stasis changes  Heme: No visible ecchymosis or erythema on extremities  Skin: No notable rash    Neurologic:  Strength: All major muscle groups of the bilateral lower extremities have normal and symmetric muscle strength     Musculoskeletal:  Increased thoracic kyphosis most notable and thoracolumbar junction     Lumbar Spine: No gross pelvic obliquity.  Positive step-off at approximately T12-L1 with palpation to the spinous processes.  Mod reduced ROM all planes, non-tender to palpation. No significant pain with facet loading.        ASSESSMENT:  Robert Archer is a pleasant 72 year old male who presents with predominantly axial/mechanical low back pain worse on the left compared to the right (70: 30%):    #. Dizziniess    Spine issues:  #.  Lumbar spondylosis  #.  Lumbar facet arthropathy  #.  Chronic T12 compression fracture  #.  Multilevel neural foraminal stenosis and severe L3-4 spinal canal stenosis which is nonspecific and overall appears to be relatively noncontributory given lack of claudicatory symptoms.  However, on occasion this could be an atypical presentation with lumbosacral pain rather than or more conventional lower extremity neurogenic claudication.     Overall, he denies any significant, ongoing thoracolumbar spine pain or issues.  His primary concern is with regards to his dizziness and balance when ambulating.    PLAN:  -Neurorehab for balance and stability as scheduled  -At this point, he does not seem to have significant pain as relates to his spine and I would defer any interventional options unless symptoms change or worsen  -Patient was interested in a second opinion regarding his dizziness and symptoms and I have referred him to my colleague, Dr. Garcia  -Follow up as needed.     Ready to learn, no apparent learning barriers.  Education provided on treatment plan according to patient's preferred learning style.   Patient verbalizes understanding.   __________________________________  Brandie Rosario MD  Physical Medicine & Rehabilitation      I spent a total of 25 minutes on the day of the visit.   Time spent by me doing chart review, history and exam, documentation and further activities per the note

## 2024-01-19 ENCOUNTER — OFFICE VISIT (OUTPATIENT)
Dept: NEUROSURGERY | Facility: CLINIC | Age: 74
End: 2024-01-19
Payer: COMMERCIAL

## 2024-01-19 VITALS — DIASTOLIC BLOOD PRESSURE: 85 MMHG | HEART RATE: 65 BPM | SYSTOLIC BLOOD PRESSURE: 144 MMHG

## 2024-01-19 DIAGNOSIS — M47.816 LUMBAR SPONDYLOSIS: Primary | ICD-10-CM

## 2024-01-19 DIAGNOSIS — R42 DIZZINESS: ICD-10-CM

## 2024-01-19 DIAGNOSIS — M40.204 KYPHOSIS OF THORACIC REGION, UNSPECIFIED KYPHOSIS TYPE: ICD-10-CM

## 2024-01-19 DIAGNOSIS — S22.080S COMPRESSION FRACTURE OF T12 VERTEBRA, SEQUELA: ICD-10-CM

## 2024-01-19 DIAGNOSIS — G89.29 CHRONIC MIDLINE LOW BACK PAIN WITHOUT SCIATICA: ICD-10-CM

## 2024-01-19 DIAGNOSIS — M54.50 CHRONIC MIDLINE LOW BACK PAIN WITHOUT SCIATICA: ICD-10-CM

## 2024-01-19 PROCEDURE — 99213 OFFICE O/P EST LOW 20 MIN: CPT | Performed by: PHYSICAL MEDICINE & REHABILITATION

## 2024-01-19 NOTE — PATIENT INSTRUCTIONS
It was a pleasure seeing you today in our [facility]. We discussed the following:    #. Continue with balance therapy as scheduled.    #. I will also refer you my colleague in neurology, Dr. Garcia, who specializes in dizziness as she may have some other therapy or medication options to consider for you. Their clinic will reach out to you for an appointment.     #. If you are not having significant back pain or symptoms at this time, I do no think spine injections would be particularly beneficial. Should your pain increase in the low back, please let our clinic know and I would be happy to see you again to re-evaluate.    We will plan to follow up in our Spine Clinic as needed.

## 2024-01-19 NOTE — LETTER
1/19/2024         RE: Robert Archer  [address] [place] [address]        Dear Colleague,    Thank you for referring your patient, Robert Archer, to the [facility] NEUROSURGERY [facility]INIC [place]. Please see a copy of my visit note below.    PM&R Follow-Up Visit -     Date of Initial Visit: 10/28/23  LOV: 10/28/233  TD: 1/19/2024     Recall: Robert Archer is a 73 year old male with history of chronic T12 compression fracture, severe L3-4 central canal stenosis and axial/mechanical lumbosacral pain worse on the left compared to the right (70: 30%) who presents for follow up    INTERVAL HISTORY:  Patient was seen for his initial evaluation October 20, 2023.  At that visit, we had reviewed his MRI, encouraged more consistent exercise program, and discussed the possibility of diagnostic medial branch blocks in the pathway to radiofrequency ablation.    Today, he denies any significant, ongoing back pain or issues; but rather, his primary concern is with regards to dizziness.  He states that he has dizziness worse with position changes, stairs, but also experiences persistent dizziness when he is walking on unsteady surface.  He has seen the general neurology clinic who has referred him for balance and neurorehab PT which is scheduled.  Describes his dizziness as feeling as though he is spinning.  He notes significant quality of life limitations as result of his ongoing and chronic symptoms.    RECALL HISTORY OF PRESENT ILLNESS:  Robert Archer is a 72 year old male who presents with a chief complaint of low back pain.     Pain began ~1.5 years ago and is associated with trauma. Patient reports a fall down 11 stairs when carrying chairs and fell down onto his back. Denies any major back pain or issues prior to that time. He was seen [facility] at that time in the ED with T12 compression fracture and anterior wedging, suspected L5 spondylolysis bilaterally but no other spinal fracture or dislocation  seen.  He did follow-up with his primary care physician and was referred to neurology.  He has since been seen and evaluated both by neurology and neurosurgery who recommended a trial of physical therapy.  He has been working on neuropathic medication optimization with neurology given underlying neck dependent peripheral polyneuropathy.  Patient did attend 2 sessions of physical therapy with Zeyad Huang, however, states that it was not particularly helpful.  He did receive home exercise program which he does inconsistently.    He localizes pain symptoms to the lower lumbar spine without radicular complaints.  Symptoms are worse on the left compared to the right (70: 30%).  He denies weakness, progressive numbness/tingling, or bowel/bladder issues.  Symptoms are worse with walking, lumbar extension and lifting and improved with topical Bengay.  Pain score 5/10 on average, 0/10 today at rest, and 6/10 its worse in the last week.  Describes pain as constant dull and aching in nature.  He does not report significant pain related sleep disturbance.    PRIOR INJURIES/TREATMENT:   Ice/Heat: +  Physical Therapy:   PT x2 sessions in September with Zeyad Huang      - Current Pain Medications -   Bengay topical helps      - Prior/Trialed Pain Medications -   NSAIDs: naproxen, ibuprofen, diclofenac   Muscle relaxer: flexeril prn   gabapentin 300mg BID      Prior Procedures:  Date    Procedure   Improvement (%)  None              Prior Related Surgery: None   Other (acupuncture, OMT, CMM, TENS, DME, etc.):   Chiropractic care - not helpful     Specialists Seen - (with most recent, available notes and clinic visits reviewed)   1.  Neurology - Dr. Guzman  2.  Neurosurgery - Anuj Mcneal PA-C   3.  Primary care sports medicine -Dr. Del Cid    IMAGING - reviewed   04/01/2021 XR L spine ([facility])  IMPRESSION: Three views lumbar spine series.     Moderate superior endplate compression deformity with estimated 50% anterior vertebral  height loss at T12, age indeterminate. No retropulsion is seen. Correlate for acuity of injury with point tenderness by direct palpation.     Mild levoconvex mid lumbar scoliosis with grade 1 anterolisthesis at L5-S1. Suspect L5 spondylolysis bilaterally. No lumbar spinal fracture or dislocation is seen. Prominent L4-5 and moderate L2-3 lumbar disc degeneration is identified.    Visualized osseous pelvis is without fracture. The sacroiliac joints are mildly degenerated.       06/11/2021 MRI lumbar spine  Findings: There are 5 lumbar-type vertebrae assumed for the purposes  of this dictation.  Superior endplate compression deformity of the T12  vertebral body with loss of 60-70% vertebral body height along with  mild/moderate osseous edema. The tip of the conus medullaris is at  T12-L1.  Grade 1 anterolisthesis of L3 on L4 and L5 on S1. Multilevel  disc desiccation. Moderate loss of disc height at L4-5. Mild loss of  disc height at L2-3, L3-4, and L5-S1.  Multilevel degenerative  endplate marrow signal changes including edema most pronounced at  L4-5.     On a level by level basis:     T12-L1: Circumferential disc osteophyte complex and bilateral facet  hypertrophy. Mild spinal canal narrowing. Moderate bilateral neural  foraminal stenosis.     L1-2: Posterior disc bulge and bilateral facet hypertrophy. Mild  spinal canal narrowing. Moderate bilateral neural foraminal stenosis.     L2-3: Posterior disc osteophyte complex and bilateral facet  hypertrophy. Ligamentum flavum thickening. Mild to moderate spinal  canal stenosis. Moderate bilateral neural foraminal stenosis, right  greater than left.     L3-4: Grade 1 anterolisthesis with uncovering of the disc and  posterior disc osteophyte complex. Bilateral facet hypertrophy.  Ligamentum flavum thickening. Severe spinal canal stenosis. Moderate  bilateral neural foraminal stenosis with impingement on the exiting  right L3 nerve root.     L4-5: Posterior disc bulge and  bilateral facet hypertrophy. Mild to  moderate spinal canal narrowing. Moderate right and severe left neural  foraminal stenosis. Likely impingement on the exiting left L4 nerve  root.     L5-S1: Grade 1 anterolisthesis. Bilateral facet hypertrophy. No spinal  canal stenosis. Moderate to severe bilateral neural foraminal  stenosis.     Paraspinous tissues are within normal limits.     Partially visualized round T2 hyperintense lesion in the left kidney,  presumably representing a cyst.                                                                      Impression:   1. Subacute to chronic T12 compression fracture with loss of 60-70%  vertebral body height.  2. Extensive multilevel lumbar spondylosis, most pronounced at L3-4  where there is severe spinal canal stenosis and moderate bilateral  neural foraminal stenosis with impingement on the exiting right L3  nerve root. Also moderate to severe neural foraminal stenosis at L4-5  and L5-S1.    Medical History:  He has a history of knee osteoarthritis, length dependent neuropathy, HTN, dizziness   He  has a past surgical history that includes no history of surgery.    Family History  His family history includes Cataracts in his mother; Depression in his grandson; Heart Disease (age of onset: 83) in his father; Hypertension in his father and mother; Ovarian Cancer in his mother.     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 Enjoys walking.   He  reports that he has quit smoking. He has never used smokeless tobacco. He reports current alcohol use. He reports that he does not use drugs.        Current Medications:   He has a current medication list which includes the following prescription(s): cyclobenzaprine, fluorouracil, gabapentin, lisinopril, meclizine, and rosuvastatin, and the following Facility-Administered Medications: triamcinolone and triamcinolone.     Allergies:   No Known  Allergies    PHYSICAL EXAMINATION:  BP (!) 144/85 (BP Location: Right arm, Patient Position: Chair, Cuff Size: Adult Regular)   Pulse 65    General: Pleasant, straightforward, WDWN individual.  Mental Status: Pleasant, direct, appropriate mood and affect  Resp: breathing is unlabored without audible wheeze  Vascular: No visible cyanosis, no venous stasis changes  Heme: No visible ecchymosis or erythema on extremities  Skin: No notable rash    Neurologic:  Strength: All major muscle groups of the bilateral lower extremities have normal and symmetric muscle strength     Musculoskeletal:  Increased thoracic kyphosis most notable and thoracolumbar junction     Lumbar Spine: No gross pelvic obliquity.  Positive step-off at approximately T12-L1 with palpation to the spinous processes.  Mod reduced ROM all planes, non-tender to palpation. No significant pain with facet loading.        ASSESSMENT:  Robert Archer is a pleasant 72 year old male who presents with predominantly axial/mechanical low back pain worse on the left compared to the right (70: 30%):    #. Dizziniess    Spine issues:  #.  Lumbar spondylosis  #.  Lumbar facet arthropathy  #.  Chronic T12 compression fracture  #.  Multilevel neural foraminal stenosis and severe L3-4 spinal canal stenosis which is nonspecific and overall appears to be relatively noncontributory given lack of claudicatory symptoms.  However, on occasion this could be an atypical presentation with lumbosacral pain rather than or more conventional lower extremity neurogenic claudication.     Overall, he denies any significant, ongoing thoracolumbar spine pain or issues.  His primary concern is with regards to his dizziness and balance when ambulating.    PLAN:  -Neurorehab for balance and stability as scheduled  -At this point, he does not seem to have significant pain as relates to his spine and I would defer any interventional options unless symptoms change or worsen  -Patient was  interested in a second opinion regarding his dizziness and symptoms and I have referred him to my colleague, Dr. Garcia  -Follow up as needed.     Ready to learn, no apparent learning barriers.  Education provided on treatment plan according to patient's preferred learning style.  Patient verbalizes understanding.   __________________________________  Brandie Rosario MD  Physical Medicine & Rehabilitation      I spent a total of 25 minutes on the day of the visit.   Time spent by me doing chart review, history and exam, documentation and further activities per the note       Again, thank you for allowing me to participate in the care of your patient.        Sincerely,        Brandie Rosario MD

## 2024-01-22 NOTE — TELEPHONE ENCOUNTER
Records Requested     January 22, 2024 2:47 PM   [id number]   Facility  [facility]   Outcome 2:49 pm Sent request for imaging to be pushed to PACS. -JA     RECORDS RECEIVED FROM: Internal    REASON FOR VISIT: Dizziness   Date of Appt: 1/31/24 @ 12:30 pm    NOTES (FOR ALL VISITS) STATUS DETAILS   OFFICE NOTE from referring provider Internal 1/19/24 Brandie Rosario MD @[facility] NeuroSurg     OFFICE NOTE from other specialist Internal/Received 12/13/23, 6/14/23 Storm Guzman MD @[facility] Neuro    9/27/23 (Transferred Recs) Janina Barroso PT @[facility]     MEDICATION LIST Internal    IMAGING  (FOR ALL VISITS)     MRI (HEAD, NECK, SPINE) Internal [facility]  6/11/21 MR Brain     CT (HEAD, NECK, SPINE) In process [facility]*  2/12/23  CT Head  2/12/23 CT Cervical Spine

## 2024-01-23 NOTE — TELEPHONE ENCOUNTER
RECORDS RECEIVED FROM: Internal    REASON FOR VISIT: Dizziness   Date of Appt: 2/7/24 @ 1:30 pm (rescheduled from 1/31/24)   NOTES (FOR ALL VISITS) STATUS DETAILS   OFFICE NOTE from referring provider Internal 1/19/24 Brandie Rosario MD @[facility] NeuroSurg      OFFICE NOTE from other specialist Internal 12/13/23, 6/14/23 Storm Guzman MD @[facility] Neuro     9/27/23 (Transferred Recs) Janina Barroso PT @[facility]     MEDICATION LIST Internal    IMAGING  (FOR ALL VISITS)     MRI (HEAD, NECK, SPINE) Internal [facility]  6/11/21 MR Brain     CT (HEAD, NECK, SPINE) Internal [facility]*  2/12/23  CT Head  2/12/23 CT Cervical Spine

## 2024-01-26 ENCOUNTER — THERAPY VISIT (OUTPATIENT)
Dept: PHYSICAL THERAPY | Facility: CLINIC | Age: 74
End: 2024-01-26
Attending: STUDENT IN AN ORGANIZED HEALTH CARE EDUCATION/TRAINING PROGRAM
Payer: COMMERCIAL

## 2024-01-26 DIAGNOSIS — R26.89 BALANCE PROBLEMS: ICD-10-CM

## 2024-01-26 DIAGNOSIS — M54.16 LUMBAR RADICULOPATHY: ICD-10-CM

## 2024-01-26 PROCEDURE — 97110 THERAPEUTIC EXERCISES: CPT | Mod: GP | Performed by: PHYSICAL THERAPIST

## 2024-01-26 PROCEDURE — 97162 PT EVAL MOD COMPLEX 30 MIN: CPT | Mod: GP | Performed by: PHYSICAL THERAPIST

## 2024-01-26 NOTE — PROGRESS NOTES
PHYSICAL THERAPY EVALUATION  Type of Visit: Evaluation    See electronic medical record for Abuse and Falls Screening details.    Subjective       Presenting condition or subjective complaint: Balance issues    Balance issues for many years. Morning and evenings are the worst for balance. Feeling of dizziness are bothersome. Not spinning, but feeling lightheadedness a lot of the time. Using a cane today- started using this after fall and broke femur about a year ago. He fell in the kitchen and unsure of what happened. Finds the cane is helpful on stairs. Only uses the cane for stairs in the house otherwise does not use often. Sitting, no dizziness but most of the time in standing. Low back pain limits him too. Seeing Dr. Garcia regarding his dizziness in Feb. Reports decreased sensation in toes and balls of feet.    Exercise is minimal right now because of back. Spending a good amount of time at home.    Date of onset: 12/13/24    Relevant medical history: Dizziness; High blood pressure; Osteoarthritis     Prior diagnostic imaging/testing results: MRI     Prior therapy history for the same diagnosis, illness or injury: No        Living Environment  Social support: With a significant other or spouse   Type of home: House   Stairs to enter the home: Yes 4 Is there a railing: Yes   Ramp: No   Stairs inside the home: Yes 13 Is there a railing: Yes   Help at home: Self Cares (home health aide/personal care attendant, family, etc)  Equipment owned: Straight Cane     Employment: No    Hobbies/Interests: none    Patient goals for therapy: Go up and down stairs and walk with no issues.    Pain assessment:  pain in low back with more standing and walking tasks.     Objective   Vitals Signs  Heart Rate: 64  Blood Pressure: 134/71 (125/79)  Cognitive Status Examination  Orientation: Oriented to person, place and time   Level of Consciousness: Alert  Follows Commands and Answers Questions: 100% of the time  Personal Safety and  Judgement: Intact  Memory: Intact    OBSERVATION: arrives with spouse in lobby but comes back by himself  INTEGUMENTARY: Intact  POSTURE:  moderate/severe forward head posture, forward shoulder posture, posterior pelvic tilt in standing  PALPATION: n/t  RANGE OF MOTION:  heel cord and HS tightness, pec tightness as noted with posture, neck rotation through about 50% of ROM as well as extension   STRENGTH:  grossly WFLs    BED MOBILITY:  did not assess today    TRANSFERS:  prefers hands on chair for sit to stand, posterior lean upon standing.    WHEELCHAIR MOBILITY: n/a    GAIT:   Level of [place]: Independent  Assistive Device(s): Cane (single end) but can walk without  Gait Deviations:  decreased speed for age, decreased foot clearance, downward gaze with forward head posture.  Gait Distance: around clinic  Stairs: 1 UE support on rail and cane on other side on stairs, does not put much pressure through the cane though.    BALANCE:  NBOS standing is challenging for patient.    SPECIAL TESTS  Functional Gait Assessment (FGA) TOTAL SCORE: (MAXIMUM SCORE 30): 16    10 Meter Walk Test (Comfortable)  8.6 secs   10 Meter Walk Test (Fast)     6 Minute Walk Test (6MWT)           Danielson Balance Scale (BBS)     5 Times Sit-to-Stand (5TSTS)  18.3 secs with posterior lean in standing and 1 rep he did not full make up into standing.     Dynamic Gait Index (DGI)     Timed Up and Go (TUG) - sec 14.4 secs, fall risk   Single Leg Stance Right (sec)    Single Leg Stance Left (sec)    Modified CTSIB Conditions (sec) Cond 1: 30  Cond 2: 22  Cond 4: 30  Cond 5 : 2     SENSATION:  did okay on neuropathy screening (neurology) but reports numbness in toes and balls of feet.      Assessment & Plan   CLINICAL IMPRESSIONS  Medical Diagnosis: balance problems    Treatment Diagnosis: impaired balance and dizziness   Impression/Assessment: Patient is a 73 year old male with dizziness and imbalance complaints. He has two major falls in the  "last year and is unsure of what happens when these occur. He reports feeling dizzy, no spinning. With testing today the \"dizziness\" occurs when he is showing imbalance like with head turns walking or standing on unsteady surfaces. Patient also with moderate forward head and shoulder posture which impacts his balance. Did not assess vestibular system today but will plan on further testing next session. Checked BP in sitting and standing and he was not orthostatic. The following significant findings have been identified: Pain, Decreased ROM/flexibility, Decreased strength, Impaired balance, Impaired gait, Impaired muscle performance, and Dizziness. These impairments interfere with their ability to perform recreational activities, household mobility, and community mobility as compared to previous level of function.     Clinical Decision Making (Complexity):  Clinical Presentation: Evolving/Changing  Clinical Presentation Rationale: based on medical and personal factors listed in PT evaluation  Clinical Decision Making (Complexity): Moderate complexity    PLAN OF CARE  Treatment Interventions:  Interventions: Gait Training, Neuromuscular Re-education, Therapeutic Activity, Therapeutic Exercise    Long Term Goals     PT Goal 1  Goal Identifier: FGA  Goal Description: Robert will improve score on FGA from 16 to 21 or > in order to show improved balance to decrease risk of falls at home and in the community.  Target Date: 04/19/24  PT Goal 2  Goal Identifier: sit to stand  Goal Description: Robert will perform 5 times sit to  12 secs or < without posterior loss of balance in order to improve safety and ease of transers.  Target Date: 04/19/24  PT Goal 3  Goal Identifier: stairs  Goal Description: Robert will do flight of stairs with 1 rail only, instead of cane and rail in order to improve ease of safely getting around his house.  Target Date: 04/19/24      Frequency of Treatment: 1 time per week  Duration of Treatment: " 12 weeks    Recommended Referrals to Other Professionals:   Education Assessment:   Learner/Method: Patient;Listening;Demonstration;Reading  Education Comments: education on balance findings    Risks and benefits of evaluation/treatment have been explained.   Patient/Family/caregiver agrees with Plan of Care.     Evaluation Time:     PT Eval, Moderate Complexity Minutes (22304): 30       Signing Clinician: KATEY Odell [facility]                                                                                   OUTPATIENT PHYSICAL THERAPY      PLAN OF TREATMENT FOR OUTPATIENT REHABILITATION   Patient's Last Name, First Name, Fozia Murdockil  AGATHA YOB: 1950   Provider's Name   [facility]   Medical Record No.  [id number]     Onset Date: 12/13/24  Start of Care Date: 01/26/24     Medical Diagnosis:  balance problems      PT Treatment Diagnosis:  impaired balance and dizziness Plan of Treatment  Frequency/Duration: 1 time per week/ 12 weeks    Certification date from 01/26/24 to 04/19/24         See note for plan of treatment details and functional goals     Meena Gonzalez PT                         I CERTIFY THE NEED FOR THESE SERVICES FURNISHED UNDER        THIS PLAN OF TREATMENT AND WHILE UNDER MY CARE     (Physician attestation of this document indicates review and certification of the therapy plan).              Referring Provider:  Storm Guzman    Initial Assessment  See Epic Evaluation- Start of Care Date: 01/26/24

## 2024-01-31 ENCOUNTER — PRE VISIT (OUTPATIENT)
Dept: NEUROLOGY | Facility: CLINIC | Age: 74
End: 2024-01-31

## 2024-02-02 ENCOUNTER — THERAPY VISIT (OUTPATIENT)
Dept: PHYSICAL THERAPY | Facility: CLINIC | Age: 74
End: 2024-02-02
Attending: STUDENT IN AN ORGANIZED HEALTH CARE EDUCATION/TRAINING PROGRAM
Payer: COMMERCIAL

## 2024-02-02 DIAGNOSIS — R26.89 BALANCE PROBLEMS: Primary | ICD-10-CM

## 2024-02-02 PROCEDURE — 97110 THERAPEUTIC EXERCISES: CPT | Mod: GP | Performed by: PHYSICAL THERAPIST

## 2024-02-02 PROCEDURE — 97112 NEUROMUSCULAR REEDUCATION: CPT | Mod: GP | Performed by: PHYSICAL THERAPIST

## 2024-02-07 ENCOUNTER — PRE VISIT (OUTPATIENT)
Dept: NEUROLOGY | Facility: CLINIC | Age: 74
End: 2024-02-07

## 2024-02-07 ENCOUNTER — OFFICE VISIT (OUTPATIENT)
Dept: NEUROLOGY | Facility: CLINIC | Age: 74
End: 2024-02-07
Payer: COMMERCIAL

## 2024-02-07 VITALS
RESPIRATION RATE: 16 BRPM | DIASTOLIC BLOOD PRESSURE: 81 MMHG | SYSTOLIC BLOOD PRESSURE: 154 MMHG | HEART RATE: 61 BPM | OXYGEN SATURATION: 98 %

## 2024-02-07 DIAGNOSIS — R20.2 NUMBNESS AND TINGLING OF BOTH FEET: ICD-10-CM

## 2024-02-07 DIAGNOSIS — R42 DIZZINESS: ICD-10-CM

## 2024-02-07 DIAGNOSIS — G20.C PARKINSONISM, UNSPECIFIED PARKINSONISM TYPE (H): Primary | ICD-10-CM

## 2024-02-07 DIAGNOSIS — R20.0 NUMBNESS AND TINGLING OF BOTH FEET: ICD-10-CM

## 2024-02-07 DIAGNOSIS — R26.89 BALANCE DISORDER: ICD-10-CM

## 2024-02-07 PROCEDURE — 99417 PROLNG OP E/M EACH 15 MIN: CPT | Performed by: PSYCHIATRY & NEUROLOGY

## 2024-02-07 PROCEDURE — 99215 OFFICE O/P EST HI 40 MIN: CPT | Mod: GC | Performed by: PSYCHIATRY & NEUROLOGY

## 2024-02-07 ASSESSMENT — PAIN SCALES - GENERAL: PAINLEVEL: NO PAIN (0)

## 2024-02-07 NOTE — PROGRESS NOTES
[facility]    Neurology Consult    2/7/2024      Robert Archer MRN# [id number]   YOB: 1950 Age: 73 year old      Primary care provider:   Jose Barrera    Requesting provider:   Brandie Rosario    Reason for Consult:  Dizziness/balance    IMPRESSIONS:  Robert Archer is a 73 year old male with a past medical history of T12 compression fracture, lumbar spondylosis, and neuropathy who presents with balance problems.   His history and exam are very suggestive of parkinsonism.  Features include bradykinesia, masked facies, reduced blink, rigid tone, and a history of shuffling gait and potentially freezing of gait. There is no rest tremor or a high degree of asymmetry that is more typical of Parkinson's disease. He does have a history of growing up in a farm and potential exposure to pesticides.  Given this ambiguity, it would be helpful for him to get a DaTscan to help determine whether he has idiopathic Parkinson's disease or whether he needs an evaluation for parkinsonism.  We will also get a EMG to evaluate for potential neuropathy as a contributor to the balance problems. He does not have much by way of a vestibular history with head movement triggered symptoms.  There may be a mild component of autonomic dysregulation that could certainly be part of an extrapyramidal process.    Recommendations:  DaTscan  EMG  Follow-up after testing    HISTORY OF PRESENT ILLNESS:  Robert Archer is a 73 year old male with a past medical history of T12 compression fracture, lumbar spondylosis, and neuropathy who presents with balance problems. He is cared for by my colleagues Dr. Brandie Rosario in PM&R and Dr. Storm Guzman in Neurology. He is accompanied by his wife Ioana to this visit, who also provides helpful history.      Robert has felt off-balance for the past ~2 years. He had a fall almost exactly 1 year ago but does not remember any particular trigger for the fall.  He denies mechanical interruptions, dizziness, or loss of consciousness. The fall was severe enough for him to induce a left intertrochanteric femur fracture, which was surgically repaired on 2-14-23. There was one other fall in the last two years, again, un-triggered. He feels the balance problems gradually worsened but have been stable for the past several months. He does not feel he is being pulled or pushed in any direction, no time lag to feeling unsteady.     Ioana says he shuffles and walks slowly, tends to lean to the right when walking. Endorses freezing of gait when changing surfaces or going through thresholds.   He has no tremor, but his handwriting has become poor. Endorses overall slowness and stiffness that is new in the past several months. His voice has been quieter. Denies constipation, bladder changes. Sleeps okay, denies snoring, dream enactment. Denies anosmia.    He walks with a cane outside the house and going up stairs and on uneven surfaces. He has had numbness in the feet for a couple years. There is no dizziness, vertigo, faintness with head movement in the sitting position. When first standing up can feel like he is getting pushed back. When he walks, he doesn't feel faint or dizzy.  He can walk up to 1/4 mile at a time but this limited by back pain.     DIZZINESS:  Spinning vertigo: 15-20 years of vertigo, now more recently resolved; typically when sitting or in bed. Dizziness is not a major problem for him but he does frequently feel lightheaded when changing positions, such from lying to sitting and sitting to standing. This lasts a few minutes and self-resolves. Does not know if BP changes during that time .  Rocking vertigo: denies  Triggers: as above  Last VNG: none    AURAL SYMPTOMS:  Tinnitus: ringing, probably both ears, nearly constant; described as a high-pitched buzz that is loud  Ear pressure: denies  Hearing loss: denies  Last audiogram: years ago    HEADACHE:    Denies  Aura: N/A  Last brain imaging:  MR BRAIN W/O & W CONTRAST 6/11/2021  1. No acute intracranial pathology.  2. Normal bilateral auditory and vestibular structures.  3. Mild chronic small vessel ischemic disease.    NECK/UPPER EXT SYMPTOMS:  Neck pain: No  Shoulder pain: No  Arm pain: No  Hand pain: No  Hand weakness: No  Numbness/tingling hands: Occasional tingling R hand  Color change hands: No  Swelling hands: No  Last EMG: None    BULBAR SYMPTOMS:  Dysphagia: No  Throat pressure: No  Chronic cough: No    CARDIAC:  Chest pain: No  Shortness of breath: No  Palpitation: No  Syncope: No  Last Echo:    OTHER:  Pelvic pressure: No  Rectal pressure: No  Hematuria:  Swelling in feet: No    PAST MEDICAL HISTORY:  Past Medical History:   Diagnosis Date    Hypertension 4-1-2012      PAST SURGICAL HISTORY:  Past Surgical History:   Procedure Laterality Date    NO HISTORY OF SURGERY        SOCIAL HISTORY: , former smoker, quit 18 years. 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 Denies tobacco, drinks EtOH 3-4/week, denies other drugs.  [place].    ALLERGIES:  No Known Allergies     MEDICATIONS:    Current Outpatient Medications   Medication Sig Dispense Refill    lisinopril (ZESTRIL) 20 MG tablet Take 1.5 tablets (30 mg) by mouth daily 135 tablet 3    metoprolol succinate ER (TOPROL XL) 25 MG 24 hr tablet Take 1 tablet (25 mg) by mouth daily 90 tablet 1    rosuvastatin (CRESTOR) 20 MG tablet Take 1 tablet (20 mg) by mouth daily 90 tablet 3    vitamin D3 (CHOLECALCIFEROL) 50 mcg (2000 units) tablet Take 1 tablet (50 mcg) by mouth daily 90 tablet 3     PHYSICAL EXAM:  Vitals:  BP (!) 154/81   Pulse 61   Resp 16   SpO2 98%     General: Patient is well-nourished, well-groomed, in no apparent distress    HEENT: Head is atraumatic, throat clear, neck supple.  Ext: Warm, well-perfused. No edema. Good pulses.     Neurologic:  Mental Status: Alert and oriented to person, place,  time, and situation.  Able to provide an excellent history.     Cranial Nerves: Visual fields full to confrontation. Smooth pursuit is saccadic. Normal vertical eye movements. Saccades are hypometric but reach their target. Pupils equal and reactive to light.  Extraocular movements full.  Face sensation normal.   Masked facies. Very limited blinking. Normal head impulse testing.  Normal hearing to finger rub. Face symmetric with normal movements. Tongue and palate midline.  Normal shoulder elevation.      Motor: Normal bulk increased rigid tone R>L but no cogwheeling.  Right thumb is missing the tip, reported as a traumatic avulsion at the age of 13.  No pronator drift.  Full strength to confrontational testing. Mild bradykinesia in R hemibody. No tremor. Increased axial tone. Unable to stand from sitting position with arms crossed.     Sensory: Normal light touch, temperature, and vibration.    Reflexes: Biceps, Brachioradialis, Triceps, Knees, Ankles 2/4.     Coordination: Normal finger to nose, rapid alternating movements. No tremor.     Gait: Decreased stride height and length. Decreased arm swing bilaterally.  Negative Romberg.  Normal turn. Unable to walk in tandem.    Upper Limb Tension Test for Thoracic Outlet Syndrome:  Arms abducted: NO numbness and tingling develop   Arms abducted head turned to the RIGHT: none  Arms abducted head turned to the LEFT: none  Arms abducted head tilt to the RIGHT: none  Arms abducted head tilt to the LEFT: none    Pain Right scalene: No  Pain Left scalene: No  Pain Right sternocleidomastoid: No  Pain Left sternocleidomastoid: No    Pain under the RIGHT pectoralis minor tendon: No  Pain at the RIGHT 1st rib-sternum insertion site: No  Pain under the LEFT pectoralis minor tendon: No  Pain at the LEFT 1st rib-sternum insertion site: No     DATA:  All available and relevant labs, imaging, and other procedures were reviewed personally.   8-16-23: CT ABDOMEN PELVIS W/O  CONTRAST  IMPRESSION:  1.  Limited study without intravenous or oral contrast.  2.  There is a left inguinal hernia containing a knuckle of small bowel. No bowel dilatation to confirm the presence of a mechanical bowel obstruction.  3.  Large amount of stool throughout the colon.  4.  Severe compression fracture deformity of the T12 vertebral body, possibly old but correlate clinically for confirmation.  5.  Other findings as detailed above.    MR BRAIN W/O & W CONTRAST 6/11/2021  Provided History:  Dizziness, persistent/recurrent, cardiac or  vascular cause suspected.  ICD-10: Vertigo     Findings: No abnormal signal or enhancement along the course of the  seventh and eighth cranial nerves on either side. Vestibular and  auditory structures exhibit normal signal on T2-weighted images and no  abnormal enhancement. Mild dependent left mastoid effusion.     Images of the whole brain demonstrate no mass lesion, no mass effect,  or midline shift. No intracranial hemorrhage. No restricted diffusion.  Mild generalized parenchymal volume loss, normal for age. Ventricles  are proportionate to the cerebral sulci. Mild nonspecific T2  hyperintensity in the subcortical and deep white matter of both  cerebral hemispheres. No abnormal enhancement.     Mild paranasal sinus mucosal thickening.                                                             Impression:    1. No acute intracranial pathology.  2. Normal bilateral auditory and vestibular structures.  3. Mild chronic small vessel ischemic disease.    Patient seen with Dr. Donovan Jordan, movement disorder fellow.    60-minutes were spent in evaluation, examination, and documentation.

## 2024-02-07 NOTE — LETTER
2/7/2024       RE: Robert Archer  [address] [place] [address]       Dear Colleague,    Thank you for referring your patient, Robert Archer, to the [facility]. Please see a copy of my visit note below.    [facility]    Neurology Consult    2/7/2024      Robert Archer MRN# [id number]   YOB: 1950 Age: 73 year old      Primary care provider:   Jose Barrera    Requesting provider:   Brandie Rosario    Reason for Consult:  Dizziness/balance    IMPRESSIONS:  Robert Archer is a 73 year old male with a past medical history of T12 compression fracture, lumbar spondylosis, and neuropathy who presents with balance problems.   His history and exam are very suggestive of parkinsonism.  Features include bradykinesia, masked facies, reduced blink, rigid tone, and a history of shuffling gait and potentially freezing of gait. There is no rest tremor or a high degree of asymmetry that is more typical of Parkinson's disease. He does have a history of growing up in a farm and potential exposure to pesticides.  Given this ambiguity, it would be helpful for him to get a DaTscan to help determine whether he has idiopathic Parkinson's disease or whether he needs an evaluation for parkinsonism.  We will also get a EMG to evaluate for potential neuropathy as a contributor to the balance problems. He does not have much by way of a vestibular history with head movement triggered symptoms.  There may be a mild component of autonomic dysregulation that could certainly be part of an extrapyramidal process.    Recommendations:  DaTscan  EMG  Follow-up after testing    HISTORY OF PRESENT ILLNESS:  Robert Archer is a 73 year old male with a past medical history of T12 compression fracture, lumbar spondylosis, and neuropathy who presents with balance problems. He is cared for by my  colleagues Dr. Brandie Rosario in PM&R and Dr. Storm Guzman in Neurology. He is accompanied by his wife Ioana to this visit, who also provides helpful history.      Robert has felt off-balance for the past ~2 years. He had a fall almost exactly 1 year ago but does not remember any particular trigger for the fall. He denies mechanical interruptions, dizziness, or loss of consciousness. The fall was severe enough for him to induce a left intertrochanteric femur fracture, which was surgically repaired on 2-14-23. There was one other fall in the last two years, again, un-triggered. He feels the balance problems gradually worsened but have been stable for the past several months. He does not feel he is being pulled or pushed in any direction, no time lag to feeling unsteady.     Ioana says he shuffles and walks slowly, tends to lean to the right when walking. Endorses freezing of gait when changing surfaces or going through thresholds.   He has no tremor, but his handwriting has become poor. Endorses overall slowness and stiffness that is new in the past several months. His voice has been quieter. Denies constipation, bladder changes. Sleeps okay, denies snoring, dream enactment. Denies anosmia.    He walks with a cane outside the house and going up stairs and on uneven surfaces. He has had numbness in the feet for a couple years. There is no dizziness, vertigo, faintness with head movement in the sitting position. When first standing up can feel like he is getting pushed back. When he walks, he doesn't feel faint or dizzy.  He can walk up to 1/4 mile at a time but this limited by back pain.     DIZZINESS:  Spinning vertigo: 15-20 years of vertigo, now more recently resolved; typically when sitting or in bed. Dizziness is not a major problem for him but he does frequently feel lightheaded when changing positions, such from lying to sitting and sitting to standing. This lasts a few minutes and self-resolves. Does not know  if BP changes during that time .  Rocking vertigo: denies  Triggers: as above  Last VNG: none    AURAL SYMPTOMS:  Tinnitus: ringing, probably both ears, nearly constant; described as a high-pitched buzz that is loud  Ear pressure: denies  Hearing loss: denies  Last audiogram: years ago    HEADACHE:   Denies  Aura: N/A  Last brain imaging:  MR BRAIN W/O & W CONTRAST 6/11/2021  1. No acute intracranial pathology.  2. Normal bilateral auditory and vestibular structures.  3. Mild chronic small vessel ischemic disease.    NECK/UPPER EXT SYMPTOMS:  Neck pain: No  Shoulder pain: No  Arm pain: No  Hand pain: No  Hand weakness: No  Numbness/tingling hands: Occasional tingling R hand  Color change hands: No  Swelling hands: No  Last EMG: None    BULBAR SYMPTOMS:  Dysphagia: No  Throat pressure: No  Chronic cough: No    CARDIAC:  Chest pain: No  Shortness of breath: No  Palpitation: No  Syncope: No  Last Echo:    OTHER:  Pelvic pressure: No  Rectal pressure: No  Hematuria:  Swelling in feet: No    PAST MEDICAL HISTORY:  Past Medical History:   Diagnosis Date    Hypertension 4-1-2012      PAST SURGICAL HISTORY:  Past Surgical History:   Procedure Laterality Date    NO HISTORY OF SURGERY        SOCIAL HISTORY: , former smoker, quit 18 years. Retired. Worked for [employer] management. Denies tobacco, drinks EtOH 3-4/week, denies other drugs.  [place] up [place] farm.    ALLERGIES:  No Known Allergies     MEDICATIONS:    Current Outpatient Medications   Medication Sig Dispense Refill    lisinopril (ZESTRIL) 20 MG tablet Take 1.5 tablets (30 mg) by mouth daily 135 tablet 3    metoprolol succinate ER (TOPROL XL) 25 MG 24 hr tablet Take 1 tablet (25 mg) by mouth daily 90 tablet 1    rosuvastatin (CRESTOR) 20 MG tablet Take 1 tablet (20 mg) by mouth daily 90 tablet 3    vitamin D3 (CHOLECALCIFEROL) 50 mcg (2000 units) tablet Take 1 tablet (50 mcg) by mouth daily 90 tablet 3     PHYSICAL EXAM:  Vitals:  BP  (!) 154/81   Pulse 61   Resp 16   SpO2 98%     General: Patient is well-nourished, well-groomed, in no apparent distress    HEENT: Head is atraumatic, throat clear, neck supple.  Ext: Warm, well-perfused. No edema. Good pulses.     Neurologic:  Mental Status: Alert and oriented to person, place, time, and situation.  Able to provide an excellent history.     Cranial Nerves: Visual fields full to confrontation. Smooth pursuit is saccadic. Normal vertical eye movements. Saccades are hypometric but reach their target. Pupils equal and reactive to light.  Extraocular movements full.  Face sensation normal.   Masked facies. Very limited blinking. Normal head impulse testing.  Normal hearing to finger rub. Face symmetric with normal movements. Tongue and palate midline.  Normal shoulder elevation.      Motor: Normal bulk increased rigid tone R>L but no cogwheeling.  Right thumb is missing the tip, reported as a traumatic avulsion at the age of 13.  No pronator drift.  Full strength to confrontational testing. Mild bradykinesia in R hemibody. No tremor.     Sensory: Normal light touch, temperature, and vibration.    Reflexes: Biceps, Brachioradialis, Triceps, Knees, Ankles 2/4.     Coordination: Normal finger to nose, rapid alternating movements. No tremor.     Gait: Decreased stride height and length. Decreased arm swing bilaterally.  Negative Romberg.  Normal turn. Unable to walk in tandem.    Upper Limb Tension Test for Thoracic Outlet Syndrome:  Arms abducted: NO numbness and tingling develop   Arms abducted head turned to the RIGHT: none  Arms abducted head turned to the LEFT: none  Arms abducted head tilt to the RIGHT: none  Arms abducted head tilt to the LEFT: none    Pain Right scalene: No  Pain Left scalene: No  Pain Right sternocleidomastoid: No  Pain Left sternocleidomastoid: No    Pain under the RIGHT pectoralis minor tendon: No  Pain at the RIGHT 1st rib-sternum insertion site: No  Pain under the LEFT  pectoralis minor tendon: No  Pain at the LEFT 1st rib-sternum insertion site: No     DATA:  All available and relevant labs, imaging, and other procedures were reviewed personally.   8-16-23: CT ABDOMEN PELVIS W/O CONTRAST  IMPRESSION:  1.  Limited study without intravenous or oral contrast.  2.  There is a left inguinal hernia containing a knuckle of small bowel. No bowel dilatation to confirm the presence of a mechanical bowel obstruction.  3.  Large amount of stool throughout the colon.  4.  Severe compression fracture deformity of the T12 vertebral body, possibly old but correlate clinically for confirmation.  5.  Other findings as detailed above.    MR BRAIN W/O & W CONTRAST 6/11/2021  Provided History:  Dizziness, persistent/recurrent, cardiac or  vascular cause suspected.  ICD-10: Vertigo     Findings: No abnormal signal or enhancement along the course of the  seventh and eighth cranial nerves on either side. Vestibular and  auditory structures exhibit normal signal on T2-weighted images and no  abnormal enhancement. Mild dependent left mastoid effusion.     Images of the whole brain demonstrate no mass lesion, no mass effect,  or midline shift. No intracranial hemorrhage. No restricted diffusion.  Mild generalized parenchymal volume loss, normal for age. Ventricles  are proportionate to the cerebral sulci. Mild nonspecific T2  hyperintensity in the subcortical and deep white matter of both  cerebral hemispheres. No abnormal enhancement.     Mild paranasal sinus mucosal thickening.                                                             Impression:    1. No acute intracranial pathology.  2. Normal bilateral auditory and vestibular structures.  3. Mild chronic small vessel ischemic disease.    Patient seen with Dr. Donovan Jordan, movement disorder fellow.    60-minutes were spent in evaluation, examination, and documentation.      Again, thank you for allowing me to participate in the care of your patient.       Sincerely,    Dereje WONG Cha, MD

## 2024-02-13 ENCOUNTER — TELEPHONE (OUTPATIENT)
Dept: NEUROLOGY | Facility: CLINIC | Age: 74
End: 2024-02-13
Payer: COMMERCIAL

## 2024-02-15 ENCOUNTER — THERAPY VISIT (OUTPATIENT)
Dept: PHYSICAL THERAPY | Facility: CLINIC | Age: 74
End: 2024-02-15
Attending: STUDENT IN AN ORGANIZED HEALTH CARE EDUCATION/TRAINING PROGRAM
Payer: COMMERCIAL

## 2024-02-15 DIAGNOSIS — R26.89 BALANCE PROBLEMS: Primary | ICD-10-CM

## 2024-02-15 PROCEDURE — 97110 THERAPEUTIC EXERCISES: CPT | Mod: GP | Performed by: PHYSICAL THERAPIST

## 2024-02-15 PROCEDURE — 97116 GAIT TRAINING THERAPY: CPT | Mod: GP | Performed by: PHYSICAL THERAPIST

## 2024-02-15 PROCEDURE — 97112 NEUROMUSCULAR REEDUCATION: CPT | Mod: GP | Performed by: PHYSICAL THERAPIST

## 2024-03-01 ENCOUNTER — THERAPY VISIT (OUTPATIENT)
Dept: PHYSICAL THERAPY | Facility: CLINIC | Age: 74
End: 2024-03-01
Attending: STUDENT IN AN ORGANIZED HEALTH CARE EDUCATION/TRAINING PROGRAM
Payer: COMMERCIAL

## 2024-03-01 DIAGNOSIS — R26.89 BALANCE PROBLEMS: Primary | ICD-10-CM

## 2024-03-01 PROCEDURE — 97110 THERAPEUTIC EXERCISES: CPT | Mod: GP | Performed by: PHYSICAL THERAPIST

## 2024-03-01 PROCEDURE — 97112 NEUROMUSCULAR REEDUCATION: CPT | Mod: GP | Performed by: PHYSICAL THERAPIST

## 2024-03-11 ENCOUNTER — THERAPY VISIT (OUTPATIENT)
Dept: PHYSICAL THERAPY | Facility: CLINIC | Age: 74
End: 2024-03-11
Attending: STUDENT IN AN ORGANIZED HEALTH CARE EDUCATION/TRAINING PROGRAM
Payer: COMMERCIAL

## 2024-03-11 DIAGNOSIS — R26.89 BALANCE PROBLEMS: Primary | ICD-10-CM

## 2024-03-11 PROCEDURE — 97116 GAIT TRAINING THERAPY: CPT | Mod: GP | Performed by: PHYSICAL THERAPIST

## 2024-03-11 PROCEDURE — 97112 NEUROMUSCULAR REEDUCATION: CPT | Mod: GP | Performed by: PHYSICAL THERAPIST

## 2024-03-12 ENCOUNTER — DOCUMENTATION ONLY (OUTPATIENT)
Dept: CARDIOLOGY | Facility: CLINIC | Age: 74
End: 2024-03-12
Payer: COMMERCIAL

## 2024-03-13 ENCOUNTER — DOCUMENTATION ONLY (OUTPATIENT)
Dept: CARDIOLOGY | Facility: CLINIC | Age: 74
End: 2024-03-13
Payer: COMMERCIAL

## 2024-03-18 ENCOUNTER — THERAPY VISIT (OUTPATIENT)
Dept: PHYSICAL THERAPY | Facility: CLINIC | Age: 74
End: 2024-03-18
Attending: STUDENT IN AN ORGANIZED HEALTH CARE EDUCATION/TRAINING PROGRAM
Payer: COMMERCIAL

## 2024-03-18 DIAGNOSIS — R26.89 BALANCE PROBLEMS: Primary | ICD-10-CM

## 2024-03-18 PROCEDURE — 97116 GAIT TRAINING THERAPY: CPT | Mod: GP | Performed by: PHYSICAL THERAPIST

## 2024-03-18 PROCEDURE — 97112 NEUROMUSCULAR REEDUCATION: CPT | Mod: GP | Performed by: PHYSICAL THERAPIST

## 2024-03-18 PROCEDURE — 97110 THERAPEUTIC EXERCISES: CPT | Mod: GP | Performed by: PHYSICAL THERAPIST

## 2024-03-21 ENCOUNTER — ANCILLARY PROCEDURE (OUTPATIENT)
Dept: NUCLEAR MEDICINE | Facility: CLINIC | Age: 74
End: 2024-03-21
Attending: PSYCHIATRY & NEUROLOGY
Payer: COMMERCIAL

## 2024-03-21 DIAGNOSIS — R26.89 BALANCE PROBLEMS: Primary | ICD-10-CM

## 2024-03-21 PROCEDURE — 78803 RP LOCLZJ TUM SPECT 1 AREA: CPT | Mod: GC | Performed by: RADIOLOGY

## 2024-03-21 PROCEDURE — A9584 IODINE I-123 IOFLUPANE: HCPCS | Performed by: RADIOLOGY

## 2024-03-21 RX ORDER — IOFLUPANE I-123 2 MCI/ML
4-6 INJECTION, SOLUTION INTRAVENOUS ONCE
Status: COMPLETED | OUTPATIENT
Start: 2024-03-21 | End: 2024-03-21

## 2024-03-21 RX ADMIN — IOFLUPANE I-123 4.52 MILLICURIE: 2 INJECTION, SOLUTION INTRAVENOUS at 11:30

## 2024-03-21 RX ADMIN — Medication 130 MG: at 10:49

## 2024-03-22 ENCOUNTER — MYC MEDICAL ADVICE (OUTPATIENT)
Dept: FAMILY MEDICINE | Facility: CLINIC | Age: 74
End: 2024-03-22
Payer: COMMERCIAL

## 2024-03-22 NOTE — TELEPHONE ENCOUNTER
Patient Quality Outreach    Patient is due for the following:   Hypertension -  BP check    Next Steps:   Schedule a nurse only visit for blood pressure check    Type of outreach:    Sent [employer] message.  Call in 1 week if not read/completed; send letter in 2 weeks if not returned/read.       Questions for provider review:    None           Marcella Watts, [facility]  Chart routed to Care Team.

## 2024-03-23 NOTE — PROGRESS NOTES
[facility]    Neurology Follow-Up    3/27/2024      Robert Archer MRN# [id number]   YOB: 1950 Age: 73 year old      Primary care provider:   Jose Barrera      Follow-up 2-7-24  Robert Archer is a 73 year old male with a past medical history of T12 compression fracture, lumbar spondylosis, and neuropathy who presents with balance problems.     His history and exam are very suggestive of parkinsonism.  Features include bradykinesia, masked facies, reduced blink, rigid tone, and a history of shuffling gait and potentially freezing of gait. There is no rest tremor or a high degree of asymmetry that is more typical of Parkinson's disease. He does have a history of growing up in a farm and potential exposure to pesticides.  Given this ambiguity, it would be helpful for him to get a DaTscan to help determine whether he has idiopathic Parkinson's disease or whether he needs an evaluation for parkinsonism.  We will also get a EMG to evaluate for potential neuropathy as a contributor to the balance problems. He does not have much by way of a vestibular history with head movement triggered symptoms.  There may be a mild component of autonomic dysregulation that could certainly be part of an extrapyramidal process.     Recommendations:  DaTscan  EMG    Interim History 3/27/2024:  3-21-24 DaTscan  Left greater than right decreased putaminal tracer uptake.                                                        Impression:  1. Presynaptic dopaminergic deficit is present.          Reviewed that he has not had any REM behavior sleep disorders. He has had no anosmia. Appetite has been good.   He has had no falls.     ALLERGIES:   No Known Allergies     MEDICATIONS:    lisinopril (ZESTRIL) 20 MG tablet, Take 1.5 tablets (30 mg) by mouth daily, Disp: 135 tablet, Rfl: 3    metoprolol succinate ER (TOPROL XL) 25 MG 24 hr tablet, Take 1 tablet (25 mg) by mouth daily, Disp: 90  tablet, Rfl: 1    rosuvastatin (CRESTOR) 20 MG tablet, Take 1 tablet (20 mg) by mouth daily, Disp: 90 tablet, Rfl: 3    vitamin D3 (CHOLECALCIFEROL) 50 mcg (2000 units) tablet, Take 1 tablet (50 mcg) by mouth daily, Disp: 90 tablet, Rfl: 3     PHYSICAL EXAM:  Vitals: BP (!) 173/97 (BP Location: Right arm, Patient Position: Sitting, Cuff Size: Adult Regular)   Pulse 68   Resp 18   SpO2 98%     General: Patient is well-nourished, well-groomed, in no apparent distress. Here with wife.    HEENT: Head is atraumatic, eyes look normal exteriorly, throat clear, neck supple. Low blink rate. Masked facies.    Neurologic:  Mental Status: Alert and oriented to person, place, time, and situation.  Able to provide an excellent history. Asks good questions.     Gait: Unable to stand without use of arms. Increased axial tone. Positive pull test--falls backwards and needs to be caught even after 6-7 steps.    IMPRESSION:   Robert Archer is a 73 year old male with balance problems, bradykinesia, axial rigidity, asymmetric limb rigidity worse on the side opposite to lower putamenal dopamine uptake. We reviewed the nature of Parkinson's Disease and possible next steps including starting dopamine replacement immediately or enrolling in an exercise program. He is quite interested in the exercise program so I will reach out to the coordinators of the SPARX3 study to see whether he may qualify. I will discuss with colleagues in the Movement Disorders program about Robert's longitudinal care.    1] Depending on that outcome, we can start some dopamine replacement either immediately or after the study has been completed.     2] We will also continue to work up the neuropathy as a contributor to the balance problems. He has an EMG scheduled on 4-17-24    3] Patient to get a home blood pressure monitor. Will need to modify the blood pressure medications if the blood pressure is persistently elevated.     The longitudinal plan of care for the  condition(s) below were addressed during this visit. Due to the added complexity in care, I will continue to support Robert in the subsequent management of this condition(s) and with the ongoing continuity of care of this condition(s).    30-minutes spent in evaluation, examination, and documentation

## 2024-03-27 ENCOUNTER — OFFICE VISIT (OUTPATIENT)
Dept: NEUROLOGY | Facility: CLINIC | Age: 74
End: 2024-03-27
Payer: COMMERCIAL

## 2024-03-27 VITALS
RESPIRATION RATE: 18 BRPM | SYSTOLIC BLOOD PRESSURE: 173 MMHG | HEART RATE: 68 BPM | DIASTOLIC BLOOD PRESSURE: 97 MMHG | OXYGEN SATURATION: 98 %

## 2024-03-27 DIAGNOSIS — R20.2 NUMBNESS AND TINGLING OF BOTH FEET: ICD-10-CM

## 2024-03-27 DIAGNOSIS — R26.89 BALANCE DISORDER: ICD-10-CM

## 2024-03-27 DIAGNOSIS — R20.0 NUMBNESS AND TINGLING OF BOTH FEET: ICD-10-CM

## 2024-03-27 DIAGNOSIS — G20.A1 PARKINSON'S DISEASE WITHOUT DYSKINESIA OR FLUCTUATING MANIFESTATIONS (H): Primary | ICD-10-CM

## 2024-03-27 PROCEDURE — 99214 OFFICE O/P EST MOD 30 MIN: CPT | Performed by: PSYCHIATRY & NEUROLOGY

## 2024-03-27 PROCEDURE — G2211 COMPLEX E/M VISIT ADD ON: HCPCS | Performed by: PSYCHIATRY & NEUROLOGY

## 2024-03-27 ASSESSMENT — PAIN SCALES - GENERAL
PAINLEVEL: NO PAIN (0)
PAINLEVEL: MODERATE PAIN (4)

## 2024-03-27 NOTE — LETTER
3/27/2024       RE: Robert Archer  [address] [place] [address]     Dear Colleague,    Thank you for referring your patient, Robert Archer, to the [facility]. Please see a copy of my visit note below.    [facility]    Neurology Follow-Up    3/27/2024      Robert Archer MRN# [id number]   YOB: 1950 Age: 73 year old      Primary care provider:   Jose Barrera      Follow-up 2-7-24  Robert Archer is a 73 year old male with a past medical history of T12 compression fracture, lumbar spondylosis, and neuropathy who presents with balance problems.     His history and exam are very suggestive of parkinsonism.  Features include bradykinesia, masked facies, reduced blink, rigid tone, and a history of shuffling gait and potentially freezing of gait. There is no rest tremor or a high degree of asymmetry that is more typical of Parkinson's disease. He does have a history of growing up in a farm and potential exposure to pesticides.  Given this ambiguity, it would be helpful for him to get a DaTscan to help determine whether he has idiopathic Parkinson's disease or whether he needs an evaluation for parkinsonism.  We will also get a EMG to evaluate for potential neuropathy as a contributor to the balance problems. He does not have much by way of a vestibular history with head movement triggered symptoms.  There may be a mild component of autonomic dysregulation that could certainly be part of an extrapyramidal process.     Recommendations:  DaTscan  EMG    Interim History 3/27/2024:  3-21-24 DaTscan  Left greater than right decreased putaminal tracer uptake.                                                        Impression:  1. Presynaptic dopaminergic deficit is present.          Reviewed that he has not had any REM behavior sleep disorders. He has had no anosmia.  Appetite has been good.   He has had no falls.     ALLERGIES:   No Known Allergies     MEDICATIONS:    lisinopril (ZESTRIL) 20 MG tablet, Take 1.5 tablets (30 mg) by mouth daily, Disp: 135 tablet, Rfl: 3    metoprolol succinate ER (TOPROL XL) 25 MG 24 hr tablet, Take 1 tablet (25 mg) by mouth daily, Disp: 90 tablet, Rfl: 1    rosuvastatin (CRESTOR) 20 MG tablet, Take 1 tablet (20 mg) by mouth daily, Disp: 90 tablet, Rfl: 3    vitamin D3 (CHOLECALCIFEROL) 50 mcg (2000 units) tablet, Take 1 tablet (50 mcg) by mouth daily, Disp: 90 tablet, Rfl: 3     PHYSICAL EXAM:  Vitals: BP (!) 173/97 (BP Location: Right arm, Patient Position: Sitting, Cuff Size: Adult Regular)   Pulse 68   Resp 18   SpO2 98%     General: Patient is well-nourished, well-groomed, in no apparent distress. Here with wife.    HEENT: Head is atraumatic, eyes look normal exteriorly, throat clear, neck supple. Low blink rate. Masked facies.    Neurologic:  Mental Status: Alert and oriented to person, place, time, and situation.  Able to provide an excellent history. Asks good questions.     Gait: Unable to stand without use of arms. Increased axial tone. Positive pull test--falls backwards and needs to be caught even after 6-7 steps.    IMPRESSION:   Robert Archer is a 73 year old male with balance problems, bradykinesia, axial rigidity, asymmetric limb rigidity worse on the side opposite to lower putamenal dopamine uptake. We reviewed the nature of Parkinson's Disease and possible next steps including starting dopamine replacement immediately or enrolling in an exercise program. He is quite interested in the exercise program so I will reach out to the coordinators of the SPARX3 study to see whether he may qualify. I will discuss with colleagues in the Movement Disorders program about Robert's longitudinal care.    1] Depending on that outcome, we can start some dopamine replacement either immediately or after the study has been completed.     2] We will  also continue to work up the neuropathy as a contributor to the balance problems. He has an EMG scheduled on 4-17-24    The longitudinal plan of care for the condition(s) below were addressed during this visit. Due to the added complexity in care, I will continue to support Robert in the subsequent management of this condition(s) and with the ongoing continuity of care of this condition(s).    30-minutes spent in evaluation, examination, and documentation          Again, thank you for allowing me to participate in the care of your patient.      Sincerely,    Dereje WONG Cha, MD

## 2024-03-27 NOTE — NURSING NOTE
Chief Complaint   Patient presents with    RECHECK     BP (!) 173/97 (BP Location: Right arm, Patient Position: Sitting, Cuff Size: Adult Regular)   Pulse 68   Resp 18   SpO2 98%     FLIP PELON

## 2024-03-28 ENCOUNTER — TELEPHONE (OUTPATIENT)
Dept: NEUROLOGY | Facility: CLINIC | Age: 74
End: 2024-03-28
Payer: COMMERCIAL

## 2024-03-28 NOTE — TELEPHONE ENCOUNTER
Recent primary diagnosis of Parkinson's Disease by Dr. Jordan, per Dr. Garcia. Pt will be followed by Dr. Jordan going forward in Movement, per Dr. Howell.    Following appt on hold for pt:  New Movement Disorder  Dr. Julian Jovel  8:00AM  5/22/24  [place]    Pt will call to either confirm this appointment (and likely change neuro treatment appt) or decline and reschedule.     Can use any New Movement appt with Dr. Julian Jovel at his [place] or [facility] clinic.    Dr. Garcia has contacted [facility] coordinators as of 3/28/24     Celena Boudreaux on 3/28/2024 at 4:20 PM

## 2024-04-01 ENCOUNTER — THERAPY VISIT (OUTPATIENT)
Dept: PHYSICAL THERAPY | Facility: CLINIC | Age: 74
End: 2024-04-01
Attending: STUDENT IN AN ORGANIZED HEALTH CARE EDUCATION/TRAINING PROGRAM
Payer: COMMERCIAL

## 2024-04-01 DIAGNOSIS — R26.89 BALANCE PROBLEMS: Primary | ICD-10-CM

## 2024-04-01 PROCEDURE — 97112 NEUROMUSCULAR REEDUCATION: CPT | Mod: GP | Performed by: PHYSICAL THERAPIST

## 2024-04-01 PROCEDURE — 97116 GAIT TRAINING THERAPY: CPT | Mod: GP | Performed by: PHYSICAL THERAPIST

## 2024-04-08 ENCOUNTER — THERAPY VISIT (OUTPATIENT)
Dept: PHYSICAL THERAPY | Facility: CLINIC | Age: 74
End: 2024-04-08
Attending: STUDENT IN AN ORGANIZED HEALTH CARE EDUCATION/TRAINING PROGRAM
Payer: COMMERCIAL

## 2024-04-08 DIAGNOSIS — R26.89 BALANCE PROBLEMS: Primary | ICD-10-CM

## 2024-04-08 PROCEDURE — 97112 NEUROMUSCULAR REEDUCATION: CPT | Mod: GP | Performed by: PHYSICAL THERAPIST

## 2024-04-08 PROCEDURE — 97116 GAIT TRAINING THERAPY: CPT | Mod: GP | Performed by: PHYSICAL THERAPIST

## 2024-04-15 ENCOUNTER — THERAPY VISIT (OUTPATIENT)
Dept: PHYSICAL THERAPY | Facility: CLINIC | Age: 74
End: 2024-04-15
Attending: STUDENT IN AN ORGANIZED HEALTH CARE EDUCATION/TRAINING PROGRAM
Payer: COMMERCIAL

## 2024-04-15 DIAGNOSIS — R26.89 BALANCE PROBLEMS: Primary | ICD-10-CM

## 2024-04-15 PROCEDURE — 97116 GAIT TRAINING THERAPY: CPT | Mod: GP | Performed by: PHYSICAL THERAPIST

## 2024-04-15 PROCEDURE — 97112 NEUROMUSCULAR REEDUCATION: CPT | Mod: GP | Performed by: PHYSICAL THERAPIST

## 2024-04-17 ENCOUNTER — OFFICE VISIT (OUTPATIENT)
Dept: NEUROLOGY | Facility: CLINIC | Age: 74
End: 2024-04-17
Attending: PSYCHIATRY & NEUROLOGY
Payer: COMMERCIAL

## 2024-04-17 DIAGNOSIS — R26.89 BALANCE DISORDER: Primary | ICD-10-CM

## 2024-04-17 PROCEDURE — 95910 NRV CNDJ TEST 7-8 STUDIES: CPT | Performed by: PSYCHIATRY & NEUROLOGY

## 2024-04-17 NOTE — PROGRESS NOTES
[facility]  Electrodiagnostic Laboratory                 Department of Neurology                                                                                                         Test Date:  2024    Patient: Robert Archer : 1950 Physician: Donell Jones MD   Sex: Male AGE: 73 year Ref Phys:    ID#: [id number]   Technician: Kristy Behling     History and Examination:  Robert Archer is a 73 year old man with gait imbalance. He is referred for evaluation of possible sensory polyneuropathy.    Techniques:  Motor conduction studies were done with surface recording electrodes. Sensory conduction studies were performed with surface electrodes, unless indicated otherwise by (n), designating the use of subdermal recording electrodes. Temperature was monitored and recorded throughout the study. Needle EMG was deferred in consideration of the situation of the patient's presentation of  sensory symptoms restricted to both feet and referral for consideration of sensory polyneuropathy.     Results:  Bilateral superficial fibular, bilateral sural sensory, bilateral fibular motor, and bilateral tibial motor conduction studies were normal.     Interpretation:  This is a normal study. In particular, there is no electrophysiologic evidence of polyneuropathy.      _____________________________  Donell Jones MD  Board Certified in Clinical Neurophysiology and Neuromuscular Medicine        Nerve Conduction Studies  Motor Sites      Latency Amplitude Neg. Amp Diff Segment Distance Velocity Neg. Dur Neg Area Diff Temperature Comment   Site (ms) Norm (mV) Norm (%)  cm m/s Norm (ms) (%) ( C)    Left Fibular (EDB) Motor   Ankle 5.3  < 6.0 3.7  > 2.0  Ankle-EDB 8   5.6  31.8    Bel Fib Head 14.0 - 2.8 - -24 Bel Fib Head-Ankle 36 41  > 38 5.8 -23 31.8    Pop Fossa 15.5 - 2.9 - 4 Pop Fossa-Bel Fib Head 8 53  > 38 6.5 1 31.8    Right Fibular (EDB) Motor   Ankle 4.6  < 6.0 3.4  > 2.0  Ankle-EDB 8   6.6   31.1    Bel Fib Head 13.2 - 3.0 - -12 Bel Fib Head-Ankle 33 38  > 38 7.4 5 30.7    Pop Fossa 15.2 - 3.3 - 10 Pop Fossa-Bel Fib Head 9 45  > 38 6.8 17 30.8    Left Tibial (AHB) Motor   Ankle 3.7  < 6.5 4.0  > 5.0  Ankle-AHB 8   7.3  31.3    Right Tibial (AHB) Motor   Ankle 4.8  < 6.5 3.8  > 5.0  Ankle-AHB 8   6.8  31.6    Knee 15.4 - 2.9 - -24 Knee-Ankle 42 40  > 38 7.6 -22 31.5      Sensory Sites      Onset Lat Peak Lat Amp (O-P) Amp (P-P) Segment Distance Velocity Temperature Comment   Site ms (ms)  V Norm ( V)  cm m/s Norm ( C)    Left Superficial Fibular Sensory   14 cm-Ankle 3.0 3.5 2  > 3 4 14 cm-Ankle 12.5 42  > 38 31.7    Right Superficial Fibular Sensory   14 cm-Ankle 3.0 3.9 3  > 3 4 14 cm-Ankle 12.5 42  > 38 29.5    Left Sural Sensory   Calf-Lat Mall 3.5 4.4 5  > 5 5 Calf-Lat Mall 14 40  > 38 31.3    Right Sural Sensory   Calf-Lat Mall 3.7 4.4 6  > 5 7 Calf-Lat Mall 14 38  > 38 30.9      F Wave Studies     Min-F Max-F Dispersion Persistence Mean-F F-Norm L-R Mean-F L-R Mean-F Norm F/M Ratio F-M Lat (ms)   Right Tibial (Abd Hallucis)  31.3  C   57.81 59.69 1.88 100.00 59.17 <61  <5.7 4.99 51.25           NCS Waveforms:    Motor                Sensory

## 2024-04-17 NOTE — LETTER
2024       RE: Robert Archer  [address] [place] [address]     Dear Colleague,    Thank you for referring your patient, Robert Archer, to the [facility] EMG CLIN[facility] [place] [facility]. Please see a copy of my visit note below.                        [facility]  Electrodiagnostic Laboratory                 Department of Neurology                                                                                                         Test Date:  2024    Patient: Robert Archer : 1950 Physician: Donell Jones MD   Sex: Male AGE: 73 year Ref Phys:    ID#: [id number]   Technician: Kristy Behling     History and Examination:  Robert Archer is a 73 year old man with gait imbalance. He is referred for evaluation of possible sensory polyneuropathy.    Techniques:  Motor conduction studies were done with surface recording electrodes. Sensory conduction studies were performed with surface electrodes, unless indicated otherwise by (n), designating the use of subdermal recording electrodes. Temperature was monitored and recorded throughout the study. Needle EMG was deferred in consideration of the situation of the patient's presentation of  sensory symptoms restricted to both feet and referral for consideration of sensory polyneuropathy.     Results:  Bilateral superficial fibular, bilateral sural sensory, bilateral fibular motor, and bilateral tibial motor conduction studies were normal.     Interpretation:  This is a normal study. In particular, there is no electrophysiologic evidence of polyneuropathy.      _____________________________  Donell Jones MD  Board Certified in Clinical Neurophysiology and Neuromuscular Medicine        Nerve Conduction Studies  Motor Sites      Latency Amplitude Neg. Amp Diff Segment Distance Velocity Neg. Dur Neg Area Diff Temperature Comment   Site (ms) Norm (mV) Norm (%)  cm m/s Norm (ms) (%) ( C)    Left  Fibular (EDB) Motor   Ankle 5.3  < 6.0 3.7  > 2.0  Ankle-EDB 8   5.6  31.8    Bel Fib Head 14.0 - 2.8 - -24 Bel Fib Head-Ankle 36 41  > 38 5.8 -23 31.8    Pop Fossa 15.5 - 2.9 - 4 Pop Fossa-Bel Fib Head 8 53  > 38 6.5 1 31.8    Right Fibular (EDB) Motor   Ankle 4.6  < 6.0 3.4  > 2.0  Ankle-EDB 8   6.6  31.1    Bel Fib Head 13.2 - 3.0 - -12 Bel Fib Head-Ankle 33 38  > 38 7.4 5 30.7    Pop Fossa 15.2 - 3.3 - 10 Pop Fossa-Bel Fib Head 9 45  > 38 6.8 17 30.8    Left Tibial (AHB) Motor   Ankle 3.7  < 6.5 4.0  > 5.0  Ankle-AHB 8   7.3  31.3    Right Tibial (AHB) Motor   Ankle 4.8  < 6.5 3.8  > 5.0  Ankle-AHB 8   6.8  31.6    Knee 15.4 - 2.9 - -24 Knee-Ankle 42 40  > 38 7.6 -22 31.5      Sensory Sites      Onset Lat Peak Lat Amp (O-P) Amp (P-P) Segment Distance Velocity Temperature Comment   Site ms (ms)  V Norm ( V)  cm m/s Norm ( C)    Left Superficial Fibular Sensory   14 cm-Ankle 3.0 3.5 2  > 3 4 14 cm-Ankle 12.5 42  > 38 31.7    Right Superficial Fibular Sensory   14 cm-Ankle 3.0 3.9 3  > 3 4 14 cm-Ankle 12.5 42  > 38 29.5    Left Sural Sensory   Calf-Lat Mall 3.5 4.4 5  > 5 5 Calf-Lat Mall 14 40  > 38 31.3    Right Sural Sensory   Calf-Lat Mall 3.7 4.4 6  > 5 7 Calf-Lat Mall 14 38  > 38 30.9      F Wave Studies     Min-F Max-F Dispersion Persistence Mean-F F-Norm L-R Mean-F L-R Mean-F Norm F/M Ratio F-M Lat (ms)   Right Tibial (Abd Hallucis)  31.3  C   57.81 59.69 1.88 100.00 59.17 <61  <5.7 4.99 51.25           NCS Waveforms:    Motor                Sensory                   Again, thank you for allowing me to participate in the care of your patient.      Sincerely,    Donell Jones MD

## 2024-04-18 NOTE — PROGRESS NOTES
Department of Neurology  Movement Disorders Division   New Patient Visit    Patient: Robert Archer   MRN: [id number]   : 1950   Date of Visit: 2024    CC: Parkinson's    HPI:  Robert Archer is a 73 year old R-handed male with a history of HLD, HTN who presents as a new movement disorders consult for Parkinson's disease management.    He was seen by Dr. Garcia 24 for balance changes. He was found to have bradykinesia, masked facies, rigidity, and a shuffling gait on exam, so a Dorothy Scan and EMG were ordered. DaTScan was positive for pre-synaptic dopaminergic deficit, EMG was normal. 3/27/24, Dr. Garcia provided information on SPARX3 study as he may be a candidate.    Today, he notes trouble with coordination of his hands and general slowness.     Walking is going okay, no falls. He feels slow when walking, but tries to go on a short walk daily,~1/4-mile. Goes to PT once a week.     Sleep is good. No dream enactment behaviors.     Denies constipation, bladder changes. Endorses occasional lightheadedness when standing, less often will have to sit back down. Denies dysphagia.     No hallucinations.     He also notes ongoing numbness and tingling in the bottom of both feet. It is not painful but he is aware of it often; tends to be worse at night.           2024     8:00 AM   UPDRS EDL Scale   2.1  Speech 1   2.2  Salivation 0   2.3  Chew & Swallow 1   2.4  Eating                  1   2.5  Dressing                1   2.6  Hygiene                 0   2.7  Handwriting             1   2.8  Hobbies etc.            1   2.9  Turn in bed             0   2.10 Tremor                  1   2.11 Stand from chair        1   2.12 Walking & Balance  1   2.13 Freezing                0   MDS-UPDRS II Total Score 9         ROS:  All others negative except as listed above.    Past Medical History:   Diagnosis Date    Hypertension 2012        Past Surgical History:   Procedure Laterality Date    NO HISTORY OF SURGERY           Current Outpatient Medications   Medication Sig Dispense Refill    lisinopril (ZESTRIL) 20 MG tablet Take 1.5 tablets (30 mg) by mouth daily 135 tablet 3    metoprolol succinate ER (TOPROL XL) 25 MG 24 hr tablet Take 1 tablet (25 mg) by mouth daily 90 tablet 1    rosuvastatin (CRESTOR) 20 MG tablet Take 1 tablet (20 mg) by mouth daily 90 tablet 3    vitamin D3 (CHOLECALCIFEROL) 50 mcg (2000 units) tablet Take 1 tablet (50 mcg) by mouth daily 90 tablet 3       No Known Allergies     Family History   Problem Relation Age of Onset    Hypertension Mother     Ovarian Cancer Mother     Cataracts Mother     Hypertension Father     Heart Disease Father 83    Coronary Artery Disease Father     Depression Grandson     Colon Cancer No family hx of     Prostate Cancer No family hx of     Breast Cancer No family hx of     Ulcerative Colitis No family hx of     Celiac Disease No family hx of     Crohn's Disease No family hx of         Social History     Socioeconomic History    Marital status:      Spouse name: Not on file    Number of children: Not on file    Years of education: Not on file    Highest education level: Not on file   Occupational History    Not on file   Tobacco Use    Smoking status: Former     Current packs/day: 0.00     Average packs/day: 1 pack/day for 20.1 years (20.1 ttl pk-yrs)     Types: Cigarettes     Start date: 1985     Quit date: 2005     Years since quittin.5    Smokeless tobacco: Never    Tobacco comments:     quit 15 years ago    Vaping Use    Vaping status: Never Used   Substance and Sexual Activity    Alcohol use: Yes     Comment: occ / 15 drinks a month     Drug use: No    Sexual activity: Not Currently     Partners: Female     Birth control/protection: None     Comment:     Other Topics Concern    Parent/sibling w/ CABG, MI or angioplasty before 65F 55M? Yes     Comment: 2017   Social History Narrative    Not on file     Social Determinants of Health  "    Financial Resource Strain: Low Risk  (1/17/2024)    Financial Resource Strain     Within the past 12 months, have you or your family members you live with been unable to get utilities (heat, electricity) when it was really needed?: No   Food Insecurity: Low Risk  (1/17/2024)    Food Insecurity     Within the past 12 months, did you worry that your food would run out before you got money to buy more?: No     Within the past 12 months, did the food you bought just not last and you didn t have money to get more?: No   Transportation Needs: Low Risk  (1/17/2024)    Transportation Needs     Within the past 12 months, has lack of transportation kept you from medical appointments, getting your medicines, non-medical meetings or appointments, work, or from getting things that you need?: No   Physical Activity: Not on file   Stress: Not on file   Social Connections: Unknown (1/1/2022)    Received [facility]    Social Connections     Frequency of Communication with Friends and Family: Not on file   Interpersonal Safety: Low Risk  (12/8/2023)    Interpersonal Safety     Do you feel physically and emotionally safe where you currently live?: Yes     Within the past 12 months, have you been hit, slapped, kicked or otherwise physically hurt by someone?: No     Within the past 12 months, have you been humiliated or emotionally abused in other ways by your partner or ex-partner?: No   Housing Stability: Low Risk  (1/17/2024)    Housing Stability     Do you have housing? : Yes     Are you worried about losing your housing?: No        PHYSICAL EXAM:  BP (!) 173/93   Pulse 61   Ht 1.778 m (5' 10\")   Wt 78.5 kg (173 lb)   SpO2 98%   BMI 24.82 kg/m      Mental Status:   Alert and oriented to person, place, time, and situation.  Speech fluent without paraphasic errors. Follows commands briskly.     CN:  II: Pupils equal, round, and reactive to " light. VFF.  III, IV, VI: EOM normal range, no nystagmus.  Normal saccades.  V:  Facial sensation intact.  VII: Face symmetric at rest and with activation  VIII: Intact to finger rub bilaterally.  IX, X: Palate rise b/l, uvula midline.  No dysarthria.  XI: Trapezius and SCM 5/5 bilaterally.  XII: Tongue protrudes midline. No fasciculation or atrophy noted.    Motor:    R/L  Shoulder abd      5/5  Elbow flex  5/5  Elbow ext  5/5  Wrist flex  5/5  Wrist ext  5/5    Hip flex  5/5  Knee flex  5/5  Knee ext  5/5  Dorsiflex  5/5  Plantar flex  5/5    Reflexes:  R/L  Biceps   2+/2+  Triceps  2+/2+  Patellar  2+/2+  Achilles  0/0  No clonus.  No frontal release signs.    Sensation:    Intact to LT, pinprick, vibration, temperature, and proprioception in all extremities.    Coordination:    FTN intact bilaterally.    Gait:    Decreased stride height and length.         4/22/2024     8:00 AM   UPDRS Motor Scale   Time: 08:30   Medication Off   R Brain DBS: None   L Brain DBS: None   Dyskinesia (LID) No   Speech 0   Facial Expression 3   Rigidity Neck 1   Rigidity RUE 2   Rigidity LUE 0   Rigidity RLE 0   Rigidity LLE 0   Finger Taps R 1   Finger Taps L 1   Hand Mvt R 1   Hand Mvt L 0   Pron-/Supinate R 1   Pron-/Supinate L 0   Toe Tap R 0   Toe Tap L 0   Leg Agility R 0   Leg Agility L 0   Arise From Chair 1   Gait 1   Gait Freezing 0   Postural Stability 0   Posture 3   Global Spont Mvt 2   Postural Tremor RUE 0   Postural Tremor LUE 0   Kinetic Tremor RUE 0   Kinetic Tremor LUE 0   Rest Tremor RUE 1   Rest Tremor LUE 0   Rest Tremor RLE 0   Rest Tremor LLE 0   Rest Tremor Lip/Jaw 0   Rest Tremor Constancy 1   Total Right 6   Total Left 1   Axial Total 11   Total 19         LABS:   Latest Reference Range & Units 12/08/23 07:59   Sodium 135 - 145 mmol/L 143   Potassium 3.4 - 5.3 mmol/L 4.0   Chloride 98 - 107 mmol/L 110 (H)   Carbon Dioxide (CO2) 22 - 29 mmol/L 24   Urea Nitrogen 8.0 - 23.0 mg/dL 10.1   Creatinine 0.67 - 1.17  mg/dL 0.78   GFR Estimate >60 mL/min/1.73m2 >90   Calcium 8.8 - 10.2 mg/dL 8.9   (H): Data is abnormally high    IMAGING:  3-21-24 DaTscan  Left greater than right decreased putaminal tracer uptake.  Impression:  1. Presynaptic dopaminergic deficit is present.    OTHER:  EMG 4/17/24  Results:  Bilateral superficial fibular, bilateral sural sensory, bilateral fibular motor, and bilateral tibial motor conduction studies were normal.   Interpretation:  This is a normal study. In particular, there is no electrophysiologic evidence of polyneuropathy.      ASSESSMENT/PLAN:  Robert Archer is a 73 year old male with a history of HLD, HTN who presents as a new movement disorders consult for Parkinson's disease management.    He remains with relatively mild symptoms of stiffness and slowness that manifest most during walking and certain motor tasks (eating). He has few non-motor symptoms at this time, but we will advise to watch for worsening orthostatic symptoms given he is on two BP medications. He is interested in trying a medication to help with symptoms, so we will start levodopa. He was informed that this would prevent him from enrolling in SPARX3 trial.     He was counseled to continue working with his PCP to manage BP, as it was high today and he says it tends to run in the 160s at home.     His foot symptoms are most likely 2/2 a very mild small fiber neuropathy as his EMG was normal and his sensory exam is unremarkable. It may also be an atypical manifestation or restless leg syndrome given it is worse at night. We will continue to monitor this.     Tx  - We encouraged safe exercise daily with walking or using a recumbent bike and to stay social.  - Encouraged to continue PT  - Start carbidopa/levodopa IR 25/100   - 0.5 tabs TID --> 1.5 tabs TID until next visit (see AVS for detailed titration)    Return to clinic in 3mo    Donovan Jovel MD  Fellow  Movement Disorders Division  [facility] Neurology      Patient  seen and discussed with attending neurologist Dr. Beti Rogel

## 2024-04-19 ENCOUNTER — TELEPHONE (OUTPATIENT)
Dept: NEUROLOGY | Facility: CLINIC | Age: 74
End: 2024-04-19
Payer: COMMERCIAL

## 2024-04-19 NOTE — TELEPHONE ENCOUNTER
Attempted to reach patient to remind them about appointment scheduled with Donovan Jovel MD on 4/22/24 in our [place] location. No answer and unable to leave message.

## 2024-04-22 ENCOUNTER — OFFICE VISIT (OUTPATIENT)
Dept: NEUROLOGY | Facility: CLINIC | Age: 74
End: 2024-04-22
Attending: INTERNAL MEDICINE
Payer: COMMERCIAL

## 2024-04-22 VITALS
OXYGEN SATURATION: 98 % | SYSTOLIC BLOOD PRESSURE: 173 MMHG | BODY MASS INDEX: 24.77 KG/M2 | HEART RATE: 61 BPM | DIASTOLIC BLOOD PRESSURE: 93 MMHG | HEIGHT: 70 IN | WEIGHT: 173 LBS

## 2024-04-22 DIAGNOSIS — G20.A1 PARKINSON'S DISEASE WITHOUT DYSKINESIA OR FLUCTUATING MANIFESTATIONS (H): Primary | ICD-10-CM

## 2024-04-22 PROCEDURE — 99214 OFFICE O/P EST MOD 30 MIN: CPT | Mod: GC | Performed by: PSYCHIATRY & NEUROLOGY

## 2024-04-22 RX ORDER — CARBIDOPA AND LEVODOPA 25; 100 MG/1; MG/1
TABLET ORAL
Qty: 405 TABLET | Refills: 1 | Status: SHIPPED | OUTPATIENT
Start: 2024-04-22 | End: 2024-07-22

## 2024-04-22 ASSESSMENT — UNIFIED PARKINSONS DISEASE RATING SCALE (UPDRS)
AMPLITUDE_LIP_JAW: (0) NORMAL: NO TREMOR.
LEG_AGILITY_LEFT: (0) NORMAL: NO PROBLEMS.
TOTAL_SCORE_LEFT: 1
ARISING_CHAIR: (1) SLIGHT: ARISING IS SLOWER THAN NORMAL, OR MAY NEED MORE THAN ONE ATTEMPT, OR MAY NEED TO MOVE FORWARD IN THE CHAIR TO ARISE. NO NEED TO USE THE ARMS OF THE CHAIR.
POSTURE: (3) MODERATE: STOOPED POSTURE, SCOLIOSIS, OR LEANING TO ONE SIDE THAT CANNOT BE CORRECTED VOLITIONALLY TO A NORMAL POSTURE BY THE PATIENT.
PRONATION_SUPINATION_RIGHT: (1) SLIGHT: ANY OF THE FOLLOWING: A) THE REGULAR RHYTHM IS BROKEN WITH ONE WITH ONE OR TWO INTERRUPTIONS OR HESITATIONS OF THE MOVEMENT  B) SLIGHT SLOWING  C) THE AMPLITUDE DECREMENTS NEAR THE END OF THE 10 MOVEMENTS.
FINGER_TAPPING_RIGHT: (1) SLIGHT: ANY OF THE FOLLOWING: A) THE REGULAR RHYTHM IS BROKEN WITH ONE WITH ONE OR TWO INTERRUPTIONS OR HESITATIONS OF THE MOVEMENT  B) SLIGHT SLOWING  C) THE AMPLITUDE DECREMENTS NEAR THE END OF THE 10 MOVEMENTS.
TOETAPPING_RIGHT: (0) NORMAL: NO PROBLEMS.
AMPLITUDE_LLE: (0) NORMAL: NO TREMOR.
FACIAL_EXPRESSION: (3) MASKED FACIES WITH LIPS PARTED SOME OF THE TIME WHEN THE MOUTH IS AT REST.
HANDMOVEMENTS_RIGHT: (1) SLIGHT: ANY OF THE FOLLOWING: A) THE REGULAR RHYTHM IS BROKEN WITH ONE WITH ONE OR TWO INTERRUPTIONS OR HESITATIONS OF THE MOVEMENT  B) SLIGHT SLOWING  C) THE AMPLITUDE DECREMENTS NEAR THE END OF THE 10 MOVEMENTS.
AMPLITUDE_RUE: (1) SLIGHT: < 1 CM IN MAXIMAL AMPLITUDE.
AXIAL_SCORE: 11
CONSTANCY_TREMOR_ATREST: (1) SLIGHT: TREMOR AT REST IS PRESENT 25% OF THE ENTIRE EXAMINATION PERIOD.
RIGIDITY_LUE: (0) NORMAL: NO RIGIDITY.
SPONTANEITY_OF_MOVEMENT: (2) MILD: MILD GLOBAL SLOWNESS AND POVERTY OF SPONTANEOUS MOVEMENTS.
TOETAPPING_LEFT: (0) NORMAL: NO PROBLEMS.
DYSKINESIAS_PRESENT: NO
RIGIDITY_RLE: (0) NORMAL: NO RIGIDITY.
RIGIDITY_NECK: (1) SLIGHT: RIGIDITY ONLY DETECTED WITH ACTIVATION MANEUVER.
RIGIDITY_LLE: (0) NORMAL: NO RIGIDITY.
LEG_AGILITY_RIGHT: (0) NORMAL: NO PROBLEMS.
FREEZING_GAIT: (0) NORMAL: NO FREEZING.
TOTAL_SCORE: 6
HANDMOVEMENTS_LEFT: (0) NORMAL: NO PROBLEMS.
POSTURAL_STABILITY: (0) NORMAL: NO PROBLEMS. RECOVERS WITH ONE OR TWO STEPS.
RIGIDITY_RUE: (2) MILD: RIGIDITY DETECTED WITHOUT THE ACTIVATION MANEUVER. FULL RANGE OF MOTION IS EASILY ACHIEVED.
AMPLITUDE_RLE: (0) NORMAL: NO TREMOR.
GAIT: (1) SLIGHT: INDEPENDENT WALKING WITH MINOR GAIT IMPAIRMENT.
FINGER_TAPPING_LEFT: (1) SLIGHT: ANY OF THE FOLLOWING: A) THE REGULAR RHYTHM IS BROKEN WITH ONE WITH ONE OR TWO INTERRUPTIONS OR HESITATIONS OF THE MOVEMENT  B) SLIGHT SLOWING  C) THE AMPLITUDE DECREMENTS NEAR THE END OF THE 10 MOVEMENTS.
PRONATION_SUPINATION_LEFT: (0) NORMAL: NO PROBLEMS.
SPEECH: (0) NORMAL: NO SPEECH PROBLEMS.
PARKINSONS_MEDS: OFF
TOTAL_SCORE: 19
AMPLITUDE_LUE: (0) NORMAL: NO TREMOR.

## 2024-04-22 ASSESSMENT — MOVEMENT DISORDERS SOCIETY - UNIFIED PARKINSONS DISEASE RATING SCALE (MDS-UPDRS)
CHEWING_AND_SWALLOWING: (1) SLIGHT: I AM AWARE OF SLOWNESS IN MY CHEWING OR INCREASED EFFORT AT SWALLOWING, BUT I DO NOT CHOKE OR NEED TO HAVE MY FOOD SPECIALLY PREPARED.
HANDWRITING: (1) SLIGHT: MY WRITING IS SLOW, CLUMSY OR UNEVEN, BUT ALL WORDS ARE CLEAR.
EATING_TASKS: (1) SLIGHT: I AM SLOW, BUT I DO NOT NEED ANY HELP HANDLING MY FOOD AND HAVE NOT HAD FOOD SPILLS WHILE EATING.
WALKING_AND_BALANCE: (1) SLIGHT: I AM SLIGHTLY SLOW OR MAY DRAG A LEG.  I NEVER USE A WALKING AID.
FREEZING: (0) NORMAL: NOT AT ALL (NO PROBLEMS).
SPEECH: (1) SLIGHT: MY SPEECH IS SOFT, SLURRED OR UNEVEN, BUT IT DOES NOT CAUSE OTHERS TO ASK ME TO REPEAT MYSELF.
HOBBIES_AND_OTHER_ACTIVITIES: (1) SLIGHT: I AM A BIT SLOW BUT DO THESE ACTIVITIES EASILY.
HYGIENE: (0) NORMAL: NOT AT ALL (NO PROBLEMS).
TURNING_IN_BED: (0) NORMAL: NOT AT ALL (NO PROBLEMS).
TOTAL_SCORE: 9
SALIVA_AND_DROOLING: (0) NORMAL: NOT AT ALL (NO PROBLEMS).
GETTING_OUT_OF_BED_CAR_DEEP_CHAIR: (1) SLIGHT: I AM SLOW OR AWKWARD, BUT I USUALLY CAN DO IT ON MY FIRST TRY.
TREMOR: (1) SLIGHT: SHAKING OR TREMOR OCCURS BUT DOES NOT CAUSE PROBLEMS WITH ANY ACTIVITIES.
DRESSING: (1) SLIGHT: I AM SLOW, BUT I DO NOT NEED HELP.

## 2024-04-22 NOTE — PATIENT INSTRUCTIONS
Your history and exam are consistent with Parkinson's disease. There are many resources available to learn more, but please keep in mind that every Parkinson's patient is unique. Not everything you read about will necessarily apply to you. We are here to support you in this journey and will address any new or different symptoms as they come.     Talk to your primary care doctor about your blood pressure as you may need medication adjustments. Continue to check your blood pressure at home; try to check it around the same time every time you check it.     We will start a new medication to help with your symptoms. The most common side effects are nausea and lightheadedness. You can take it with food to help with this, but avoid protein-heavy meals/snacks within an hour of the medication. The medicine is not likely to cause major improvements in balance by itself. We will start at a low dose and increase slowly; if you feel good on a certain dose, you don't have to keep going up. Similarly, if you feel terrible on a certain dose, you can go back down to the previous one. Let us know how you are feeling after a month or so.    Carbidopa/levodopa 25/100 (brand name: Sinemet)  Week 1  - 1/2 pill three times a day    Week 2  - 1 pill three times a day    Week 3 and beyond  - 1 and 1/2 pill three times a day      More information:  Each Parkinson's disease patient is unique and no one follows a textbook path. If you read something concerning, talk with us about it before you allow fear or uncertainty to enter your mind. After viewing the video below, consider joining my colleague in her once a month Parkinson's disease question and answer class.     You can live well in spite of this. With knowledge comes power. You can do this.    Parkinson's disease video    Please sign up for the PARKINSON'S DISEASE 101 zoom class with a Parkinson's Specialty Nurse. It takes place the first Thursday of every month at 10AM. The class is 1  "hour long. It consists of a 30 minute presentation followed by 30 minutes of reflection and Q & A. Call [phone] and ask to speak with the Movement Disorder Nurses.    You can see subtitles of the video above, in your preferred language by using the closed captioning tool (CC) on the bottom right portion of the video. After clicking on the closed captioning icon \"CC\", the language captions will start. Now click the settings icon on the bottom right-hand corner of the video, select \"subtitles\", then \"auto-translate.\" From there you can select your preferred language.      Guide for Newly Diagnosed    National Parkinsons Foundation    American Parkinsons Disease Association     [facility] [facility] [facility]    [facility]    Here is a great 2 minute video that explains how to meditate.  https://www.Feedlooks.com/watch?v=[email or web address]    [facility]: Susanna Feliz exercise class and support group (currently online): [phone] or [email or web address].   [facility] Fitness Classes and Support Group for People with Parkinson s Disease    Fitness Classes: Free virtual fitness classes for people with Parkinson s disease, until we can meet in person!    When: Monday, Wednesday and Friday mornings during this time of COVID. Where: Classes are virtual, using Zoom .    Details: Classes are led by a recreational therapist, who is certified in Parkinson s Wellness Recovery and has 10 years of PD experience. No doctor referral is needed. All classes include a warmup, cardio, strength training, stretching/flexibility work and Parkinson s-specific exercises during the 45-60 minutes.    Nordic Walking: For people with Parkinson s disease and their care providers.    When: Class is held 2 times per month, on Thursday mornings.    Where: [facility] in [place] or the [facility] in [place], depending on weather.    Details: Class is led by a recreational therapist. Some Nordic walking poles are " available for use. There is a $4.00 fee to enter the [place] Track.    Support Group: Free to people with Parkinson s disease and their care partners.    When: The first Wednesday of each month from 10 a.m. to 11 a.m., with small groups for care providers from 11:15 a.m. to 12:15 p.m. Where: We meet virtually, using Zoom.    Details: The support group is educational, with a speaker on a specific topic and time for questions. The first hour, 10 a.m. to 11 a.m., is for people with PD and their care partners. From 11:15 a.m. to 12:15 p.m., care partners are placed into small groups to enable conversation and support. To sign up or for more information, please contact Susanna Feliz, at [phone] or [email or web address].    [phone]

## 2024-04-22 NOTE — LETTER
2024         RE: Robert Archer  [address] [place] [address]        Dear Colleague,    Thank you for referring your patient, Robert Archer, to the [facility]. Please see a copy of my visit note below.    Department of Neurology  Movement Disorders Division   New Patient Visit    Patient: Robert Archer   MRN: [id number]   : 1950   Date of Visit: 2024    CC: Parkinson's    HPI:  Robert Archer is a 73 year old R-handed male with a history of HLD, HTN who presents as a new movement disorders consult for Parkinson's disease management.    He was seen by Dr. Garcia 24 for balance changes. He was found to have bradykinesia, masked facies, rigidity, and a shuffling gait on exam, so a Dorothy Scan and EMG were ordered. DaTScan was positive for pre-synaptic dopaminergic deficit, EMG was normal. 3/27/24, Dr. Garcia provided information on SPARX3 study as he may be a candidate.    Today, he notes trouble with coordination of his hands and general slowness.     Walking is going okay, no falls. He feels slow when walking, but tries to go on a short walk daily,~1/4-mile. Goes to PT once a week.     Sleep is good. No dream enactment behaviors.     Denies constipation, bladder changes. Endorses occasional lightheadedness when standing, less often will have to sit back down. Denies dysphagia.     No hallucinations.     He also notes ongoing numbness and tingling in the bottom of both feet. It is not painful but he is aware of it often; tends to be worse at night.           2024     8:00 AM   UPDRS EDL Scale   2.1  Speech 1   2.2  Salivation 0   2.3  Chew & Swallow 1   2.4  Eating                  1   2.5  Dressing                1   2.6  Hygiene                 0   2.7  Handwriting             1   2.8  Hobbies etc.            1   2.9  Turn in bed             0   2.10 Tremor                  1   2.11 Stand from chair        1   2.12 Walking & Balance  1   2.13 Freezing                 0   MDS-UPDRS II Total Score 9         ROS:  All others negative except as listed above.    Past Medical History:   Diagnosis Date     Hypertension 2012        Past Surgical History:   Procedure Laterality Date     NO HISTORY OF SURGERY          Current Outpatient Medications   Medication Sig Dispense Refill     lisinopril (ZESTRIL) 20 MG tablet Take 1.5 tablets (30 mg) by mouth daily 135 tablet 3     metoprolol succinate ER (TOPROL XL) 25 MG 24 hr tablet Take 1 tablet (25 mg) by mouth daily 90 tablet 1     rosuvastatin (CRESTOR) 20 MG tablet Take 1 tablet (20 mg) by mouth daily 90 tablet 3     vitamin D3 (CHOLECALCIFEROL) 50 mcg (2000 units) tablet Take 1 tablet (50 mcg) by mouth daily 90 tablet 3       No Known Allergies     Family History   Problem Relation Age of Onset     Hypertension Mother      Ovarian Cancer Mother      Cataracts Mother      Hypertension Father      Heart Disease Father 83     Coronary Artery Disease Father      Depression Grandson      Colon Cancer No family hx of      Prostate Cancer No family hx of      Breast Cancer No family hx of      Ulcerative Colitis No family hx of      Celiac Disease No family hx of      Crohn's Disease No family hx of         Social History     Socioeconomic History     Marital status:      Spouse name: Not on file     Number of children: Not on file     Years of education: Not on file     Highest education level: Not on file   Occupational History     Not on file   Tobacco Use     Smoking status: Former     Current packs/day: 0.00     Average packs/day: 1 pack/day for 20.1 years (20.1 ttl pk-yrs)     Types: Cigarettes     Start date: 1985     Quit date: 2005     Years since quittin.5     Smokeless tobacco: Never     Tobacco comments:     quit 15 years ago    Vaping Use     Vaping status: Never Used   Substance and Sexual Activity     Alcohol use: Yes     Comment: occ / 15 drinks a month      Drug use: No     Sexual activity: Not  Currently     Partners: Female     Birth control/protection: None     Comment:     Other Topics Concern     Parent/sibling w/ CABG, MI or angioplasty before 65F 55M? Yes     Comment: 5-1-2017   Social History Narrative     Not on file     Social Determinants of Health     Financial Resource Strain: Low Risk  (1/17/2024)    Financial Resource Strain      Within the past 12 months, have you or your family members you live with been unable to get utilities (heat, electricity) when it was really needed?: No   Food Insecurity: Low Risk  (1/17/2024)    Food Insecurity      Within the past 12 months, did you worry that your food would run out before you got money to buy more?: No      Within the past 12 months, did the food you bought just not last and you didn t have money to get more?: No   Transportation Needs: Low Risk  (1/17/2024)    Transportation Needs      Within the past 12 months, has lack of transportation kept you from medical appointments, getting your medicines, non-medical meetings or appointments, work, or from getting things that you need?: No   Physical Activity: Not on file   Stress: Not on file   Social Connections: Unknown (1/1/2022)    Received from [employer] [facility], [facility] & [facility]    Social Connections      Frequency of Communication with Friends and Family: Not on file   Interpersonal Safety: Low Risk  (12/8/2023)    Interpersonal Safety      Do you feel physically and emotionally safe where you currently live?: Yes      Within the past 12 months, have you been hit, slapped, kicked or otherwise physically hurt by someone?: No      Within the past 12 months, have you been humiliated or emotionally abused in other ways by your partner or ex-partner?: No   Housing Stability: Low Risk  (1/17/2024)    Housing Stability      Do you have housing? : Yes      Are you worried about losing your housing?: No        PHYSICAL EXAM:  BP (!) 173/93    "Pulse 61   Ht 1.778 m (5' 10\")   Wt 78.5 kg (173 lb)   SpO2 98%   BMI 24.82 kg/m      Mental Status:   Alert and oriented to person, place, time, and situation.  Speech fluent without paraphasic errors. Follows commands briskly.     CN:  II: Pupils equal, round, and reactive to light. VFF.  III, IV, VI: EOM normal range, no nystagmus.  Normal saccades.  V:  Facial sensation intact.  VII: Face symmetric at rest and with activation  VIII: Intact to finger rub bilaterally.  IX, X: Palate rise b/l, uvula midline.  No dysarthria.  XI: Trapezius and SCM 5/5 bilaterally.  XII: Tongue protrudes midline. No fasciculation or atrophy noted.    Motor:    R/L  Shoulder abd      5/5  Elbow flex  5/5  Elbow ext  5/5  Wrist flex  5/5  Wrist ext  5/5    Hip flex  5/5  Knee flex  5/5  Knee ext  5/5  Dorsiflex  5/5  Plantar flex  5/5    Reflexes:  R/L  Biceps   2+/2+  Triceps  2+/2+  Patellar  2+/2+  Achilles  0/0  No clonus.  No frontal release signs.    Sensation:    Intact to LT, pinprick, vibration, temperature, and proprioception in all extremities.    Coordination:    FTN intact bilaterally.    Gait:    Decreased stride height and length.         4/22/2024     8:00 AM   UPDRS Motor Scale   Time: 08:30   Medication Off   R Brain DBS: None   L Brain DBS: None   Dyskinesia (LID) No   Speech 0   Facial Expression 3   Rigidity Neck 1   Rigidity RUE 2   Rigidity LUE 0   Rigidity RLE 0   Rigidity LLE 0   Finger Taps R 1   Finger Taps L 1   Hand Mvt R 1   Hand Mvt L 0   Pron-/Supinate R 1   Pron-/Supinate L 0   Toe Tap R 0   Toe Tap L 0   Leg Agility R 0   Leg Agility L 0   Arise From Chair 1   Gait 1   Gait Freezing 0   Postural Stability 0   Posture 3   Global Spont Mvt 2   Postural Tremor RUE 0   Postural Tremor LUE 0   Kinetic Tremor RUE 0   Kinetic Tremor LUE 0   Rest Tremor RUE 1   Rest Tremor LUE 0   Rest Tremor RLE 0   Rest Tremor LLE 0   Rest Tremor Lip/Jaw 0   Rest Tremor Constancy 1   Total Right 6   Total Left 1   Axial " Total 11   Total 19         LABS:   Latest Reference Range & Units 12/08/23 07:59   Sodium 135 - 145 mmol/L 143   Potassium 3.4 - 5.3 mmol/L 4.0   Chloride 98 - 107 mmol/L 110 (H)   Carbon Dioxide (CO2) 22 - 29 mmol/L 24   Urea Nitrogen 8.0 - 23.0 mg/dL 10.1   Creatinine 0.67 - 1.17 mg/dL 0.78   GFR Estimate >60 mL/min/1.73m2 >90   Calcium 8.8 - 10.2 mg/dL 8.9   (H): Data is abnormally high    IMAGING:  3-21-24 DaTscan  Left greater than right decreased putaminal tracer uptake.  Impression:  1. Presynaptic dopaminergic deficit is present.    OTHER:  EMG 4/17/24  Results:  Bilateral superficial fibular, bilateral sural sensory, bilateral fibular motor, and bilateral tibial motor conduction studies were normal.   Interpretation:  This is a normal study. In particular, there is no electrophysiologic evidence of polyneuropathy.      ASSESSMENT/PLAN:  Robert Archer is a 73 year old male with a history of HLD, HTN who presents as a new movement disorders consult for Parkinson's disease management.    He remains with relatively mild symptoms of stiffness and slowness that manifest most during walking and certain motor tasks (eating). He has few non-motor symptoms at this time, but we will advise to watch for worsening orthostatic symptoms given he is on two BP medications. He is interested in trying a medication to help with symptoms, so we will start levodopa. He was informed that this would prevent him from enrolling in SPARX3 trial.     He was counseled to continue working with his PCP to manage BP, as it was high today and he says it tends to run in the 160s at home.     His foot symptoms are most likely 2/2 a very mild small fiber neuropathy as his EMG was normal and his sensory exam is unremarkable. It may also be an atypical manifestation or restless leg syndrome given it is worse at night. We will continue to monitor this.     Tx  - We encouraged safe exercise daily with walking or using a recumbent bike and to  stay social.  - Encouraged to continue PT  - Start carbidopa/levodopa IR 25/100   - 0.5 tabs TID --> 1.5 tabs TID until next visit (see AVS for detailed titration)    Return to clinic in 3mo    Donovan Jovel MD  Fellow  Movement Disorders Division  [facility]      Patient seen and discussed with attending neurologist Dr. Beti Rogel    45 minutes spent on date of encounter doing chart reviews, obtaining history, performing physical exam, counseling, documentation, and further activities as noted above.       Again, thank you for allowing me to participate in the care of your patient.        Sincerely,        Donovan Jovel MD

## 2024-04-23 DIAGNOSIS — I47.20 V TACH (H): ICD-10-CM

## 2024-04-23 DIAGNOSIS — I10 ESSENTIAL (PRIMARY) HYPERTENSION: ICD-10-CM

## 2024-04-23 RX ORDER — METOPROLOL SUCCINATE 25 MG/1
25 TABLET, EXTENDED RELEASE ORAL DAILY
Qty: 90 TABLET | Refills: 1 | Status: SHIPPED | OUTPATIENT
Start: 2024-04-23 | End: 2024-06-07

## 2024-04-29 ENCOUNTER — THERAPY VISIT (OUTPATIENT)
Dept: PHYSICAL THERAPY | Facility: CLINIC | Age: 74
End: 2024-04-29
Attending: STUDENT IN AN ORGANIZED HEALTH CARE EDUCATION/TRAINING PROGRAM
Payer: COMMERCIAL

## 2024-04-29 DIAGNOSIS — R26.89 BALANCE PROBLEMS: Primary | ICD-10-CM

## 2024-04-29 PROCEDURE — 97116 GAIT TRAINING THERAPY: CPT | Mod: GP | Performed by: PHYSICAL THERAPIST

## 2024-04-29 PROCEDURE — 97112 NEUROMUSCULAR REEDUCATION: CPT | Mod: GP | Performed by: PHYSICAL THERAPIST

## 2024-04-29 PROCEDURE — 97110 THERAPEUTIC EXERCISES: CPT | Mod: GP | Performed by: PHYSICAL THERAPIST

## 2024-05-17 ENCOUNTER — OFFICE VISIT (OUTPATIENT)
Dept: ORTHOPEDICS | Facility: CLINIC | Age: 74
End: 2024-05-17
Payer: COMMERCIAL

## 2024-05-17 ENCOUNTER — OFFICE VISIT (OUTPATIENT)
Dept: NEUROSURGERY | Facility: CLINIC | Age: 74
End: 2024-05-17
Payer: COMMERCIAL

## 2024-05-17 ENCOUNTER — TELEPHONE (OUTPATIENT)
Dept: PHYSICAL MEDICINE AND REHAB | Facility: CLINIC | Age: 74
End: 2024-05-17

## 2024-05-17 ENCOUNTER — ANCILLARY PROCEDURE (OUTPATIENT)
Dept: GENERAL RADIOLOGY | Facility: CLINIC | Age: 74
End: 2024-05-17
Attending: FAMILY MEDICINE
Payer: COMMERCIAL

## 2024-05-17 VITALS
BODY MASS INDEX: 23.96 KG/M2 | DIASTOLIC BLOOD PRESSURE: 80 MMHG | SYSTOLIC BLOOD PRESSURE: 156 MMHG | WEIGHT: 167 LBS | HEART RATE: 61 BPM

## 2024-05-17 DIAGNOSIS — M17.0 OSTEOARTHRITIS OF BOTH KNEES, UNSPECIFIED OSTEOARTHRITIS TYPE: ICD-10-CM

## 2024-05-17 DIAGNOSIS — M47.816 LUMBAR FACET ARTHROPATHY: ICD-10-CM

## 2024-05-17 DIAGNOSIS — M17.0 OSTEOARTHRITIS OF BOTH KNEES, UNSPECIFIED OSTEOARTHRITIS TYPE: Primary | ICD-10-CM

## 2024-05-17 DIAGNOSIS — M48.061 SPINAL STENOSIS OF LUMBAR REGION, UNSPECIFIED WHETHER NEUROGENIC CLAUDICATION PRESENT: ICD-10-CM

## 2024-05-17 DIAGNOSIS — M47.816 LUMBAR SPONDYLOSIS: Primary | ICD-10-CM

## 2024-05-17 DIAGNOSIS — M54.17 RADICULAR PAIN OF LUMBOSACRAL REGION: ICD-10-CM

## 2024-05-17 PROCEDURE — 99215 OFFICE O/P EST HI 40 MIN: CPT | Performed by: PHYSICAL MEDICINE & REHABILITATION

## 2024-05-17 PROCEDURE — 20610 DRAIN/INJ JOINT/BURSA W/O US: CPT | Mod: 50 | Performed by: FAMILY MEDICINE

## 2024-05-17 PROCEDURE — 73564 X-RAY EXAM KNEE 4 OR MORE: CPT | Mod: LT | Performed by: RADIOLOGY

## 2024-05-17 RX ORDER — TRIAMCINOLONE ACETONIDE 40 MG/ML
40 INJECTION, SUSPENSION INTRA-ARTICULAR; INTRAMUSCULAR
Status: SHIPPED | OUTPATIENT
Start: 2024-05-17

## 2024-05-17 RX ADMIN — TRIAMCINOLONE ACETONIDE 40 MG: 40 INJECTION, SUSPENSION INTRA-ARTICULAR; INTRAMUSCULAR at 09:10

## 2024-05-17 ASSESSMENT — PAIN SCALES - GENERAL: PAINLEVEL: NO PAIN (0)

## 2024-05-17 NOTE — LETTER
5/17/2024         RE: Robert Archer  [address] [place] [address]        Dear Colleague,    Thank you for referring your patient, Robert Archer, to the [facility] NEUROSURGERY [facility]INIC [place]. Please see a copy of my visit note below.    PM&R Follow-Up Visit -     Date of Initial Visit: 10/28/23  LOV: 1/19/2024  TD: 5/17/2024      Recall: Robert Archer is a 73 year old male with history of chronic T12 compression fracture, severe L3-4 central canal stenosis and axial/mechanical lumbosacral pain worse on the left compared to the right (70: 30%) with milder lower extremity symptoms who presents for follow up    INTERVAL HISTORY:  Patient was last seen in clinic January 19, 2024.  He is coming today by his wife. At that time, he did not have much in the way of pain or contributing spine issues.  His primary concern was with regards to ongoing balance problems.  He had been scheduled for neurorehabilitation PT which she has attended several sessions.  He was also referred to neurology and seen by my colleague, Dr. Garcia, who has been assisting in managing balance and dizziness.  EMG was performed 04/17/2024 and was normal without electrodiagnostic evidence of polyneuropathy.  He has also been diagnosed with Parkinson's disease and movement disorder and will be following up with Dr. Jordan at [facility] movement disorders clinic.    Today, he states that he has low back and bilateral lower extremity symptoms.  His low back pain is more predominant and slightly worse on the right compared to the left when compared to his radicular pain that tends to affect the posterior thighs.  Symptoms are worse with standing, ambulation and improved with relative rest.     He completed several sessions of prior spine PT and more recent neurorehab with continue home exercises and has not had much relief. No Pain (0)/10 at rest however 5/10 at worst in the last week.        RECALL HISTORY OF PRESENT ILLNESS:  Robert SNIDER  Gianni is a 72 year old male who presents with a chief complaint of low back pain.     Pain began ~1.5 years ago and is associated with trauma. Patient reports a fall down 11 stairs when carrying chairs and fell down onto his back. Denies any major back pain or issues prior to that time. He was seen [facility] at that time in the ED with T12 compression fracture and anterior wedging, suspected L5 spondylolysis bilaterally but no other spinal fracture or dislocation seen.  He did follow-up with his primary care physician and was referred to neurology.  He has since been seen and evaluated both by neurology and neurosurgery who recommended a trial of physical therapy.  He has been working on neuropathic medication optimization with neurology given underlying length dependent peripheral polyneuropathy.  Patient did attend 2 sessions of physical therapy with Zeyad Huang, however, states that it was not particularly helpful.  He did receive home exercise program which he does inconsistently.    He localizes pain symptoms to the lower lumbar spine without radicular complaints.  Symptoms are worse on the left compared to the right (70: 30%).  He denies weakness, progressive numbness/tingling, or bowel/bladder issues.  Symptoms are worse with walking, lumbar extension and lifting and improved with topical Bengay.  Pain score 5/10 on average, 0/10 today at rest, and 6/10 its worse in the last week.  Describes pain as constant dull and aching in nature.  He does not report significant pain related sleep disturbance.    PRIOR INJURIES/TREATMENT:   Ice/Heat: +  Physical Therapy:   PT x2 sessions in September with Zeyad Huang      - Current Pain Medications -   Bengay topical helps      - Prior/Trialed Pain Medications -   NSAIDs: naproxen, ibuprofen, diclofenac   Muscle relaxer: flexeril prn   gabapentin 300mg BID      Prior Procedures:  Date    Procedure   Improvement (%)  None              Prior Related Surgery:   Left  intertrochanteric proximal femur fracture status post ORIF  Other (acupuncture, OMT, CMM, TENS, DME, etc.):   Chiropractic care - not helpful     Specialists Seen - (with most recent, available notes and clinic visits reviewed)   1.  Neurology - Dr. Guzman, Dr Garcia, Dr Jordan  2.  Neurosurgery - Anuj Mcneal PA-C   3.  Primary care sports medicine -Dr. Del Cid    IMAGING - reviewed   04/17/24 - EMG/NCS - BLE - Normal study     04/01/2021 XR L spine ([facility])  IMPRESSION: Three views lumbar spine series.     Moderate superior endplate compression deformity with estimated 50% anterior vertebral height loss at T12, age indeterminate. No retropulsion is seen. Correlate for acuity of injury with point tenderness by direct palpation.     Mild levoconvex mid lumbar scoliosis with grade 1 anterolisthesis at L5-S1. Suspect L5 spondylolysis bilaterally. No lumbar spinal fracture or dislocation is seen. Prominent L4-5 and moderate L2-3 lumbar disc degeneration is identified.    Visualized osseous pelvis is without fracture. The sacroiliac joints are mildly degenerated.     06/11/2021 MRI lumbar spine  Findings: There are 5 lumbar-type vertebrae assumed for the purposes  of this dictation.  Superior endplate compression deformity of the T12  vertebral body with loss of 60-70% vertebral body height along with  mild/moderate osseous edema. The tip of the conus medullaris is at  T12-L1.  Grade 1 anterolisthesis of L3 on L4 and L5 on S1. Multilevel  disc desiccation. Moderate loss of disc height at L4-5. Mild loss of  disc height at L2-3, L3-4, and L5-S1.  Multilevel degenerative  endplate marrow signal changes including edema most pronounced at  L4-5.     On a level by level basis:     T12-L1: Circumferential disc osteophyte complex and bilateral facet  hypertrophy. Mild spinal canal narrowing. Moderate bilateral neural  foraminal stenosis.     L1-2: Posterior disc bulge and bilateral facet hypertrophy. Mild  spinal canal narrowing.  Moderate bilateral neural foraminal stenosis.     L2-3: Posterior disc osteophyte complex and bilateral facet  hypertrophy. Ligamentum flavum thickening. Mild to moderate spinal  canal stenosis. Moderate bilateral neural foraminal stenosis, right  greater than left.     L3-4: Grade 1 anterolisthesis with uncovering of the disc and  posterior disc osteophyte complex. Bilateral facet hypertrophy.  Ligamentum flavum thickening. Severe spinal canal stenosis. Moderate  bilateral neural foraminal stenosis with impingement on the exiting  right L3 nerve root.     L4-5: Posterior disc bulge and bilateral facet hypertrophy. Mild to  moderate spinal canal narrowing. Moderate right and severe left neural  foraminal stenosis. Likely impingement on the exiting left L4 nerve  root.     L5-S1: Grade 1 anterolisthesis. Bilateral facet hypertrophy. No spinal  canal stenosis. Moderate to severe bilateral neural foraminal  stenosis.     Paraspinous tissues are within normal limits.     Partially visualized round T2 hyperintense lesion in the left kidney,  presumably representing a cyst.                                                                      Impression:   1. Subacute to chronic T12 compression fracture with loss of 60-70%  vertebral body height.  2. Extensive multilevel lumbar spondylosis, most pronounced at L3-4  where there is severe spinal canal stenosis and moderate bilateral  neural foraminal stenosis with impingement on the exiting right L3  nerve root. Also moderate to severe neural foraminal stenosis at L4-5  and L5-S1.    Medical History:  He has a history of knee osteoarthritis, length dependent neuropathy, HTN, dizziness   He  has a past surgical history that includes no history of surgery.    Family History  His family history includes Cataracts in his mother; Depression in his grandson; Heart Disease (age of onset: 83) in his father; Hypertension in his father and mother; Ovarian Cancer in his mother.      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 Enjoys walking.   He  reports that he has quit smoking. He has never used smokeless tobacco. He reports current alcohol use. He reports that he does not use drugs.        Current Medications:   He has a current medication list which includes the following prescription(s): carbidopa-levodopa, lisinopril, metoprolol succinate er, rosuvastatin, and vitamin d3, and the following Facility-Administered Medications: triamcinolone, triamcinolone, triamcinolone, triamcinolone, triamcinolone, triamcinolone, triamcinolone, and triamcinolone.       Allergies:   No Known Allergies    PHYSICAL EXAMINATION:  BP (!) 144/85 (BP Location: Right arm, Patient Position: Chair, Cuff Size: Adult Regular)   Pulse 65    General: Pleasant, straightforward, WDWN individual.  Mental Status: Pleasant, direct, appropriate mood and affect  Resp: breathing is unlabored without audible wheeze  Vascular: No visible cyanosis, no venous stasis changes  Heme: No visible ecchymosis or erythema on extremities  Skin: No notable rash    Neurologic:  Strength: All major muscle groups of the bilateral lower extremities have equal and symmetric muscle strength     Musculoskeletal:  Increased thoracic kyphosis most notable and thoracolumbar junction     Lumbar Spine: Positive step-off at approximately T12-L1 with palpation to the spinous processes.  Mod reduced ROM all planes, Pain with lateral rotation and extension bilaterally      ASSESSMENT:  Robert Archer is a pleasant 73 year old  male who presents with predominantly axial/mechanical low back pain worse on the left compared to the right (70: 30%):    #.  Lumbar spondylosis  #.  Lumbar facet arthropathy  #.  Chronic T12 compression fracture  #.  Multilevel neural foraminal stenosis and severe L3-4 spinal canal stenosis which could be an atypical presentation with lumbosacral pain and upper  thigh/gluteal symptoms more conventional lower extremity neurogenic claudication.     Complicating co-morbidities include:   #. Movement disorder - Parkinsons disease. Follows with neurology      At this point, he has had an adequate trial of PT/home exercise program and medications without improvement.    PLAN:  -Continue HEP. Stressed the importance of home exercise program when it comes to achieving meaningful, long-lasting pain relief    --Prior lumbar spine imaging reviewed in detail with patient/family. Update MRI lumbar spine as it has been +3yrs since last advanced imaging completed and when considering interventional procedures for modulation of pain  -Refer for L4-5 interlaminar epidural steroid injection  -The pathway from diagnostic medial branch blocks to radiofrequency denervation was discussed in detail with the patient today.  Risks and benefits were discussed and all questions were answered.   There are no contraindications.  The patient understands they will have 2 diagnostic medial branch blocks; and after each block they will be required to keep a pain diary recording their pain scores approximately every hour until the pain has returned to baseline.  During the time after the block, the patient was instructed to perform activities which typically aggravates their pain.  The patient will contact the medical spine staff after each diagnostic block to assess the amount of benefit the patient received.  If the patient has a significantly positive response to each of these diagnostic blocks, they may move forward with the radiofrequency ablation procedure. Depending on his response to ILESI, we discussed possible Bilateral L4-5, L5-S1 MBB#1 in the future.   -Follow up for procedure.     Ready to learn, no apparent learning barriers.  Education provided on treatment plan according to patient's preferred learning style.  Patient verbalizes understanding.   __________________________________  Brandie  MD [facility]  Physical Medicine & Rehabilitation      I spent a total of 40 minutes on the day of the visit.   Time spent by me doing chart review, history and exam, documentation and further activities per the note       Again, thank you for allowing me to participate in the care of your patient.        Sincerely,        Brandie Rosario MD

## 2024-05-17 NOTE — PATIENT INSTRUCTIONS
It was a pleasure seeing you today in our [facility]. We discussed the following:    #. MRI    MRI Lumbar spine order was added today. You can schedule this at the check-out desk today or Radiology will call you to schedule. You may also call [facility] Imaging Scheduling directly if you do not hear from them in the next couple of days.    Imaging scheduling  -[facility] [phone] Clinics and [facility] Center [facility] ([facility]), [facility] Clinics, including [facility], [facility]  -[facility] [phone] [facility], [facility] Clinics including [place], [place], [place], [place], and [place]  -[facility] [phone] [facility], [facility], [facility], [facility] clinics, including [place], [facility], and [facility]  -[facility] [phone] [facility], [facility], [facility] Clinics, including [place], [facility], and [place]      #.   Preparing for Spine Injection Therapy              Why Do I Need Spine Injection Therapy?  Your Health Care Provider is recommending spine injection therapy to help relieve your back and neck pain. This will be in addition to other therapies such as medications and physical therapy. The purpose of these injections is to reduce the amount of inflammation (swelling, pain, heat, redness, loss of body function) around the nerves thus reducing the amount of pain.     The medications you will receive with the injections will include:   Anesthetic - medication to numb the painful area.    Steroid - medication that prevents or reduces swelling and pain (anti-inflammatory).   To reduce your discomfort during the injection procedure, you will receive a numbing medication injection prior to the placement of the needles. You will be lying on your stomach during the injection procedure. We  will use a low-dose x-ray (fluoroscopy) to help guide needle placement.  You must have a  for this procedure. We will need to reschedule your injection if you do not have a  with you.      What are the different types of injections and procedure?  Below, is a brief description of the different types of injections we use to deliver pain medication as close as possible to the nerves in the painful area:    Epidural injection: (picture on the right) Epidural injections place 2 medications in your epidural space.  This is the space alongside your spinal canal (not inside of it). Nerves from your spinal cord pass through this space. The medications will bathe those nerves.       Medial Branch Block injections: This is a test to see if your pain is coming from a specific nerve. This injection is similar to a facet joint injection but contains only the numbing medication.  You will keep a pain score diary for the rest of the day and the following morning after receiving the injection.    Radiofrequency ablation: This is a procedure that uses radio waves and numbing medication to block the nerves that feel pain at the joint. The pain relief effects can last for a long time, but are not permanent. The procedure is similar to the Medial Branch Block but requires additional testing to ensure that the needle is near the nerve before numbing and ablating it.  Doctors often order sedation for this procedure.  Please see the sections on sedation below.      What Should I Expect if I Receive Sedation? THIS IS ORDERED BY THE PHYSICIAN  This is conscious sedation.  The medications we give to you will help you relax and reduce your anxiety.  You will still be awake for the procedure so we can ask you questions and hear your answers.     What Are My Responsibilities With Sedation?  You must stop eating and drinking 8 (eight) hours before your procedure. You can have clear fluids (water, coffee/tea without milk) up to 2  hours prior to the injections. Nothing by mouth for 2 hours prior to injection.  This includes gum, mints, or chew.  Take your morning medications with a small sip of water.    You must have a  who will check-in with you, and stay in the building while the procedure is underway.  If you do not have a  your sedation will be cancelled.  We will monitor you for at least 30 minutes after the procedure before being discharged home.      What Are the Risks and Complications For This Procedure?  Risks and complication are rare, but can still occur.  You should understand, discuss, and accept these risks before agreeing to the procedure. They include, but are not limited to:  infection  nerve damage   paralysis  injection failure or a need for further injections or additional procedures  continued or worsening of symptoms/pain,   medication reaction,  dural leak (into the hole covering around the spinal cord. This may cause a spinal headache)  leak of the medication into the spinal canal, nerves, or blood vessel.  death       What do I need to do before the procedure?   If you do not follow these instructions your procedure may be cancelled.  Tell us if you are on major blood thinners such as Coumadin, Xarelto , Plavix, Eliquis , Pradaxa , or others.    Contact the doctor who prescribed your blood thinner to ask for permission to stop taking it before you have the injection.    Schedule your pain injection procedure after your doctor gave their permission.   We will notify you when to stop and re-start your blood thinner.  Tell us if you have any allergies to contrast dye.  If you do, we may give additional medications to take before the procedure.  Tell us if you are pregnant, or possibly pregnant.  If so, you cannot receive steroid medications or be exposed to fluoroscopic X-rays.  Tell us if you have been sick during the 10 days before the procedure. This includes:   colds   gastrointestinal illness or  discomfort  dental sores,   skin infection, or any other type of infection.  Tell us if you have taken antibiotics during the 10 days prior to the procedure.  Do not drink alcohol the night before or on the day of the procedure.  You must shower the night before and on the day of your procedure.  Wear comfortable, clean clothing.  If you have an outside MRI (Magnetic Resonance Imaging photo), please bring it with you.    What Will Happen After the Procedure?  If you did not receive sedation we will monitor you for 15 minutes after the procedure. If you received sedation, we will monitor you for at least 30 minutes after the procedure     How soon can I expect pain relief?  You have received 2 types of medications with your injection:    Anesthetic - numbing medication which only acts for a few hours    Steroid which may take 3-14 days to be effective.   You can expect to feel your normal pain after the anesthetic wears off, until the steroid becomes effective.    How should I care for myself at home?  Get plenty of rest and avoid twisting, bending movements, heavy lifting, or strenuous activity for the first 24 hours. This will help the steroid be more effective. Medial Branch Block injections will have different discharge instructions.  Those will be discussed at that injection appointment.  Resume your pain medications  Apply ice packs (on for 20 minutes at a time), every 2-3 hours to your injection area for the first 2-3 days to help with pain control.    Avoid heat (pads or water bottles), which can cause the veins to open up, making the steroid less effective. You can use heat after 48 hours.  Take showers only for the first 48 hours.  No baths, hot tubs, swimming, or soaking for 48 hours to reduce the risk of infection.     When should I call the doctor?  Call us if you have any of the following:   Fever more than 100.5 degrees Fahrenheit   Signs of infection   Severe headache   Severe back pain   Increased  numbness or weakness in your legs or arms   Loss of bladder or bowel control  Nausea   Other concerns       #. We also discussed the possibility of lumbar medial branch blocks to evaluate and treat potential facet joint related pain. There is additional information outlined below       Medial Branch Blocks and Radiofrequency Ablation (RFA) aka Neurotomy  Back or neck pain may be due to problems with certain nerves near your spine. If so, a medial branch neurotomy can help relieve your pain. Neurotomy destroys a nerve to relieve pain or stop involuntary movements. In some cases, your doctor may give you a nerve block to see how your body will respond to neurotomy. The treatment uses heat, cold, radiofrequency, or chemicals to destroy the nerves near a problem joint. This keeps some pain messages from traveling to your brain, and helps relieve your symptoms.   Medial branch nerves  Each vertebra in your spine has facets (flat surfaces). They touch where the vertebrae fit together. This forms a facet joint. Each facet joint has at least 2 medial branch nerves. They are part of the nerve pathway to and from each facet joint. A facet joint in your back or neck can become inflamed (swollen and irritated). Pain messages may then travel along the nerve pathway from the facet joint to your brain.     Blocking pain messages  Medial branch nerves in each facet joint send and carry messages about back or neck pain. Destroying a few of these nerves can keep certain pain messages from reaching your brain. This can help bring you relief. The relief typically lasts for months to years.   Risks and complications  Risks and complications are rare, but can include:  Infection  Increased pain, numbness, or weakness  Nerve damage  Bleeding  Failure to relieve pain  [employer] last reviewed this educational content on 4/1/2018 2000-2021 [employer]. All rights reserved. This information is not intended as a substitute for  professional medical care. Always follow your healthcare professional's instructions.       Follow up for procedure after MRI lumbar spine has been completed.

## 2024-05-17 NOTE — PROGRESS NOTES
PM&R Follow-Up Visit -     Date of Initial Visit: 10/28/23  LOV: 1/19/2024  TD: 5/17/2024      Recall: Robert Archer is a 73 year old male with history of chronic T12 compression fracture, severe L3-4 central canal stenosis and axial/mechanical lumbosacral pain worse on the left compared to the right (70: 30%) with milder lower extremity symptoms who presents for follow up    INTERVAL HISTORY:  Patient was last seen in clinic January 19, 2024.  He is coming today by his wife. At that time, he did not have much in the way of pain or contributing spine issues.  His primary concern was with regards to ongoing balance problems.  He had been scheduled for neurorehabilitation PT which she has attended several sessions.  He was also referred to neurology and seen by my colleague, Dr. Garcia, who has been assisting in managing balance and dizziness.  EMG was performed 04/17/2024 and was normal without electrodiagnostic evidence of polyneuropathy.  He has also been diagnosed with Parkinson's disease and movement disorder and will be following up with Dr. Jordan at [facility] movement disorders clinic.    Today, he states that he has low back and bilateral lower extremity symptoms.  His low back pain is more predominant and slightly worse on the right compared to the left when compared to his radicular pain that tends to affect the posterior thighs.  Symptoms are worse with standing, ambulation and improved with relative rest.     He completed several sessions of prior spine PT and more recent neurorehab with continue home exercises and has not had much relief. No Pain (0)/10 at rest however 5/10 at worst in the last week.        RECALL HISTORY OF PRESENT ILLNESS:  Robert Archer is a 72 year old male who presents with a chief complaint of low back pain.     Pain began ~1.5 years ago and is associated with trauma. Patient reports a fall down 11 stairs when carrying chairs and fell down onto his back. Denies any major back pain or  issues prior to that time. He was seen [facility] at that time in the ED with T12 compression fracture and anterior wedging, suspected L5 spondylolysis bilaterally but no other spinal fracture or dislocation seen.  He did follow-up with his primary care physician and was referred to neurology.  He has since been seen and evaluated both by neurology and neurosurgery who recommended a trial of physical therapy.  He has been working on neuropathic medication optimization with neurology given underlying length dependent peripheral polyneuropathy.  Patient did attend 2 sessions of physical therapy with Zeyad Huang, however, states that it was not particularly helpful.  He did receive home exercise program which he does inconsistently.    He localizes pain symptoms to the lower lumbar spine without radicular complaints.  Symptoms are worse on the left compared to the right (70: 30%).  He denies weakness, progressive numbness/tingling, or bowel/bladder issues.  Symptoms are worse with walking, lumbar extension and lifting and improved with topical Bengay.  Pain score 5/10 on average, 0/10 today at rest, and 6/10 its worse in the last week.  Describes pain as constant dull and aching in nature.  He does not report significant pain related sleep disturbance.    PRIOR INJURIES/TREATMENT:   Ice/Heat: +  Physical Therapy:   PT x2 sessions in September with Zeyad Huang      - Current Pain Medications -   Bengay topical helps      - Prior/Trialed Pain Medications -   NSAIDs: naproxen, ibuprofen, diclofenac   Muscle relaxer: flexeril prn   gabapentin 300mg BID      Prior Procedures:  Date    Procedure   Improvement (%)  None              Prior Related Surgery:   Left intertrochanteric proximal femur fracture status post ORIF  Other (acupuncture, OMT, CMM, TENS, DME, etc.):   Chiropractic care - not helpful     Specialists Seen - (with most recent, available notes and clinic visits reviewed)   1.  Neurology - Dr. Guzman,   Dr Julian Garcia  2.  Neurosurgery - Anuj Mcneal PA-C   3.  Primary care sports medicine -Dr. Del Cid    IMAGING - reviewed   04/17/24 - EMG/NCS - BLE - Normal study     04/01/2021 XR L spine ([facility])  IMPRESSION: Three views lumbar spine series.     Moderate superior endplate compression deformity with estimated 50% anterior vertebral height loss at T12, age indeterminate. No retropulsion is seen. Correlate for acuity of injury with point tenderness by direct palpation.     Mild levoconvex mid lumbar scoliosis with grade 1 anterolisthesis at L5-S1. Suspect L5 spondylolysis bilaterally. No lumbar spinal fracture or dislocation is seen. Prominent L4-5 and moderate L2-3 lumbar disc degeneration is identified.    Visualized osseous pelvis is without fracture. The sacroiliac joints are mildly degenerated.     06/11/2021 MRI lumbar spine  Findings: There are 5 lumbar-type vertebrae assumed for the purposes  of this dictation.  Superior endplate compression deformity of the T12  vertebral body with loss of 60-70% vertebral body height along with  mild/moderate osseous edema. The tip of the conus medullaris is at  T12-L1.  Grade 1 anterolisthesis of L3 on L4 and L5 on S1. Multilevel  disc desiccation. Moderate loss of disc height at L4-5. Mild loss of  disc height at L2-3, L3-4, and L5-S1.  Multilevel degenerative  endplate marrow signal changes including edema most pronounced at  L4-5.     On a level by level basis:     T12-L1: Circumferential disc osteophyte complex and bilateral facet  hypertrophy. Mild spinal canal narrowing. Moderate bilateral neural  foraminal stenosis.     L1-2: Posterior disc bulge and bilateral facet hypertrophy. Mild  spinal canal narrowing. Moderate bilateral neural foraminal stenosis.     L2-3: Posterior disc osteophyte complex and bilateral facet  hypertrophy. Ligamentum flavum thickening. Mild to moderate spinal  canal stenosis. Moderate bilateral neural foraminal stenosis, right  greater than  left.     L3-4: Grade 1 anterolisthesis with uncovering of the disc and  posterior disc osteophyte complex. Bilateral facet hypertrophy.  Ligamentum flavum thickening. Severe spinal canal stenosis. Moderate  bilateral neural foraminal stenosis with impingement on the exiting  right L3 nerve root.     L4-5: Posterior disc bulge and bilateral facet hypertrophy. Mild to  moderate spinal canal narrowing. Moderate right and severe left neural  foraminal stenosis. Likely impingement on the exiting left L4 nerve  root.     L5-S1: Grade 1 anterolisthesis. Bilateral facet hypertrophy. No spinal  canal stenosis. Moderate to severe bilateral neural foraminal  stenosis.     Paraspinous tissues are within normal limits.     Partially visualized round T2 hyperintense lesion in the left kidney,  presumably representing a cyst.                                                                      Impression:   1. Subacute to chronic T12 compression fracture with loss of 60-70%  vertebral body height.  2. Extensive multilevel lumbar spondylosis, most pronounced at L3-4  where there is severe spinal canal stenosis and moderate bilateral  neural foraminal stenosis with impingement on the exiting right L3  nerve root. Also moderate to severe neural foraminal stenosis at L4-5  and L5-S1.    Medical History:  He has a history of knee osteoarthritis, length dependent neuropathy, HTN, dizziness   He  has a past surgical history that includes no history of surgery.    Family History  His family history includes Cataracts in his mother; Depression in his grandson; Heart Disease (age of onset: 83) in his father; Hypertension in his father and mother; Ovarian Cancer in his mother.     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 Enjoys walking.   He  reports that he has quit smoking. He has never used smokeless tobacco. He reports current alcohol use. He reports that he does not  use drugs.        Current Medications:   He has a current medication list which includes the following prescription(s): carbidopa-levodopa, lisinopril, metoprolol succinate er, rosuvastatin, and vitamin d3, and the following Facility-Administered Medications: triamcinolone, triamcinolone, triamcinolone, triamcinolone, triamcinolone, triamcinolone, triamcinolone, and triamcinolone.       Allergies:   No Known Allergies    PHYSICAL EXAMINATION:  BP (!) 144/85 (BP Location: Right arm, Patient Position: Chair, Cuff Size: Adult Regular)   Pulse 65    General: Pleasant, straightforward, WDWN individual.  Mental Status: Pleasant, direct, appropriate mood and affect  Resp: breathing is unlabored without audible wheeze  Vascular: No visible cyanosis, no venous stasis changes  Heme: No visible ecchymosis or erythema on extremities  Skin: No notable rash    Neurologic:  Strength: All major muscle groups of the bilateral lower extremities have equal and symmetric muscle strength     Musculoskeletal:  Increased thoracic kyphosis most notable and thoracolumbar junction     Lumbar Spine: Positive step-off at approximately T12-L1 with palpation to the spinous processes.  Mod reduced ROM all planes, Pain with lateral rotation and extension bilaterally      ASSESSMENT:  Robert Archer is a pleasant 73 year old  male who presents with predominantly axial/mechanical low back pain worse on the left compared to the right (70: 30%):    #.  Lumbar spondylosis  #.  Lumbar facet arthropathy  #.  Chronic T12 compression fracture  #.  Multilevel neural foraminal stenosis and severe L3-4 spinal canal stenosis which could be an atypical presentation with lumbosacral pain and upper thigh/gluteal symptoms more conventional lower extremity neurogenic claudication.     Complicating co-morbidities include:   #. Movement disorder - Parkinsons disease. Follows with neurology      At this point, he has had an adequate trial of PT/home exercise program  and medications without improvement.    PLAN:  -Continue HEP. Stressed the importance of home exercise program when it comes to achieving meaningful, long-lasting pain relief    --Prior lumbar spine imaging reviewed in detail with patient/family. Update MRI lumbar spine as it has been +3yrs since last advanced imaging completed and when considering interventional procedures for modulation of pain  -Refer for L4-5 interlaminar epidural steroid injection  -The pathway from diagnostic medial branch blocks to radiofrequency denervation was discussed in detail with the patient today.  Risks and benefits were discussed and all questions were answered.   There are no contraindications.  The patient understands they will have 2 diagnostic medial branch blocks; and after each block they will be required to keep a pain diary recording their pain scores approximately every hour until the pain has returned to baseline.  During the time after the block, the patient was instructed to perform activities which typically aggravates their pain.  The patient will contact the medical spine staff after each diagnostic block to assess the amount of benefit the patient received.  If the patient has a significantly positive response to each of these diagnostic blocks, they may move forward with the radiofrequency ablation procedure. Depending on his response to ILESI, we discussed possible Bilateral L4-5, L5-S1 MBB#1 in the future.   -Follow up for procedure.     Ready to learn, no apparent learning barriers.  Education provided on treatment plan according to patient's preferred learning style.  Patient verbalizes understanding.   __________________________________  Brandie Rosario MD  Physical Medicine & Rehabilitation      I spent a total of 40 minutes on the day of the visit.   Time spent by me doing chart review, history and exam, documentation and further activities per the note

## 2024-05-17 NOTE — NURSING NOTE
[facility]   ORTHOPEDICS & SPORTS MEDICINE  [address] [place] [address]  Dept: [phone]  ______________________________________________________________________________    Patient: Robert Archer   : 1950   MRN: [id number]   May 17, 2024    INVASIVE PROCEDURE SAFETY CHECKLIST    Date: May 17, 2024   Procedure: Bilateral knee cortisone injection   Patient Name: Robert Archer  MRN: [id number]  YOB: 1950    Action: Complete sections as appropriate. Any discrepancy results in a HARD COPY until resolved.     PRE PROCEDURE:  Patient ID verified with 2 identifiers (name and  or MRN): Yes  Procedure and site verified with patient/designee (when able): Yes  Accurate consent documentation in medical record: Yes  H&P (or appropriate assessment) documented in medical record: NA  H&P must be up to 20 days prior to procedure and updates within 24 hours of procedure as applicable: NA  Relevant diagnostic and radiology test results appropriately labeled and displayed as applicable: Yes  Procedure site(s) marked with provider initials: Yes    TIMEOUT:  Time-Out performed immediately prior to starting procedure, including verbal and active participation of all team members addressing the following:Yes  * Correct patient identify  * Confirmed that the correct side and site are marked  * An accurate procedure consent form  * Agreement on the procedure to be done  * Correct patient position  * Relevant images and results are properly labeled and appropriately displayed  * The need to administer antibiotics or fluids for irrigation purposes during the procedure as applicable   * Safety precautions based on patient history or medication use    DURING PROCEDURE: Verification of correct person, site, and procedures any time the responsibility for care of the patient is transferred to another member of the care team.       Prior to injection, verified patient identity using patient's name and  date of birth.  Due to injection administration, patient instructed to remain in clinic for 15 minutes  afterwards, and to report any adverse reaction to me immediately.    Joint injection was performed.      Drug Amount Wasted:  None.  Vial/Syringe: Single dose vial  Expiration Date:  10/30/2025      Anastasiya Montenegro, [facility]  May 17, 2024

## 2024-05-17 NOTE — LETTER
5/17/2024         RE: Robert Archer  [address] [place] [address]        Dear Colleague,    Thank you for referring your patient, Robert Archer, to the [facility] SP[facility] [place]. Please see a copy of my visit note below.    [facility]  SPORTS & ORTHOPEDIC CLINIC VISIT     May 17, 2024      Large Joint Injection/Arthocentesis: bilateral knee    Date/Time: 5/17/2024 9:10 AM    Performed by: Santiago Del Cid MD  Authorized by: Santiago Del Cid MD    Indications:  Pain  Needle Size:  22 G  Guidance: landmark guided    Approach:  Anterolateral  Location:  Knee  Laterality:  Bilateral      Medications (Right):  40 mg triamcinolone 40 MG/ML  Medications (Left):  40 mg triamcinolone 40 MG/ML  Outcome:  Tolerated well, no immediate complications  Procedure discussed: discussed risks, benefits, and alternatives    Consent Given by:  Patient  Timeout: timeout called immediately prior to procedure    Prep: patient was prepped and draped in usual sterile fashion     There were no complications. The patient tolerated the procedure well. There was minimal bleeding.   The patient was instructed to ice the knees upon leaving clinic and refrain from overuse over the next 2 days.   The patient was instructed to go to the emergency room with any unusual pain, swelling, or redness occurred in the injected area.     Santiago Del Cid MD            Again, thank you for allowing me to participate in the care of your patient.        Sincerely,        Santiago Del Cid MD

## 2024-05-17 NOTE — PROGRESS NOTES
[facility]  SPORTS & ORTHOPEDIC CLINIC VISIT     May 17, 2024      Large Joint Injection/Arthocentesis: bilateral knee    Date/Time: 5/17/2024 9:10 AM    Performed by: Santiago Del Cid MD  Authorized by: Santiago Del Cid MD    Indications:  Pain  Needle Size:  22 G  Guidance: landmark guided    Approach:  Anterolateral  Location:  Knee  Laterality:  Bilateral      Medications (Right):  40 mg triamcinolone 40 MG/ML  Medications (Left):  40 mg triamcinolone 40 MG/ML  Outcome:  Tolerated well, no immediate complications  Procedure discussed: discussed risks, benefits, and alternatives    Consent Given by:  Patient  Timeout: timeout called immediately prior to procedure    Prep: patient was prepped and draped in usual sterile fashion     There were no complications. The patient tolerated the procedure well. There was minimal bleeding.   The patient was instructed to ice the knees upon leaving clinic and refrain from overuse over the next 2 days.   The patient was instructed to go to the emergency room with any unusual pain, swelling, or redness occurred in the injected area.     Santiago Del Cid MD

## 2024-05-20 ENCOUNTER — TELEPHONE (OUTPATIENT)
Dept: PHYSICAL MEDICINE AND REHAB | Facility: CLINIC | Age: 74
End: 2024-05-20
Payer: COMMERCIAL

## 2024-05-20 PROBLEM — M48.061 SPINAL STENOSIS OF LUMBAR REGION, UNSPECIFIED WHETHER NEUROGENIC CLAUDICATION PRESENT: Status: ACTIVE | Noted: 2024-05-17

## 2024-05-20 PROBLEM — M47.816 LUMBAR SPONDYLOSIS: Status: ACTIVE | Noted: 2024-05-17

## 2024-05-20 PROBLEM — M54.17 RADICULAR PAIN OF LUMBOSACRAL REGION: Status: ACTIVE | Noted: 2024-05-17

## 2024-05-20 NOTE — TELEPHONE ENCOUNTER
Called patient to schedule procedure with Dr. Rosario    Date of Procedure: 6/21/24    Arrival time given: Yes: Arrival Time 2pm       Procedure Location: [facility]     Verified Location with Patient:  Yes  Address provided to the patient    Pre-op H&P Required:  No: Local anesthesia        Post-Op/Follow Up Appt:  Yes: 7/17/24       Informed patient they will need a  to drive them home:  Yes    Patients : Spouse     Patient is aware that pre-op RN from the procedure center will call 2-3 days prior to scheduled procedure to confirm arrival time and review any instructions:  Yes       Additional Comments: N/A        Cecelia Verdin MA on 5/20/2024 at 11:44 AM      P: [phone]*

## 2024-05-20 NOTE — TELEPHONE ENCOUNTER
Phoned the patient back and left VM. Stated to call and schedule injection procedure with dr. Rosario at [phone]

## 2024-06-03 ENCOUNTER — THERAPY VISIT (OUTPATIENT)
Dept: PHYSICAL THERAPY | Facility: CLINIC | Age: 74
End: 2024-06-03
Attending: STUDENT IN AN ORGANIZED HEALTH CARE EDUCATION/TRAINING PROGRAM
Payer: COMMERCIAL

## 2024-06-03 DIAGNOSIS — R26.89 BALANCE PROBLEMS: Primary | ICD-10-CM

## 2024-06-03 PROCEDURE — 97116 GAIT TRAINING THERAPY: CPT | Mod: GP | Performed by: PHYSICAL THERAPIST

## 2024-06-03 PROCEDURE — 97110 THERAPEUTIC EXERCISES: CPT | Mod: GP | Performed by: PHYSICAL THERAPIST

## 2024-06-03 PROCEDURE — 97750 PHYSICAL PERFORMANCE TEST: CPT | Mod: GP | Performed by: PHYSICAL THERAPIST

## 2024-06-05 ENCOUNTER — ALLIED HEALTH/NURSE VISIT (OUTPATIENT)
Dept: FAMILY MEDICINE | Facility: CLINIC | Age: 74
End: 2024-06-05
Payer: COMMERCIAL

## 2024-06-05 ENCOUNTER — NURSE TRIAGE (OUTPATIENT)
Dept: FAMILY MEDICINE | Facility: CLINIC | Age: 74
End: 2024-06-05

## 2024-06-05 VITALS — TEMPERATURE: 97.6 F

## 2024-06-05 VITALS — SYSTOLIC BLOOD PRESSURE: 136 MMHG | DIASTOLIC BLOOD PRESSURE: 80 MMHG

## 2024-06-05 DIAGNOSIS — I10 ESSENTIAL (PRIMARY) HYPERTENSION: Primary | ICD-10-CM

## 2024-06-05 DIAGNOSIS — Z78.9 TRIAGE ASSESSMENT CLASS 3, NONURGENT: Primary | ICD-10-CM

## 2024-06-05 PROCEDURE — 99207 PR NO CHARGE NURSE ONLY: CPT

## 2024-06-05 NOTE — NURSING NOTE
Robert Archer is a 73 year old patient who comes in today for a Blood Pressure check.  Initial BP:  /80 (BP Location: Left arm, Patient Position: Chair, Cuff Size: Adult Regular)        Disposition: results routed to provider

## 2024-06-05 NOTE — TELEPHONE ENCOUNTER
Nurse Triage SBAR    Is this a 2nd Level Triage? YES, LICENSED PRACTITIONER REVIEW IS REQUIRED    Situation:   Patient presented to clinic for BP check on MA schedule. Upon being roomed by MA staff patient reported a left elbow injury noting swelling and burning from 3 weeks ago.     Background:  Patient reports a fall 3 weeks ago. Fell on carpet.   Denies hitting his head but thinks he hit is elbow on the way down as he was initially bleeding.   No signs of bleeding or healing scabs upon RN assessment.     Patient reports his elbow is tender and reports a burning sensation near inner elbow   Upon nurse assessment and palpation of elbow, possible fluid present in at elbow.   No bruising  No redness  Appropriately warm to the touch  Full mobility of arm  No numbness or tingling in arm or fingers  No fever, temp 97.6 in clinic    Assessment:   Per protocol patient should be seen today for assessment of symptoms. RN recommended assessment today per protocol, upon review of provider schedules no appointments in clinic available, RN recommended . RN also advised that she can follow up with in clinic providers for possible work in today (however schedules look full) or approval to be seen later  this week. Patient and his wife in agreement with RN to follow up with PCP for work in or appt later this week.     Protocol Recommended Disposition:   See in Office Today    Recommendation:   PCP is unable to work patient in for assessment today. Due to patient having full range of motion, no fever, no numbness or tingling in arm or fingers, no redness, no bruising, not hot to the touch okay to see same day provider in clinic on Friday for follow up and X-ray to rule out fracture vs. possible Olecranon Bursitis vs. other etiology. Present to UC for new or worsening symptoms such as pain, increased burning sensation, mobility concerns, numbness and tingling.     RN spoke with PCP    Patient is in agreement with plan to be seen  on Friday. Appt scheduled. Patient and his wife verbalized understanding and all questions answered.     Appointments in Next Year      Jun 07, 2024  9:00 AM  (Arrive by 8:40 AM)  Provider Visit with RAMO Escoto CNP  [facility] ([facility] ) [phone]     Does the patient meet one of the following criteria for ADS visit consideration? 16+ years old, with an [facility] PCP     JANN Silva, RN  [facility] Registered Nurse  Clinic Triage  June 5, 2024    Reason for Disposition   SEVERE pain (e.g., excruciating)    Additional Information   Negative: Major bleeding (actively dripping or spurting) that can't be stopped   Negative: Amputation or bone sticking through the skin   Negative: Serious injury with multiple fractures (broken bones)   Negative: Bullet, stabbed by knife or other serious penetrating wound   Negative: Sounds like a life-threatening emergency to the triager   Negative: Wound looks infected   Negative: Shoulder injury   Negative: Hand or wrist injury   Negative: Looks like a broken bone or dislocated joint (crooked or deformed)   Negative: Can't bend injured elbow at all   Negative: Skin is split open or gaping (length > 1/2 inch or 12 mm)   Negative: Bleeding won't stop after 10 minutes of direct pressure (using correct technique)   Negative: Dirt in the wound and not removed after 15 minutes of scrubbing   Negative: Numbness (loss of sensation) of finger(s), present now   Negative: Sounds like a serious injury to the triager    Protocols used: Elbow Injury-A-OH

## 2024-06-05 NOTE — PROGRESS NOTES
Nurse Triage SBAR    Is this a 2nd Level Triage? YES, LICENSED PRACTITIONER REVIEW IS REQUIRED    Situation:   Patient presented to clinic for BP check on MA schedule. Upon being roomed by MA staff patient reported a left elbow injury noting swelling and burning from 3 weeks ago.     Background:  Patient reports a fall 3 weeks ago. Fell on carpet.   Denies hitting his head but thinks he hit is elbow on the way down as he was initially bleeding.   No signs of bleeding or healing scabs upon RN assessment.     Patient reports his elbow is tender and reports a burning sensation near inner elbow   Upon nurse assessment and palpation of elbow, possible fluid present in at elbow.   No bruising  No redness  Appropriately warm to the touch  Full mobility of arm  No numbness or tingling in arm or fingers  No fever, temp 97.6 in clinic    Assessment:   Per protocol patient should be seen today for assessment of symptoms. RN recommended assessment today per protocol, upon review of provider schedules no appointments in clinic available, RN recommended . RN also advised that she can follow up with in clinic providers for possible work in today (however schedules look full) or approval to be seen later  this week. Patient and his wife in agreement with RN to follow up with PCP for work in or appt later this week.     Protocol Recommended Disposition:   See in Office Today    Recommendation:   PCP is unable to work patient in for assessment today. Due to patient having full range of motion, no fever, no numbness or tingling in arm or fingers, no redness, no bruising, not hot to the touch okay to see same day provider in clinic on Friday for follow up and X-ray to rule out fracture vs. possible Olecranon Bursitis vs. other etiology. Present to UC for new or worsening symptoms such as pain, increased burning sensation, mobility concerns, numbness and tingling.     RN spoke with PCP    Patient is in agreement with plan to be seen  on Friday. Appt scheduled. Patient and his wife verbalized understanding and all questions answered.     Appointments in Next Year      Jun 07, 2024  9:00 AM  (Arrive by 8:40 AM)  Provider Visit with RAMO Escoto CNP  [facility] ([facility] ) [phone]     Does the patient meet one of the following criteria for ADS visit consideration? 16+ years old, with an [facility] PCP     JANN Silva, RN  [facility] Registered Nurse  Clinic Triage  June 5, 2024    Reason for Disposition   SEVERE pain (e.g., excruciating)    Additional Information   Negative: Major bleeding (actively dripping or spurting) that can't be stopped   Negative: Amputation or bone sticking through the skin   Negative: Serious injury with multiple fractures (broken bones)   Negative: Bullet, stabbed by knife or other serious penetrating wound   Negative: Sounds like a life-threatening emergency to the triager   Negative: Wound looks infected   Negative: Shoulder injury   Negative: Hand or wrist injury   Negative: Looks like a broken bone or dislocated joint (crooked or deformed)   Negative: Can't bend injured elbow at all   Negative: Skin is split open or gaping (length > 1/2 inch or 12 mm)   Negative: Bleeding won't stop after 10 minutes of direct pressure (using correct technique)   Negative: Dirt in the wound and not removed after 15 minutes of scrubbing   Negative: Numbness (loss of sensation) of finger(s), present now   Negative: Sounds like a serious injury to the triager    Protocols used: Elbow Injury-A-OH

## 2024-06-07 ENCOUNTER — OFFICE VISIT (OUTPATIENT)
Dept: FAMILY MEDICINE | Facility: CLINIC | Age: 74
End: 2024-06-07
Payer: COMMERCIAL

## 2024-06-07 ENCOUNTER — ANCILLARY PROCEDURE (OUTPATIENT)
Dept: GENERAL RADIOLOGY | Facility: CLINIC | Age: 74
End: 2024-06-07
Payer: COMMERCIAL

## 2024-06-07 VITALS
OXYGEN SATURATION: 97 % | SYSTOLIC BLOOD PRESSURE: 133 MMHG | TEMPERATURE: 97.3 F | WEIGHT: 169.5 LBS | BODY MASS INDEX: 25.1 KG/M2 | DIASTOLIC BLOOD PRESSURE: 67 MMHG | HEIGHT: 69 IN | HEART RATE: 58 BPM

## 2024-06-07 DIAGNOSIS — M25.522 LEFT ELBOW PAIN: Primary | ICD-10-CM

## 2024-06-07 DIAGNOSIS — I47.20 V TACH (H): ICD-10-CM

## 2024-06-07 DIAGNOSIS — I10 ESSENTIAL (PRIMARY) HYPERTENSION: ICD-10-CM
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
## 2024-06-07 PROCEDURE — 73080 X-RAY EXAM OF ELBOW: CPT | Mod: TC | Performed by: RADIOLOGY

## 2024-06-07 PROCEDURE — 99213 OFFICE O/P EST LOW 20 MIN: CPT

## 2024-06-07 RX ORDER — METOPROLOL SUCCINATE 25 MG/1
25 TABLET, EXTENDED RELEASE ORAL DAILY
Qty: 90 TABLET | Refills: 1 | Status: SHIPPED | OUTPATIENT
Start: 2024-06-07

## 2024-06-07 ASSESSMENT — PAIN SCALES - GENERAL: PAINLEVEL: SEVERE PAIN (6)

## 2024-06-07 NOTE — PROGRESS NOTES
[facility]                                                                                   OUTPATIENT PHYSICAL THERAPY    PLAN OF TREATMENT FOR OUTPATIENT REHABILITATION   Patient's Last Name, First Name, Robert Murdock YOB: 1950   Provider's Name   [facility]   Medical Record No.  [id number]     Onset Date:   12/13/84 Start of Care Date:  1/26/24     Medical Diagnosis:   balance problems      PT Treatment Diagnosis:    impaired balance and dizziness Plan of Treatment  Frequency/Duration:  /  1 additional session in about 6 weeks.    Certification date from   4/20/24 to    6/19/24       See note for plan of treatment details and functional goals     Meena Gonzalez PT                         I CERTIFY THE NEED FOR THESE SERVICES FURNISHED UNDER        THIS PLAN OF TREATMENT AND WHILE UNDER MY CARE     (Physician attestation of this document indicates review and certification of the therapy plan).              Referring Provider:  Storm Guzman    Initial Assessment  See Epic Evaluation-                 04/29/24 0500   Appointment Info   Signing clinician's name / credentials Meena Gonzalez PT, DPT   Visits Used 10   Medical Diagnosis balance problems   PT Tx Diagnosis impaired balance and dizziness   Quick Adds Certification   Progress Note/Certification   Start of Care Date 01/26/24   Onset of illness/injury or Date of Surgery 12/13/24   Therapy Frequency 1 time per week   Predicted Duration 12 weeks   Certification date from 02/20/24   Certification date to 06/19/24   Progress Note Due Date 04/19/24   GOALS   PT Goals 2;3   PT Goal 1   Goal Identifier FGA   Goal Description Robert will improve score on FGA from 16 to 21 or > in order to show improved balance to decrease risk of falls at home and in the community.   Goal Progress 20/30, balance is improving but did not quite meet goal yet   Target Date 06/19/24   PT Goal  2   Goal Identifier sit to stand   Goal Description Robert will perform 5 times sit to  12 secs or < without posterior loss of balance in order to improve safety and ease of transers.   Goal Progress he has posterior lean with sit to stand will fall back into chair on reps 4 and 5, not yet achieved this goal   Target Date 06/19/24   PT Goal 3   Goal Identifier stairs   Goal Description Robert will do flight of stairs with 1 rail only, instead of cane and rail in order to improve ease of safely getting around his house.   Goal Progress he can do this but at home still using the cane, extend goal   Target Date 06/19/24   Subjective Report   Subjective Report EMG study was normal. Started PD meds on Friday but low dose and increasing over the next two weeks. He has been trying the poles--he does not feel they change his gait much. Balance feels about the same too.   Objective Measure 1   Objective Measure 6MWT   Details 809 feet   Objective Measure 2   Objective Measure 10 M   Details ~ 7.5 sec with no walking poles, 2 walking poles, 1 walking poles and even when cued for BIG walking the speed is about the same   Therapeutic Procedure/Exercise   Therapeutic Procedures: strength, endurance, ROM, flexibility minutes (73921) 10   Ther Proc 1 wall stand   Ther Proc 1 - Details stood at wall for several minutes focused on scapular retraction and pracitce doing chin tuck   Ther Proc 2 sit to stand   Ther Proc 2 - Details 10 reps in total, he needed cues for forward lean, time does not improve much with these but he does show improved anterior WS when cued, continue as part of HEP   Ther Proc 3 verbal review of back lengthening exercises   Ther Proc 3 - Details included bridge, knee to chest and lumbar roll   Skilled Intervention posture work and review of back work   Patient Response/Progress needs cues for proper posture at wall   Neuromuscular Re-education   Neuromuscular re-ed of mvmt, balance, coord, kinesthetic  sense, posture, proprioception minutes (03376) 15   Neuro Re-ed 1 forward stepping 1 UE support   Neuro Re-ed 1 - Details did 10 reps on each side, then tried a few without UE support but he does not feel safe, mild imbalance so will continue to use UE support for this at home   Neuro Re-ed 2 backwards stepping   Neuro Re-ed 2 - Details retro stepping with 1 UE support on rail for balance, cued for > WS and he has diffuclty through both sides of this   Neuro Re-ed 3 rock and reach   Neuro Re-ed 3 - Details cues for larger movements, increased WS, did at least 15 reps on each side and used visual target to improve his posture   Skilled Intervention amplitude training and review of HEP   Patient Response/Progress he is able to increase amplitude slighlty of movement with exercise but challenging for him   Gait Training   Gait Training Minutes, includes stair climbing (70716) 20   Gait 1 6MWT   Gait 1 - Details used 2 walking poles to start and then went to 1 pole  per patient request- it does help his balance but does not seem to improve his posture and patient overall does not feel poles helped.   Gait 2 BIG walking with and without walking poles   Gait 2 - Details did BIG walking in hallway---speed is actually about the same whether he uses them or now but posture is better with BIG walking focus   Gait 3 safety with gait   Gait 3 - Details discussed using 1 walking pole or cane for longer walks outside for safety as he does deviate lateraly and scores at risk of falls with walking tests   Skilled Intervention 6MWT, gait with and without walking poles, BIG walking   Patient Response/Progress he has difficulty changing the amplitue of his movements especially with walking, recommend walking pole for safety when out in neighborhood   Education   Learner/Method Patient;Listening;Demonstration;Reading   Plan   Home program continue HEP   Plan for next session treadmill as he is thinking of getting one for home, see if  gait speed changes   Comments   Comments he is going to follow up in a month   Total Session Time   Timed Code Treatment Minutes 45   Total Treatment Time (sum of timed and untimed services) 45

## 2024-06-07 NOTE — PROGRESS NOTES
Assessment & Plan  1. Left elbow pain  Patient is a 73 year-old male with hyperlipidemia and hypertension presenting with swelling and pain to left olecranon process. Fall 3 weeks ago prior to symptom onset. Plan to obtain XR today to rule or fracture or other osteologic process, pending formal read by radiologist. Advised to apply ice multiple times per day to aid in swelling, take as needed acetaminophen for pain, and use elbow compression sleeve to help reduce swelling. Encouraged gentle ROM exercises to prevent stiffening of the joint. Physical therapy referral placed for further evaluation. Patient understands and is agreeable to plan as discussed in clinic.  - XR Elbow Left G/E 3 Views; Future  - Physical Therapy  Referral; Future    2. Fall, initial encounter  Fall 3 weeks ago and another last week. Using cane when out and about but not at home. Advised patient to use assistive devices in the home as well due to frequent falls. Tripping hazards such as throw rug and cords were advised to be removed. Denies balance disturbances, dizziness, lightheadedness, or near syncopal episodes.     3. V tach (H)  Requiring refills on metoprolol. Follows with cardiology.   - metoprolol succinate ER (TOPROL XL) 25 MG 24 hr tablet; Take 1 tablet (25 mg) by mouth daily  Dispense: 90 tablet; Refill: 1    4. Essential (primary) hypertension   Requiring refills on metoprolol. Follows with cardiology.   - metoprolol succinate ER (TOPROL XL) 25 MG 24 hr tablet; Take 1 tablet (25 mg) by mouth daily  Dispense: 90 tablet; Refill: 1    Subjective   Robert is a 73 year old, presenting for the following health issues:  Elbow Injury  - Had a fall about 3 weeks ago at his sons house while walking, unsure if you had his cane when he fell. Swelling was worse until 2 days ago when he started wearing a compression sleeve. Stevens on the inner arm. No numbness, tingling or weakness in the extremity.      6/7/2024     8:46 AM   Additional  "Questions   Roomed by Timothy LYON   Accompanied by Spouse     History of Present Illness       Reason for visit:  Left elbow injury  Symptom onset:  3-4 weeks ago  Symptoms include:  Swelling and burning sensation  Symptom intensity:  Moderate  Symptom progression:  Staying the same  Had these symptoms before:  No  What makes it worse:  Pressure on left elbow  What makes it better:  Reliving the pressure on my left elbow    He eats 2-3 servings of fruits and vegetables daily.He consumes 1 sweetened beverage(s) daily.He exercises with enough effort to increase his heart rate 30 to 60 minutes per day.  He exercises with enough effort to increase his heart rate 7 days per week.   He is taking medications regularly.     Presents with concerns of ongoing left elbow pain for the past 3 weeks after falling at home landing on carpet. Unsure if he landed on his elbow or not. Ongoing swelling around olecranon process. Range of motion is intact. Denies pain with movement. Associated mild burning sensation proximal to medial epicondyle. Has not been icing or OTC analgesics. Reports falling last week too. Uses cane when out and about but does not use when in the home.     Denies chest pain, dyspnea, orthopnea, lightheadedness, or syncopal/near syncopal episodes.       Objective    /67 (BP Location: Left arm, Patient Position: Sitting, Cuff Size: Adult Regular)   Pulse 58   Temp 97.3  F (36.3  C) (Temporal)   Ht 1.76 m (5' 9.29\")   Wt 76.9 kg (169 lb 8 oz)   SpO2 97%   BMI 24.82 kg/m    Body mass index is 24.82 kg/m .  Physical Exam  Vitals reviewed.   Constitutional:       Appearance: Normal appearance.   HENT:      Head: Normocephalic and atraumatic.   Eyes:      Pupils: Pupils are equal, round, and reactive to light.   Cardiovascular:      Rate and Rhythm: Normal rate and regular rhythm.      Pulses:           Radial pulses are 2+ on the right side and 2+ on the left side.      Heart sounds: Normal heart sounds, S1 " normal and S2 normal. No murmur heard.  Pulmonary:      Effort: Pulmonary effort is normal.      Breath sounds: Normal breath sounds.      Comments: Negative for adventitious breath sounds in all lung fields.  Musculoskeletal:      Right shoulder: Normal.      Left shoulder: Normal. No tenderness or bony tenderness. Normal range of motion.      Left elbow: Swelling (Over olecranon process) present. No deformity. Normal range of motion. Tenderness present in olecranon process. No medial epicondyle or lateral epicondyle tenderness.      Left wrist: Normal. No swelling or tenderness. Normal range of motion.      Comments: Burning sensation proximal to medial epicondyle.     Lymphadenopathy:      Cervical: No cervical adenopathy.   Skin:     General: Skin is warm and dry.      Capillary Refill: Capillary refill takes less than 2 seconds.      Comments: No suspicious lesions or rashes.   Neurological:      Mental Status: He is alert and oriented to person, place, and time.      Sensory: Sensation is intact.      Motor: Motor function is intact. No weakness.   Psychiatric:         Attention and Perception: Attention and perception normal.         Mood and Affect: Mood and affect normal.      XR pending formal read by radiologist.       Signed Electronically by: RAMO Escoto CNP

## 2024-06-07 NOTE — PROGRESS NOTES
DISCHARGE  Reason for Discharge: Patient has met all goals or making progress towards goals.     Patient was originally referred to PT for dizziness. After vestibular evaluation, determined that dizziness was not a true vertigo and more of an imbalanced feeling. He was also dx'd with PD during our plan of care. Focused on amplitude training exercises and balance to improve his mobility. He is making slow, steady gains and will need to keep working on HEP to see further changes. Please refer back in future for more PD focused therapy or if balance changes occur. Did improve with balance testing and showing more stability with exercises.     Equipment Issued: n/a    Discharge Plan: Patient to continue home program.    Referring Provider:  Storm Guzman         06/03/24 0500   Appointment Info   Signing clinician's name / credentials Meena Gonzalez, PT, DPT   Visits Used 11   Medical Diagnosis balance problems   PT Tx Diagnosis impaired balance and dizziness   Quick Adds Certification   Progress Note/Certification   Start of Care Date 01/26/24   Onset of illness/injury or Date of Surgery 12/13/24   Therapy Frequency 1 time per week   Predicted Duration 12 weeks   Certification date from 04/20/24   Certification date to 06/19/24   GOALS   PT Goals 2;3   PT Goal 1   Goal Identifier FGA   Goal Description Robert will improve score on FGA from 16 to 21 or > in order to show improved balance to decrease risk of falls at home and in the community.   Goal Progress 21/30, met goal but still at risk of falls   Target Date 06/20/24   Date Met 06/03/24   PT Goal 2   Goal Identifier sit to stand   Goal Description Robert will perform 5 times sit to  12 secs or < without posterior loss of balance in order to improve safety and ease of transers.   Goal Progress did not meet, he is able to do 4 sit<>stands in about 15 secs and then has posterior lean in standing and needs to sit back in the chair   Target Date 06/20/24    PT Goal 3   Goal Identifier stairs   Goal Description Robert will do flight of stairs with 1 rail only, instead of cane and rail in order to improve ease of safely getting around his house.   Goal Progress MET, using the banister and sometimes cane near for safety   Target Date 06/20/24   Date Met 06/03/24   Subjective Report   Subjective Report Feet feel better with PD meds. Imbalance feels about the same overall. Is having spine injection 6/21 and so hoping this helps with his back pain when walking. Sometimes balance feels better than other days.   Objective Measure 1   Objective Measure FGA   Details 21/30, improved by 1 point since last tested   Therapeutic Procedure/Exercise   Therapeutic Procedures: strength, endurance, ROM, flexibility minutes (70804) 20   Ther Proc 1 wall stand for warm up   Ther Proc 1 - Details did x 60 secs, shoulders back to wall, he tolerated well today   Ther Proc 2 PD handout on exercise   Ther Proc 2 - Details discussed current exercise recommends by PD foundation-- 30 minutes of CV exercise (3 days per week which is alreay doing) and 2-3 days of strength, balance/agility and lengthening which we set up with his current routine. Educated patient on trying to peform exercise and walking at speeds that are moderate level of energy expenditure in order to appropriately challenge himself.   Skilled Intervention exercise recs for PD and posture work   Patient Response/Progress he shows good understanding of education and is motivated to keep exercise routine going   Gait Training   Gait Training Minutes, includes stair climbing (35094) 10   Gait 1 Treadmill   Gait 1 - Details He is thinking about getting one for home so practiced today. 10 minutes, 1.6 mph, he used B UE support for balance. Able to increase grade to 1.0 during performance and discussed he could work on increasing this at home in order to increase HR and make more moderate exercise.   Skilled Intervention TM training    Patient Response/Progress showed understanding on proper use of TM as he is thinking of getting one for home   Physical Performance Test/measures   Physical Performance Test/Measurement, Minutes (86287) 10   Physical Performance Test/Measurement Details sit to stand test and FGA   Skilled Intervention performed sit to stand test and FGA   Patient Response/Progress did not imrpove with sit to stand test, met is FGA goal.   Education   Learner/Method Patient;Listening;Demonstration;Reading   Plan   Plan for next session d/c   Total Session Time   Timed Code Treatment Minutes 40   Total Treatment Time (sum of timed and untimed services) 40

## 2024-06-07 NOTE — PATIENT INSTRUCTIONS
Today we completed an x-ray of your elbow. I will reach out via [employer] with results once images are read by the radiologist.     Continue wearing compression sleeve to help aid in the swelling occur around your elbow. Apply ice 3-4 times per day to aid in reducing inflammation. Take as needed acetaminophen (Tylenol) for pain relief. Please complete gentle range of motion exercises to keep the joint loose. I have placed a referral to physical therapy for further evaluation.     Please use your cane at home to help prevent falls. Remove throw rugs, cords, and other tripping hazards.

## 2024-06-14 ENCOUNTER — ANCILLARY PROCEDURE (OUTPATIENT)
Dept: MRI IMAGING | Facility: CLINIC | Age: 74
End: 2024-06-14
Attending: PHYSICAL MEDICINE & REHABILITATION
Payer: COMMERCIAL

## 2024-06-14 DIAGNOSIS — M47.816 LUMBAR FACET ARTHROPATHY: ICD-10-CM

## 2024-06-14 DIAGNOSIS — M48.061 SPINAL STENOSIS OF LUMBAR REGION, UNSPECIFIED WHETHER NEUROGENIC CLAUDICATION PRESENT: ICD-10-CM

## 2024-06-14 DIAGNOSIS — M47.816 LUMBAR SPONDYLOSIS: ICD-10-CM

## 2024-06-14 PROCEDURE — 72148 MRI LUMBAR SPINE W/O DYE: CPT | Performed by: RADIOLOGY

## 2024-06-21 ENCOUNTER — HOSPITAL ENCOUNTER (OUTPATIENT)
Facility: AMBULATORY SURGERY CENTER | Age: 74
Discharge: HOME | End: 2024-06-21
Attending: PHYSICAL MEDICINE & REHABILITATION | Admitting: PHYSICAL MEDICINE & REHABILITATION
Payer: COMMERCIAL

## 2024-06-21 ENCOUNTER — ANCILLARY PROCEDURE (OUTPATIENT)
Dept: GENERAL RADIOLOGY | Facility: CLINIC | Age: 74
End: 2024-06-21
Attending: PHYSICAL MEDICINE & REHABILITATION
Payer: COMMERCIAL

## 2024-06-21 VITALS
RESPIRATION RATE: 18 BRPM | HEART RATE: 56 BPM | OXYGEN SATURATION: 98 % | SYSTOLIC BLOOD PRESSURE: 142 MMHG | TEMPERATURE: 97.6 F | DIASTOLIC BLOOD PRESSURE: 79 MMHG

## 2024-06-21 DIAGNOSIS — R52 PAIN: ICD-10-CM

## 2024-06-21 PROCEDURE — G8918 PT W/O PREOP ORDER IV AB PRO: HCPCS

## 2024-06-21 PROCEDURE — 62323 NJX INTERLAMINAR LMBR/SAC: CPT

## 2024-06-21 PROCEDURE — G8907 PT DOC NO EVENTS ON DISCHARG: HCPCS

## 2024-06-21 RX ORDER — DEXAMETHASONE SODIUM PHOSPHATE 10 MG/ML
INJECTION, SOLUTION INTRAMUSCULAR; INTRAVENOUS PRN
Status: DISCONTINUED | OUTPATIENT
Start: 2024-06-21 | End: 2024-06-21 | Stop reason: HOSPADM

## 2024-06-21 RX ORDER — LIDOCAINE HYDROCHLORIDE 10 MG/ML
INJECTION, SOLUTION EPIDURAL; INFILTRATION; INTRACAUDAL; PERINEURAL PRN
Status: DISCONTINUED | OUTPATIENT
Start: 2024-06-21 | End: 2024-06-21 | Stop reason: HOSPADM

## 2024-06-21 RX ORDER — IOPAMIDOL 408 MG/ML
INJECTION, SOLUTION INTRATHECAL PRN
Status: DISCONTINUED | OUTPATIENT
Start: 2024-06-21 | End: 2024-06-21 | Stop reason: HOSPADM

## 2024-06-21 NOTE — OP NOTE
PROCEDURE NOTE: Lumbar Interlaminar Epidural Steroid Injection    PROCEDURE DATE: 6/21/2024    PATIENT NAME: Robert Archer  YOB: 1950    ATTENDING PHYSICIAN: Brandie Rosario MD    PREOPERATIVE DIAGNOSIS:   Radicular pain of the lumbosacral region  Lumbar spinal stenosis, unspecified  POSTOPERATIVE DIAGNOSIS: Same    PROCEDURE PERFORMED: Interlaminar Epidural Steroid Injection at the L4-5 level, with a left paramedian approach under fluoroscopic guidance       FLUOROSCOPY WAS USED.     INDICATIONS FOR PROCEDURE:   This is a 73 year old male with a clinical picture consistent with the above-mentioned diagnosis, resulting in radicular pain to the right and left lower extremity.    06/14/24 MRI lumbar spine  FINDINGS:  There are 5 lumbar type vertebrae, used for the purposes of this  dictation. Conus tip at approximately L1. Chronic severe wedge  compression deformity of the T12 vertebral body with 80-90% loss of  height anteriorly; resolution of associated edema since 6/11/2021. No  new loss of vertebral body height. Unchanged minimal retropulsion at  T12. Unchanged grade 1 anterolisthesis at L3-4 and L5-S1. Mild edema  in the opposing endplates at L5-S1, increased since 2021. Multilevel  disc dehydration and loss of intervertebral disc height, greatest at  L4-5. Increased loss of disc height at L5-S1 since 2021.     On a level by level basis, the findings are as follows:     L1-2: Posterior disc bulge and bilateral facet hypertrophy. Mild  spinal canal narrowing. Moderate bilateral neural foraminal narrowing.     L2-3: Posterior disc bulge, bilateral facet hypertrophy, and  ligamentum flavum thickening. Mild spinal canal narrowing. Moderate  right and mild left neural foraminal narrowing.     L3-4: Grade 1 anterolisthesis with uncovering of the disc and  asymmetric right disc bulge. Bilateral facet hypertrophy and  ligamentum flavum thickening. Severe spinal canal stenosis. Mild to  moderate bilateral  neural foraminal narrowing.     L4-5: Asymmetric left disc osteophyte complex and left greater than  right facet hypertrophy. Mild spinal canal narrowing. Moderate to  severe right and severe left neural foraminal stenosis. Persistent  left L4 foraminal nerve root impingement.     L5-S1: Grade 1 anterolisthesis with uncovering of the disc and  asymmetric right disc bulge. Bilateral pars interarticularis defects  of L5. No spinal canal stenosis. Severe right and moderate to severe  left neural foraminal stenosis, slightly progressed since 2021. Right  L5 foraminal nerve root impingement.     The visualized paraspinous soft tissues are within normal limits.                                                                       IMPRESSION:  1 extensive multilevel lumbar degenerative changes, slightly  progressed since 2021. Most pronounced findings include severe spinal  canal stenosis at L3-4. Foraminal stenosis with associated left L4 and  right L5 nerve root impingement.  2. Chronic severe T12 compression deformity with resolution of edema  since 6/11/2021.  3. Chronic bilateral pars defects of L5 with associated grade 1  anterolisthesis at L5-S1.    PROCEDURE AND FINDINGS:   The patient was greeted in the pre procedure holding area. The risk, benefits and alternatives to the procedure were again reviewed with the patient and written informed consent was placed in the chart. Prior to the procedure a time out was completed, verifying correct patient, procedure, site, positioning, and implants and/or special equipment.    An IV line was not placed. The patient was taken to the procedure room and positioned prone on the fluoroscopy table. Routine monitors were applied including blood pressure cuff, and pulse oximetry. Then a  film was taken to identify the correct level. The skin was prepped and draped in the usual sterile fashion. The overlying skin and subcutaneous tissue was anesthetized using a 27-guage 1-1/4  inch needle with 1% preservative-free lidocaine for a total volume of  4mls. Then an 20G: 3.5-inch Tuohy needle was advanced under fluorosocpic guidance using AP and lateral views into the L4-5 interlaminar space. A loss of resistance syringe was attached and loss of resistance to saline was achieved.     After negative aspiration, 1mls of Isovue-M contrast was injected under AP view with live fluoroscopy and confirmed adequate spread along the nerve root and in the epidural space. There was no evidence of intravascular uptake or intrathecal spread on imaging. A lateral view was also taken confirming adequate epidural spread.     At this point, after negative aspiration, a total 4mL volume of treatment injectate, consisting of 1mL Dexamethasone (10mg/mL) and 3mL of preservative free normal saline (PFNS) was injected easily.  The needle was then flushed with 0.5ml of PFNS and removed. The needle insertion site was dressed appropriately.     The patient was taken to the recovery room where they were monitored for a brief period of time. He tolerated the procedure well and were discharged home in stable condition with post procedural instructions.     Follow-up will be in clinic    COMPLICATIONS: None    COMMENTS: None

## 2024-06-21 NOTE — DISCHARGE INSTRUCTIONS
PAIN INJECTION HOME CARE INSTRUCTIONS  Activity  -Rest today  -Do not work today  -Resume normal activity tomorrow  -DO NOT shower for 24 hours  -DO NOT remove bandaid for 24 hours    Pain  -You may experience soreness at the injection site for one or two days  -You may use an ice pack for 20 minutes every 2 hours for the first 24 hours  -You may use a heating pad after the first 24 hours  -You may use Tylenol (acetaminophen) every 4 hours or other pain medicines as directed by your physician    You may experience numbness radiating into your legs or arms (depending on the procedure location). This numbness may last several hours. Until sensation returns to normal; please use caution in walking, climbing stairs, and stepping out of your vehicle, etc.    DID YOU RECEIVE SEDATION TODAY?  No    Safety  Sedation medicine, if given, may remain active for many hours. It is important for the next 24 hours that you do not:  -Drive a car  -Operate machines or power tools  -Consume alcohol, including beer  -Sign any important papers or legal documents    DID YOU RECEIVE STEROIDS TODAY?  Yes    Common side effects of steroids:  Not everyone will experience corticosteroid side effects. If side effects are experienced, they will gradually subside in the 7-10 day period following an injection. Most common side effects include:  -Flushed face and/or chest  -Feeling of warmth, particularly in the face but could be an overall feeling of warmth  -Increased blood sugar in diabetic patients  -Menstrual irregularities my occur. If taking hormone-based birth control an alternate method of birth control is recommended  -Sleep disturbances and/or mood swings are possible  -Leg cramps    Please contact us if you have:  -Severe pain  -Fever more than 101.5 degrees Fahrenheit  -Signs of infection at the injection site (redness, swelling, or drainage)    FOR PM&R PATIENTS:  For patients seen by the PM and R service, please call [phone].  (Monday through Friday 8a-5p.  After business hours and weekends call [phone] and ask for the PM and R doctor on call). If you need to fax a pain diary to PM&R the fax number is [phone]. If you are unable to fax, uploading to [employer] is encouraged, then send to provider. If you have procedure scheduling questions please call [phone] Option #2.

## 2024-06-27 ENCOUNTER — OFFICE VISIT (OUTPATIENT)
Dept: FAMILY MEDICINE | Facility: CLINIC | Age: 74
End: 2024-06-27
Payer: COMMERCIAL

## 2024-06-27 VITALS
WEIGHT: 171 LBS | RESPIRATION RATE: 14 BRPM | OXYGEN SATURATION: 99 % | BODY MASS INDEX: 25.33 KG/M2 | DIASTOLIC BLOOD PRESSURE: 68 MMHG | TEMPERATURE: 97.2 F | SYSTOLIC BLOOD PRESSURE: 118 MMHG | HEART RATE: 60 BPM | HEIGHT: 69 IN

## 2024-06-27 DIAGNOSIS — R20.8 DYSESTHESIA: Primary | ICD-10-CM

## 2024-06-27 PROCEDURE — 99213 OFFICE O/P EST LOW 20 MIN: CPT | Performed by: FAMILY MEDICINE

## 2024-06-27 PROCEDURE — G2211 COMPLEX E/M VISIT ADD ON: HCPCS | Performed by: FAMILY MEDICINE

## 2024-06-27 ASSESSMENT — PAIN SCALES - GENERAL: PAINLEVEL: MODERATE PAIN (5)

## 2024-06-27 NOTE — PATIENT INSTRUCTIONS
Regarding your Pain/burning:  You can also try over the counter Aspercreme (lidocaine), Biofreeze (menthol), or Voltaren (diclofenac) for pain.

## 2024-06-27 NOTE — PROGRESS NOTES
Assessment & Plan     1. Dysesthesia of left elbow  Reports burning sensation in the medial antecubital fossa. Fell a month ago on elbow and reports fluid accumulation a week ago that has now subsided. No pain to extension or flexion of the arm. No pain to palpation of the joint and arm. Instructed to use topical ointments, compression, ice, and NSAIDS for healing and comfort. If not better in one month to return back to clinic for further evaluation. Patient in agreement to plan.     Jose Barrera MD  [facility]    Disclaimer: This note consists of symbols derived from keyboarding, dictation and/or voice recognition software. As a result, there may be errors in the script that have gone undetected. Please consider this when interpreting information found in this chart.    The longitudinal plan of care for the diagnosis(es)/condition(s) as documented were addressed during this visit. Due to the added complexity in care, I will continue to support Robert in the subsequent management and with ongoing continuity of care.    Damon Jones is a 73 year old, presenting for the following health issues:    HPI:  Patient has a history of Parkinson's and fell a month ago and injured his left elbow. Tenderness and fluid accumulation since injury has occurred. Fluid has resolved but pain in elbow has ustained and imparing his ability to comfortably sleep at night. Has tried compression a few times and ice with minimal relief. On 6/21 had a back steroid injection to address his chronic low back pain without complication. Enjoys time outside by mowing the lawn and great support system with wife. No other acute concerns.       Elbow Pain        6/27/2024     2:36 PM   Additional Questions   Roomed by Priya QUIJANO   Accompanied by Pb         6/27/2024     2:36 PM   Patient Reported Additional Medications   Patient reports taking the following new medications NA     History of Present  "Illness       Reason for visit:  Injury to elbow  Symptom onset:  3-4 weeks ago  Symptoms include:  Paining and burning sensation  Symptom intensity:  Moderate  Symptom progression:  Staying the same  Had these symptoms before:  No  What makes it worse:  No  What makes it better:  Application of Biofreeze    He eats 4 or more servings of fruits and vegetables daily.He consumes 1 sweetened beverage(s) daily.He exercises with enough effort to increase his heart rate 30 to 60 minutes per day.  He exercises with enough effort to increase his heart rate 7 days per week.   He is taking medications regularly.         Pain History:  When did you first notice your pain? 3-4 weeks ago    Have you seen anyone else for your pain? Yes - Rema Pickens   How has your pain affected your ability to work? Not currently working - unrelated to pain  Where in your body do you have pain? Musculoskeletal problem/pain  Onset/Duration: 4 weeks ago   Description  Location: elbow - left  Joint Swelling: No  Redness: No  Pain: YES  Warmth: No  Intensity:  moderate  Progression of Symptoms:  same  Accompanying signs and symptoms:   Fevers: No  Numbness/tingling/weakness: YES- Burning   History  Trauma to the area: YES  Recent illness:  No  Previous similar problem: No  Previous evaluation:  No  Precipitating or alleviating factors:  Aggravating factors include: pressure   Therapies tried and outcome: nothing      Review of Systems  Constitutional, HEENT, cardiovascular, pulmonary, gi and gu systems are negative, except as otherwise noted.    Social history reviewed.   Objective    /68   Pulse 60   Temp 97.2  F (36.2  C) (Temporal)   Resp 14   Ht 1.76 m (5' 9.29\")   Wt 77.6 kg (171 lb)   SpO2 99%   BMI 25.04 kg/m    Body mass index is 25.04 kg/m .  Physical Exam  Constitutional:       Appearance: Normal appearance. He is normal weight.   HENT:      Head: Normocephalic.      Mouth/Throat:      Mouth: Mucous membranes are moist.      " Pharynx: Oropharynx is clear.   Eyes:      Extraocular Movements: Extraocular movements intact.      Conjunctiva/sclera: Conjunctivae normal.      Pupils: Pupils are equal, round, and reactive to light.   Cardiovascular:      Rate and Rhythm: Normal rate and regular rhythm.   Pulmonary:      Effort: Pulmonary effort is normal.   Musculoskeletal:      Right elbow: Normal.      Left elbow: No swelling or effusion. Normal range of motion. No tenderness. No radial head, medial epicondyle, lateral epicondyle or olecranon process tenderness.        Arms:       Cervical back: Normal range of motion.      Comments: Burning tenderness.    Skin:     General: Skin is warm and dry.   Neurological:      Mental Status: He is alert.     Labs: None    Signed Electronically by: Jose Barrera MD

## 2024-07-16 NOTE — PROGRESS NOTES
"Robert is a 73 year old who is being evaluated via a billable video visit.      Video-Visit Details    Type of service:  Video Visit   Joined the call at 7/17/2024, 10:02:45 am.  Left the call at 7/17/2024, 10:23:59 am.  You were on the call for 21 minutes 14 seconds .  Originating Location (pt. Location): Home    Distant Location (provider location):  Off-site  Platform used for Video Visit: [employer]        PM&R Follow-Up Visit -     Date of Initial Visit: 10/28/23  LOV: 6/21/2024-procedure visit  TD: 7/17/2024     Recall:  Robert Archer is a 73 year old male with history of chronic T12 compression fracture, severe L3-4 central canal stenosis and axial/mechanical lumbosacral pain worse on the left compared to the right (70: 30%) with milder lower extremity symptoms who presents for follow up       INTERVAL HISTORY:  Patient was last seen in clinic 6/21/2024 for L4-5 ILESI with Dr. Roasrio.     He was seen today via telehealth for follow-up.  Unfortunately, he states that the L4-5 ILESI on 6/21/2024 provided virtually no relief.  He estimates a \"1-2%\" improvement in the level of pain after the TEJA.  He does note some occasional pain affecting the back of both thighs, and says that this particular area of pain may have improved after the epidural steroid injection.    Today, he describes:  -Low back pain R>L   -Occasional pain in the back of both thighs R>L, not past the knee     He states that the pain is worse when he wakes up in the morning, but persists throughout the day.    He states the back pain is much more bothersome for him than the leg pain. (70% back pain vs 30% leg pain).     He takes extra strength Tylenol to help with pain.  Without Tylenol he rates the pain 5/10.  After taking Tylenol, the pain improves to 1-2/10.    In the past, he has described symptoms worse with standing and ambulation, and improved with relative rest.  At the prior visit with Dr. Rosario 5/17/2024 he had bilateral low back pain with " lateral rotation and extension on physical exam.  Today, he does not have pain when performing seated lumbar flexion, extension, or twisting.  However, I did not ask him to repeat these maneuvers in a standing position, due to history of balance problems, dizziness, and Parkinson's disease and a concern this could lead to a fall.    He denies lower extremity numbness or tingling.  He denies bowel or bladder incontinence, saddle anesthesia, weakness, or falls.         RECALL HISTORY OF PRESENT ILLNESS:  (per Dr Rosario)  Robert Archer is a 72 year old male who presents with a chief complaint of low back pain.     Pain began ~1.5 years ago and is associated with trauma. Patient reports a fall down 11 stairs when carrying chairs and fell down onto his back. Denies any major back pain or issues prior to that time. He was seen [facility] at that time in the ED with T12 compression fracture and anterior wedging, suspected L5 spondylolysis bilaterally but no other spinal fracture or dislocation seen.  He did follow-up with his primary care physician and was referred to neurology.  He has since been seen and evaluated both by neurology and neurosurgery who recommended a trial of physical therapy.  He has been working on neuropathic medication optimization with neurology given underlying length dependent peripheral polyneuropathy.  Patient did attend 2 sessions of physical therapy with Zeyad Huang, however, states that it was not particularly helpful.  He did receive home exercise program which he does inconsistently.    He localizes pain symptoms to the lower lumbar spine without radicular complaints.  Symptoms are worse on the left compared to the right (70: 30%).  He denies weakness, progressive numbness/tingling, or bowel/bladder issues.  Symptoms are worse with walking, lumbar extension and lifting and improved with topical Bengay.  Pain score 5/10 on average, 0/10 today at rest, and 6/10 its worse in the last week.   Describes pain as constant dull and aching in nature.  He does not report significant pain related sleep disturbance.    5/17/2024: (per Dr Rosario)  Patient was last seen in clinic January 19, 2024.  He is coming today by his wife. At that time, he did not have much in the way of pain or contributing spine issues.  His primary concern was with regards to ongoing balance problems.  He had been scheduled for neurorehabilitation PT which she has attended several sessions.  He was also referred to neurology and seen by my colleague, Dr. Garcia, who has been assisting in managing balance and dizziness.  EMG was performed 04/17/2024 and was normal without electrodiagnostic evidence of polyneuropathy.  He has also been diagnosed with Parkinson's disease and movement disorder and will be following up with Dr. Jordan at [facility] movement disorders clinic.    Today, he states that he has low back and bilateral lower extremity symptoms.  His low back pain is more predominant and slightly worse on the right compared to the left when compared to his radicular pain that tends to affect the posterior thighs.  Symptoms are worse with standing, ambulation and improved with relative rest.     He completed several sessions of prior spine PT and more recent neurorehab with continue home exercises and has not had much relief. Data Unavailable/10 at rest however 5/10 at worst in the last week.       PRIOR INJURIES/TREATMENT:   Ice/Heat: +  Physical Therapy:   Current PT for balance  PT x2 sessions in September with Zeyad Huang for low back pain     - Current Pain Medications -   Bengay topical helps      - Prior/Trialed Pain Medications -   NSAIDs: naproxen, ibuprofen, diclofenac   Muscle relaxer: flexeril prn   gabapentin 300mg BID      Prior Procedures:  Date    Procedure   Improvement (%)  6/21/2024  L4-5 ILESI    1-2%              Prior Related Surgery:   Left intertrochanteric proximal femur fracture status post ORIF  Other  (acupuncture, OMT, CMM, TENS, DME, etc.):   Chiropractic care - not helpful     Specialists Seen - (with most recent, available notes and clinic visits reviewed)   1.  Neurology - Dr. Guzman, Dr Garcia, Dr Jordan  2.  Neurosurgery - Anuj Mcneal PA-C   3.  Primary care sports medicine -Dr. Del Cid    IMAGING - reviewed   MR LUMBAR SPINE W/O CONTRAST  6/14/2024   FINDINGS:  There are 5 lumbar type vertebrae, used for the purposes of this  dictation. Conus tip at approximately L1. Chronic severe wedge  compression deformity of the T12 vertebral body with 80-90% loss of  height anteriorly; resolution of associated edema since 6/11/2021. No  new loss of vertebral body height. Unchanged minimal retropulsion at  T12. Unchanged grade 1 anterolisthesis at L3-4 and L5-S1. Mild edema  in the opposing endplates at L5-S1, increased since 2021. Multilevel  disc dehydration and loss of intervertebral disc height, greatest at  L4-5. Increased loss of disc height at L5-S1 since 2021.     On a level by level basis, the findings are as follows:     L1-2: Posterior disc bulge and bilateral facet hypertrophy. Mild  spinal canal narrowing. Moderate bilateral neural foraminal narrowing.     L2-3: Posterior disc bulge, bilateral facet hypertrophy, and  ligamentum flavum thickening. Mild spinal canal narrowing. Moderate  right and mild left neural foraminal narrowing.     L3-4: Grade 1 anterolisthesis with uncovering of the disc and  asymmetric right disc bulge. Bilateral facet hypertrophy and  ligamentum flavum thickening. Severe spinal canal stenosis. Mild to  moderate bilateral neural foraminal narrowing.     L4-5: Asymmetric left disc osteophyte complex and left greater than  right facet hypertrophy. Mild spinal canal narrowing. Moderate to  severe right and severe left neural foraminal stenosis. Persistent  left L4 foraminal nerve root impingement.     L5-S1: Grade 1 anterolisthesis with uncovering of the disc and  asymmetric right disc  bulge. Bilateral pars interarticularis defects  of L5. No spinal canal stenosis. Severe right and moderate to severe  left neural foraminal stenosis, slightly progressed since 2021. Right  L5 foraminal nerve root impingement.     The visualized paraspinous soft tissues are within normal limits.                                                                       IMPRESSION:  1 extensive multilevel lumbar degenerative changes, slightly  progressed since 2021. Most pronounced findings include severe spinal  canal stenosis at L3-4. Foraminal stenosis with associated left L4 and  right L5 nerve root impingement.  2. Chronic severe T12 compression deformity with resolution of edema  since 6/11/2021.  3. Chronic bilateral pars defects of L5 with associated grade 1  anterolisthesis at L5-S1.      04/17/24 - EMG/NCS - BLE - Normal study     04/01/2021 XR L spine ([facility])  IMPRESSION: Three views lumbar spine series.     Moderate superior endplate compression deformity with estimated 50% anterior vertebral height loss at T12, age indeterminate. No retropulsion is seen. Correlate for acuity of injury with point tenderness by direct palpation.     Mild levoconvex mid lumbar scoliosis with grade 1 anterolisthesis at L5-S1. Suspect L5 spondylolysis bilaterally. No lumbar spinal fracture or dislocation is seen. Prominent L4-5 and moderate L2-3 lumbar disc degeneration is identified.    Visualized osseous pelvis is without fracture. The sacroiliac joints are mildly degenerated.     06/11/2021 MRI lumbar spine  Findings: There are 5 lumbar-type vertebrae assumed for the purposes  of this dictation.  Superior endplate compression deformity of the T12  vertebral body with loss of 60-70% vertebral body height along with  mild/moderate osseous edema. The tip of the conus medullaris is at  T12-L1.  Grade 1 anterolisthesis of L3 on L4 and L5 on S1. Multilevel  disc desiccation. Moderate loss of disc height at L4-5. Mild loss of  disc  height at L2-3, L3-4, and L5-S1.  Multilevel degenerative  endplate marrow signal changes including edema most pronounced at  L4-5.     On a level by level basis:     T12-L1: Circumferential disc osteophyte complex and bilateral facet  hypertrophy. Mild spinal canal narrowing. Moderate bilateral neural  foraminal stenosis.     L1-2: Posterior disc bulge and bilateral facet hypertrophy. Mild  spinal canal narrowing. Moderate bilateral neural foraminal stenosis.     L2-3: Posterior disc osteophyte complex and bilateral facet  hypertrophy. Ligamentum flavum thickening. Mild to moderate spinal  canal stenosis. Moderate bilateral neural foraminal stenosis, right  greater than left.     L3-4: Grade 1 anterolisthesis with uncovering of the disc and  posterior disc osteophyte complex. Bilateral facet hypertrophy.  Ligamentum flavum thickening. Severe spinal canal stenosis. Moderate  bilateral neural foraminal stenosis with impingement on the exiting  right L3 nerve root.     L4-5: Posterior disc bulge and bilateral facet hypertrophy. Mild to  moderate spinal canal narrowing. Moderate right and severe left neural  foraminal stenosis. Likely impingement on the exiting left L4 nerve  root.     L5-S1: Grade 1 anterolisthesis. Bilateral facet hypertrophy. No spinal  canal stenosis. Moderate to severe bilateral neural foraminal  stenosis.     Paraspinous tissues are within normal limits.     Partially visualized round T2 hyperintense lesion in the left kidney,  presumably representing a cyst.                                                                      Impression:   1. Subacute to chronic T12 compression fracture with loss of 60-70%  vertebral body height.  2. Extensive multilevel lumbar spondylosis, most pronounced at L3-4  where there is severe spinal canal stenosis and moderate bilateral  neural foraminal stenosis with impingement on the exiting right L3  nerve root. Also moderate to severe neural foraminal stenosis at  L4-5  and L5-S1.    Medical History:  He has a history of knee osteoarthritis, length dependent neuropathy, HTN, dizziness   He  has a past surgical history that includes no history of surgery.    Family History  His family history includes Cataracts in his mother; Depression in his grandson; Heart Disease (age of onset: 83) in his father; Hypertension in his father and mother; Ovarian Cancer in his mother.     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 Enjoys walking.   He  reports that he has quit smoking. He has never used smokeless tobacco. He reports current alcohol use. He reports that he does not use drugs.        Current Medications:   He has a current medication list which includes the following prescription(s): carbidopa-levodopa, lisinopril, metoprolol succinate er, rosuvastatin, and vitamin d3, and the following Facility-Administered Medications: triamcinolone, triamcinolone, triamcinolone, triamcinolone, triamcinolone, triamcinolone, triamcinolone, and triamcinolone.       Allergies:   No Known Allergies      PHYSICAL EXAMINATION: *limited by nature of virtual visit   No vital signs taken for visit  --CONSTITUTIONAL: No acute distress.   --PSYCHIATRIC: The patient is awake, alert, oriented to person, place, time and answering questions appropriately with clear speech. Appropriate mood and affect       ASSESSMENT:  Robert Archer is a pleasant 73 year old  male who reports  -Chronic, constant low back pain R>L   -Occasional pain in the back of both thighs R>L, not past the knee     He states the back pain is much more bothersome for him than the leg pain. (70% back pain vs 30% leg pain).         #.  Lumbar spondylosis  #.  Lumbar facet arthropathy  #.  Chronic T12 compression fracture  #.  Multilevel neural foraminal stenosis and severe L3-4 spinal canal stenosis which could be an atypical presentation with lumbosacral pain and upper  thigh/gluteal symptoms more conventional lower extremity neurogenic claudication.     Complicating co-morbidities include:   #. Movement disorder - Parkinsons disease. Follows with neurology          PLAN:  -MRI lumbar spine 6/14/24 reviewed  -Unfortunately L4-5 ILESI 6/21/2024 provided only 1-2% pain relief.  Given the severe spinal canal stenosis seen at the L3-4 level on the MRI of the lumbar spine 6/14/2024, we did discuss the option of a surgical consultation.  However, he is not interested in any surgery.  He wants to prioritize conservative options only.  As such, we discussed Bilateral L4-5, L5-S1 MBB#1.  He was in agreement and would like to proceed.  He describes bilateral low back pain which is much more bothersome for him than leg pain.  At the prior visit with Dr. Rosario, 5/17/2024, on physical exam, lumbar extension and lateral rotation elicited his typical low back pain.  The pathway from diagnostic medial branch blocks to radiofrequency denervation was discussed in detail with the patient today.  Risks and benefits were discussed and all questions were answered.  The patient states understanding and wishes to proceed.  There are no contraindications.  The patie low back nt understands they will have 2 diagnostic medial branch blocks; and after each block they will be required to keep a pain diary recording their pain scores approximately every hour until the pain has returned to baseline.  During the time after the block, the patient was instructed to perform activities which typically aggravates their pain.  The patient will contact the medical spine staff after each diagnostic block to assess the amount of benefit the patient received.  If the patient has a significantly positive response to each of these diagnostic blocks, they may move forward with the radiofrequency ablation procedure.  -He is participating with physical therapy in regards to balance at this time  -RTC for MBB #1.  Will send the  educational materials about MBB/RFA pathway both via the MyChart after visit summary and also in the mail as per his request      Ready to learn, no apparent learning barriers.  Education provided on treatment plan according to patient's preferred learning style.  Patient verbalizes understanding.   __________________________________  Diane Comer NP  Physical Medicine and Rehabilitation, Medical Spine       45 minutes spent by me on the date of the encounter doing chart review, history and exam, documentation and further activities per the note

## 2024-07-17 ENCOUNTER — TELEPHONE (OUTPATIENT)
Dept: PHYSICAL MEDICINE AND REHAB | Facility: CLINIC | Age: 74
End: 2024-07-17

## 2024-07-17 ENCOUNTER — VIRTUAL VISIT (OUTPATIENT)
Dept: PHYSICAL MEDICINE AND REHAB | Facility: CLINIC | Age: 74
End: 2024-07-17
Payer: COMMERCIAL

## 2024-07-17 VITALS — BODY MASS INDEX: 25.33 KG/M2 | HEIGHT: 69 IN | WEIGHT: 171 LBS

## 2024-07-17 DIAGNOSIS — G89.29 CHRONIC BILATERAL LOW BACK PAIN, UNSPECIFIED WHETHER SCIATICA PRESENT: ICD-10-CM

## 2024-07-17 DIAGNOSIS — M48.061 SPINAL STENOSIS OF LUMBAR REGION, UNSPECIFIED WHETHER NEUROGENIC CLAUDICATION PRESENT: ICD-10-CM

## 2024-07-17 DIAGNOSIS — M54.59 LUMBAR FACET JOINT PAIN: ICD-10-CM

## 2024-07-17 DIAGNOSIS — G20.A1 PARKINSON'S DISEASE WITHOUT DYSKINESIA OR FLUCTUATING MANIFESTATIONS (H): ICD-10-CM

## 2024-07-17 DIAGNOSIS — M54.50 CHRONIC BILATERAL LOW BACK PAIN, UNSPECIFIED WHETHER SCIATICA PRESENT: ICD-10-CM

## 2024-07-17 DIAGNOSIS — M47.816 FACET ARTHROPATHY, LUMBAR: Primary | ICD-10-CM

## 2024-07-17 PROCEDURE — 99215 OFFICE O/P EST HI 40 MIN: CPT | Mod: 95 | Performed by: NURSE PRACTITIONER

## 2024-07-17 RX ORDER — CARBIDOPA AND LEVODOPA 25; 100 MG/1; MG/1
TABLET ORAL
Qty: 405 TABLET | Refills: 1 | Status: CANCELLED | OUTPATIENT
Start: 2024-07-17

## 2024-07-17 ASSESSMENT — PAIN SCALES - GENERAL: PAINLEVEL: NO PAIN (0)

## 2024-07-17 NOTE — LETTER
"7/17/2024    RE: Robert Archer  [address] [place] [address]     Dear Colleague,    Thank you for referring your patient, Robert Archer, to the [facility] PHYSICAL MEDICINE AND REHABILITATION CLINIC [place] at [facility]. Please see a copy of my visit note below.    Robert is a 73 year old who is being evaluated via a billable video visit.      Video-Visit Details    Type of service:  Video Visit   Joined the call at 7/17/2024, 10:02:45 am.  Left the call at 7/17/2024, 10:23:59 am.  You were on the call for 21 minutes 14 seconds .  Originating Location (pt. Location): Home    Distant Location (provider location):  Off-site  Platform used for Video Visit: [employer]        PM&R Follow-Up Visit -     Date of Initial Visit: 10/28/23  LOV: 6/21/2024-procedure visit  TD: 7/17/2024     Recall:  Robert Archer is a 73 year old male with history of chronic T12 compression fracture, severe L3-4 central canal stenosis and axial/mechanical lumbosacral pain worse on the left compared to the right (70: 30%) with milder lower extremity symptoms who presents for follow up       INTERVAL HISTORY:  Patient was last seen in clinic 6/21/2024 for L4-5 ILESI with Dr. Rosario.     He was seen today via telehealth for follow-up.  Unfortunately, he states that the L4-5 ILESI on 6/21/2024 provided virtually no relief.  He estimates a \"1-2%\" improvement in the level of pain after the TEJA.  He does note some occasional pain affecting the back of both thighs, and says that this particular area of pain may have improved after the epidural steroid injection.    Today, he describes:  -Low back pain R>L   -Occasional pain in the back of both thighs R>L, not past the knee     He states that the pain is worse when he wakes up in the morning, but persists throughout the day.    He states the back pain is much more bothersome for him than the leg pain. (70% back pain vs 30% leg pain).     He takes " extra strength Tylenol to help with pain.  Without Tylenol he rates the pain 5/10.  After taking Tylenol, the pain improves to 1-2/10.    In the past, he has described symptoms worse with standing and ambulation, and improved with relative rest.  At the prior visit with Dr. Rosario 5/17/2024 he had bilateral low back pain with lateral rotation and extension on physical exam.  Today, he does not have pain when performing seated lumbar flexion, extension, or twisting.  However, I did not ask him to repeat these maneuvers in a standing position, due to history of balance problems, dizziness, and Parkinson's disease and a concern this could lead to a fall.    He denies lower extremity numbness or tingling.  He denies bowel or bladder incontinence, saddle anesthesia, weakness, or falls.         RECALL HISTORY OF PRESENT ILLNESS:  (per Dr Rosario)  Robert Archer is a 72 year old male who presents with a chief complaint of low back pain.     Pain began ~1.5 years ago and is associated with trauma. Patient reports a fall down 11 stairs when carrying chairs and fell down onto his back. Denies any major back pain or issues prior to that time. He was seen [facility] at that time in the ED with T12 compression fracture and anterior wedging, suspected L5 spondylolysis bilaterally but no other spinal fracture or dislocation seen.  He did follow-up with his primary care physician and was referred to neurology.  He has since been seen and evaluated both by neurology and neurosurgery who recommended a trial of physical therapy.  He has been working on neuropathic medication optimization with neurology given underlying length dependent peripheral polyneuropathy.  Patient did attend 2 sessions of physical therapy with Zeyad Huang, however, states that it was not particularly helpful.  He did receive home exercise program which he does inconsistently.    He localizes pain symptoms to the lower lumbar spine without radicular  complaints.  Symptoms are worse on the left compared to the right (70: 30%).  He denies weakness, progressive numbness/tingling, or bowel/bladder issues.  Symptoms are worse with walking, lumbar extension and lifting and improved with topical Bengay.  Pain score 5/10 on average, 0/10 today at rest, and 6/10 its worse in the last week.  Describes pain as constant dull and aching in nature.  He does not report significant pain related sleep disturbance.    5/17/2024: (per Dr Rosario)  Patient was last seen in clinic January 19, 2024.  He is coming today by his wife. At that time, he did not have much in the way of pain or contributing spine issues.  His primary concern was with regards to ongoing balance problems.  He had been scheduled for neurorehabilitation PT which she has attended several sessions.  He was also referred to neurology and seen by my colleague, Dr. Garcia, who has been assisting in managing balance and dizziness.  EMG was performed 04/17/2024 and was normal without electrodiagnostic evidence of polyneuropathy.  He has also been diagnosed with Parkinson's disease and movement disorder and will be following up with Dr. Jordan at [facility] movement disorders clinic.    Today, he states that he has low back and bilateral lower extremity symptoms.  His low back pain is more predominant and slightly worse on the right compared to the left when compared to his radicular pain that tends to affect the posterior thighs.  Symptoms are worse with standing, ambulation and improved with relative rest.     He completed several sessions of prior spine PT and more recent neurorehab with continue home exercises and has not had much relief. Data Unavailable/10 at rest however 5/10 at worst in the last week.       PRIOR INJURIES/TREATMENT:   Ice/Heat: +  Physical Therapy:   Current PT for balance  PT x2 sessions in September with Zeyad Huang for low back pain     - Current Pain Medications -   Bengay topical helps      -  Prior/Trialed Pain Medications -   NSAIDs: naproxen, ibuprofen, diclofenac   Muscle relaxer: flexeril prn   gabapentin 300mg BID      Prior Procedures:  Date    Procedure   Improvement (%)  6/21/2024  L4-5 ILESI    1-2%              Prior Related Surgery:   Left intertrochanteric proximal femur fracture status post ORIF  Other (acupuncture, OMT, CMM, TENS, DME, etc.):   Chiropractic care - not helpful     Specialists Seen - (with most recent, available notes and clinic visits reviewed)   1.  Neurology - Dr. Guzman, Dr Garcia, Dr Jordan  2.  Neurosurgery - Anuj Mcneal PA-C   3.  Primary care sports medicine -Dr. Del Cid    IMAGING - reviewed   MR LUMBAR SPINE W/O CONTRAST  6/14/2024   FINDINGS:  There are 5 lumbar type vertebrae, used for the purposes of this  dictation. Conus tip at approximately L1. Chronic severe wedge  compression deformity of the T12 vertebral body with 80-90% loss of  height anteriorly; resolution of associated edema since 6/11/2021. No  new loss of vertebral body height. Unchanged minimal retropulsion at  T12. Unchanged grade 1 anterolisthesis at L3-4 and L5-S1. Mild edema  in the opposing endplates at L5-S1, increased since 2021. Multilevel  disc dehydration and loss of intervertebral disc height, greatest at  L4-5. Increased loss of disc height at L5-S1 since 2021.     On a level by level basis, the findings are as follows:     L1-2: Posterior disc bulge and bilateral facet hypertrophy. Mild  spinal canal narrowing. Moderate bilateral neural foraminal narrowing.     L2-3: Posterior disc bulge, bilateral facet hypertrophy, and  ligamentum flavum thickening. Mild spinal canal narrowing. Moderate  right and mild left neural foraminal narrowing.     L3-4: Grade 1 anterolisthesis with uncovering of the disc and  asymmetric right disc bulge. Bilateral facet hypertrophy and  ligamentum flavum thickening. Severe spinal canal stenosis. Mild to  moderate bilateral neural foraminal narrowing.     L4-5:  Asymmetric left disc osteophyte complex and left greater than  right facet hypertrophy. Mild spinal canal narrowing. Moderate to  severe right and severe left neural foraminal stenosis. Persistent  left L4 foraminal nerve root impingement.     L5-S1: Grade 1 anterolisthesis with uncovering of the disc and  asymmetric right disc bulge. Bilateral pars interarticularis defects  of L5. No spinal canal stenosis. Severe right and moderate to severe  left neural foraminal stenosis, slightly progressed since 2021. Right  L5 foraminal nerve root impingement.     The visualized paraspinous soft tissues are within normal limits.                                                                       IMPRESSION:  1 extensive multilevel lumbar degenerative changes, slightly  progressed since 2021. Most pronounced findings include severe spinal  canal stenosis at L3-4. Foraminal stenosis with associated left L4 and  right L5 nerve root impingement.  2. Chronic severe T12 compression deformity with resolution of edema  since 6/11/2021.  3. Chronic bilateral pars defects of L5 with associated grade 1  anterolisthesis at L5-S1.      04/17/24 - EMG/NCS - BLE - Normal study     04/01/2021 XR L spine ([facility])  IMPRESSION: Three views lumbar spine series.     Moderate superior endplate compression deformity with estimated 50% anterior vertebral height loss at T12, age indeterminate. No retropulsion is seen. Correlate for acuity of injury with point tenderness by direct palpation.     Mild levoconvex mid lumbar scoliosis with grade 1 anterolisthesis at L5-S1. Suspect L5 spondylolysis bilaterally. No lumbar spinal fracture or dislocation is seen. Prominent L4-5 and moderate L2-3 lumbar disc degeneration is identified.    Visualized osseous pelvis is without fracture. The sacroiliac joints are mildly degenerated.     06/11/2021 MRI lumbar spine  Findings: There are 5 lumbar-type vertebrae assumed for the purposes  of this dictation.  Superior  endplate compression deformity of the T12  vertebral body with loss of 60-70% vertebral body height along with  mild/moderate osseous edema. The tip of the conus medullaris is at  T12-L1.  Grade 1 anterolisthesis of L3 on L4 and L5 on S1. Multilevel  disc desiccation. Moderate loss of disc height at L4-5. Mild loss of  disc height at L2-3, L3-4, and L5-S1.  Multilevel degenerative  endplate marrow signal changes including edema most pronounced at  L4-5.     On a level by level basis:     T12-L1: Circumferential disc osteophyte complex and bilateral facet  hypertrophy. Mild spinal canal narrowing. Moderate bilateral neural  foraminal stenosis.     L1-2: Posterior disc bulge and bilateral facet hypertrophy. Mild  spinal canal narrowing. Moderate bilateral neural foraminal stenosis.     L2-3: Posterior disc osteophyte complex and bilateral facet  hypertrophy. Ligamentum flavum thickening. Mild to moderate spinal  canal stenosis. Moderate bilateral neural foraminal stenosis, right  greater than left.     L3-4: Grade 1 anterolisthesis with uncovering of the disc and  posterior disc osteophyte complex. Bilateral facet hypertrophy.  Ligamentum flavum thickening. Severe spinal canal stenosis. Moderate  bilateral neural foraminal stenosis with impingement on the exiting  right L3 nerve root.     L4-5: Posterior disc bulge and bilateral facet hypertrophy. Mild to  moderate spinal canal narrowing. Moderate right and severe left neural  foraminal stenosis. Likely impingement on the exiting left L4 nerve  root.     L5-S1: Grade 1 anterolisthesis. Bilateral facet hypertrophy. No spinal  canal stenosis. Moderate to severe bilateral neural foraminal  stenosis.     Paraspinous tissues are within normal limits.     Partially visualized round T2 hyperintense lesion in the left kidney,  presumably representing a cyst.                                                                      Impression:   1. Subacute to chronic T12  compression fracture with loss of 60-70%  vertebral body height.  2. Extensive multilevel lumbar spondylosis, most pronounced at L3-4  where there is severe spinal canal stenosis and moderate bilateral  neural foraminal stenosis with impingement on the exiting right L3  nerve root. Also moderate to severe neural foraminal stenosis at L4-5  and L5-S1.    Medical History:  He has a history of knee osteoarthritis, length dependent neuropathy, HTN, dizziness   He  has a past surgical history that includes no history of surgery.    Family History  His family history includes Cataracts in his mother; Depression in his grandson; Heart Disease (age of onset: 83) in his father; Hypertension in his father and mother; Ovarian Cancer in his mother.     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 Enjoys walking.   He  reports that he has quit smoking. He has never used smokeless tobacco. He reports current alcohol use. He reports that he does not use drugs.        Current Medications:   He has a current medication list which includes the following prescription(s): carbidopa-levodopa, lisinopril, metoprolol succinate er, rosuvastatin, and vitamin d3, and the following Facility-Administered Medications: triamcinolone, triamcinolone, triamcinolone, triamcinolone, triamcinolone, triamcinolone, triamcinolone, and triamcinolone.       Allergies:   No Known Allergies      PHYSICAL EXAMINATION: *limited by nature of virtual visit   No vital signs taken for visit  --CONSTITUTIONAL: No acute distress.   --PSYCHIATRIC: The patient is awake, alert, oriented to person, place, time and answering questions appropriately with clear speech. Appropriate mood and affect       ASSESSMENT:  Robert Archer is a pleasant 73 year old  male who reports  -Chronic, constant low back pain R>L   -Occasional pain in the back of both thighs R>L, not past the knee     He states the back pain is  much more bothersome for him than the leg pain. (70% back pain vs 30% leg pain).         #.  Lumbar spondylosis  #.  Lumbar facet arthropathy  #.  Chronic T12 compression fracture  #.  Multilevel neural foraminal stenosis and severe L3-4 spinal canal stenosis which could be an atypical presentation with lumbosacral pain and upper thigh/gluteal symptoms more conventional lower extremity neurogenic claudication.     Complicating co-morbidities include:   #. Movement disorder - Parkinsons disease. Follows with neurology          PLAN:  -MRI lumbar spine 6/14/24 reviewed  -Unfortunately L4-5 ILESI 6/21/2024 provided only 1-2% pain relief.  Given the severe spinal canal stenosis seen at the L3-4 level on the MRI of the lumbar spine 6/14/2024, we did discuss the option of a surgical consultation.  However, he is not interested in any surgery.  He wants to prioritize conservative options only.  As such, we discussed Bilateral L4-5, L5-S1 MBB#1.  He was in agreement and would like to proceed.  He describes bilateral low back pain which is much more bothersome for him than leg pain.  At the prior visit with Dr. Rosario, 5/17/2024, on physical exam, lumbar extension and lateral rotation elicited his typical low back pain.  The pathway from diagnostic medial branch blocks to radiofrequency denervation was discussed in detail with the patient today.  Risks and benefits were discussed and all questions were answered.  The patient states understanding and wishes to proceed.  There are no contraindications.  The patie low back nt understands they will have 2 diagnostic medial branch blocks; and after each block they will be required to keep a pain diary recording their pain scores approximately every hour until the pain has returned to baseline.  During the time after the block, the patient was instructed to perform activities which typically aggravates their pain.  The patient will contact the medical spine staff after each  diagnostic block to assess the amount of benefit the patient received.  If the patient has a significantly positive response to each of these diagnostic blocks, they may move forward with the radiofrequency ablation procedure.  -He is participating with physical therapy in regards to balance at this time  -RTC for MBB #1.  Will send the educational materials about MBB/RFA pathway both via the MyChart after visit summary and also in the mail as per his request      Ready to learn, no apparent learning barriers.  Education provided on treatment plan according to patient's preferred learning style.  Patient verbalizes understanding.   __________________________________  Diane Comer NP  Physical Medicine and Rehabilitation, Medical Spine       45 minutes spent by me on the date of the encounter doing chart review, history and exam, documentation and further activities per the note

## 2024-07-17 NOTE — TELEPHONE ENCOUNTER
Phoned the patient and left VM. Stated to call the pain clinic to schedule injection procedure with dr. Rosario at [phone].

## 2024-07-17 NOTE — PATIENT INSTRUCTIONS
It was a pleasure seeing you via telehealth today.  As discussed, we made the following updates to your plan of care:    I added an order for medial branch block #1 (test block #1) with Dr Rosario. You will receive a call to help schedule the appointment. I included additional information about the injection below.  Please me know if you have any questions or concerns.      Thank you,  Diane Comer NP  Physical Medicine and Rehabilitation, Medical Spine  PM&R clinic Phone: [phone]  PM[facility]R clinic Fax: [phone]          Medial Branch Blocks and Radiofrequency Ablation (RFA) aka Neurotomy  Back or neck pain may be due to problems with certain nerves near your spine. If so, a medial branch neurotomy can help relieve your pain. Neurotomy destroys a nerve to relieve pain or stop involuntary movements. In some cases, your doctor may give you a nerve block to see how your body will respond to neurotomy. The treatment uses heat, cold, radiofrequency, or chemicals to destroy the nerves near a problem joint. This keeps some pain messages from traveling to your brain, and helps relieve your symptoms.   Medial branch nerves  Each vertebra in your spine has facets (flat surfaces). They touch where the vertebrae fit together. This forms a facet joint. Each facet joint has at least 2 medial branch nerves. They are part of the nerve pathway to and from each facet joint. A facet joint in your back or neck can become inflamed (swollen and irritated). Pain messages may then travel along the nerve pathway from the facet joint to your brain.     Blocking pain messages  Medial branch nerves in each facet joint send and carry messages about back or neck pain. Destroying a few of these nerves can keep certain pain messages from reaching your brain. This can help bring you relief. The relief typically lasts for months to years.   Risks and complications  Risks and complications are rare, but can include:  Infection  Increased pain,  numbness, or weakness  Nerve damage  Bleeding  Failure to relieve pain  [employer] last reviewed this educational content on 4/1/2018 2000-2021 [employer]. All rights reserved. This information is not intended as a substitute for professional medical care. Always follow your healthcare professional's instructions.          Preparing for Spine Injection Therapy                 Why Do I Need Spine Injection Therapy?  Your Health Care Provider is recommending spine injection therapy to  help relieve your back and neck pain. This will be in addition to other therapies such as medications and physical therapy. The purpose of these injections is to reduce the amount of inflammation (swelling, pain, heat, redness, loss of body function) around the nerves thus reducing the amount of pain.      The medications you will receive with the injections will include:   Anesthetic - medication to numb the painful area.      To reduce your discomfort during the injection procedure, you will receive a numbing medication injection prior to the placement of the needles. You will be lying on your stomach during the injection procedure. We will use a low-dose x-ray (fluoroscopy) to help guide needle placement.  You must have a  for this procedure. We will need to reschedule your injection if you do not have a  with you.       What are the different types of injections and procedure?  Below, is a brief description of the different types of injections we use to deliver pain medication as close as possible to the nerves in the painful area:     Medial Branch Block injections: This is a test to see if your pain is      coming from a specific nerve. This injection is similar to a facet joint injection but contains only the numbing medication.  You will keep a pain score diary for the rest of the day and the following morning after receiving the injection.    Radiofrequency ablation: This is a procedure that uses  radio waves and numbing medication to block the nerves that feel pain at the joint. The pain relief effects can last for a long time, but are not permanent. The procedure is similar to the Medial Branch Block but requires additional testing to ensure that the needle is near the nerve before numbing and ablating it.  Doctors often order sedation for this procedure.  Please see the sections on sedation below.       What Should I Expect if I Receive Sedation? THIS IS ORDERED BY THE PHYSICIAN  This is conscious sedation.  The medications we give to you will help you relax and reduce your anxiety.  You will still be awake for the procedure so we can ask you questions and hear your answers. You will NOT receive sedation for the diagnostic test blocks in order to better help evaluate your response to the injection.      What Are My Responsibilities With Sedation?  You must stop eating and drinking 8 (eight) hours before your procedure. You can have clear fluids (water, coffee/tea without milk) up to 2 hours prior to the injections. Nothing by mouth for 2 hours prior to injection.  This includes gum, mints, or chew.  Take your morning medications with a small sip of water.    You must have a  who will check-in with you, and stay in the building while the procedure is underway.  If you do not have a  your sedation will be cancelled.  We will monitor you for at least 30 minutes after the procedure before being discharged home.        What Are the Risks and Complications For This Procedure?  Risks and complication are rare, but can still occur.  You should understand, discuss, and accept these risks before agreeing to the procedure. They include, but are not limited to:  infection  nerve damage   paralysis  injection failure or a need for further injections or additional procedures  continued or worsening of symptoms/pain,   medication reaction,  dural leak (into the hole covering around the spinal cord. This may  cause a a spinal headache)  leak of the medication into the spinal canal, nerves, or blood vessel.  death        What do I need to do before the procedure?   If you do not follow these instructions your procedure may be cancelled.  Tell us if you are on major blood thinners such as Coumadin, Xarelto , Plavix, Eliquis , Pradaxa , or others.    Contact the doctor who prescribed your blood thinner to ask for permission to stop taking it before you have the injection.    Schedule your pain injection procedure after your doctor gave their permission.   We will notify you when to stop and re-start your blood thinner.  Tell us if you have any allergies to contrast dye.  If you do, we may give additional medications to take before the procedure.  Tell us if you are pregnant, or possibly pregnant.  If so, you cannot receive steroid medications or be exposed to fluoroscopic X-rays.  Tell us if you have been sick during the 10 days before the procedure. This includes:   colds   gastrointestinal illness or discomfort  dental sores,   skin infection, or any other type of infection.  Tell us if you have taken antibiotics during the 10 days prior to the procedure.  Do not drink alcohol the night before or on the day of the procedure.  You must shower the night before and on the day of your procedure.  Wear comfortable, clean clothing.  If you have an outside MRI (Magnetic Resonance Imaging photo), please bring it with you.     What Will Happen After the Procedure?  If you did not receive sedation we will monitor you for 15 minutes after the procedure. If you received sedation, we will monitor you for at least 30 minutes after the procedure      How soon can I expect pain relief?  You have received 2 types of medications with your injection:    Anesthetic - numbing medication which only acts for a few hours    Steroid which may take 3-14 days to be effective.   You can expect to feel your normal pain after the anesthetic wears off,  until the steroid becomes effective.     How should I care for myself at home?  Get plenty of rest and avoid twisting, bending movements, heavy lifting, or strenuous activity for the first 24 hours. This will help the steroid be more effective. Medial Branch Block injections will have different discharge instructions.  Those will be discussed at that injection appointment.  Resume your pain medications  Apply ice packs (on for 20 minutes at a time), every 2-3 hours to your injection area for the first 2-3 days to help with pain control.    Avoid heat (pads or water bottles), which can cause the veins to open up, making the steroid less effective. You can use heat after 48 hours.  Take showers only for the first 48 hours.  No baths, hot tubs, swimming, or soaking for 48 hours to reduce the risk of infection.      When should I call the doctor?  Call us if you have any of the following:   Fever more than 100.5 degrees Fahrenheit   Signs of infection   Severe headache   Severe back pain   Increased numbness or weakness in your legs or arms   Loss of bladder or bowel control  Nausea   Other concerns     What is the contact information?  During business hours Monday-Friday 8a-5p call [phone].   After business hours, on weekends and holidays call [phone] and ask for the PM&R doctor on call

## 2024-07-17 NOTE — NURSING NOTE
Current patient location: [address] [place] [address] [place] [address]    Is the patient currently in the state of [place]? YES    Visit mode:VIDEO    If the visit is dropped, the patient can be reconnected by: VIDEO VISIT: Text to cell phone:   Telephone Information:   Mobile [phone]       Will anyone else be joining the visit? NO  (If patient encounters technical issues they should call [phone]    How would you like to obtain your AVS? MyChart    Are changes needed to the allergy or medication list? No    Are refills needed on medications prescribed by this physician? YES    Reason for visit: Follow Up    Kianna MILLAN

## 2024-07-19 ENCOUNTER — TELEPHONE (OUTPATIENT)
Dept: PHYSICAL MEDICINE AND REHAB | Facility: CLINIC | Age: 74
End: 2024-07-19
Payer: COMMERCIAL

## 2024-07-19 NOTE — PROGRESS NOTES
Department of Neurology  Movement Disorders Division   Follow-up Note    Patient: Robert Archer   MRN: [id number]   : 1950   Date of Visit: 2024    Robert Archer is a 73 year old R-handed male who returns to clinic for follow up of Parkinson's disease. He was last seen 24 at which time carbidopa/levodopa was started.    INTERVAL EVENTS:  Has undergone epidural steroid injections with pain clinic. Unfortunately, he feels it did not help.    He presents with his wife Ioana.    Overall he feels okay. He feels his walking is still slowly and his balance is poor, though has not had any falls and denies freezing. Finished PT ~1mo ago.     Denies any difficulty with hand stiffness or slowness. He spends his time doing things around the house, yardwork, has not had major issues with this.     He is not sure the medication has made a huge difference, but does feel perhaps a bit better. Ioana agrees that she has not noticed a major difference in his movements. Denies side effects including nausea, sleepiness, lightheadedness.    Sleep is good, no dream enactment behaviors. Denies hallucinations      Related Medications 7am 1pm 8pm   Carbidopa/levodopa IR 25/100 1.5 1.5 1.5   Recently ran out        2024     9:00 AM   UPDRS EDL Scale   2.1  Speech 0   2.2  Salivation 0   2.3  Chew & Swallow 0   2.4  Eating                  1   2.5  Dressing                0   2.6  Hygiene                 0   2.7  Handwriting             1   2.8  Hobbies etc.            1   2.9  Turn in bed             0   2.10 Tremor                  0   2.11 Stand from chair        1   2.12 Walking & Balance  2   2.13 Freezing                0   MDS-UPDRS II Total Score 6         ROS:  All others negative except as listed above.      Current Outpatient Medications   Medication Sig Dispense Refill    carbidopa-levodopa (SINEMET)  MG tablet Week 1 - 1/2 pill three times a day --> Week 2 - 1 pill three times a day --> Week 3 and  "beyond - 1 and 1/2 pill three times a day. Stop at lowest effective dose. 405 tablet 1    lisinopril (ZESTRIL) 20 MG tablet Take 1.5 tablets (30 mg) by mouth daily 135 tablet 3    metoprolol succinate ER (TOPROL XL) 25 MG 24 hr tablet Take 1 tablet (25 mg) by mouth daily 90 tablet 1    rosuvastatin (CRESTOR) 20 MG tablet Take 1 tablet (20 mg) by mouth daily 90 tablet 3    vitamin D3 (CHOLECALCIFEROL) 50 mcg (2000 units) tablet Take 1 tablet (50 mcg) by mouth daily 90 tablet 3       No Known Allergies       PHYSICAL EXAM:  BP (!) 163/83   Pulse 66   Ht 1.76 m (5' 9.29\")   Wt 78.6 kg (173 lb 4 oz)   SpO2 99%   BMI 25.37 kg/m          7/22/2024     9:00 AM   UPDRS Motor Scale   Time: 09:30   Medication Off   R Brain DBS: None   L Brain DBS: None   Dyskinesia (LID) No   Speech 0   Facial Expression 4   Rigidity Neck 1   Rigidity RUE 1   Rigidity LUE 1   Rigidity RLE 0   Rigidity LLE 0   Finger Taps R 1   Finger Taps L 1   Hand Mvt R 1   Hand Mvt L 0   Pron-/Supinate R 1   Pron-/Supinate L 0   Toe Tap R 1   Toe Tap L 0   Leg Agility R 2   Leg Agility L 0   Arise From Chair 1   Gait 1   Gait Freezing 0   Postural Stability 1   Posture 3   Global Spont Mvt 2   Postural Tremor RUE 0   Postural Tremor LUE 0   Kinetic Tremor RUE 0   Kinetic Tremor LUE 0   Rest Tremor RUE 0   Rest Tremor LUE 0   Rest Tremor RLE 0   Rest Tremor LLE 0   Rest Tremor Lip/Jaw 0   Rest Tremor Constancy 0   Total Right 7   Total Left 2   Axial Total 13   Total 22     Gait slightly improved, a bit higher stride height, a bit more arm swing bilaterally though appears voluntary.      ASSESSMENT/PLAN:  Robert Archer is a 73 year old R-handed male who returns to clinic for follow up of Parkinson's disease.    He is doing relatively well, though with some ongoing bradykinesia and rigidity that are interfering with walking. His gait is improved with PT and he was encouraged to continue performing the exercises he was coached on, as well as to stay active " safely.     We will try to increase levodopa slightly in an attempt to improve overall motor symptoms. He was counseled to take the last dose of the day earlier as he does not need overnight symptom relief at this time.     - medications as below  Related Medications 7am noon 5pm   Carbidopa/levodopa IR 25/100 2 2 2       Return to clinic in 3mo    Donovan Jovel MD  Fellow  Movement Disorders Division  [facility]    30 minutes spent on date of encounter doing chart reviews, obtaining history, performing physical exam, counseling, documentation, and further activities as noted above.

## 2024-07-22 ENCOUNTER — TELEPHONE (OUTPATIENT)
Dept: PHYSICAL MEDICINE AND REHAB | Facility: CLINIC | Age: 74
End: 2024-07-22

## 2024-07-22 ENCOUNTER — OFFICE VISIT (OUTPATIENT)
Dept: NEUROLOGY | Facility: CLINIC | Age: 74
End: 2024-07-22
Payer: COMMERCIAL

## 2024-07-22 VITALS
SYSTOLIC BLOOD PRESSURE: 163 MMHG | OXYGEN SATURATION: 99 % | WEIGHT: 173.25 LBS | BODY MASS INDEX: 25.66 KG/M2 | DIASTOLIC BLOOD PRESSURE: 83 MMHG | HEART RATE: 66 BPM | HEIGHT: 69 IN

## 2024-07-22 DIAGNOSIS — G20.A1 PARKINSON'S DISEASE WITHOUT DYSKINESIA OR FLUCTUATING MANIFESTATIONS (H): ICD-10-CM

## 2024-07-22 PROBLEM — G89.29 CHRONIC BILATERAL LOW BACK PAIN, UNSPECIFIED WHETHER SCIATICA PRESENT: Status: ACTIVE | Noted: 2024-07-17

## 2024-07-22 PROBLEM — M54.59 LUMBAR FACET JOINT PAIN: Status: ACTIVE | Noted: 2024-07-17

## 2024-07-22 PROBLEM — M47.816 FACET ARTHROPATHY, LUMBAR: Status: ACTIVE | Noted: 2024-07-17

## 2024-07-22 PROBLEM — M54.50 CHRONIC BILATERAL LOW BACK PAIN, UNSPECIFIED WHETHER SCIATICA PRESENT: Status: ACTIVE | Noted: 2024-07-17

## 2024-07-22 PROCEDURE — 99214 OFFICE O/P EST MOD 30 MIN: CPT | Performed by: INTERNAL MEDICINE

## 2024-07-22 RX ORDER — CARBIDOPA AND LEVODOPA 25; 100 MG/1; MG/1
2 TABLET ORAL 3 TIMES DAILY
Qty: 360 TABLET | Refills: 2 | Status: SHIPPED | OUTPATIENT
Start: 2024-07-22 | End: 2025-01-18

## 2024-07-22 ASSESSMENT — UNIFIED PARKINSONS DISEASE RATING SCALE (UPDRS)
TOTAL_SCORE_LEFT: 2
FINGER_TAPPING_RIGHT: (1) SLIGHT: ANY OF THE FOLLOWING: A) THE REGULAR RHYTHM IS BROKEN WITH ONE WITH ONE OR TWO INTERRUPTIONS OR HESITATIONS OF THE MOVEMENT  B) SLIGHT SLOWING  C) THE AMPLITUDE DECREMENTS NEAR THE END OF THE 10 MOVEMENTS.
AXIAL_SCORE: 13
AMPLITUDE_RUE: (0) NORMAL: NO TREMOR.
AMPLITUDE_LUE: (0) NORMAL: NO TREMOR.
GAIT: (1) SLIGHT: INDEPENDENT WALKING WITH MINOR GAIT IMPAIRMENT.
RIGIDITY_NECK: (1) SLIGHT: RIGIDITY ONLY DETECTED WITH ACTIVATION MANEUVER.
PARKINSONS_MEDS: OFF
TOTAL_SCORE: 7
RIGIDITY_LUE: (1) SLIGHT: RIGIDITY ONLY DETECTED WITH ACTIVATION MANEUVER.
HANDMOVEMENTS_RIGHT: (1) SLIGHT: ANY OF THE FOLLOWING: A) THE REGULAR RHYTHM IS BROKEN WITH ONE WITH ONE OR TWO INTERRUPTIONS OR HESITATIONS OF THE MOVEMENT  B) SLIGHT SLOWING  C) THE AMPLITUDE DECREMENTS NEAR THE END OF THE 10 MOVEMENTS.
SPEECH: (0) NORMAL: NO SPEECH PROBLEMS.
FINGER_TAPPING_LEFT: (1) SLIGHT: ANY OF THE FOLLOWING: A) THE REGULAR RHYTHM IS BROKEN WITH ONE WITH ONE OR TWO INTERRUPTIONS OR HESITATIONS OF THE MOVEMENT  B) SLIGHT SLOWING  C) THE AMPLITUDE DECREMENTS NEAR THE END OF THE 10 MOVEMENTS.
AMPLITUDE_LIP_JAW: (0) NORMAL: NO TREMOR.
CONSTANCY_TREMOR_ATREST: (0) NORMAL: NO TREMOR.
LEG_AGILITY_LEFT: (0) NORMAL: NO PROBLEMS.
ARISING_CHAIR: (1) SLIGHT: ARISING IS SLOWER THAN NORMAL, OR MAY NEED MORE THAN ONE ATTEMPT, OR MAY NEED TO MOVE FORWARD IN THE CHAIR TO ARISE. NO NEED TO USE THE ARMS OF THE CHAIR.
TOETAPPING_LEFT: (0) NORMAL: NO PROBLEMS.
RIGIDITY_LLE: (0) NORMAL: NO RIGIDITY.
PRONATION_SUPINATION_LEFT: (0) NORMAL: NO PROBLEMS.
TOETAPPING_RIGHT: (1) SLIGHT: ANY OF THE FOLLOWING: A) THE REGULAR RHYTHM IS BROKEN WITH ONE WITH ONE OR TWO INTERRUPTIONS OR HESITATIONS OF THE MOVEMENT  B) SLIGHT SLOWING  C) THE AMPLITUDE DECREMENTS NEAR THE END OF THE 10 MOVEMENTS.
TOTAL_SCORE: 22
SPONTANEITY_OF_MOVEMENT: (2) MILD: MILD GLOBAL SLOWNESS AND POVERTY OF SPONTANEOUS MOVEMENTS.
POSTURE: (3) MODERATE: STOOPED POSTURE, SCOLIOSIS, OR LEANING TO ONE SIDE THAT CANNOT BE CORRECTED VOLITIONALLY TO A NORMAL POSTURE BY THE PATIENT.
FACIAL_EXPRESSION: (4) SEVERE: MASKED FACIES WITH LIPS PARTED MOST OF THE TIME WHEN THE MOUTH IS AT REST.
LEG_AGILITY_RIGHT: (2) MILD: ANY OF THE FOLLOWING: A) 3 TO 5 INTERRUPTIONS DURING TAPPING  B) MILD SLOWING  C) THE AMPLITUDE DECREMENTS MIDWAY IN THE 10-MOVEMENT SEQUENCE.
RIGIDITY_RUE: (1) SLIGHT: RIGIDITY ONLY DETECTED WITH ACTIVATION MANEUVER.
PRONATION_SUPINATION_RIGHT: (1) SLIGHT: ANY OF THE FOLLOWING: A) THE REGULAR RHYTHM IS BROKEN WITH ONE WITH ONE OR TWO INTERRUPTIONS OR HESITATIONS OF THE MOVEMENT  B) SLIGHT SLOWING  C) THE AMPLITUDE DECREMENTS NEAR THE END OF THE 10 MOVEMENTS.
AMPLITUDE_RLE: (0) NORMAL: NO TREMOR.
HANDMOVEMENTS_LEFT: (0) NORMAL: NO PROBLEMS.
POSTURAL_STABILITY: (1) SLIGHT: 3-5 STEPS, BUT RECOVERS UNAIDED.
DYSKINESIAS_PRESENT: NO
AMPLITUDE_LLE: (0) NORMAL: NO TREMOR.
RIGIDITY_RLE: (0) NORMAL: NO RIGIDITY.
FREEZING_GAIT: (0) NORMAL: NO FREEZING.

## 2024-07-22 ASSESSMENT — MOVEMENT DISORDERS SOCIETY - UNIFIED PARKINSONS DISEASE RATING SCALE (MDS-UPDRS)
CHEWING_AND_SWALLOWING: (0) NORMAL: NO PROBLEMS.
HOBBIES_AND_OTHER_ACTIVITIES: (1) SLIGHT: I AM A BIT SLOW BUT DO THESE ACTIVITIES EASILY.
SALIVA_AND_DROOLING: (0) NORMAL: NOT AT ALL (NO PROBLEMS).
SPEECH: (0) NORMAL: NOT AT ALL (NO PROBLEMS).
DRESSING: (0) NORMAL: NOT AT ALL (NO PROBLEMS).
TOTAL_SCORE: 6
GETTING_OUT_OF_BED_CAR_DEEP_CHAIR: (1) SLIGHT: I AM SLOW OR AWKWARD, BUT I USUALLY CAN DO IT ON MY FIRST TRY.
FREEZING: (0) NORMAL: NOT AT ALL (NO PROBLEMS).
TURNING_IN_BED: (0) NORMAL: NOT AT ALL (NO PROBLEMS).
HANDWRITING: (1) SLIGHT: MY WRITING IS SLOW, CLUMSY OR UNEVEN, BUT ALL WORDS ARE CLEAR.
EATING_TASKS: (1) SLIGHT: I AM SLOW, BUT I DO NOT NEED ANY HELP HANDLING MY FOOD AND HAVE NOT HAD FOOD SPILLS WHILE EATING.
WALKING_AND_BALANCE: (2) MILD: I OCCASIONALLY USE A WALKING AID, BUT I DO NOT NEED ANY HELP FROM ANOTHER PERSON.
HYGIENE: (0) NORMAL: NOT AT ALL (NO PROBLEMS).
TREMOR: (0) NORMAL: NOT AT ALL (NO PROBLEMS).

## 2024-07-22 NOTE — LETTER
2024      Robert Archer  [address] [place] [address]      Dear Colleague,    Thank you for referring your patient, Robert Archer, to the [facility] [place] [facility]. Please see a copy of my visit note below.    Department of Neurology  Movement Disorders Division   Follow-up Note    Patient: Robert Archer   MRN: [id number]   : 1950   Date of Visit: 2024    Robert Archer is a 73 year old R-handed male who returns to clinic for follow up of Parkinson's disease. He was last seen 24 at which time carbidopa/levodopa was started.    INTERVAL EVENTS:  Has undergone epidural steroid injections with pain clinic. Unfortunately, he feels it did not help.    He presents with his wife Ioaan.    Overall he feels okay. He feels his walking is still slowly and his balance is poor, though has not had any falls and denies freezing. Finished PT ~1mo ago.     Denies any difficulty with hand stiffness or slowness. He spends his time doing things around the house, yardwork, has not had major issues with this.     He is not sure the medication has made a huge difference, but does feel perhaps a bit better. Ioana agrees that she has not noticed a major difference in his movements. Denies side effects including nausea, sleepiness, lightheadedness.    Sleep is good, no dream enactment behaviors. Denies hallucinations      Related Medications 7am 1pm 8pm   Carbidopa/levodopa IR 25/100 1.5 1.5 1.5   Recently ran out        2024     9:00 AM   UPDRS EDL Scale   2.1  Speech 0   2.2  Salivation 0   2.3  Chew & Swallow 0   2.4  Eating                  1   2.5  Dressing                0   2.6  Hygiene                 0   2.7  Handwriting             1   2.8  Hobbies etc.            1   2.9  Turn in bed             0   2.10 Tremor                  0   2.11 Stand from chair        1   2.12 Walking & Balance  2   2.13 Freezing                0   MDS-UPDRS II Total Score 6         ROS:  All others  "negative except as listed above.      Current Outpatient Medications   Medication Sig Dispense Refill     carbidopa-levodopa (SINEMET)  MG tablet Week 1 - 1/2 pill three times a day --> Week 2 - 1 pill three times a day --> Week 3 and beyond - 1 and 1/2 pill three times a day. Stop at lowest effective dose. 405 tablet 1     lisinopril (ZESTRIL) 20 MG tablet Take 1.5 tablets (30 mg) by mouth daily 135 tablet 3     metoprolol succinate ER (TOPROL XL) 25 MG 24 hr tablet Take 1 tablet (25 mg) by mouth daily 90 tablet 1     rosuvastatin (CRESTOR) 20 MG tablet Take 1 tablet (20 mg) by mouth daily 90 tablet 3     vitamin D3 (CHOLECALCIFEROL) 50 mcg (2000 units) tablet Take 1 tablet (50 mcg) by mouth daily 90 tablet 3       No Known Allergies       PHYSICAL EXAM:  BP (!) 163/83   Pulse 66   Ht 1.76 m (5' 9.29\")   Wt 78.6 kg (173 lb 4 oz)   SpO2 99%   BMI 25.37 kg/m          7/22/2024     9:00 AM   UPDRS Motor Scale   Time: 09:30   Medication Off   R Brain DBS: None   L Brain DBS: None   Dyskinesia (LID) No   Speech 0   Facial Expression 4   Rigidity Neck 1   Rigidity RUE 1   Rigidity LUE 1   Rigidity RLE 0   Rigidity LLE 0   Finger Taps R 1   Finger Taps L 1   Hand Mvt R 1   Hand Mvt L 0   Pron-/Supinate R 1   Pron-/Supinate L 0   Toe Tap R 1   Toe Tap L 0   Leg Agility R 2   Leg Agility L 0   Arise From Chair 1   Gait 1   Gait Freezing 0   Postural Stability 1   Posture 3   Global Spont Mvt 2   Postural Tremor RUE 0   Postural Tremor LUE 0   Kinetic Tremor RUE 0   Kinetic Tremor LUE 0   Rest Tremor RUE 0   Rest Tremor LUE 0   Rest Tremor RLE 0   Rest Tremor LLE 0   Rest Tremor Lip/Jaw 0   Rest Tremor Constancy 0   Total Right 7   Total Left 2   Axial Total 13   Total 22     Gait slightly improved, a bit higher stride height, a bit more arm swing bilaterally though appears voluntary.      ASSESSMENT/PLAN:  Robert Archer is a 73 year old R-handed male who returns to clinic for follow up of Parkinson's disease.    He " is doing relatively well, though with some ongoing bradykinesia and rigidity that are interfering with walking. His gait is improved with PT and he was encouraged to continue performing the exercises he was coached on, as well as to stay active safely.     We will try to increase levodopa slightly in an attempt to improve overall motor symptoms. He was counseled to take the last dose of the day earlier as he does not need overnight symptom relief at this time.     - medications as below  Related Medications 7am noon 5pm   Carbidopa/levodopa IR 25/100 2 2 2       Return to clinic in 3mo    Donovan Jovel MD  Fellow  Movement Disorders Division  [facility]    30 minutes spent on date of encounter doing chart reviews, obtaining history, performing physical exam, counseling, documentation, and further activities as noted above.       Again, thank you for allowing me to participate in the care of your patient.        Sincerely,        Donovan Jovel MD

## 2024-07-22 NOTE — TELEPHONE ENCOUNTER
Third attempt: Communicated via [employer] with the patient. Informed the patient what the order was for. Gave patient direct number to call when ready to schedule at [phone].

## 2024-07-22 NOTE — TELEPHONE ENCOUNTER
Called patient to schedule procedure with Dr. Rosario    Date of Procedure: 8/30/24    Arrival time given: Yes: Arrival Time 1pm       Procedure Location: [facility] [place]     Verified Location with Patient:  Yes  Address provided to the patient     Pre-op H&P Required:  No: Local anesthesia        Post-Op/Follow Up Appt:  Not Indicated in Request      Informed patient they will need a  to drive them home:  Yes    Patients : Spouse     Patient is aware that pre-op RN from the procedure center will call 2-3 days prior to scheduled procedure to confirm arrival time and review any instructions:  Yes       Additional Comments: N/A        Cecelia Verdin MA on 7/22/2024 at 2:54 PM      P: [phone]*

## 2024-07-22 NOTE — PATIENT INSTRUCTIONS
Related Medications 7am noon 5pm   Carbidopa/levodopa IR 25/100 2 2 2       Do your best to stay active and safe.     Continue seeing the pain clinic for your back, keep in mind you may need multiple types of treatments to keep the pain under control.

## 2024-07-22 NOTE — NURSING NOTE
"Robert Archer is a 73 year old male who presents for:  Chief Complaint   Patient presents with    Parkinson     Parkinson.         Initial Vitals:  BP (!) 163/83   Pulse 66   Ht 1.76 m (5' 9.29\")   Wt 78.6 kg (173 lb 4 oz)   SpO2 99%   BMI 25.37 kg/m   Estimated body mass index is 25.37 kg/m  as calculated from the following:    Height as of this encounter: 1.76 m (5' 9.29\").    Weight as of this encounter: 78.6 kg (173 lb 4 oz).. Body surface area is 1.96 meters squared. BP completed using cuff size: regular    Aye Rathai   "

## 2024-08-22 ENCOUNTER — MYC MEDICAL ADVICE (OUTPATIENT)
Dept: FAMILY MEDICINE | Facility: CLINIC | Age: 74
End: 2024-08-22
Payer: COMMERCIAL

## 2024-08-22 NOTE — TELEPHONE ENCOUNTER
Patient Quality Outreach    Patient is due for the following:   Hypertension -  BP check    Next Steps:   Schedule a nurse only visit for blood pressure check    Type of outreach:    Sent [employer] message.  Call in 1 week if not read/completed; send letter in 2 weeks if not returned/read.       Questions for provider review:    None           Marcella Watts, [facility]  Chart routed to Care Team.

## 2024-08-30 ENCOUNTER — HOSPITAL ENCOUNTER (OUTPATIENT)
Facility: AMBULATORY SURGERY CENTER | Age: 74
Discharge: HOME | End: 2024-08-30
Attending: PHYSICAL MEDICINE & REHABILITATION | Admitting: PHYSICAL MEDICINE & REHABILITATION
Payer: COMMERCIAL

## 2024-08-30 ENCOUNTER — ANCILLARY PROCEDURE (OUTPATIENT)
Dept: GENERAL RADIOLOGY | Facility: CLINIC | Age: 74
End: 2024-08-30
Attending: PHYSICAL MEDICINE & REHABILITATION
Payer: COMMERCIAL

## 2024-08-30 VITALS
TEMPERATURE: 98.6 F | BODY MASS INDEX: 25.33 KG/M2 | DIASTOLIC BLOOD PRESSURE: 78 MMHG | RESPIRATION RATE: 16 BRPM | WEIGHT: 173 LBS | SYSTOLIC BLOOD PRESSURE: 157 MMHG | OXYGEN SATURATION: 97 % | HEART RATE: 58 BPM

## 2024-08-30 DIAGNOSIS — R52 PAIN: ICD-10-CM

## 2024-08-30 PROCEDURE — 64493 INJ PARAVERT F JNT L/S 1 LEV: CPT | Mod: RT,KX

## 2024-08-30 PROCEDURE — 64494 INJ PARAVERT F JNT L/S 2 LEV: CPT | Mod: RT,KX

## 2024-08-30 PROCEDURE — G8907 PT DOC NO EVENTS ON DISCHARG: HCPCS

## 2024-08-30 PROCEDURE — G8918 PT W/O PREOP ORDER IV AB PRO: HCPCS

## 2024-08-30 RX ORDER — IOPAMIDOL 408 MG/ML
INJECTION, SOLUTION INTRATHECAL PRN
Status: DISCONTINUED | OUTPATIENT
Start: 2024-08-30 | End: 2024-08-30 | Stop reason: HOSPADM

## 2024-08-30 NOTE — DISCHARGE INSTRUCTIONS
PAIN INJECTION HOME CARE INSTRUCTIONS  Activity  -Rest today  -Do not work today  -Resume normal activity tomorrow  -DO NOT shower for 24 hours  -DO NOT remove bandaid for 24 hours    Pain  -You may experience soreness at the injection site for one or two days  -You may use an ice pack for 20 minutes every 2 hours for the first 24 hours  -You may use a heating pad after the first 24 hours  -You may use Tylenol (acetaminophen) every 4 hours or other pain medicines as directed by your physician    You may experience numbness radiating into your legs or arms (depending on the procedure location). This numbness may last several hours. Until sensation returns to normal; please use caution in walking, climbing stairs, and stepping out of your vehicle, etc.      Please contact us if you have:  -Severe pain  -Fever more than 101.5 degrees Fahrenheit  -Signs of infection at the injection site (redness, swelling, or drainage)    FOR PAIN CENTER PATIENTS:  If you have questions, please contact the Pain Clinic at [phone] Option #1 between the hours of 7:00 am and 3:00 pm Monday through Friday. After office hours you can contact the on call provider by dialing [phone]. If you need immediate attention, we recommend that you go to a hospital emergency room or dial [phone].      FOR PM&R PATIENTS:  For patients seen by the PM and R service, please call [phone]. (Monday through Friday 8a-5p.  After business hours and weekends call [phone] and ask for the PM and R doctor on call). If you need to fax a pain diary to PM&R the fax number is [phone]. If you are unable to fax, uploading to [employer] is encouraged, then send to provider. If you have procedure scheduling questions please call [phone] Option #2.

## 2024-08-30 NOTE — OP NOTE
PROCEDURE NOTE: 1st Diagnostic Lumbar Medial Branch Block    PROCEDURE DATE: 8/30/2024    PATIENT NAME: Robert Archer  YOB: 1950    ATTENDING PHYSICIAN: Brandie Rosario MD   RESIDENT/FELLOW PHYSICIAN: None    PREOPERATIVE DIAGNOSIS:   Other lumbar spondylosis  Lumbar facet pain  POSTOPERATIVE DIAGNOSIS: same    PROCEDURE PERFORMED: 1st diagnostic medial branch block at the Bilateral L4, L5, and Sacral ala to treat the Bilateral  L4-5 and L5-S1 facet joints ( L3, L4 medial branch nerve blocks and L5 dorsal rami nerve blocks)      FLUOROSCOPY WAS USED.     INDICATIONS FOR PROCEDURE:   Robert Archer is a 74 year old male with a clinical picture consistent with bilateral axial low back pain and lumbar facet arthropathy who presents for diagnostic medial branch block at the levels noted above to assess if there is a facetogenic component to his low back pain.     PROCEDURE AND FINDINGS:    Robert was greeted in the pre-procedure holding area. The risk, benefits and alternatives to the procedure were again reviewed with the patient and written informed consent was placed in the chart. An IV line was not placed.  A 500 mL bag of NS was not connected to the patient. He was taken to the procedure room and positioned prone on the fluoroscopy table.  Routine monitors were applied including blood pressure cuff, and pulse oximetry. Prior to the procedure a time out was completed, verifying correct patient, procedure, site, positioning, and implants and/or special equipment.     The skin was prepped and draped in the usual sterile fashion. An AP fluoroscopic view was obtained to identify the vertebral bodies, the transverse processes and the superior articulating processes at the L4, L5, and Sacral ala aforementioned levels on the bilateral side(s). The skin overlying the junction of the TP-SAP at the aforementioned levels was anesthetized using a 27-gauge 1-1/4 inch needle with 1% preservative-free lidocaine for a  total volume of 0.5 ml per level. Next, a 25-gauge 3 1/2 inch Quincke spinal needle was advanced under an AP fluoroscopic view to the junction of the superior articular process and transverse process(es). Correct needle position was then confirmed in the AP and lateral views.      After negative aspiration, 0.3 mls of Isovue-M contrast was injected at each site under live fluoroscopy; no vascular run off was identified and the contrast spread was adequate. At this point, after negative aspiration, 0.5mL of 0.5% Bupivacaine, was injected at each site.  The needle was then re-styletted removed. The needle insertion site was dressed appropriately.     Robert was taken to the recovery room where he was monitored for a brief period of time. He tolerated the procedure well and was discharged home in stable condition with post procedural instructions.    Before the procedure, he reported a pain score of 3/10 at rest and 8/10 with activity.   After the procedure, he reported a pain score of 2/10.      Follow-up will be Determined after block sheet reviewed.    COMPLICATIONS: None    COMMENTS: None

## 2024-09-03 DIAGNOSIS — E78.5 HYPERLIPIDEMIA LDL GOAL <130: ICD-10-CM

## 2024-09-04 RX ORDER — ROSUVASTATIN CALCIUM 20 MG/1
20 TABLET, COATED ORAL DAILY
Qty: 90 TABLET | Refills: 0 | Status: SHIPPED | OUTPATIENT
Start: 2024-09-04

## 2024-09-09 ENCOUNTER — MEDICAL CORRESPONDENCE (OUTPATIENT)
Dept: HEALTH INFORMATION MANAGEMENT | Facility: CLINIC | Age: 74
End: 2024-09-09
Payer: COMMERCIAL

## 2024-09-13 ENCOUNTER — TELEPHONE (OUTPATIENT)
Dept: PHYSICAL MEDICINE AND REHAB | Facility: CLINIC | Age: 74
End: 2024-09-13
Payer: COMMERCIAL

## 2024-09-13 DIAGNOSIS — M47.816 LUMBAR FACET ARTHROPATHY: ICD-10-CM

## 2024-09-13 DIAGNOSIS — M47.816 LUMBAR SPONDYLOSIS: Primary | ICD-10-CM

## 2024-09-13 NOTE — PROGRESS NOTES
Patient had at least 80% improvement in pain and functionality following diagnostic lumbar MBB #1 with subsequent , gradual return towards baseline.  We will submit for confirmatory block #2

## 2024-09-13 NOTE — TELEPHONE ENCOUNTER
Phoned the patient and left VM. Stated to call the pain clinic to schedule injection procedure at [phone].

## 2024-09-16 ENCOUNTER — TELEPHONE (OUTPATIENT)
Dept: PHYSICAL MEDICINE AND REHAB | Facility: CLINIC | Age: 74
End: 2024-09-16
Payer: COMMERCIAL

## 2024-09-16 PROBLEM — M47.816 LUMBAR FACET ARTHROPATHY: Status: ACTIVE | Noted: 2024-09-13

## 2024-09-16 NOTE — TELEPHONE ENCOUNTER
Called patient to schedule procedure with Dr. Rosario    Date of Procedure: 11/15/24    Arrival time given: Yes: Arrival Time 1:30pm       Procedure Location: [facility] [place]     Verified Location with Patient:  Yes  Address provided to the patient     Pre-op H&P Required:  No: Local anesthesia        Post-Op/Follow Up Appt:  Not Indicated in Request      Informed patient they will need a  to drive them home:  Yes    Patients : Spouse     Patient is aware that pre-op RN from the procedure center will call 2-3 days prior to scheduled procedure to confirm arrival time and review any instructions:  Yes       Additional Comments: N/A        Cecelia Verdin MA on 9/16/2024 at 10:50 AM      P: [phone]*

## 2024-09-20 ENCOUNTER — TELEPHONE (OUTPATIENT)
Dept: NEUROLOGY | Facility: CLINIC | Age: 74
End: 2024-09-20
Payer: COMMERCIAL

## 2024-09-20 NOTE — TELEPHONE ENCOUNTER
[facility] Call Center    Phone Message    May a detailed message be left on voicemail: yes     Reason for Call: Pt asking to have a call back to be put on the list for an implant thats similar to a pace maker, please call Robert at [phone] to discuss further.    Action Taken: Message routed to:  Other: [facility]    Travel Screening: Not Applicable     Date of Service:

## 2024-09-23 NOTE — TELEPHONE ENCOUNTER
Dr. Archer recommends speaking to Dr. Jordan at upcoming appt 10/14/24 about DBS.     Attempted to call pt to discuss.     [facility]    Margie RN, BSN  [facility]

## 2024-09-23 NOTE — TELEPHONE ENCOUNTER
[facility] Call Center    Phone Message    May a detailed message be left on voicemail: yes     Reason for Call: Pt returned call to Margie Wagner, please call Robert at [phone].    Action Taken: Message routed to:  Other: [facility]    Travel Screening: Not Applicable     Date of Service:

## 2024-09-24 NOTE — TELEPHONE ENCOUNTER
Called patient to advise on provider message:    Dr. Archer recommends speaking to Dr. Jordan at upcoming appt 10/14/24 about DBS       Patient wondering if he will still be receiving a DBS implant soon. Advised patient that I am not exactly sure on his question, however this is something that he may discuss with provider on 10/14.     Advised patient to take notes and jot down any questions or concerns he has regarding DBS and patient care plan.     Patient agrees, and will share them with the provider at his upcoming appointment.    Advised patient that  will be able to provide patient advice and looks forward to seeing him soon on October 14th.     Patient understands and will reach back out if any additional questions.       Ketty Mirza MA

## 2024-10-05 SDOH — HEALTH STABILITY: PHYSICAL HEALTH: ON AVERAGE, HOW MANY DAYS PER WEEK DO YOU ENGAGE IN MODERATE TO STRENUOUS EXERCISE (LIKE A BRISK WALK)?: 7 DAYS

## 2024-10-05 SDOH — HEALTH STABILITY: PHYSICAL HEALTH: ON AVERAGE, HOW MANY MINUTES DO YOU ENGAGE IN EXERCISE AT THIS LEVEL?: 30 MIN

## 2024-10-05 ASSESSMENT — SOCIAL DETERMINANTS OF HEALTH (SDOH): HOW OFTEN DO YOU GET TOGETHER WITH FRIENDS OR RELATIVES?: THREE TIMES A WEEK

## 2024-10-10 ENCOUNTER — OFFICE VISIT (OUTPATIENT)
Dept: FAMILY MEDICINE | Facility: CLINIC | Age: 74
End: 2024-10-10
Attending: FAMILY MEDICINE
Payer: COMMERCIAL

## 2024-10-10 VITALS
HEART RATE: 53 BPM | OXYGEN SATURATION: 99 % | WEIGHT: 175.5 LBS | BODY MASS INDEX: 26 KG/M2 | HEIGHT: 69 IN | SYSTOLIC BLOOD PRESSURE: 126 MMHG | TEMPERATURE: 97.4 F | DIASTOLIC BLOOD PRESSURE: 82 MMHG | RESPIRATION RATE: 10 BRPM

## 2024-10-10 DIAGNOSIS — E78.5 HYPERLIPIDEMIA LDL GOAL <130: ICD-10-CM

## 2024-10-10 DIAGNOSIS — Z71.89 ACP (ADVANCE CARE PLANNING): ICD-10-CM

## 2024-10-10 DIAGNOSIS — Z00.00 ENCOUNTER FOR MEDICARE ANNUAL WELLNESS EXAM: Primary | ICD-10-CM

## 2024-10-10 DIAGNOSIS — L57.0 ACTINIC KERATOSIS: ICD-10-CM

## 2024-10-10 DIAGNOSIS — Z23 NEED FOR IMMUNIZATION AGAINST INFLUENZA: ICD-10-CM

## 2024-10-10 DIAGNOSIS — Z23 NEED FOR COVID-19 VACCINE: ICD-10-CM

## 2024-10-10 DIAGNOSIS — I10 ESSENTIAL HYPERTENSION, BENIGN: ICD-10-CM

## 2024-10-10 DIAGNOSIS — M80.00XD AGE-RELATED OSTEOPOROSIS WITH CURRENT PATHOLOGICAL FRACTURE WITH ROUTINE HEALING, SUBSEQUENT ENCOUNTER: ICD-10-CM

## 2024-10-10 DIAGNOSIS — I47.10 SVT (SUPRAVENTRICULAR TACHYCARDIA) (H): ICD-10-CM

## 2024-10-10 DIAGNOSIS — L98.9 SKIN LESION: ICD-10-CM

## 2024-10-10 DIAGNOSIS — G20.A1 PARKINSON'S DISEASE WITHOUT DYSKINESIA OR FLUCTUATING MANIFESTATIONS (H): ICD-10-CM

## 2024-10-10 PROBLEM — M54.50 CHRONIC BILATERAL LOW BACK PAIN, UNSPECIFIED WHETHER SCIATICA PRESENT: Status: RESOLVED | Noted: 2024-07-17 | Resolved: 2024-10-10

## 2024-10-10 PROBLEM — G20.B1 PARKINSON'S DISEASE WITH DYSKINESIA WITHOUT FLUCTUATING MANIFESTATIONS (H): Status: ACTIVE | Noted: 2024-10-10

## 2024-10-10 PROBLEM — M47.816 LUMBAR FACET ARTHROPATHY: Status: RESOLVED | Noted: 2024-09-13 | Resolved: 2024-10-10

## 2024-10-10 PROBLEM — M54.59 LUMBAR FACET JOINT PAIN: Status: RESOLVED | Noted: 2024-07-17 | Resolved: 2024-10-10

## 2024-10-10 PROBLEM — R26.89 BALANCE PROBLEMS: Status: RESOLVED | Noted: 2024-02-02 | Resolved: 2024-10-10

## 2024-10-10 PROBLEM — M47.816 FACET ARTHROPATHY, LUMBAR: Status: RESOLVED | Noted: 2024-07-17 | Resolved: 2024-10-10

## 2024-10-10 PROBLEM — G89.29 CHRONIC BILATERAL LOW BACK PAIN, UNSPECIFIED WHETHER SCIATICA PRESENT: Status: RESOLVED | Noted: 2024-07-17 | Resolved: 2024-10-10

## 2024-10-10 PROBLEM — M54.17 RADICULAR PAIN OF LUMBOSACRAL REGION: Status: RESOLVED | Noted: 2024-05-17 | Resolved: 2024-10-10

## 2024-10-10 PROBLEM — M47.816 LUMBAR SPONDYLOSIS: Status: RESOLVED | Noted: 2024-05-17 | Resolved: 2024-10-10

## 2024-10-10 LAB
ALBUMIN SERPL BCG-MCNC: 4.5 G/DL (ref 3.5–5.2)
ALP SERPL-CCNC: 73 U/L (ref 40–150)
ALT SERPL W P-5'-P-CCNC: <5 U/L (ref 0–70)
ANION GAP SERPL CALCULATED.3IONS-SCNC: 9 MMOL/L (ref 7–15)
AST SERPL W P-5'-P-CCNC: 21 U/L (ref 0–45)
BASOPHILS # BLD AUTO: 0 10E3/UL (ref 0–0.2)
BASOPHILS NFR BLD AUTO: 0 %
BILIRUB SERPL-MCNC: 0.7 MG/DL
BUN SERPL-MCNC: 14 MG/DL (ref 8–23)
CALCIUM SERPL-MCNC: 9 MG/DL (ref 8.8–10.4)
CHLORIDE SERPL-SCNC: 106 MMOL/L (ref 98–107)
CHOLEST SERPL-MCNC: 133 MG/DL
CREAT SERPL-MCNC: 0.84 MG/DL (ref 0.67–1.17)
EGFRCR SERPLBLD CKD-EPI 2021: 90 ML/MIN/1.73M2
EOSINOPHIL # BLD AUTO: 0.1 10E3/UL (ref 0–0.7)
EOSINOPHIL NFR BLD AUTO: 1 %
ERYTHROCYTE [DISTWIDTH] IN BLOOD BY AUTOMATED COUNT: 12.4 % (ref 10–15)
FASTING STATUS PATIENT QL REPORTED: NO
FASTING STATUS PATIENT QL REPORTED: NO
GLUCOSE SERPL-MCNC: 93 MG/DL (ref 70–99)
HCO3 SERPL-SCNC: 25 MMOL/L (ref 22–29)
HCT VFR BLD AUTO: 44.5 % (ref 40–53)
HDLC SERPL-MCNC: 59 MG/DL
HGB BLD-MCNC: 14.9 G/DL (ref 13.3–17.7)
IMM GRANULOCYTES # BLD: 0 10E3/UL
IMM GRANULOCYTES NFR BLD: 0 %
LDLC SERPL CALC-MCNC: 63 MG/DL
LYMPHOCYTES # BLD AUTO: 1.3 10E3/UL (ref 0.8–5.3)
LYMPHOCYTES NFR BLD AUTO: 23 %
MCH RBC QN AUTO: 33 PG (ref 26.5–33)
MCHC RBC AUTO-ENTMCNC: 33.5 G/DL (ref 31.5–36.5)
MCV RBC AUTO: 99 FL (ref 78–100)
MONOCYTES # BLD AUTO: 0.5 10E3/UL (ref 0–1.3)
MONOCYTES NFR BLD AUTO: 9 %
NEUTROPHILS # BLD AUTO: 4 10E3/UL (ref 1.6–8.3)
NEUTROPHILS NFR BLD AUTO: 68 %
NONHDLC SERPL-MCNC: 74 MG/DL
PLATELET # BLD AUTO: 182 10E3/UL (ref 150–450)
POTASSIUM SERPL-SCNC: 4.9 MMOL/L (ref 3.4–5.3)
PROT SERPL-MCNC: 6.8 G/DL (ref 6.4–8.3)
RBC # BLD AUTO: 4.51 10E6/UL (ref 4.4–5.9)
SODIUM SERPL-SCNC: 140 MMOL/L (ref 135–145)
TRIGL SERPL-MCNC: 53 MG/DL
VIT D+METAB SERPL-MCNC: 35 NG/ML (ref 20–50)
WBC # BLD AUTO: 5.9 10E3/UL (ref 4–11)

## 2024-10-10 PROCEDURE — 99214 OFFICE O/P EST MOD 30 MIN: CPT | Mod: 25 | Performed by: FAMILY MEDICINE

## 2024-10-10 PROCEDURE — 82306 VITAMIN D 25 HYDROXY: CPT | Performed by: FAMILY MEDICINE

## 2024-10-10 PROCEDURE — 80053 COMPREHEN METABOLIC PANEL: CPT | Performed by: FAMILY MEDICINE

## 2024-10-10 PROCEDURE — 90662 IIV NO PRSV INCREASED AG IM: CPT | Performed by: FAMILY MEDICINE

## 2024-10-10 PROCEDURE — 36415 COLL VENOUS BLD VENIPUNCTURE: CPT | Performed by: FAMILY MEDICINE

## 2024-10-10 PROCEDURE — 85025 COMPLETE CBC W/AUTO DIFF WBC: CPT | Performed by: FAMILY MEDICINE

## 2024-10-10 PROCEDURE — G0008 ADMIN INFLUENZA VIRUS VAC: HCPCS | Performed by: FAMILY MEDICINE

## 2024-10-10 PROCEDURE — 80061 LIPID PANEL: CPT | Performed by: FAMILY MEDICINE

## 2024-10-10 PROCEDURE — 90480 ADMN SARSCOV2 VAC 1/ONLY CMP: CPT | Performed by: FAMILY MEDICINE

## 2024-10-10 PROCEDURE — 91320 SARSCV2 VAC 30MCG TRS-SUC IM: CPT | Performed by: FAMILY MEDICINE

## 2024-10-10 PROCEDURE — G0439 PPPS, SUBSEQ VISIT: HCPCS | Performed by: FAMILY MEDICINE

## 2024-10-10 RX ORDER — ROSUVASTATIN CALCIUM 20 MG/1
20 TABLET, COATED ORAL DAILY
Qty: 90 TABLET | Refills: 3 | Status: SHIPPED | OUTPATIENT
Start: 2024-10-10

## 2024-10-10 RX ORDER — CHOLECALCIFEROL (VITAMIN D3) 50 MCG
1 TABLET ORAL DAILY
Qty: 90 TABLET | Refills: 3 | Status: SHIPPED | OUTPATIENT
Start: 2024-10-10

## 2024-10-10 RX ORDER — LISINOPRIL 20 MG/1
30 TABLET ORAL DAILY
Qty: 135 TABLET | Refills: 3 | Status: SHIPPED | OUTPATIENT
Start: 2024-10-10

## 2024-10-10 RX ORDER — METOPROLOL SUCCINATE 25 MG/1
25 TABLET, EXTENDED RELEASE ORAL DAILY
Qty: 90 TABLET | Refills: 3 | Status: SHIPPED | OUTPATIENT
Start: 2024-10-10

## 2024-10-10 ASSESSMENT — PAIN SCALES - GENERAL: PAINLEVEL: NO PAIN (0)

## 2024-10-10 NOTE — PATIENT INSTRUCTIONS
Patient Education     Call your insurance about where to get the RSV vaccine if interested.   Preventive Care Advice   This is general advice given by our system to help you stay healthy. However, your care team may have specific advice just for you. Please talk to your care team about your preventive care needs.  Nutrition  Eat 5 or more servings of fruits and vegetables each day.  Try wheat bread, brown rice and whole grain pasta (instead of white bread, rice, and pasta).  Get enough calcium and vitamin D. Check the label on foods and aim for 100% of the RDA (recommended daily allowance).  Lifestyle  Exercise at least 150 minutes each week  (30 minutes a day, 5 days a week).  Do muscle strengthening activities 2 days a week. These help control your weight and prevent disease.  No smoking.  Wear sunscreen to prevent skin cancer.  Have a dental exam and cleaning every 6 months.  Yearly exams  See your health care team every year to talk about:  Any changes in your health.  Any medicines your care team has prescribed.  Preventive care, family planning, and ways to prevent chronic diseases.  Shots (vaccines)   HPV shots (up to age 26), if you've never had them before.  Hepatitis B shots (up to age 59), if you've never had them before.  COVID-19 shot: Get this shot when it's due.  Flu shot: Get a flu shot every year.  Tetanus shot: Get a tetanus shot every 10 years.  Pneumococcal, hepatitis A, and RSV shots: Ask your care team if you need these based on your risk.  Shingles shot (for age 50 and up)  General health tests  Diabetes screening:  Starting at age 35, Get screened for diabetes at least every 3 years.  If you are younger than age 35, ask your care team if you should be screened for diabetes.  Cholesterol test: At age 39, start having a cholesterol test every 5 years, or more often if advised.  Bone density scan (DEXA): At age 50, ask your care team if you should have this scan for osteoporosis (brittle  bones).  Hepatitis C: Get tested at least once in your life.  STIs (sexually transmitted infections)  Before age 24: Ask your care team if you should be screened for STIs.  After age 24: Get screened for STIs if you're at risk. You are at risk for STIs (including HIV) if:  You are sexually active with more than one person.  You don't use condoms every time.  You or a partner was diagnosed with a sexually transmitted infection.  If you are at risk for HIV, ask about PrEP medicine to prevent HIV.  Get tested for HIV at least once in your life, whether you are at risk for HIV or not.  Cancer screening tests  Cervical cancer screening: If you have a cervix, begin getting regular cervical cancer screening tests starting at age 21.  Breast cancer scan (mammogram): If you've ever had breasts, begin having regular mammograms starting at age 40. This is a scan to check for breast cancer.  Colon cancer screening: It is important to start screening for colon cancer at age 45.  Have a colonoscopy test every 10 years (or more often if you're at risk) Or, ask your provider about stool tests like a FIT test every year or Cologuard test every 3 years.  To learn more about your testing options, visit:   .  For help making a decision, visit:   https://bit.ly/hy35714.  Prostate cancer screening test: If you have a prostate, ask your care team if a prostate cancer screening test (PSA) at age 55 is right for you.  Lung cancer screening: If you are a current or former smoker ages 50 to 80, ask your care team if ongoing lung cancer screenings are right for you.  For informational purposes only. Not to replace the advice of your health care provider. Copyright   2023 [employer]. All rights reserved. Clinically reviewed by the [facility]itions [facility]. [employer] 424373 - REV 01/24.  Preventing Falls: Care Instructions  Injuries and health problems such as trouble walking or poor eyesight can increase your risk of  falling. So can some medicines. But there are things you can do to help prevent falls. You can exercise to get stronger. You can also arrange your home to make it safer.    Talk to your doctor about the medicines you take. Ask if any of them increase the risk of falls and whether they can be changed or stopped.   Try to exercise regularly. It can help improve your strength and balance. This can help lower your risk of falling.         Practice fall safety and prevention.   Wear low-heeled shoes that fit well and give your feet good support. Talk to your doctor if you have foot problems that make this hard.  Carry a cellphone or wear a medical alert device that you can use to call for help.  Use stepladders instead of chairs to reach high objects. Don't climb if you're at risk for falls. Ask for help, if needed.  Wear the correct eyeglasses, if you need them.        Make your home safer.   Remove rugs, cords, clutter, and furniture from walkways.  Keep your house well lit. Use night-lights in hallways and bathrooms.  Install and use sturdy handrails on stairways.  Wear nonskid footwear, even inside. Don't walk barefoot or in socks without shoes.        Be safe outside.   Use handrails, curb cuts, and ramps whenever possible.  Keep your hands free by using a shoulder bag or backpack.  Try to walk in well-lit areas. Watch out for uneven ground, changes in pavement, and debris.  Be careful in the winter. Walk on the grass or gravel when sidewalks are slippery. Use de-icer on steps and walkways. Add non-slip devices to shoes.    Put grab bars and nonskid mats in your shower or tub and near the toilet. Try to use a shower chair or bath bench when bathing.   Get into a tub or shower by putting in your weaker leg first. Get out with your strong side first. Have a phone or medical alert device in the bathroom with you.   Where can you learn more?  Go to https://www.[employer].net/patiented  Enter G117 in the search box to  "learn more about \"Preventing Falls: Care Instructions.\"  Current as of: July 17, 2023  Content Version: 14.2 2024 [facility].   Care instructions adapted under license by your healthcare professional. If you have questions about a medical condition or this instruction, always ask your healthcare professional. [employer] disclaims any warranty or liability for your use of this information.       "

## 2024-10-10 NOTE — RESULT ENCOUNTER NOTE
Latosha Jones,    If you have not viewed these results on [employer] within 3 days, we will use an alternative method to contact you. We will contact you via the following protocol:    - Via letter if your results are normal.  - Via phone ([phone]) if your results are abnormal.     Here are my comments about your recent results:    CBC Results - Your cell counts were normal.    Please call the clinic ([phone]), or message us on [employer] with any questions you may have.     Have a great day,    Dr. Sepulveda

## 2024-10-10 NOTE — PROGRESS NOTES
Preventive Care Visit  [facility] JAYA Barrera MD, Family Medicine  Oct 10, 2024    Assessment & Plan   1. Encounter for Medicare annual wellness exam  Discussed personal health and safety. Routine screenings ordered as below. Appropriate anticipatory guidance, vaccinations, and health screening recommendations delivered according to the USPSTF and other appropriate society guidelines. Robert reports understanding and in agreement with this mutually agreed upon plan.    Today's Orders:  - PRIMARY CARE FOLLOW-UP SCHEDULING  - INFLUENZA HIGH DOSE, TRIVALENT, PF (FLUZONE)  - COVID-19 12+ (PFIZER)  - REVIEW OF HEALTH MAINTENANCE PROTOCOL ORDERS  - PRIMARY CARE FOLLOW-UP SCHEDULING; Future  - Lipid panel reflex to direct LDL Non-fasting; Future  - Comprehensive metabolic panel; Future  - CBC with Platelets & Differential; Future  - Vitamin D Deficiency; Future  - Adult Dermatology  Referral; Future  - IMMUNIATION ADMIN EACH ADDT'  - VACCINE ADMINISTRATION, INITIAL  - [place] ADVANCE CARE PLANNING FIRST 30 MINS    2. ACP (advance care planning)  Discussed importance of ACP as part of standard end of life care planning / Preventative health. Discussed it can improve his care and make decisions/grief easier for family should it ever need to be implemented. Discussion lasted < 30 min.  - [place] ADVANCE CARE PLANNING FIRST 30 MINS    3. SVT (supraventricular tachycardia) (H)  Sees cardiology in December. Controlled.  - metoprolol succinate ER (TOPROL XL) 25 MG 24 hr tablet; Take 1 tablet (25 mg) by mouth daily.  Dispense: 90 tablet; Refill: 3    4. Hyperlipidemia LDL goal <130  Check labs to monitor efficacy of interventions (medication, lifestyle, etc). No side effects reported. Continue on current therapy. See medication list for more details. Ok for refill of current lipid lowering medications for next 1 year. Will need follow-up visit with labs in 1 year.  - Lipid panel reflex to direct LDL  Non-fasting; Future  - rosuvastatin (CRESTOR) 20 MG tablet; Take 1 tablet (20 mg) by mouth daily.  Dispense: 90 tablet; Refill: 3    5. Essential hypertension, benign  Symptoms stable/controlled. Medication monitoring labs ordered/reviewed. Tolerating pharmacotherapy well. Continue current regiment. Medication refilled per orders as below.   - Lipid panel reflex to direct LDL Non-fasting; Future  - Comprehensive metabolic panel; Future  - lisinopril (ZESTRIL) 20 MG tablet; Take 1.5 tablets (30 mg) by mouth daily.  Dispense: 135 tablet; Refill: 3  - metoprolol succinate ER (TOPROL XL) 25 MG 24 hr tablet; Take 1 tablet (25 mg) by mouth daily.  Dispense: 90 tablet; Refill: 3    6. Age-related osteoporosis with current pathological fracture with routine healing, subsequent encounter  Recheck dexa in the spring. Check vitamin D.  - Comprehensive metabolic panel; Future  - DX Bone Density; Future  - Vitamin D Deficiency; Future  - vitamin D3 (CHOLECALCIFEROL) 50 mcg (2000 units) tablet; Take 1 tablet (50 mcg) by mouth daily.  Dispense: 90 tablet; Refill: 3    7. Parkinson's disease without dyskinesia or fluctuating manifestations (H)  Encourage continued routine follow-up with specialty for this condition.    8. Actinic keratosis  9. Skin lesion  - Adult Dermatology  Referral; Future    10. Need for immunization against influenza  - INFLUENZA HIGH DOSE, TRIVALENT, PF (FLUZONE)  - VACCINE ADMINISTRATION, INITIAL    11. Need for COVID-19 vaccine  - COVID-19 12+ (PFIZER)  - IMMUNIATION ADMIN EACH ADDT'       Follow-up Visit   Expected date:  Oct 17, 2025 (Approximate)      Follow Up Appointment Details:     Follow-up with whom?: PCP    Follow-Up for what?: Medicare Wellness    Welcome or Annual?: Annual Wellness    How?: In Person                   Jose Barrera MD  [facility]    Disclaimer: This note consists of symbols derived from keyboarding, dictation and/or voice  recognition software. As a result, there may be errors in the script that have gone undetected. Please consider this when interpreting information found in this chart.    Damon Jones is a 74 year old, presenting for the following:  Annual Visit and Foot Problems (B Foot Pain especially after walking, hard time walking)  - Would like his flu shot Today but is curious to know what it is mixed with.         10/10/2024     9:17 AM   Additional Questions   Roomed by Timothy LYON   Accompanied by Self         10/10/2024     9:17 AM   Patient Reported Additional Medications   Patient reports taking the following new medications None     Health Care Directive  Patient has a Health Care Directive on file  Discussed advance care planning with patient. Patient to bring in his copy.    HPI        10/5/2024   General Health   How would you rate your overall physical health? Good   Feel stress (tense, anxious, or unable to sleep) Only a little      (!) STRESS CONCERN      10/5/2024   Nutrition   Diet: Regular (no restrictions)            10/5/2024   Exercise   Days per week of moderate/strenous exercise 7 days   Average minutes spent exercising at this level 30 min            10/5/2024   Social Factors   Frequency of gathering with friends or relatives Three times a week   Worry food won't last until get money to buy more No    No   Food not last or not have enough money for food? No    No   Do you have housing? (Housing is defined as stable permanent housing and does not include staying ouside in a car, in a tent, in an abandoned building, in an overnight shelter, or couch-surfing.) Yes    Yes   Are you worried about losing your housing? No    No   Lack of transportation? No    No   Unable to get utilities (heat,electricity)? No    No       Multiple values from one day are sorted in reverse-chronological order         10/10/2024   Fall Risk   Gait Speed Test (Document in seconds) 5.91   Gait Speed Test Interpretation Greater  than 5.01 seconds - ABNORMAL             10/5/2024   Activities of Daily Living- Home Safety   Needs help with the following daily activites None of the above   Safety concerns in the home None of the above            10/5/2024   Dental   Dentist two times every year? Yes            10/5/2024   Hearing Screening   Hearing concerns? None of the above            10/5/2024   Driving Risk Screening   Patient/family members have concerns about driving No            10/5/2024   General Alertness/Fatigue Screening   Have you been more tired than usual lately? No            10/5/2024   Urinary Incontinence Screening   Bothered by leaking urine in past 6 months No            10/5/2024   TB Screening   Were you born outside of the [place]? No            Today's PHQ-2 Score:       10/9/2024    10:08 AM   PHQ-2 (  [employer])   Q1: Little interest or pleasure in doing things 0   Q2: Feeling down, depressed or hopeless 0   PHQ-2 Score 0   Q1: Little interest or pleasure in doing things Not at all   Q2: Feeling down, depressed or hopeless Not at all   PHQ-2 Score 0           10/5/2024   Substance Use   Alcohol more than 3/day or more than 7/wk No   Do you have a current opioid prescription? No   How severe/bad is pain from 1 to 10? 2/10   Do you use any other substances recreationally? No        Social History     Tobacco Use    Smoking status: Former     Current packs/day: 0.00     Average packs/day: 1 pack/day for 20.1 years (20.1 ttl pk-yrs)     Types: Cigarettes     Start date: 1985     Quit date: 2005     Years since quittin.0    Smokeless tobacco: Never    Tobacco comments:     quit 15 years ago    Vaping Use    Vaping status: Never Used   Substance Use Topics    Alcohol use: Yes     Comment: occ / 15 drinks a month     Drug use: No     ASCVD Risk   The 10-year ASCVD risk score (Maegan MORTON, et al., 2019) is: 25.2%    Values used to calculate the score:      Age: 74 years      Sex: Male      Is Non-  : No      Diabetic: No      Tobacco smoker: No      Systolic Blood Pressure: 126 mmHg      Is BP treated: Yes      HDL Cholesterol: 62 mg/dL      Total Cholesterol: 141 mg/dL    Reviewed and updated as needed this visit by Provider   Tobacco  Allergies  Meds  Problems  Med Hx  Surg Hx  Fam Hx        Social Hx Reviewed    Past Medical History:   Diagnosis Date    Hypertension 4-1-2012    Parkinson's disease (H)      Past Surgical History:   Procedure Laterality Date    INJECT BLOCK MEDIAL BRANCH CERVICAL/THORACIC/LUMBAR Bilateral 8/30/2024    Procedure: Medial Branch Block #1 to address Bilateral L4-5, L5-S1 facet joints;  Surgeon: Brandie Rosario MD;  Location: [facility] OR    INJECT EPIDURAL LUMBAR N/A 6/21/2024    Procedure: L4-5 lumbar interlaminar epidural steroid injection;  Surgeon: Brandie Rosario MD;  Location: [facility] OR    NO HISTORY OF SURGERY       Current providers sharing in care for this patient include:  Patient Care Team:  Jose Barrera MD as PCP - General (Family Practice)  Jose Barrera MD as Assigned PCP  Santiago Del Cid MD as Assigned Musculoskeletal Provider  Storm Guzman MD as Patrick Miller MD as MD (Surgery)  Patrick Kirk MD as Assigned Surgical Provider  Dereje Garcia MD as MD (Neurology)  Donell Jones MD as MD (Neurology)  Donovan Koehler MD as Fellow (Neurology)  Brandie Roasrio MD as Assigned Neuroscience Provider    The following health maintenance items are reviewed in Epic and correct as of today:  Health Maintenance   Topic Date Due    RSV VACCINE (1 - 1-dose 75+ series) Never done    INFLUENZA VACCINE (1) 09/01/2024    COVID-19 Vaccine (8 - 2024-25 season) 09/01/2024    LIPID  10/04/2024    BMP  12/08/2024    MEDICARE ANNUAL WELLNESS VISIT  10/10/2025    ANNUAL REVIEW OF HM ORDERS  10/10/2025    FALL RISK ASSESSMENT  10/10/2025    GLUCOSE  12/08/2026    DTAP/TDAP/TD IMMUNIZATION (2 - Td or Tdap)  "08/06/2028    ADVANCE CARE PLANNING  10/10/2029    HEPATITIS C SCREENING  Completed    PHQ-2 (once per calendar year)  Completed    Pneumococcal Vaccine: 65+ Years  Completed    ZOSTER IMMUNIZATION  Completed    HPV IMMUNIZATION  Aged Out    MENINGITIS IMMUNIZATION  Aged Out    RSV MONOCLONAL ANTIBODY  Aged Out    COLORECTAL CANCER SCREENING  Discontinued     Review of Systems  Constitutional, HEENT, cardiovascular, pulmonary, GI, , musculoskeletal, neuro, skin, endocrine and psych systems are negative, except as otherwise noted.     Objective    Exam  /82 (BP Location: Right arm, Patient Position: Sitting, Cuff Size: Adult Regular)   Pulse 53   Temp 97.4  F (36.3  C) (Temporal)   Resp 10   Ht 1.76 m (5' 9.29\")   Wt 79.6 kg (175 lb 8 oz)   SpO2 99%   BMI 25.70 kg/m     Estimated body mass index is 25.7 kg/m  as calculated from the following:    Height as of this encounter: 1.76 m (5' 9.29\").    Weight as of this encounter: 79.6 kg (175 lb 8 oz).    Physical Exam  HENT:      Head: Normocephalic and atraumatic.        Comments: AK and lesion of undetermined significance left nose.     Right Ear: Tympanic membrane, ear canal and external ear normal. There is no impacted cerumen.      Left Ear: Tympanic membrane, ear canal and external ear normal. There is no impacted cerumen.      Nose: Nose normal. No congestion.      Mouth/Throat:      Pharynx: Oropharynx is clear. No oropharyngeal exudate or posterior oropharyngeal erythema.   Eyes:      General:         Right eye: No discharge.         Left eye: No discharge.      Extraocular Movements: Extraocular movements intact.      Conjunctiva/sclera: Conjunctivae normal.      Pupils: Pupils are equal, round, and reactive to light.   Cardiovascular:      Rate and Rhythm: Normal rate and regular rhythm.      Heart sounds: Normal heart sounds. No murmur heard.     No friction rub.   Pulmonary:      Effort: Pulmonary effort is normal. No respiratory distress.      " Breath sounds: Normal breath sounds. No wheezing or rhonchi.   Abdominal:      General: Abdomen is flat. There is no distension.      Palpations: Abdomen is soft. There is no mass.      Tenderness: There is no abdominal tenderness. There is no guarding.   Musculoskeletal:         General: No swelling.      Cervical back: Normal range of motion and neck supple. No tenderness.      Right lower leg: No edema.      Left lower leg: No edema.   Lymphadenopathy:      Cervical: No cervical adenopathy.   Skin:     General: Skin is warm.      Findings: No rash.   Neurological:      General: No focal deficit present.      Mental Status: He is alert.      Cranial Nerves: No cranial nerve deficit.      Motor: No weakness.      Coordination: Coordination normal.      Gait: Gait normal.   Psychiatric:         Mood and Affect: Mood normal.         Behavior: Behavior normal.         Thought Content: Thought content normal.         Judgment: Judgment normal.           10/10/2024   Mini Cog   Clock Draw Score 2 Normal   3 Item Recall 3 objects recalled   Mini Cog Total Score 5         Labs: Pending    Signed Electronically by: Jose Barrera MD

## 2024-10-10 NOTE — RESULT ENCOUNTER NOTE
Latosha Jones,    If you have not viewed these results on [employer] within 3 days, we will use an alternative method to contact you. We will contact you via the following protocol:    - Via letter if your results are normal.  - Via phone ([phone]) if your results are abnormal.     Here are my comments about your recent results:    CMP Results - Your blood sugar was normal. Your kidney function, electrolytes, and liver function were normal.    Lipids - Your cholesterol lab results were normal.    Vitamin D - Your vitamin D level was normal.    Please call the clinic ([phone]), or message us on "[employer] with any questions you may have.     Have a great day,    Dr. Sepulveda

## 2024-10-13 NOTE — PROGRESS NOTES
Department of Neurology  Movement Disorders Division   Follow-up Note    Patient: Robert Archer   MRN: [id number]   : 1950   Date of Visit: 10/14/2024    Robert Archer is a 74 year old R-handed male who returns to clinic for follow up of Parkinson's disease (dx 3/2024, sx since ). He was last seen 24 at which time carbidopa/levodopa was increased.    INTERVAL EVENTS:  He presents with his wife Ioana.    Overall he feels a bit worse.    Walking is a bit worse, he is shuffling more and feels more unsteady. Denies freezing, festination, falls. He walks daily 45min to 1h, often limited by back pain. He will occasionally use a cane. Last PT was 2024.    Medications are helpful, but he does not feel they make a huge difference. No side effects, no wearing off or dyskinesias.     Denies any difficulty with hand stiffness or slowness. He spends his time doing things around the house, yardwork, has not had major issues with this. He has slowed down in this regard.    Sleep is fine, no dream enactment behaviors. Denies hallucinations.    Occasional constipation. denies urinary changes. Endorses some lightheadedness with standing, no syncope and usually can stay standing.      Related Medications 7am noon 5pm   Carbidopa/levodopa IR 25/100 2 2 2   Last taken noon        10/14/2024     3:00 PM   UPDRS EDL Scale   2.1  Speech 0   2.2  Salivation 0   2.3  Chew & Swallow 0   2.4  Eating                  1   2.5  Dressing                0   2.6  Hygiene                 0   2.7  Handwriting             1   2.8  Hobbies etc.            1   2.9  Turn in bed             0   2.10 Tremor                  0   2.11 Stand from chair        1   2.12 Walking & Balance  2   2.13 Freezing                0   MDS-UPDRS II Total Score 6         ROS:  All others negative except as listed above.      Current Outpatient Medications   Medication Sig Dispense Refill    carbidopa-levodopa (SINEMET)  MG tablet Take 2 tablets by  "mouth 3 times daily for 180 days 360 tablet 2    lisinopril (ZESTRIL) 20 MG tablet Take 1.5 tablets (30 mg) by mouth daily. 135 tablet 3    metoprolol succinate ER (TOPROL XL) 25 MG 24 hr tablet Take 1 tablet (25 mg) by mouth daily. 90 tablet 3    rosuvastatin (CRESTOR) 20 MG tablet Take 1 tablet (20 mg) by mouth daily. 90 tablet 3    vitamin D3 (CHOLECALCIFEROL) 50 mcg (2000 units) tablet Take 1 tablet (50 mcg) by mouth daily. 90 tablet 3       No Known Allergies       PHYSICAL EXAM:  BP (!) 171/81   Pulse 60   Ht 1.76 m (5' 9.29\")   Wt 79.6 kg (175 lb 8 oz)   SpO2 99%   BMI 25.70 kg/m          7/22/2024     9:00 AM   UPDRS Motor Scale   Time: 09:30   Medication Off   R Brain DBS: None   L Brain DBS: None   Dyskinesia (LID) No   Speech 0   Facial Expression 4   Rigidity Neck 1   Rigidity RUE 1   Rigidity LUE 1   Rigidity RLE 0   Rigidity LLE 0   Finger Taps R 1   Finger Taps L 1   Hand Mvt R 1   Hand Mvt L 0   Pron-/Supinate R 1   Pron-/Supinate L 0   Toe Tap R 1   Toe Tap L 0   Leg Agility R 2   Leg Agility L 0   Arise From Chair 1   Gait 1   Gait Freezing 0   Postural Stability 1   Posture 3   Global Spont Mvt 2   Postural Tremor RUE 0   Postural Tremor LUE 0   Kinetic Tremor RUE 0   Kinetic Tremor LUE 0   Rest Tremor RUE 0   Rest Tremor LUE 0   Rest Tremor RLE 0   Rest Tremor LLE 0   Rest Tremor Lip/Jaw 0   Rest Tremor Constancy 0   Total Right 7   Total Left 2   Axial Total 13   Total 22     Gait stable, decreased stride height and length with okay voluntary arm swing. There is some unsteadiness with listing to either side.      ASSESSMENT/PLAN:  Robert Archer is a 74 year old R-handed male who returns to clinic for follow up of Parkinson's disease.    He is doing well from an appendicular motor standpoint, but unfortunately is having worsening of gait instability. He is not falling. His stooped posture and orthopedic low back issues are contributing factors to his gait disorder.     Unfortunately, " increasing doses of levodopa is unlikely to cause significant improvement in his gait. He saw PT in June of this year, but in light of his reported worsening of balance, it may be worthwhile to undergo another session before the winter months.     He was counseled to stay active as safely as possible.    - medications as below   Related Medications 7am noon 5pm   Carbidopa/levodopa IR 25/100 2 2 2   - PT referral for gait training    Return to clinic in 3mo    Donovan Jovel MD  Fellow  Movement Disorders Division  [facility]    30 minutes spent on date of encounter doing chart reviews, obtaining history, performing physical exam, counseling, documentation, and further activities as noted above.

## 2024-10-14 ENCOUNTER — OFFICE VISIT (OUTPATIENT)
Dept: NEUROLOGY | Facility: CLINIC | Age: 74
End: 2024-10-14
Payer: COMMERCIAL

## 2024-10-14 VITALS
HEIGHT: 69 IN | SYSTOLIC BLOOD PRESSURE: 171 MMHG | HEART RATE: 60 BPM | OXYGEN SATURATION: 99 % | WEIGHT: 175.5 LBS | BODY MASS INDEX: 26 KG/M2 | DIASTOLIC BLOOD PRESSURE: 81 MMHG

## 2024-10-14 DIAGNOSIS — G20.A1 PARKINSON'S DISEASE WITHOUT DYSKINESIA OR FLUCTUATING MANIFESTATIONS (H): Primary | ICD-10-CM

## 2024-10-14 PROCEDURE — 99214 OFFICE O/P EST MOD 30 MIN: CPT | Mod: GC | Performed by: INTERNAL MEDICINE

## 2024-10-14 ASSESSMENT — UNIFIED PARKINSONS DISEASE RATING SCALE (UPDRS)
TOTAL_SCORE_LEFT: 3
SPEECH: (0) NORMAL: NO SPEECH PROBLEMS.
RIGIDITY_LUE: (2) MILD: RIGIDITY DETECTED WITHOUT THE ACTIVATION MANEUVER. FULL RANGE OF MOTION IS EASILY ACHIEVED.
POSTURAL_STABILITY: (1) SLIGHT: 3-5 STEPS, BUT RECOVERS UNAIDED.
FACIAL_EXPRESSION: (3) MASKED FACIES WITH LIPS PARTED SOME OF THE TIME WHEN THE MOUTH IS AT REST.
GAIT: (4) SEVERE: CANNOT WALK AT ALL OR ONLY WITH ANOTHER PERSON'S ASSISTANCE.
AMPLITUDE_LIP_JAW: (0) NORMAL: NO TREMOR.
PRONATION_SUPINATION_LEFT: (1) SLIGHT: ANY OF THE FOLLOWING: A) THE REGULAR RHYTHM IS BROKEN WITH ONE WITH ONE OR TWO INTERRUPTIONS OR HESITATIONS OF THE MOVEMENT  B) SLIGHT SLOWING  C) THE AMPLITUDE DECREMENTS NEAR THE END OF THE 10 MOVEMENTS.
TOETAPPING_LEFT: (0) NORMAL: NO PROBLEMS.
CONSTANCY_TREMOR_ATREST: (0) NORMAL: NO TREMOR.
TOTAL_SCORE: 3
RIGIDITY_NECK: (0) NORMAL: NO RIGIDITY.
DYSKINESIAS_PRESENT: NO
HANDMOVEMENTS_LEFT: (0) NORMAL: NO PROBLEMS.
PARKINSONS_MEDS: ON
HANDMOVEMENTS_RIGHT: (1) SLIGHT: ANY OF THE FOLLOWING: A) THE REGULAR RHYTHM IS BROKEN WITH ONE WITH ONE OR TWO INTERRUPTIONS OR HESITATIONS OF THE MOVEMENT  B) SLIGHT SLOWING  C) THE AMPLITUDE DECREMENTS NEAR THE END OF THE 10 MOVEMENTS.
RIGIDITY_LLE: (0) NORMAL: NO RIGIDITY.
AMPLITUDE_RLE: (0) NORMAL: NO TREMOR.
RIGIDITY_RLE: (0) NORMAL: NO RIGIDITY.
RIGIDITY_RUE: (0) NORMAL: NO RIGIDITY.
FREEZING_GAIT: (0) NORMAL: NO FREEZING.
FINGER_TAPPING_LEFT: (0) NORMAL: NO PROBLEMS.
PRONATION_SUPINATION_RIGHT: (0) NORMAL: NO PROBLEMS.
AXIAL_SCORE: 14
TOETAPPING_RIGHT: (1) SLIGHT: ANY OF THE FOLLOWING: A) THE REGULAR RHYTHM IS BROKEN WITH ONE WITH ONE OR TWO INTERRUPTIONS OR HESITATIONS OF THE MOVEMENT  B) SLIGHT SLOWING  C) THE AMPLITUDE DECREMENTS NEAR THE END OF THE 10 MOVEMENTS.
AMPLITUDE_RUE: (0) NORMAL: NO TREMOR.
LEG_AGILITY_LEFT: (0) NORMAL: NO PROBLEMS.
AMPLITUDE_LUE: (0) NORMAL: NO TREMOR.
FINGER_TAPPING_RIGHT: (1) SLIGHT: ANY OF THE FOLLOWING: A) THE REGULAR RHYTHM IS BROKEN WITH ONE WITH ONE OR TWO INTERRUPTIONS OR HESITATIONS OF THE MOVEMENT  B) SLIGHT SLOWING  C) THE AMPLITUDE DECREMENTS NEAR THE END OF THE 10 MOVEMENTS.
LEG_AGILITY_RIGHT: (0) NORMAL: NO PROBLEMS.
POSTURE: (3) MODERATE: STOOPED POSTURE, SCOLIOSIS, OR LEANING TO ONE SIDE THAT CANNOT BE CORRECTED VOLITIONALLY TO A NORMAL POSTURE BY THE PATIENT.
ARISING_CHAIR: (1) SLIGHT: ARISING IS SLOWER THAN NORMAL, OR MAY NEED MORE THAN ONE ATTEMPT, OR MAY NEED TO MOVE FORWARD IN THE CHAIR TO ARISE. NO NEED TO USE THE ARMS OF THE CHAIR.
SPONTANEITY_OF_MOVEMENT: (2) MILD: MILD GLOBAL SLOWNESS AND POVERTY OF SPONTANEOUS MOVEMENTS.
AMPLITUDE_LLE: (0) NORMAL: NO TREMOR.
TOTAL_SCORE: 20

## 2024-10-14 ASSESSMENT — MOVEMENT DISORDERS SOCIETY - UNIFIED PARKINSONS DISEASE RATING SCALE (MDS-UPDRS)
HYGIENE: (0) NORMAL: NOT AT ALL (NO PROBLEMS).
HANDWRITING: (1) SLIGHT: MY WRITING IS SLOW, CLUMSY OR UNEVEN, BUT ALL WORDS ARE CLEAR.
CHEWING_AND_SWALLOWING: (0) NORMAL: NO PROBLEMS.
DRESSING: (0) NORMAL: NOT AT ALL (NO PROBLEMS).
TURNING_IN_BED: (0) NORMAL: NOT AT ALL (NO PROBLEMS).
TOTAL_SCORE: 6
SPEECH: (0) NORMAL: NOT AT ALL (NO PROBLEMS).
GETTING_OUT_OF_BED_CAR_DEEP_CHAIR: (1) SLIGHT: I AM SLOW OR AWKWARD, BUT I USUALLY CAN DO IT ON MY FIRST TRY.
FREEZING: (0) NORMAL: NOT AT ALL (NO PROBLEMS).
TREMOR: (0) NORMAL: NOT AT ALL (NO PROBLEMS).
SALIVA_AND_DROOLING: (0) NORMAL: NOT AT ALL (NO PROBLEMS).
HOBBIES_AND_OTHER_ACTIVITIES: (1) SLIGHT: I AM A BIT SLOW BUT DO THESE ACTIVITIES EASILY.
WALKING_AND_BALANCE: (2) MILD: I OCCASIONALLY USE A WALKING AID, BUT I DO NOT NEED ANY HELP FROM ANOTHER PERSON.
EATING_TASKS: (1) SLIGHT: I AM SLOW, BUT I DO NOT NEED ANY HELP HANDLING MY FOOD AND HAVE NOT HAD FOOD SPILLS WHILE EATING.

## 2024-10-14 NOTE — NURSING NOTE
"Robert Archer is a 74 year old male who presents for:  Chief Complaint   Patient presents with    Parkinson     Parkinson follow up, balance seems worse, shuffling feet.         Initial Vitals:  BP (!) 171/81   Pulse 60   Ht 1.76 m (5' 9.29\")   Wt 79.6 kg (175 lb 8 oz)   SpO2 99%   BMI 25.70 kg/m   Estimated body mass index is 25.7 kg/m  as calculated from the following:    Height as of this encounter: 1.76 m (5' 9.29\").    Weight as of this encounter: 79.6 kg (175 lb 8 oz).. Body surface area is 1.97 meters squared. BP completed using cuff size: small regular    Aye Stephenson   "

## 2024-10-14 NOTE — PATIENT INSTRUCTIONS
Let's leave the levodopa medication where it is for now.     For your walking, we'll have you see PT again before the winter months come in. Stay active as much as is safe.    For your back pain, ask the clinician who will be doing your steroid shot about trigger point injections. Pain is tough and almost always needs more than one intervention. Trigger point injections go into the affected muscles can be helpful as part of a comprehensive pain plan.

## 2024-11-15 ENCOUNTER — TELEPHONE (OUTPATIENT)
Dept: PHYSICAL MEDICINE AND REHAB | Facility: CLINIC | Age: 74
End: 2024-11-15

## 2024-11-15 ENCOUNTER — HOSPITAL ENCOUNTER (OUTPATIENT)
Facility: AMBULATORY SURGERY CENTER | Age: 74
Discharge: HOME | End: 2024-11-15
Attending: PHYSICAL MEDICINE & REHABILITATION | Admitting: PHYSICAL MEDICINE & REHABILITATION
Payer: COMMERCIAL

## 2024-11-15 VITALS
DIASTOLIC BLOOD PRESSURE: 90 MMHG | RESPIRATION RATE: 18 BRPM | SYSTOLIC BLOOD PRESSURE: 186 MMHG | OXYGEN SATURATION: 98 % | HEART RATE: 54 BPM | TEMPERATURE: 97.9 F

## 2024-11-15 DIAGNOSIS — M47.816 LUMBAR FACET ARTHROPATHY: ICD-10-CM

## 2024-11-15 DIAGNOSIS — M47.816 LUMBAR SPONDYLOSIS: Primary | ICD-10-CM

## 2024-11-15 NOTE — TELEPHONE ENCOUNTER
Phoned the patient and left VM. Stated to call the pain clinic to schedule injection procedure at [phone].

## 2024-11-15 NOTE — PROGRESS NOTES
Patient presented for confirmatory bilateral L4-5, L5-S1 facet medial branch block #2.  He does not have significant pain or symptoms today rating at 1-2/10 unsure if he can reproduce more consistent pain or assess his response accurately.  Therefore, we will plan to reschedule.

## 2024-11-19 ENCOUNTER — TELEPHONE (OUTPATIENT)
Dept: PHYSICAL MEDICINE AND REHAB | Facility: CLINIC | Age: 74
End: 2024-11-19
Payer: COMMERCIAL

## 2024-11-19 PROBLEM — M47.816 LUMBAR SPONDYLOSIS: Status: ACTIVE | Noted: 2024-11-15

## 2024-11-19 PROBLEM — M47.816 LUMBAR FACET ARTHROPATHY: Status: ACTIVE | Noted: 2024-11-15

## 2024-11-19 NOTE — TELEPHONE ENCOUNTER
Called patient to schedule procedure with Dr. Rosario    Date of Procedure: 1/3/25    Arrival time given: Yes: Arrival Time 2pm       Procedure Location: [facility] [place]     Verified Location with Patient:  Yes  Address provided to the patient     Pre-op H&P Required:  No: Local anesthesia        Post-Op/Follow Up Appt:  Not Indicated in Request      Informed patient they will need a  to drive them home:  Yes    Patients : Spouse     Patient is aware that pre-op RN from the procedure center will call 2-3 days prior to scheduled procedure to confirm arrival time and review any instructions:  Yes       Additional Comments: N/A        Cecelia Verdin MA on 11/19/2024 at 10:02 AM      P: [phone]*

## 2024-11-20 ENCOUNTER — OFFICE VISIT (OUTPATIENT)
Dept: ORTHOPEDICS | Facility: CLINIC | Age: 74
End: 2024-11-20
Payer: COMMERCIAL

## 2024-11-20 DIAGNOSIS — M17.0 OSTEOARTHRITIS OF BOTH KNEES, UNSPECIFIED OSTEOARTHRITIS TYPE: Primary | ICD-10-CM

## 2024-11-20 RX ORDER — TRIAMCINOLONE ACETONIDE 40 MG/ML
40 INJECTION, SUSPENSION INTRA-ARTICULAR; INTRAMUSCULAR
Status: COMPLETED | OUTPATIENT
Start: 2024-11-20 | End: 2024-11-20

## 2024-11-20 RX ADMIN — TRIAMCINOLONE ACETONIDE 40 MG: 40 INJECTION, SUSPENSION INTRA-ARTICULAR; INTRAMUSCULAR at 14:40

## 2024-11-20 NOTE — LETTER
11/20/2024      Robert Archer  [address] [place] [address]      Dear Colleague,    Thank you for referring your patient, Robert Archer, to the [facility] [place]. Please see a copy of my visit note below.    [facility]  SPORTS & ORTHOPEDIC CLINIC VISIT     Nov 20, 2024      Large Joint Injection/Arthocentesis: bilateral knee    Date/Time: 11/20/2024 2:40 PM    Performed by: Santiago Del Cid MD  Authorized by: Santiago Del Cid MD    Indications:  Pain and osteoarthritis  Needle Size:  22 G  Guidance: landmark guided    Approach:  Anterolateral  Location:  Knee  Laterality:  Bilateral      Medications (Right):  40 mg triamcinolone 40 MG/ML  Medications (Left):  40 mg triamcinolone 40 MG/ML  Outcome:  Tolerated well, no immediate complications  Procedure discussed: discussed risks, benefits, and alternatives    Consent Given by:  Patient  Timeout: timeout called immediately prior to procedure    Prep: patient was prepped and draped in usual sterile fashion     There were no complications. The patient tolerated the procedure well. There was minimal bleeding.   The patient was instructed to ice the knee upon leaving clinic and refrain from overuse over the next 2 days.   The patient was instructed to go to the emergency room with any unusual pain, swelling, or redness occurred in the injected area.     Santiago Del Cid MD          Again, thank you for allowing me to participate in the care of your patient.        Sincerely,        Santiago Del Cid MD

## 2024-11-20 NOTE — PROGRESS NOTES
[facility]  SPORTS & ORTHOPEDIC CLINIC VISIT     Nov 20, 2024      Large Joint Injection/Arthocentesis: bilateral knee    Date/Time: 11/20/2024 2:40 PM    Performed by: Santiago Del Cid MD  Authorized by: Santiago Del Cid MD    Indications:  Pain and osteoarthritis  Needle Size:  22 G  Guidance: landmark guided    Approach:  Anterolateral  Location:  Knee  Laterality:  Bilateral      Medications (Right):  40 mg triamcinolone 40 MG/ML  Medications (Left):  40 mg triamcinolone 40 MG/ML  Outcome:  Tolerated well, no immediate complications  Procedure discussed: discussed risks, benefits, and alternatives    Consent Given by:  Patient  Timeout: timeout called immediately prior to procedure    Prep: patient was prepped and draped in usual sterile fashion     There were no complications. The patient tolerated the procedure well. There was minimal bleeding.   The patient was instructed to ice the knee upon leaving clinic and refrain from overuse over the next 2 days.   The patient was instructed to go to the emergency room with any unusual pain, swelling, or redness occurred in the injected area.     Santiago Del Cid MD

## 2024-11-25 ENCOUNTER — NURSE TRIAGE (OUTPATIENT)
Dept: FAMILY MEDICINE | Facility: CLINIC | Age: 74
End: 2024-11-25

## 2024-11-25 ENCOUNTER — OFFICE VISIT (OUTPATIENT)
Dept: FAMILY MEDICINE | Facility: CLINIC | Age: 74
End: 2024-11-25
Payer: COMMERCIAL

## 2024-11-25 ENCOUNTER — ANCILLARY PROCEDURE (OUTPATIENT)
Dept: GENERAL RADIOLOGY | Facility: CLINIC | Age: 74
End: 2024-11-25
Payer: COMMERCIAL

## 2024-11-25 VITALS
BODY MASS INDEX: 25.84 KG/M2 | DIASTOLIC BLOOD PRESSURE: 86 MMHG | TEMPERATURE: 97.7 F | RESPIRATION RATE: 14 BRPM | HEIGHT: 69 IN | HEART RATE: 55 BPM | WEIGHT: 174.5 LBS | OXYGEN SATURATION: 99 % | SYSTOLIC BLOOD PRESSURE: 120 MMHG
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
## 2024-11-25 DIAGNOSIS — R07.81 RIB PAIN ON RIGHT SIDE: ICD-10-CM

## 2024-11-25 DIAGNOSIS — Z91.81 AT HIGH RISK FOR FALLS: ICD-10-CM

## 2024-11-25 DIAGNOSIS — S22.41XA CLOSED FRACTURE OF MULTIPLE RIBS OF RIGHT SIDE, INITIAL ENCOUNTER: ICD-10-CM

## 2024-11-25 DIAGNOSIS — R10.11 RUQ ABDOMINAL PAIN: ICD-10-CM

## 2024-11-25 DIAGNOSIS — M80.00XA OSTEOPOROSIS WITH CURRENT PATHOLOGICAL FRACTURE, UNSPECIFIED OSTEOPOROSIS TYPE, INITIAL ENCOUNTER: ICD-10-CM

## 2024-11-25 DIAGNOSIS — I10 ESSENTIAL HYPERTENSION, BENIGN: ICD-10-CM

## 2024-11-25 DIAGNOSIS — G20.A1 PARKINSON'S DISEASE WITHOUT DYSKINESIA OR FLUCTUATING MANIFESTATIONS (H): ICD-10-CM
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
## 2024-11-25 PROCEDURE — 71101 X-RAY EXAM UNILAT RIBS/CHEST: CPT | Mod: TC | Performed by: RADIOLOGY

## 2024-11-25 PROCEDURE — 99213 OFFICE O/P EST LOW 20 MIN: CPT

## 2024-11-25 ASSESSMENT — PAIN SCALES - GENERAL: PAINLEVEL_OUTOF10: EXTREME PAIN (8)

## 2024-11-25 NOTE — TELEPHONE ENCOUNTER
Nurse Triage SBAR    Is this a 2nd Level Triage? NO    Situation: Patient called requesting appointment regarding RIGHT sided rib pain from a fall 2 days ago 11/23.     Background: Patient reportedly was up to use the bathroom in the middle of the night and tripped and fell. Landed on his right side and has had RIGHT sided rib/abdominal pain since.     Assessment: See assessment info below. Denies any red flag symptoms, cardiac or respiratory.     Protocol Recommended Disposition:   See in Office Today    Recommendation: Assisted with scheduling.   Appointments in Next Year      Nov 25, 2024 11:30 AM  (Arrive by 11:10 AM)  Provider Visit with RAMO Escoto CNP  [facility] ([facility]) [phone]     Greta Pink, RN, BSN      Reason for Disposition   Patient wants to be seen    Additional Information   Negative: Followed an injury to chest   Negative: SEVERE chest pain   Negative: Pain also in shoulder(s) or arm(s) or jaw   Negative: Difficulty breathing   Negative: Cocaine use within last 3 days   Negative: Major surgery in the past month   Negative: Hip or leg fracture (broken bone) in past month (or had cast on leg or ankle in past month)   Negative: Illness requiring prolonged bedrest in past month (e.g., immobilization, long hospital stay)   Negative: Long-distance travel in past month (e.g., car, bus, train, plane; with trip lasting 6 or more hours)   Negative: History of prior 'blood clot' in leg or lungs (i.e., deep vein thrombosis, pulmonary embolism)   Negative: History of inherited increased risk of blood clots (e.g., Factor 5 Leiden, Anti-thrombin 3, Protein C or Protein S deficiency, Prothrombin mutation)   Negative: Cancer treatment in the past two months (or has cancer now)   Negative: Heart beating irregularly or very rapidly   Negative: Chest pain lasting longer than 5 minutes and occurred in last 3 days (72 hours) (Exception: Feels exactly the  "same as previously diagnosed heartburn and has accompanying sour taste in mouth.)   Negative: Chest pain or 'angina' comes and goes and is happening more often (increasing in frequency) or getting worse (increasing in severity) (Exception: Chest pains that last only a few seconds.)   Negative: Dizziness or lightheadedness   Negative: Coughing up blood   Negative: Patient sounds very sick or weak to the triager   Negative: Patient says chest pain feels exactly the same as previously diagnosed 'heartburn' and describes burning in chest and accompanying sour taste in mouth    Answer Assessment - Initial Assessment Questions  1. LOCATION: \"Where does it hurt?\"        RIGHT SIDE, lower side of ribs; between waist and ribs    2. RADIATION: \"Does the pain go anywhere else?\" (e.g., into neck, jaw, arms, back)      Localized, does not radiate    3. ONSET: \"When did the chest pain begin?\" (Minutes, hours or days)       2 days ago 11/23    4. PATTERN: \"Does the pain come and go, or has it been constant since it started?\"  \"Does it get worse with exertion?\"       Constant    5. DURATION: \"How long does it last\" (e.g., seconds, minutes, hours)      Constant, but worse with movement    6. SEVERITY: \"How bad is the pain?\"  (e.g., Scale 1-10; mild, moderate, or severe)      10/10 when laying flat in bed  5/10 when up moving around       7. CARDIAC RISK FACTORS: \"Do you have any history of heart problems or risk factors for heart disease?\" (e.g., angina, prior heart attack; diabetes, high blood pressure, high cholesterol, smoker, or strong family history of heart disease)      High cholesterol, high blood pressure    8. PULMONARY RISK FACTORS: \"Do you have any history of lung disease?\"  (e.g., blood clots in lung, asthma, emphysema, birth control pills)      No    9. CAUSE: \"What do you think is causing the chest pain?\"      FELL  Was getting up to use bathroom at midnight, fell going into the bathroom. Landed on right side and hit " "abdomen on that side    10. OTHER SYMPTOMS: \"Do you have any other symptoms?\" (e.g., dizziness, nausea, vomiting, sweating, fever, difficulty breathing, cough)        No    11. PREGNANCY: \"Is there any chance you are pregnant?\" \"When was your last menstrual period?\"        no    Protocols used: Chest Pain-A-[place]    "

## 2024-11-25 NOTE — PROGRESS NOTES
Assessment & Plan     Fall, initial encounter  Rib pain on right side  Patient is a 74 year-old male with hypertension, Parkinson's, and osteoporosis presenting for ongoing right chest wall and RUQ pain following fall on Saturday. Exam overall reassuring. Pain to palpation along right lateral chest wall in mid-axillary line from ribs 8-10. No ecchymosis, step-offs, or crepitus appreciated. Plan for XR to evaluate for fracture, pending formal read by radiologist. Discussed conservative management including as needed acetaminophen, ice/heat, and topical analgesics.   - XR Ribs & Chest Right G/E 3 Views; Future    RUQ abdominal pain  Pain to palpation in RUQ on exam with mild guarding. Plan for RUQ US to evaluate liver for injury.   - US Abdomen Limited; Future    Essential hypertension, benign  Stable during encounter. Denies lightheadedness, dizziness, or syncopal episodes.     Parkinson's disease without dyskinesia or fluctuating manifestations (H)  Contributes to complexity and fall risk.     At high risk for falls  Discussed home safety and removing tripping hazards and having assistive devices handy.     Osteoporosis with current pathological fracture, unspecified osteoporosis type, initial encounter  Increased risk for fracture due to osteoporosis. Plan for XR today. Based on multiple rib fractures and underlying osteoporosis, advised to follow-up with PCP and discuss prevention medication. Had been prescribed Fosamax in the past but was discontinued.     ADDENDUM:   Closed fracture of multiple ribs of right side, initial encounter  Mildly displaced lateral right seventh, eighth and ninth rib fractures. Continue with conservative management. Discussed realistic expectation.       Subjective   Robert is a 74 year old, presenting for the following health issues:  Fall and Rib Pain        11/25/2024    11:21 AM   Additional Questions   Roomed by AD   Accompanied by Spouse     History of Present Illness       Reason  "for visit:  Fall  Symptom onset:  1-3 days ago   He is taking medications regularly.    Presents with concerns of right chest wall pain and RUQ pain since sustaining a fall on Saturday. Reports waking up in the middle of the night to go to the bathroom when he lost his balance and fell hitting his right elbow, side, and head on the floor. Pain has been consistent in intensity since onset. Has been taking as needed acetaminophen and applying heat without significant relief. Pain is worse with deep breaths and when laying on right side.     Denies dizziness, lightheadedness, or syncopal episodes.          Objective    /86 (BP Location: Left arm, Patient Position: Sitting, Cuff Size: Adult Regular)   Pulse 55   Temp 97.7  F (36.5  C) (Temporal)   Resp 14   Ht 1.76 m (5' 9.29\")   Wt 79.2 kg (174 lb 8 oz)   SpO2 99%   BMI 25.55 kg/m    Body mass index is 25.55 kg/m .  Physical Exam  Vitals reviewed.   Constitutional:       General: He is not in acute distress.     Appearance: Normal appearance. He is not ill-appearing.   HENT:      Head: Normocephalic and atraumatic.   Eyes:      Conjunctiva/sclera: Conjunctivae normal.      Right eye: Right conjunctiva is not injected. No exudate.     Left eye: Left conjunctiva is not injected. No exudate.     Pupils: Pupils are equal, round, and reactive to light.   Cardiovascular:      Rate and Rhythm: Normal rate and regular rhythm.      Heart sounds: Normal heart sounds, S1 normal and S2 normal.   Pulmonary:      Effort: Pulmonary effort is normal. No respiratory distress.      Breath sounds: Normal breath sounds. No wheezing.   Chest:      Chest wall: Tenderness (Pain to palpation of right lateral chest wall in mid-axillary line from ribs 8-10. No step-offs or crepitus appreciated.) present. No crepitus.   Abdominal:      General: Abdomen is flat. There is no distension.      Palpations: Abdomen is soft.      Tenderness: There is abdominal tenderness in the right upper " quadrant. There is guarding. There is no rebound. Negative signs include Lyons's sign.   Skin:     General: Skin is warm and dry.      Capillary Refill: Capillary refill takes less than 2 seconds.      Findings: No lesion or rash.   Neurological:      Mental Status: He is alert.      Sensory: Sensation is intact. No sensory deficit.      Motor: Motor function is intact. No weakness.   Psychiatric:         Attention and Perception: Attention and perception normal.         Mood and Affect: Mood and affect normal.        XR pending formal read by radiologist.   US pending        Signed Electronically by: RAMO Escoto CNP

## 2024-11-25 NOTE — TELEPHONE ENCOUNTER
Reason for Call:  Appointment Request    Patient requesting this type of appt:  fell    Requested provider: Jose Barrera    Reason patient unable to be scheduled: Not within requested timeframe    When does patient want to be seen/preferred time:  asap    Comments: fell in the bathroom on 11/23; rib pain; was offered to speak to nurse and UC; pt refuse    Could we send this information to you in [facility] or would you prefer to receive a phone call?:   Patient would prefer a phone call   Okay to leave a detailed message?: Yes at Cell number on file:    Telephone Information:   Mobile [phone]       Call taken on 11/25/2024 at 9:55 AM by Zoila Virgen

## 2024-11-25 NOTE — PATIENT INSTRUCTIONS
Today we will complete an x-ray to evaluate for possible rib fracture. I will call with abnormal results or send a [employer] message if everything looks good. As you are experiencing acute pain in the right upper quadrant, I would also like to complete an ultrasound to ensure the liver is intact without injury. Please call [phone] to schedule your imaging study.     I would like you to continue taking as needed acetaminophen (Tylenol), heat, and topical lidocaine patches for pain relief.     Make sure you are taking full deep breaths to prevent lung sacks from collapsing and progressing to pneumonia.

## 2024-11-26 ENCOUNTER — TELEPHONE (OUTPATIENT)
Dept: FAMILY MEDICINE | Facility: CLINIC | Age: 74
End: 2024-11-26
Payer: COMMERCIAL

## 2024-11-26 NOTE — TELEPHONE ENCOUNTER
Please assist patient in scheduling follow-up with Dr. Sepulveda to discuss Fosamax or likewise. Probably will not start for another 4-8 weeks. No rush on appointment.

## 2024-11-27 NOTE — TELEPHONE ENCOUNTER
RN Triage    Patient Contact    Attempt # 1    Was call answered?  No.  Left message on voicemail with information to call me back.    Upon call back please follow message below.    Please assist patient in scheduling follow-up with Dr. Sepulveda to discuss Fosamax or likewise. Probably will not start for another 4-8 weeks. No rush on appointment.     Rachael Cullen RN  [facility] - Registered Nurse  Clinic Triage [facility]   November 27, 2024

## 2024-12-02 NOTE — TELEPHONE ENCOUNTER
Spoke with patient, assisted with scheduling.     Appointments in Next Year      Dec 23, 2024 10:00 AM  (Arrive by 9:40 AM)  Provider Visit with Jose Barrera MD  [facility] ([facility]) [phone]     Greta Pink RN, BSN

## 2024-12-03 ENCOUNTER — ANCILLARY PROCEDURE (OUTPATIENT)
Dept: ULTRASOUND IMAGING | Facility: CLINIC | Age: 74
End: 2024-12-03
Payer: COMMERCIAL

## 2024-12-03 DIAGNOSIS — R10.11 RUQ ABDOMINAL PAIN: ICD-10-CM

## 2024-12-03 PROCEDURE — 76705 ECHO EXAM OF ABDOMEN: CPT | Performed by: RADIOLOGY

## 2024-12-23 ENCOUNTER — OFFICE VISIT (OUTPATIENT)
Dept: FAMILY MEDICINE | Facility: CLINIC | Age: 74
End: 2024-12-23
Payer: COMMERCIAL

## 2024-12-23 VITALS
DIASTOLIC BLOOD PRESSURE: 72 MMHG | TEMPERATURE: 98.2 F | OXYGEN SATURATION: 99 % | BODY MASS INDEX: 24.59 KG/M2 | WEIGHT: 166 LBS | HEIGHT: 69 IN | HEART RATE: 60 BPM | RESPIRATION RATE: 12 BRPM | SYSTOLIC BLOOD PRESSURE: 126 MMHG

## 2024-12-23 DIAGNOSIS — M80.00XA OSTEOPOROSIS WITH CURRENT PATHOLOGICAL FRACTURE, UNSPECIFIED OSTEOPOROSIS TYPE, INITIAL ENCOUNTER: Primary | ICD-10-CM

## 2024-12-23 DIAGNOSIS — K76.89 HEPATIC CYST: ICD-10-CM

## 2024-12-23 PROCEDURE — 99213 OFFICE O/P EST LOW 20 MIN: CPT | Performed by: FAMILY MEDICINE

## 2024-12-23 PROCEDURE — G2211 COMPLEX E/M VISIT ADD ON: HCPCS | Performed by: FAMILY MEDICINE

## 2024-12-23 RX ORDER — ALENDRONATE SODIUM 70 MG/1
70 TABLET ORAL
Qty: 12 TABLET | Refills: 3 | Status: SHIPPED | OUTPATIENT
Start: 2024-12-23

## 2024-12-23 ASSESSMENT — PAIN SCALES - GENERAL: PAINLEVEL_OUTOF10: NO PAIN (0)

## 2024-12-23 NOTE — PROGRESS NOTES
Assessment & Plan   1. Osteoporosis with current pathological fracture, unspecified osteoporosis type, initial encounter (Primary)  Multiple falls with fractures. Work with neurology on parkinson's. Restart fosamax. Recheck dexa in 2026. Consider PT. Discussed side effects of bisphosphates including heartburn, atypical fractures if used for long durations, jaw necrosis, etc.  - alendronate (FOSAMAX) 70 MG tablet; Take 1 tablet (70 mg) by mouth every 7 days.  Dispense: 12 tablet; Refill: 3    2. Hepatic cyst  Reassured.    Jose Barrera MD  [facility]    Disclaimer: This note consists of symbols derived from keyboarding, dictation and/or voice recognition software. As a result, there may be errors in the script that have gone undetected. Please consider this when interpreting information found in this chart.    The longitudinal plan of care for the diagnosis(es)/condition(s) as documented were addressed during this visit. Due to the added complexity in care, I will continue to support Robert in the subsequent management and with ongoing continuity of care.    Damon Jones is a 74 year old, presenting for the following health issues:  Follow Up        12/23/2024     9:49 AM   Additional Questions   Roomed by Marcella Watts CMA   Accompanied by self         12/23/2024     9:49 AM   Patient Reported Additional Medications   Patient reports taking the following new medications none     History of Present Illness       Reason for visit:  Follow up visit    He eats 2-3 servings of fruits and vegetables daily.He exercises with enough effort to increase his heart rate 30 to 60 minutes per day.  He exercises with enough effort to increase his heart rate 7 days per week.   He is taking medications regularly.    Fell in November. Multiple rib fractures. No residual pain. Wondering if its time to go back on fosamax.    Would also like to discuss Cyst on liver per xray and ultrasound  "images    Walking is slower than normal and balance is off, no falls in the last month    Review of Systems  Constitutional, HEENT, cardiovascular, pulmonary, GI, , musculoskeletal, neuro, skin, endocrine and psych systems are negative, except as otherwise noted.      Objective    /72   Pulse 60   Temp 98.2  F (36.8  C) (Temporal)   Resp 12   Ht 1.76 m (5' 9.29\")   Wt 75.3 kg (166 lb)   SpO2 99%   BMI 24.31 kg/m    Body mass index is 24.31 kg/m .  Physical Exam  Vitals reviewed.   HENT:      Head: Normocephalic and atraumatic.   Eyes:      General:         Right eye: No discharge.         Left eye: No discharge.      Extraocular Movements: Extraocular movements intact.      Conjunctiva/sclera: Conjunctivae normal.      Pupils: Pupils are equal, round, and reactive to light.   Cardiovascular:      Rate and Rhythm: Normal rate and regular rhythm.      Heart sounds: Normal heart sounds. No murmur heard.     No friction rub.   Pulmonary:      Effort: Pulmonary effort is normal. No respiratory distress.      Breath sounds: Normal breath sounds. No wheezing or rhonchi.   Musculoskeletal:         General: No swelling.      Cervical back: Normal range of motion and neck supple. No tenderness.   Lymphadenopathy:      Cervical: No cervical adenopathy.   Skin:     General: Skin is warm.      Findings: No rash.   Neurological:      General: No focal deficit present.      Mental Status: He is alert.      Motor: No weakness.      Coordination: Coordination normal.      Gait: Gait normal.   Psychiatric:         Mood and Affect: Mood normal.         Behavior: Behavior normal.         Thought Content: Thought content normal.         Judgment: Judgment normal.            EXAMINATION: US ABDOMEN LIMITED, 12/3/2024 9:26 AM     COMPARISON: Chest x-ray 11/25/2024.     HISTORY: The patient reports recent traumatic fall with right lateral  rib fractures.  RUQ pain, recent fall landing on right side; RUQ abdominal pain   "   TECHNIQUE: The abdomen was scanned in standard fashion with  specialized ultrasound transducer(s) using both gray-scale and limited  color Doppler techniques.     FINDINGS:  Liver: Normal echotexture.  No focal solid mass.The main portal vein  is patent with antegrade flow, measuring 0.8 cm. Cluster of cysts in  the right hepatic lobe, 1.6 x 1.7 x 1.4 cm.     Gallbladder: No wall thickening, pericholecystic fluid, positive  sonographic Lyons's sign. No cholelithiasis.  Bile Ducts: Both the intra- and extrahepatic biliary system are of  normal caliber.  The common bile duct measures 3 mm mm in diameter.  Pancreas: Visualized portions of the head and body of the pancreas are  unremarkable.   Kidneys: No hydronephrosis.  The craniocaudal dimensions are: right-  11.0 cm.                                                                       IMPRESSION:   1.  Right hepatic cyst.        Signed Electronically by: Jose Barrera MD

## 2025-01-03 ENCOUNTER — ANCILLARY PROCEDURE (OUTPATIENT)
Dept: GENERAL RADIOLOGY | Facility: CLINIC | Age: 75
End: 2025-01-03
Attending: PHYSICAL MEDICINE & REHABILITATION
Payer: COMMERCIAL

## 2025-01-03 ENCOUNTER — HOSPITAL ENCOUNTER (OUTPATIENT)
Facility: AMBULATORY SURGERY CENTER | Age: 75
Discharge: HOME | End: 2025-01-03
Attending: PHYSICAL MEDICINE & REHABILITATION | Admitting: PHYSICAL MEDICINE & REHABILITATION
Payer: COMMERCIAL

## 2025-01-03 VITALS
RESPIRATION RATE: 16 BRPM | DIASTOLIC BLOOD PRESSURE: 75 MMHG | WEIGHT: 162 LBS | OXYGEN SATURATION: 97 % | TEMPERATURE: 97.4 F | BODY MASS INDEX: 23.72 KG/M2 | HEART RATE: 57 BPM | SYSTOLIC BLOOD PRESSURE: 154 MMHG

## 2025-01-03 DIAGNOSIS — R52 PAIN: ICD-10-CM

## 2025-01-03 PROCEDURE — 64494 INJ PARAVERT F JNT L/S 2 LEV: CPT | Mod: 50 | Performed by: PHYSICAL MEDICINE & REHABILITATION

## 2025-01-03 PROCEDURE — 64493 INJ PARAVERT F JNT L/S 1 LEV: CPT | Mod: 50 | Performed by: PHYSICAL MEDICINE & REHABILITATION

## 2025-01-03 PROCEDURE — 64493 INJ PARAVERT F JNT L/S 1 LEV: CPT | Mod: RT,KX

## 2025-01-03 PROCEDURE — G8907 PT DOC NO EVENTS ON DISCHARG: HCPCS

## 2025-01-03 PROCEDURE — 64494 INJ PARAVERT F JNT L/S 2 LEV: CPT | Mod: RT,KX

## 2025-01-03 PROCEDURE — G8918 PT W/O PREOP ORDER IV AB PRO: HCPCS

## 2025-01-03 RX ORDER — LIDOCAINE HYDROCHLORIDE 20 MG/ML
INJECTION, SOLUTION INFILTRATION; PERINEURAL PRN
Status: DISCONTINUED | OUTPATIENT
Start: 2025-01-03 | End: 2025-01-03 | Stop reason: HOSPADM

## 2025-01-03 RX ORDER — IOPAMIDOL 408 MG/ML
INJECTION, SOLUTION INTRATHECAL PRN
Status: DISCONTINUED | OUTPATIENT
Start: 2025-01-03 | End: 2025-01-03 | Stop reason: HOSPADM

## 2025-01-03 NOTE — OP NOTE
PROCEDURE NOTE: 2nd Diagnostic Lumbar Medial Branch Block    PROCEDURE DATE: 1/3/2025    PATIENT NAME: Robert Archer  YOB: 1950    ATTENDING PHYSICIAN: Brandie Rosario MD   RESIDENT/FELLOW PHYSICIAN: None    PREOPERATIVE DIAGNOSIS:   Other, lumbar spondylosis  Lumbar facet pain  POSTOPERATIVE DIAGNOSIS: same    PROCEDURE PERFORMED: 2nd diagnostic medial branch block at the Bilateral L4, L5, and Sacral ala to treat the Bilateral  L4-5 and L5-S1 facet joints ( L3, L4 medial branch nerve blocks and L5 dorsal rami nerve blocks)      FLUOROSCOPY WAS USED.     INDICATIONS FOR PROCEDURE:   Robert Archer is a 74 year old male with a clinical picture consistent with bilateral axial low back pain and lumbar facet arthropathy who presents for diagnostic medial branch block at the levels noted above to assess if there is a facetogenic component to his low back pain.     PROCEDURE AND FINDINGS:    Robert was greeted in the pre-procedure holding area. The risk, benefits and alternatives to the procedure were again reviewed with the patient and written informed consent was placed in the chart. An IV line was not placed.  A 500 mL bag of NS was not connected to the patient. He was taken to the procedure room and positioned prone on the fluoroscopy table.  Routine monitors were applied including blood pressure cuff, and pulse oximetry. Prior to the procedure a time out was completed, verifying correct patient, procedure, site, positioning, and implants and/or special equipment.     The skin was prepped and draped in the usual sterile fashion. An AP fluoroscopic view was obtained to identify the vertebral bodies, the transverse processes and the superior articulating processes at the L4, L5, and Sacral ala aforementioned levels on the bilateral side(s). The skin overlying the junction of the TP-SAP at the aforementioned levels was anesthetized using a 27-gauge 1-1/4 inch needle with 1% preservative-free lidocaine for a  total volume of 0.5 ml per level. Next, a 25-gauge 3 1/2 inch Quincke spinal needle was advanced under an AP fluoroscopic view to the junction of the superior articular process and transverse process(es). Correct needle position was then confirmed with additional AP, ipsilateral oblique and/or lateral views.      After negative aspiration, 0.3 mls of Isovue-M contrast was injected at each site under live fluoroscopy; no vascular run off was identified and the contrast spread was adequate. At this point, after negative aspiration, 0.5mL of 2% Lidocaine, was injected at each site.  The needle was then re-styletted removed. The needle insertion site was dressed appropriately.     Robert was taken to the recovery room where he was monitored for a brief period of time. He tolerated the procedure well and was discharged home in stable condition with post procedural instructions.    Before the procedure, he reported a pain score of 5/10.   After the procedure, he reported a pain score of 0/10.      Follow-up will be Determined after block sheet reviewed.    COMPLICATIONS: None    COMMENTS: None

## 2025-01-03 NOTE — DISCHARGE INSTRUCTIONS
"PAIN INJECTION HOME CARE INSTRUCTIONS  Activity  -Rest today  -Do not work today  -Resume normal activity tomorrow  -DO NOT shower for 24 hours  -DO NOT remove bandaid for 24 hours    Pain  -You may experience soreness at the injection site for one or two days  -You may use an ice pack for 20 minutes every 2 hours for the first 24 hours  -You may use a heating pad after the first 24 hours  -You may use Tylenol (acetaminophen) every 4 hours or other pain medicines as directed by your physician    You may experience numbness radiating into your legs or arms (depending on the procedure location). This numbness may last several hours. Until sensation returns to normal; please use caution in walking, climbing stairs, and stepping out of your vehicle, etc.    DID YOU RECEIVE SEDATION TODAY?  {YES / NO:986942::\"Yes\"}    Safety  Sedation medicine, if given, may remain active for many hours. It is important for the next 24 hours that you do not:  -Drive a car  -Operate machines or power tools  -Consume alcohol, including beer  -Sign any important papers or legal documents    DID YOU RECEIVE STEROIDS TODAY?  {YES / NO:527834::\"Yes\"}    Common side effects of steroids:  Not everyone will experience corticosteroid side effects. If side effects are experienced, they will gradually subside in the 7-10 day period following an injection. Most common side effects include:  -Flushed face and/or chest  -Feeling of warmth, particularly in the face but could be an overall feeling of warmth  -Increased blood sugar in diabetic patients  -Menstrual irregularities my occur. If taking hormone-based birth control an alternate method of birth control is recommended  -Sleep disturbances and/or mood swings are possible  -Leg cramps    Please contact us if you have:  -Severe pain  -Fever more than 101.5 degrees Fahrenheit  -Signs of infection at the injection site (redness, swelling, or drainage)    FOR PAIN CENTER PATIENTS:  If you have " questions, please contact the Pain Clinic at [phone] Option #1 between the hours of 7:00 am and 3:00 pm Monday through Friday. After office hours you can contact the on call provider by dialing [phone]. If you need immediate attention, we recommend that you go to a hospital emergency room or dial [phone].      FOR PM&R PATIENTS:  For patients seen by the PM and R service, please call [phone]. (Monday through Friday 8a-5p.  After business hours and weekends call [phone] and ask for the PM and R doctor on call). If you need to fax a pain diary to PM&R the fax number is [phone]. If you are unable to fax, uploading to [employer] is encouraged, then send to provider. If you have procedure scheduling questions please call [phone] Option #2.

## 2025-01-06 ENCOUNTER — OFFICE VISIT (OUTPATIENT)
Dept: FAMILY MEDICINE | Facility: CLINIC | Age: 75
End: 2025-01-06
Payer: COMMERCIAL

## 2025-01-06 ENCOUNTER — OFFICE VISIT (OUTPATIENT)
Dept: PEDIATRICS | Facility: CLINIC | Age: 75
End: 2025-01-06
Payer: COMMERCIAL

## 2025-01-06 ENCOUNTER — ANCILLARY PROCEDURE (OUTPATIENT)
Dept: GENERAL RADIOLOGY | Facility: CLINIC | Age: 75
End: 2025-01-06
Attending: INTERNAL MEDICINE
Payer: COMMERCIAL

## 2025-01-06 VITALS
BODY MASS INDEX: 24.71 KG/M2 | HEIGHT: 69 IN | DIASTOLIC BLOOD PRESSURE: 62 MMHG | OXYGEN SATURATION: 100 % | HEART RATE: 79 BPM | RESPIRATION RATE: 24 BRPM | WEIGHT: 166.8 LBS | TEMPERATURE: 98.1 F | SYSTOLIC BLOOD PRESSURE: 130 MMHG

## 2025-01-06 VITALS
DIASTOLIC BLOOD PRESSURE: 79 MMHG | SYSTOLIC BLOOD PRESSURE: 132 MMHG | HEART RATE: 80 BPM | TEMPERATURE: 98.1 F | RESPIRATION RATE: 16 BRPM | HEIGHT: 69 IN | BODY MASS INDEX: 24.59 KG/M2 | OXYGEN SATURATION: 100 % | WEIGHT: 166 LBS

## 2025-01-06 DIAGNOSIS — I47.20 V TACH (H): ICD-10-CM

## 2025-01-06 DIAGNOSIS — M71.122 SEPTIC BURSITIS OF ELBOW, LEFT: ICD-10-CM

## 2025-01-06 DIAGNOSIS — G20.C PARKINSONISM, UNSPECIFIED PARKINSONISM TYPE (H): ICD-10-CM

## 2025-01-06 DIAGNOSIS — M80.00XA OSTEOPOROSIS WITH CURRENT PATHOLOGICAL FRACTURE, UNSPECIFIED OSTEOPOROSIS TYPE, INITIAL ENCOUNTER: ICD-10-CM

## 2025-01-06 DIAGNOSIS — M25.522 LEFT ELBOW PAIN: Primary | ICD-10-CM

## 2025-01-06 DIAGNOSIS — M25.522 LEFT ELBOW PAIN: ICD-10-CM

## 2025-01-06 DIAGNOSIS — M70.22 OLECRANON BURSITIS OF LEFT ELBOW: Primary | ICD-10-CM

## 2025-01-06 PROCEDURE — 99213 OFFICE O/P EST LOW 20 MIN: CPT | Performed by: INTERNAL MEDICINE

## 2025-01-06 PROCEDURE — 99207 REFERRAL TO ACUTE AND DIAGNOSTIC SERVICES: CPT | Performed by: PHYSICIAN ASSISTANT

## 2025-01-06 PROCEDURE — 73080 X-RAY EXAM OF ELBOW: CPT | Mod: LT | Performed by: STUDENT IN AN ORGANIZED HEALTH CARE EDUCATION/TRAINING PROGRAM

## 2025-01-06 RX ORDER — CEPHALEXIN 500 MG/1
500 CAPSULE ORAL 3 TIMES DAILY
Qty: 21 CAPSULE | Refills: 0 | Status: SHIPPED | OUTPATIENT
Start: 2025-01-06 | End: 2025-01-13

## 2025-01-06 ASSESSMENT — PAIN SCALES - GENERAL
PAINLEVEL_OUTOF10: WORST PAIN (10)
PAINLEVEL_OUTOF10: MODERATE PAIN (5)

## 2025-01-06 NOTE — PATIENT INSTRUCTIONS
There is no sign of a fracture in yoru left elbow but I'd like you to take antibiotics for the next week.    Please follow up with your PCP in 1-2 weeks

## 2025-01-06 NOTE — PROGRESS NOTES
"Acute and Diagnostic Services Clinic Visit    Assessment & Plan     Left elbow pain  After fall    - XR Elbow Left G/E 3 Views; Future    Septic bursitis of elbow, left  No evidence of fracture but with swelling and erythema will treat with abx as below. Will need to follow-up with his PCP in 1-2 weeks to ensure resolution    - cephALEXin (KEFLEX) 500 MG capsule; Take 1 capsule (500 mg) by mouth 3 times daily for 7 days.                Return in about 2 weeks (around 1/20/2025), or if symptoms worsen or fail to improve, for follow up with PCP.    Damon Jones is a 74 year old, presenting for the following health issues:  Musculoskeletal Problem (Left Elbow - Fell off a chair )    HPI     Musculoskeletal problem/pain  Onset/Duration: 1/2/2024  Description:       Location: Left Elbow        Joint swelling: YES       Redness: YES       Pain: moderate       Warmth: YES  Progression of symptoms worse  Accompanying signs and symptoms:       Fevers: no        Numbness/tingling/weakness: no   History        Trauma to the area: YES- fall onto Elbow from chair         Previous history of Gout: no         Alcohol usage: no         Diuretic use: no         Recent illness: no         Previous similar problem: no         Previous evaluation: no   Aggravating factors include: Lifting and Bending the elbow    Therapies tried and outcome: {: Drained the area, Tylenol, Ice - pt states that this made it worse   Have you had any surgeries on your arteries of veins: No            Objective    /79 (BP Location: Right arm, Patient Position: Sitting, Cuff Size: Adult Regular)   Pulse 80   Temp 98.1  F (36.7  C)   Resp 16   Ht 1.76 m (5' 9.29\")   Wt 75.3 kg (166 lb)   SpO2 100%   BMI 24.31 kg/m    Body mass index is 24.31 kg/m .  Physical Exam     Well appearing  RRR  CTAB  Left elbow with bubble like swelling surrounding flexor aspect, quite erythematous and TTP            Signed Electronically by: Lourdes Valle, " MD

## 2025-01-06 NOTE — PROGRESS NOTES
Assessment & Plan     Olecranon bursitis of left elbow  Concern with infection and possible fracture given his history of osteoporosis.  We unfortunately do not have x-ray available at this time.  Robert and Elvia will go to [place] at this time for further evaluation and treatment.  And he has significant pain.  - Referral to Acute and Diagnostic Services (Day of diagnostic / First order acute); Future    Osteoporosis with current pathological fracture, unspecified osteoporosis type, initial encounter  History of osteoporosis  - Referral to Acute and Diagnostic Services (Day of diagnostic / First order acute); Future    V tach (H)  Heart rate well-controlled, following with cardiology this is being treated with metoprolol currently    Parkinsonism, unspecified Parkinsonism type (H)  Following with neurology        This chart documentation was completed in part with Dragon voice recognition software.  Documentation is reviewed after completion, however, some words and grammatical errors may remain.  Lizette Carey PA-C      Subjective   Robert is a 74 year old, presenting for the following health issues:  Musculoskeletal Problem        1/6/2025    12:40 PM   Additional Questions   Roomed by Priya QUIJANO   Accompanied by WIFE ELVIA          1/6/2025    12:40 PM   Patient Reported Additional Medications   Patient reports taking the following new medications NA     History of Present Illness       Reason for visit:  Pain and swelling in my elbow after my chair tipped over last week.  Symptom onset:  3-7 days ago  Symptoms include:  Pain and swelling in my elbow  Symptom intensity:  Moderate  Symptom progression:  Worsening  Had these symptoms before:  No  What makes it worse:  No  What makes it better:  No   He is taking medications regularly.           Pain History:  When did you first notice your pain? 1 week ago he fell off his chair on January 2.  He reports that he believes he hit his elbow on the ground.   It was  "immediately painful but the redness did not start until a few days later.  They report that the elbow drained a clear fluid a few days ago and it was after this that the redness and pain started increasing.  They tried to get more fluid out of it recently with a needle but no more fluid came out.  He has a pain of 5/10 at rest and it is 8-10 out of 10 with any type of movement or activity the pain is now waking him up at night and it is difficult for him to sleep.  Have you seen anyone else for your pain? No  How has your pain affected your ability to work? Not currently working - unrelated to pain  Where in your body do you have pain? Musculoskeletal problem/pain  Onset/Duration: 1 week ago   Description  Location: elbow - left  Joint Swelling: YES  Redness: YES  Pain: YES getting worse-  Warmth: YES    Intensity:  moderate  Progression of Symptoms:  worsening  Accompanying signs and symptoms:   Fevers: No  Numbness/tingling/weakness: YES- WEAKNESS   History  Trauma to the area: YES- hit floor with elbow  Recent illness:  No  Previous similar problem: No  Previous evaluation:  No  Precipitating or alleviating factors:  Aggravating factors include: TOUCHING/PRESSURE, ICE   Therapies tried and outcome: nothing        Review of Systems  As documented above       Objective    /62   Pulse 79   Temp 98.1  F (36.7  C) (Temporal)   Resp 24   Ht 1.76 m (5' 9.29\")   Wt 75.7 kg (166 lb 12.8 oz)   SpO2 100%   BMI 24.43 kg/m    Body mass index is 24.43 kg/m .  Physical Exam   GENERAL: alert and no distress, afebrile   MS:  left elbow-olecranon bursitis noted, it is warm and erythematous and extremely tender he does have a small slit though it is not actively draining at this time.    Unable to obtain x-ray today we do not have x-ray onsite        Signed Electronically by: Lizette Carey PA-C    "

## 2025-01-08 NOTE — PROGRESS NOTES
Daily Note    Today's date: 2021  Patient name: Ching Ramirez  : 2009  MRN: [id number]  Referring provider: Vicky Martino MD  Dx:   Encounter Diagnosis     ICD-10-CM    1  Spastic diplegic cerebral palsy ([address] )  G80 1    2  Toe-walking  R26 89    3  Autism spectrum disorder  F84 0         Start Time: 1700  Stop Time: 1750  Total time in clinic (min): 50 minutes     INTERVENTION COMMENTS:   Diagnosis: Spastic diplegic cerebral palsy ([address] ) [G80 1]  Insurance: Payor: Hans Thomas / Plan: [employer] PLAN 361 / Product Type: Blue Fee for Service /   Visit:     Subjective: Josey Villalpando arrived to PT session with his Mother today  Mom reports Josey Villalpando is going through a growth spurt over the past few weeks  His braces are now too small for him  Josey Villalpando has been doing his stretches throughout the day at home, but Mom does not always see Josey Villalpando doing his exercises  Prior to session today, clinician screened patient over the phone  Parent denied any current symptoms and/or recent exposure to covid19 per screening regarding their child and/or immediate family  Upon arrival to the clinic, parent called the  to check in  Patient and parent were met at the door, clinician was gloved and with a face mask  Patient and/or parent arrived with a face mask on  Patient and/or parent's temperature was checked prior to entrance to the clinic via a no-contact forehead thermometer  Patient's temperature was < 100 deg (below 100 is considered safe for entry)  Patient and/or parent appeared well without overt s/s of illness  Patient and/or parent was then allowed to enter the clinic with the clinician, and was escorted to the sink to wash their hands with soap and water  After washing their hands, the patient and/or parent was then transitioned into a designated treatment area  Items used in therapy were sanitized before and after use   Following the session, the patient and/or parent was escorted back to the front Department of Neurology  Movement Disorders Division   Follow-up Note    Patient: Robert Archer   MRN: [id number]   : 1950   Date of Visit: 2025    Robert Archer is a 74 year old R-handed male who returns to clinic for follow up of Parkinson's disease (dx 3/2024, sx since ). He was last seen 10/14/24 at which time PT referral placed for balance changes.    Relevant prior history:  He was seen by Dr. Garcia 24 for balance changes. He was found to have bradykinesia, masked facies, rigidity, and a shuffling gait on exam, so a Dorothy Scan and EMG were ordered. DaTScan was positive for pre-synaptic dopaminergic deficit, EMG was normal.    Started carbidopa/levodopa 2024    INTERVAL EVENTS:  He presents with his wife Ioana.    Overall he feels stable.    Walking is mostly good. He had a fall when he was walking backwards while adjusting a chair.    He underwent bilateral L4-L5 and L5-S1 nerve block procedure on 1/3/25 that was okay, he experienced some pain relief but not as much as he hoped for.     Medications are helping with stiffness. No side effects, no wearing off or dyskinesias.     Denies any difficulty with hand stiffness or slowness. He spends his time doing things around the house, yardwork, has not had major issues with this.     Sleep is fine, no dream enactment behaviors. Denies hallucinations.    Occasional constipation. Denies urinary changes. Endorses some lightheadedness with standing, no syncope and usually can stay standing, this is stable.       Related Medications 7am noon 5pm   Carbidopa/levodopa IR 25/100 2 2 2   Last taken noon        10/14/2024     3:00 PM   UPDRS EDL Scale   2.1  Speech 0   2.2  Salivation 0   2.3  Chew & Swallow 0   2.4  Eating                  1   2.5  Dressing                0   2.6  Hygiene                 0   2.7  Handwriting             1   2.8  Hobbies etc.            1   2.9  Turn in bed             0   2.10 Tremor                  0   2.11 Stand from  "chair        1   2.12 Walking & Balance  2   2.13 Freezing                0   MDS-UPDRS II Total Score 6         ROS:  All others negative except as listed above.      Current Outpatient Medications   Medication Sig Dispense Refill    alendronate (FOSAMAX) 70 MG tablet Take 1 tablet (70 mg) by mouth every 7 days. 12 tablet 3    carbidopa-levodopa (SINEMET)  MG tablet Take 2 tablets by mouth 3 times daily for 180 days 360 tablet 2    lisinopril (ZESTRIL) 20 MG tablet Take 1.5 tablets (30 mg) by mouth daily. 135 tablet 3    metoprolol succinate ER (TOPROL XL) 25 MG 24 hr tablet Take 1 tablet (25 mg) by mouth daily. 90 tablet 3    rosuvastatin (CRESTOR) 20 MG tablet Take 1 tablet (20 mg) by mouth daily. 90 tablet 3    vitamin D3 (CHOLECALCIFEROL) 50 mcg (2000 units) tablet Take 1 tablet (50 mcg) by mouth daily. 90 tablet 3       No Known Allergies       PHYSICAL EXAM:  BP (!) 149/81   Pulse 56   Ht 1.76 m (5' 9.29\")   Wt 75.3 kg (166 lb)   SpO2 100%   BMI 24.31 kg/m          1/13/2025     3:00 PM   UPDRS Motor Scale   Time: 15:50   Medication On   R Brain DBS: None   L Brain DBS: None   Dyskinesia (LID) No   Speech 0   Facial Expression 3   Rigidity Neck 0   Rigidity RUE 0   Rigidity LUE 2   Rigidity RLE 0   Rigidity LLE 0   Finger Taps R 0   Finger Taps L 0   Hand Mvt R 0   Hand Mvt L 0   Pron-/Supinate R 0   Pron-/Supinate L 0   Toe Tap R 1   Toe Tap L 0   Leg Agility R 1   Leg Agility L 0   Arise From Chair 1   Gait 1   Gait Freezing 0   Postural Stability 1   Posture 3   Global Spont Mvt 2   Postural Tremor RUE 0   Postural Tremor LUE 0   Kinetic Tremor RUE 0   Kinetic Tremor LUE 0   Rest Tremor RUE 0   Rest Tremor LUE 0   Rest Tremor RLE 0   Rest Tremor LLE 0   Rest Tremor Lip/Jaw 0   Rest Tremor Constancy 0   Total Right 2   Total Left 2   Axial Total 11   Total 15     Gait stable, decreased stride height and length with okay voluntary arm swing. There is some unsteadiness with listing to either " door     Objective: See treatment diary below     Stretching:   - Manual hamstring stretch - 3 x 30 sec each    - Gastroc stretch on wall - 2 sets x 30 sec each   - Gastroc stretch (manual) - 2 sets x 30 sec each    - Elliptical: 4 minutes at 2 5 - 3 2 mph, 1% incline   - Sit to stands with pink TB - 2 sets x 10 with min vc for limiting hip ER and knee valgus   - final set with 3# weighted bar    - Overhead shoulder flexion with 3# weighted bar (10x)   - Quadruped hip extension - 15 x 5" each, min vc-tc    - Seated on physioball with focus on core and hip extensor strength    - 5 sets x 1 minute   - added throwing/catching playground ball while maintaining balance (10x)   - Bridges with feet on physioball - 10 x 5" holds    HEP/Education: Copied from previous visit   -  Quadruped hip extension - 2 sets x 10 x 5" holds   - Bridges - 2-3 sets x 10 x 5-10" holds  - Self gastroc stretch - 2-3 sets x 30 sec holds, every day  - Seated hamstring stretch - 2-3 sets x 30 sec holds, every day     - Reviewed stretches with Mother post session, with emphasis on sustaining stretch with appropriate form   - Added seated on physioball while Saman Moe is watching TV or performing a seated task, to focus on postural control and balance     Assessment: Chon tolerated treatment well today  He demonstrates improved participation and effort with all strengthening and balance exercises today  Saman Moe with good ability to perform sit to stands with feet apart, with only compensating with hip ER 50% of the time which is an improvement since previous visit  Saman Moe with good tolerance for stretching, with good knee extension for prolonged self gastroc stretch on wall  Saman Moe still required minimal cuing to limit ER compensation with bridges with feet on physioball  Reviewed importance of stretches with Mother post session today and ways to improve effectiveness of stretches at home    Saman Moe would benefit from continued PT to improve posture, side.      ASSESSMENT/PLAN:  Robert Archer is a 74 year old R-handed male who returns to clinic for follow up of Parkinson's disease.    He is doing well overall, perhaps with slightly worse balance. As his PD has been predominantly characterized by slowing and stooped posture, he may experience more gait instability over a relatively shorter period of time compared to tremor-predominant PD.     We discussed switching from a cane to walking poles for extra stability and I counseled that we should have a low threshold to see PT for gait training.     He is doing okay from a motor symptom standpoint, so we will not make any medication changes today.    He was counseled to stay active as safely as possible.    - medications as below   Related Medications 7am noon 5pm   Carbidopa/levodopa IR 25/100 2 2 2       Return to clinic in 5-6mo    Donovan Jovel MD  Fellow  Movement Disorders Division  [facility]    30 minutes spent on date of encounter doing chart reviews, obtaining history, performing physical exam, counseling, documentation, and further activities as noted above.      strength, balance, gait efficiency, endurance to increase participation for peers at school and in the community  Plan: Progress stretching, strengthening, and postural control         Walter Sena, EVE  7/8/2021

## 2025-01-13 ENCOUNTER — OFFICE VISIT (OUTPATIENT)
Dept: NEUROLOGY | Facility: CLINIC | Age: 75
End: 2025-01-13
Payer: COMMERCIAL

## 2025-01-13 VITALS
SYSTOLIC BLOOD PRESSURE: 149 MMHG | HEART RATE: 56 BPM | HEIGHT: 69 IN | OXYGEN SATURATION: 100 % | WEIGHT: 166 LBS | BODY MASS INDEX: 24.59 KG/M2 | DIASTOLIC BLOOD PRESSURE: 81 MMHG

## 2025-01-13 DIAGNOSIS — R26.89 BALANCE DISORDER: ICD-10-CM

## 2025-01-13 DIAGNOSIS — G20.A1 PARKINSON'S DISEASE WITHOUT DYSKINESIA OR FLUCTUATING MANIFESTATIONS (H): Primary | ICD-10-CM

## 2025-01-13 ASSESSMENT — UNIFIED PARKINSONS DISEASE RATING SCALE (UPDRS)
AXIAL_SCORE: 11
RIGIDITY_RLE: (0) NORMAL: NO RIGIDITY.
GAIT: (1) SLIGHT: INDEPENDENT WALKING WITH MINOR GAIT IMPAIRMENT.
LEG_AGILITY_RIGHT: (1) SLIGHT: ANY OF THE FOLLOWING: A) THE REGULAR RHYTHM IS BROKEN WITH ONE WITH ONE OR TWO INTERRUPTIONS OR HESITATIONS OF THE MOVEMENT  B) SLIGHT SLOWING  C) THE AMPLITUDE DECREMENTS NEAR THE END OF THE 10 MOVEMENTS.
RIGIDITY_LLE: (0) NORMAL: NO RIGIDITY.
FACIAL_EXPRESSION: (3) MASKED FACIES WITH LIPS PARTED SOME OF THE TIME WHEN THE MOUTH IS AT REST.
RIGIDITY_LUE: (2) MILD: RIGIDITY DETECTED WITHOUT THE ACTIVATION MANEUVER. FULL RANGE OF MOTION IS EASILY ACHIEVED.
POSTURE: (3) MODERATE: STOOPED POSTURE, SCOLIOSIS, OR LEANING TO ONE SIDE THAT CANNOT BE CORRECTED VOLITIONALLY TO A NORMAL POSTURE BY THE PATIENT.
HANDMOVEMENTS_RIGHT: (0) NORMAL: NO PROBLEMS.
ARISING_CHAIR: (1) SLIGHT: ARISING IS SLOWER THAN NORMAL, OR MAY NEED MORE THAN ONE ATTEMPT, OR MAY NEED TO MOVE FORWARD IN THE CHAIR TO ARISE. NO NEED TO USE THE ARMS OF THE CHAIR.
AMPLITUDE_RUE: (0) NORMAL: NO TREMOR.
PRONATION_SUPINATION_RIGHT: (0) NORMAL: NO PROBLEMS.
SPONTANEITY_OF_MOVEMENT: (2) MILD: MILD GLOBAL SLOWNESS AND POVERTY OF SPONTANEOUS MOVEMENTS.
TOETAPPING_RIGHT: (1) SLIGHT: ANY OF THE FOLLOWING: A) THE REGULAR RHYTHM IS BROKEN WITH ONE WITH ONE OR TWO INTERRUPTIONS OR HESITATIONS OF THE MOVEMENT  B) SLIGHT SLOWING  C) THE AMPLITUDE DECREMENTS NEAR THE END OF THE 10 MOVEMENTS.
FINGER_TAPPING_RIGHT: (0) NORMAL: NO PROBLEMS.
RIGIDITY_RUE: (0) NORMAL: NO RIGIDITY.
SPEECH: (0) NORMAL: NO SPEECH PROBLEMS.
DYSKINESIAS_PRESENT: NO
LEG_AGILITY_LEFT: (0) NORMAL: NO PROBLEMS.
FINGER_TAPPING_LEFT: (0) NORMAL: NO PROBLEMS.
TOETAPPING_LEFT: (0) NORMAL: NO PROBLEMS.
TOTAL_SCORE: 15
PRONATION_SUPINATION_LEFT: (0) NORMAL: NO PROBLEMS.
RIGIDITY_NECK: (0) NORMAL: NO RIGIDITY.
CONSTANCY_TREMOR_ATREST: (0) NORMAL: NO TREMOR.
AMPLITUDE_RLE: (0) NORMAL: NO TREMOR.
TOTAL_SCORE: 2
AMPLITUDE_LIP_JAW: (0) NORMAL: NO TREMOR.
TOTAL_SCORE_LEFT: 2
AMPLITUDE_LLE: (0) NORMAL: NO TREMOR.
POSTURAL_STABILITY: (1) SLIGHT: 3-5 STEPS, BUT RECOVERS UNAIDED.
HANDMOVEMENTS_LEFT: (0) NORMAL: NO PROBLEMS.
AMPLITUDE_LUE: (0) NORMAL: NO TREMOR.
FREEZING_GAIT: (0) NORMAL: NO FREEZING.
PARKINSONS_MEDS: ON

## 2025-01-13 NOTE — LETTER
2025      Robert Archer  [address] [place] [address]      Dear Colleague,    Thank you for referring your patient, Robert Archer, to the [facility] [place]. Please see a copy of my visit note below.    Department of Neurology  Movement Disorders Division   Follow-up Note    Patient: Robert Archer   MRN: [id number]   : 1950   Date of Visit: 2025    Robert Archer is a 74 year old R-handed male who returns to clinic for follow up of Parkinson's disease (dx 3/2024, sx since ). He was last seen 10/14/24 at which time PT referral placed for balance changes.    Relevant prior history:  He was seen by Dr. Garcia 24 for balance changes. He was found to have bradykinesia, masked facies, rigidity, and a shuffling gait on exam, so a Dorothy Scan and EMG were ordered. DaTScan was positive for pre-synaptic dopaminergic deficit, EMG was normal.    Started carbidopa/levodopa 2024    INTERVAL EVENTS:  He presents with his wife Ioana.    Overall he feels stable.    Walking is mostly good. He had a fall when he was walking backwards while adjusting a chair.    He underwent bilateral L4-L5 and L5-S1 nerve block procedure on 1/3/25 that was okay, he experienced some pain relief but not as much as he hoped for.     Medications are helping with stiffness. No side effects, no wearing off or dyskinesias.     Denies any difficulty with hand stiffness or slowness. He spends his time doing things around the house, yardwork, has not had major issues with this.     Sleep is fine, no dream enactment behaviors. Denies hallucinations.    Occasional constipation. Denies urinary changes. Endorses some lightheadedness with standing, no syncope and usually can stay standing, this is stable.       Related Medications 7am noon 5pm   Carbidopa/levodopa IR 25/100 2 2 2   Last taken noon        10/14/2024     3:00 PM   UPDRS EDL Scale   2.1  Speech 0   2.2  Salivation 0   2.3  Chew & Swallow 0   2.4   "Eating                  1   2.5  Dressing                0   2.6  Hygiene                 0   2.7  Handwriting             1   2.8  Hobbies etc.            1   2.9  Turn in bed             0   2.10 Tremor                  0   2.11 Stand from chair        1   2.12 Walking & Balance  2   2.13 Freezing                0   MDS-UPDRS II Total Score 6         ROS:  All others negative except as listed above.      Current Outpatient Medications   Medication Sig Dispense Refill     alendronate (FOSAMAX) 70 MG tablet Take 1 tablet (70 mg) by mouth every 7 days. 12 tablet 3     carbidopa-levodopa (SINEMET)  MG tablet Take 2 tablets by mouth 3 times daily for 180 days 360 tablet 2     lisinopril (ZESTRIL) 20 MG tablet Take 1.5 tablets (30 mg) by mouth daily. 135 tablet 3     metoprolol succinate ER (TOPROL XL) 25 MG 24 hr tablet Take 1 tablet (25 mg) by mouth daily. 90 tablet 3     rosuvastatin (CRESTOR) 20 MG tablet Take 1 tablet (20 mg) by mouth daily. 90 tablet 3     vitamin D3 (CHOLECALCIFEROL) 50 mcg (2000 units) tablet Take 1 tablet (50 mcg) by mouth daily. 90 tablet 3       No Known Allergies       PHYSICAL EXAM:  BP (!) 149/81   Pulse 56   Ht 1.76 m (5' 9.29\")   Wt 75.3 kg (166 lb)   SpO2 100%   BMI 24.31 kg/m          1/13/2025     3:00 PM   UPDRS Motor Scale   Time: 15:50   Medication On   R Brain DBS: None   L Brain DBS: None   Dyskinesia (LID) No   Speech 0   Facial Expression 3   Rigidity Neck 0   Rigidity RUE 0   Rigidity LUE 2   Rigidity RLE 0   Rigidity LLE 0   Finger Taps R 0   Finger Taps L 0   Hand Mvt R 0   Hand Mvt L 0   Pron-/Supinate R 0   Pron-/Supinate L 0   Toe Tap R 1   Toe Tap L 0   Leg Agility R 1   Leg Agility L 0   Arise From Chair 1   Gait 1   Gait Freezing 0   Postural Stability 1   Posture 3   Global Spont Mvt 2   Postural Tremor RUE 0   Postural Tremor LUE 0   Kinetic Tremor RUE 0   Kinetic Tremor LUE 0   Rest Tremor RUE 0   Rest Tremor LUE 0   Rest Tremor RLE 0   Rest Tremor LLE 0 "   Rest Tremor Lip/Jaw 0   Rest Tremor Constancy 0   Total Right 2   Total Left 2   Axial Total 11   Total 15     Gait stable, decreased stride height and length with okay voluntary arm swing. There is some unsteadiness with listing to either side.      ASSESSMENT/PLAN:  Robert Archer is a 74 year old R-handed male who returns to clinic for follow up of Parkinson's disease.    He is doing well overall, perhaps with slightly worse balance. As his PD has been predominantly characterized by slowing and stooped posture, he may experience more gait instability over a relatively shorter period of time compared to tremor-predominant PD.     We discussed switching from a cane to walking poles for extra stability and I counseled that we should have a low threshold to see PT for gait training.     He is doing okay from a motor symptom standpoint, so we will not make any medication changes today.    He was counseled to stay active as safely as possible.    - medications as below   Related Medications 7am noon 5pm   Carbidopa/levodopa IR 25/100 2 2 2       Return to clinic in 5-6mo    Donovan Jovel MD  Fellow  Movement Disorders Division  [facility]    30 minutes spent on date of encounter doing chart reviews, obtaining history, performing physical exam, counseling, documentation, and further activities as noted above.       Again, thank you for allowing me to participate in the care of your patient.        Sincerely,        Donovan Jovel MD    Electronically signed

## 2025-01-13 NOTE — NURSING NOTE
"Robert Archer is a 74 year old male who presents for:  Chief Complaint   Patient presents with    Tremors     Tremor follow up        Initial Vitals:  BP (!) 149/81   Pulse 56   Ht 1.76 m (5' 9.29\")   Wt 75.3 kg (166 lb)   SpO2 100%   BMI 24.31 kg/m   Estimated body mass index is 24.31 kg/m  as calculated from the following:    Height as of this encounter: 1.76 m (5' 9.29\").    Weight as of this encounter: 75.3 kg (166 lb).. Body surface area is 1.92 meters squared. BP completed using cuff size: regular    Aye Rathai   "

## 2025-01-13 NOTE — PATIENT INSTRUCTIONS
You are doing well!    Let's keep the medications the same.     Be mindful of your walking and do your best to avoid walking backwards for any reason.     Try to switch to walking poles instead of a cane as they will provide you with extra stability.    If you feel your balance becomes more of an issue, let me know so I can place a physical therapy referral

## 2025-01-16 DIAGNOSIS — G20.A1 PARKINSON'S DISEASE WITHOUT DYSKINESIA OR FLUCTUATING MANIFESTATIONS (H): ICD-10-CM

## 2025-01-16 RX ORDER — CARBIDOPA AND LEVODOPA 25; 100 MG/1; MG/1
2 TABLET ORAL 3 TIMES DAILY
Qty: 360 TABLET | Refills: 2 | Status: SHIPPED | OUTPATIENT
Start: 2025-01-16

## 2025-01-16 NOTE — TELEPHONE ENCOUNTER
Pending Prescriptions:                       Disp   Refills    carbidopa-levodopa (SINEMET)  MG ta*360 ta*2            Sig: Take 2 tablets by mouth 3 times daily.   Last OV: 1/13/25  Next OV: 6/23/25    [employer]   Group# [id number]     Pharmacy    [facility] - [place], [place] - [address]

## 2025-01-22 ENCOUNTER — TELEPHONE (OUTPATIENT)
Dept: PHYSICAL MEDICINE AND REHAB | Facility: CLINIC | Age: 75
End: 2025-01-22

## 2025-01-22 ENCOUNTER — OFFICE VISIT (OUTPATIENT)
Dept: DERMATOLOGY | Facility: CLINIC | Age: 75
End: 2025-01-22
Attending: FAMILY MEDICINE
Payer: COMMERCIAL

## 2025-01-22 DIAGNOSIS — D18.01 CHERRY ANGIOMA: ICD-10-CM

## 2025-01-22 DIAGNOSIS — M47.897 OTHER SPONDYLOSIS, LUMBOSACRAL REGION: Primary | ICD-10-CM

## 2025-01-22 DIAGNOSIS — L82.1 SEBORRHEIC KERATOSIS: ICD-10-CM

## 2025-01-22 DIAGNOSIS — M54.59 LUMBAR FACET JOINT PAIN: ICD-10-CM

## 2025-01-22 DIAGNOSIS — L81.4 SOLAR LENTIGO: ICD-10-CM

## 2025-01-22 DIAGNOSIS — D22.9 MULTIPLE MELANOCYTIC NEVI: ICD-10-CM

## 2025-01-22 DIAGNOSIS — L57.0 ACTINIC KERATOSIS: Primary | ICD-10-CM

## 2025-01-22 RX ORDER — SODIUM CHLORIDE 9 MG/ML
500 INJECTION, SOLUTION INTRAVENOUS CONTINUOUS
OUTPATIENT
Start: 2025-01-22 | End: 2025-01-22

## 2025-01-22 RX ORDER — LIDOCAINE 40 MG/G
CREAM TOPICAL
OUTPATIENT
Start: 2025-01-22

## 2025-01-22 RX ORDER — FLUOROURACIL 50 MG/G
CREAM TOPICAL 2 TIMES DAILY
Qty: 40 G | Refills: 3 | Status: SHIPPED | OUTPATIENT
Start: 2025-01-22

## 2025-01-22 NOTE — PROGRESS NOTES
Pain diary reviewed for lumbar MBB #2, with patient reporting at least 80% improvement in pain and functionality.  Case request placed for Bilateral L4-5, L5-S1 facet radiofrequency ablation

## 2025-01-22 NOTE — PROGRESS NOTES
[facility] Dermatology Note    Encounter Date: Jan 22, 2025    Dermatology Problem List:  Last skin check 1/22/25    1. AKs - head and ears  -current tx: fluorouracil 5%  -cryo     Last FBSE: 1/20/21    ______________________________________    Impression/Plan:  1.  Irregular brown macule, R shoulder. Ddx: atypical nevus  - Photo obtained today. Continue to monitor. Recheck in 6 months    2. Reassurance provided for benign lesions not treated today including cherry angiomata, solar lentigines, seborrheic keratoses, and banal-appearing melanocytic nevi.    3. Actinic keratoses, face and scalp x 18  - Cryotherapy performed today. See procedure section.  - Resume field therapy with fluorouracil 5%, focusing on the ears and face.           Procedures:  Cryotherapy procedure note: After verbal consent and discussion of risks and benefits including, but not limited to, dyspigmentation/scar, blister, and pain, 18 AKs was(were) treated with 1-2 mm freeze border for 1-2 cycles with liquid nitrogen. Post cryotherapy instructions were provided.       Follow-up in 6 months.       Staff Involved:  Staff and Scribe  Scribe Disclosure:   I, Angelina Birch, am serving as a scribe to document services personally performed by Donell Melendez MD based on data collection and the provider's statements to me.     Provider Disclosure:   The documentation recorded by the scribe accurately reflects the services I personally performed and the decisions made by me.    Donell Melendez MD   of Dermatology  Department of Dermatology  [facility]        CC:   Chief Complaint   Patient presents with    Skin Check     Pt is here for a FBSC. Areas on the face is a concern.        History of Present Illness:  Mr. Robert Archer is a 74 year old male who presents as a return patient. Referred by Dr Jose Sepul[facility] for Encounter for Medicare annual wellness exam, Actinic  keratosis, and Skin lesion.    Today, patient reports areas of concern on his face.     Labs:  N/a     Physical exam:  Vitals: There were no vitals taken for this visit.  GEN: This is a well developed, well-nourished male in no acute distress, in a pleasant mood.    SKIN: Handley phototype I  - Full skin, which includes the head/face, both arms, chest, back, abdomen,both legs, genitalia and/or groin buttocks, digits and/or nails, was examined.  - There are dome shaped bright red papules on the trunk and extremities   - Multiple regular brown pigmented macules and papules are identified on the trunk and extremities. .   - Scattered brown macules on sun exposed areas.  - Waxy stuck on papules and plaques on trunk and extremities.   - There are erythematous scaly macules on the face and scalp x 18.  - There is an irregular brown macule on the R shoulder  - No other lesions of concern on areas examined.     Past Medical History:   Past Medical History:   Diagnosis Date    Hypertension 4-1-2012    Parkinson's disease (H)     V tach (H) 1/6/2025     Past Surgical History:   Procedure Laterality Date    INJECT BLOCK MEDIAL BRANCH CERVICAL/THORACIC/LUMBAR Bilateral 8/30/2024    Procedure: Medial Branch Block #1 to address Bilateral L4-5, L5-S1 facet joints;  Surgeon: Brandie Rosario MD;  Location: [facility] OR    INJECT BLOCK MEDIAL BRANCH CERVICAL/THORACIC/LUMBAR Bilateral 1/3/2025    Procedure: Bilateral L4-5, L5-S1 medial branch block #2;  Surgeon: Brandie Rosario MD;  Location: [facility] OR    INJECT EPIDURAL LUMBAR N/A 6/21/2024    Procedure: L4-5 lumbar interlaminar epidural steroid injection;  Surgeon: Brandie Rosario MD;  Location: [facility] OR    NO HISTORY OF SURGERY         Social History:   reports that he quit smoking about 19 years ago. His smoking use included cigarettes. He started smoking about 39 years ago. He has a 20.1 pack-year smoking history. He has been exposed to tobacco smoke. He has never used smokeless  tobacco. He reports current alcohol use. He reports that he does not use drugs.    Family History:  Family History   Problem Relation Age of Onset    Hypertension Mother     Ovarian Cancer Mother     Cataracts Mother     Hypertension Father     Heart Disease Father 83    Coronary Artery Disease Father     Depression Grandson     Colon Cancer No family hx of     Prostate Cancer No family hx of     Breast Cancer No family hx of     Ulcerative Colitis No family hx of     Celiac Disease No family hx of     Crohn's Disease No family hx of        Medications:  Current Outpatient Medications   Medication Sig Dispense Refill    alendronate (FOSAMAX) 70 MG tablet Take 1 tablet (70 mg) by mouth every 7 days. 12 tablet 3    carbidopa-levodopa (SINEMET)  MG tablet Take 2 tablets by mouth 3 times daily. 360 tablet 2    lisinopril (ZESTRIL) 20 MG tablet Take 1.5 tablets (30 mg) by mouth daily. 135 tablet 3    metoprolol succinate ER (TOPROL XL) 25 MG 24 hr tablet Take 1 tablet (25 mg) by mouth daily. 90 tablet 3    rosuvastatin (CRESTOR) 20 MG tablet Take 1 tablet (20 mg) by mouth daily. 90 tablet 3    vitamin D3 (CHOLECALCIFEROL) 50 mcg (2000 units) tablet Take 1 tablet (50 mcg) by mouth daily. 90 tablet 3     No Known Allergies

## 2025-01-22 NOTE — NURSING NOTE
Robert Archer's goals for this visit include:   Chief Complaint   Patient presents with    Skin Check     Pt is here for a FBSC. Areas on the face is a concern.        He requests these members of his care team be copied on today's visit information:     PCP: Jose Barrera    Referring Provider:  Jose Barrera [place]  [address] [place] [address]    There were no vitals taken for this visit.    Do you need any medication refills at today's visit?     Saadia Tellez LPN on 1/22/2025 at 10:23 AM

## 2025-01-22 NOTE — TELEPHONE ENCOUNTER
Phoned the patient and left VM. Stated to call the pain clinic to schedule injection procedure at [phone].

## 2025-01-22 NOTE — LETTER
1/22/2025      Robert Archer  [address] [place] [address]      Dear Colleague,    Thank you for referring your patient, Robert Archer, to the [facility]. Please see a copy of my visit note below.    [facility] Dermatology Note    Encounter Date: Jan 22, 2025    Dermatology Problem List:  Last skin check 1/22/25    1. AKs - head and ears  -current tx: fluorouracil 5%  -cryo     Last FBSE: 1/20/21    ______________________________________    Impression/Plan:  1.  Irregular brown macule, R shoulder. Ddx: atypical nevus  - Photo obtained today. Continue to monitor. Recheck in 6 months    2. Reassurance provided for benign lesions not treated today including cherry angiomata, solar lentigines, seborrheic keratoses, and banal-appearing melanocytic nevi.    3. Actinic keratoses, face and scalp x 18  - Cryotherapy performed today. See procedure section.  - Resume field therapy with fluorouracil 5%, focusing on the ears and face.           Procedures:  Cryotherapy procedure note: After verbal consent and discussion of risks and benefits including, but not limited to, dyspigmentation/scar, blister, and pain, 18 AKs was(were) treated with 1-2 mm freeze border for 1-2 cycles with liquid nitrogen. Post cryotherapy instructions were provided.       Follow-up in 6 months.       Staff Involved:  Staff and Scribe  Scribe Disclosure:   I, Angelina Birch, am serving as a scribe to document services personally performed by Donell Melendez MD based on data collection and the provider's statements to me.     Provider Disclosure:   The documentation recorded by the scribe accurately reflects the services I personally performed and the decisions made by me.    Donell Melendez MD   of Dermatology  Department of Dermatology  [facility]        CC:   Chief Complaint   Patient presents with     Skin Check     Pt is here for a FBSC. Areas  on the face is a concern.        History of Present Illness:  Mr. Robert Archer is a 74 year old male who presents as a return patient. [facility] annual wellness exam, Actinic keratosis, and Skin lesion.    Today, patient reports areas of concern on his face.     Labs:  N/a     Physical exam:  Vitals: There were no vitals taken for this visit.  GEN: This is a well developed, well-nourished male in no acute distress, in a pleasant mood.    SKIN: Handley phototype I  - Full skin, which includes the head/face, both arms, chest, back, abdomen,both legs, genitalia and/or groin buttocks, digits and/or nails, was examined.  - There are dome shaped bright red papules on the trunk and extremities   - Multiple regular brown pigmented macules and papules are identified on the trunk and extremities. .   - Scattered brown macules on sun exposed areas.  - Waxy stuck on papules and plaques on trunk and extremities.   - There are erythematous scaly macules on the face and scalp x 18.  - There is an irregular brown macule on the R shoulder  - No other lesions of concern on areas examined.     Past Medical History:   Past Medical History:   Diagnosis Date     Hypertension 4-1-2012     Parkinson's disease (H)      V tach (H) 1/6/2025     Past Surgical History:   Procedure Laterality Date     INJECT BLOCK MEDIAL BRANCH CERVICAL/THORACIC/LUMBAR Bilateral 8/30/2024    Procedure: Medial Branch Block #1 to address Bilateral L4-5, L5-S1 facet joints;  Surgeon: Brandie Rosario MD;  Location: [facility] OR     INJECT BLOCK MEDIAL BRANCH CERVICAL/THORACIC/LUMBAR Bilateral 1/3/2025    Procedure: Bilateral L4-5, L5-S1 medial branch block #2;  Surgeon: Brandie Rosario MD;  Location: [facility] OR     INJECT EPIDURAL LUMBAR N/A 6/21/2024    Procedure: L4-5 lumbar interlaminar epidural steroid injection;  Surgeon: Brandie Rosario MD;  Location: [facility] OR     NO HISTORY OF SURGERY         Social History:    reports that he quit smoking about 19 years ago. His smoking use included cigarettes. He started smoking about 39 years ago. He has a 20.1 pack-year smoking history. He has been exposed to tobacco smoke. He has never used smokeless tobacco. He reports current alcohol use. He reports that he does not use drugs.    Family History:  Family History   Problem Relation Age of Onset     Hypertension Mother      Ovarian Cancer Mother      Cataracts Mother      Hypertension Father      Heart Disease Father 83     Coronary Artery Disease Father      Depression Grandson      Colon Cancer No family hx of      Prostate Cancer No family hx of      Breast Cancer No family hx of      Ulcerative Colitis No family hx of      Celiac Disease No family hx of      Crohn's Disease No family hx of        Medications:  Current Outpatient Medications   Medication Sig Dispense Refill     alendronate (FOSAMAX) 70 MG tablet Take 1 tablet (70 mg) by mouth every 7 days. 12 tablet 3     carbidopa-levodopa (SINEMET)  MG tablet Take 2 tablets by mouth 3 times daily. 360 tablet 2     lisinopril (ZESTRIL) 20 MG tablet Take 1.5 tablets (30 mg) by mouth daily. 135 tablet 3     metoprolol succinate ER (TOPROL XL) 25 MG 24 hr tablet Take 1 tablet (25 mg) by mouth daily. 90 tablet 3     rosuvastatin (CRESTOR) 20 MG tablet Take 1 tablet (20 mg) by mouth daily. 90 tablet 3     vitamin D3 (CHOLECALCIFEROL) 50 mcg (2000 units) tablet Take 1 tablet (50 mcg) by mouth daily. 90 tablet 3     No Known Allergies                Again, thank you for allowing me to participate in the care of your patient.        Sincerely,        Donell Melendez MD    Electronically signed

## 2025-01-27 ENCOUNTER — TELEPHONE (OUTPATIENT)
Dept: PHYSICAL MEDICINE AND REHAB | Facility: CLINIC | Age: 75
End: 2025-01-27
Payer: COMMERCIAL

## 2025-01-27 PROBLEM — M54.59 LUMBAR FACET JOINT PAIN: Status: ACTIVE | Noted: 2025-01-22

## 2025-01-27 PROBLEM — M47.897 OTHER SPONDYLOSIS, LUMBOSACRAL REGION: Status: ACTIVE | Noted: 2025-01-22

## 2025-01-27 NOTE — TELEPHONE ENCOUNTER
Called patient to schedule procedure with Dr. Rosario    Date of Procedure: 3/14/25    Arrival time given: Yes: Arrival Time 12:15pm       Procedure Location: [facility] [place]     Verified Location with Patient:  Yes  Address provided to the patient     Pre-op H&P Required:  No: Moderate Sedation       Post-Op/Follow Up Appt:  Yes: 4/30/25 @9:20am       Informed patient they will need a  to drive them home:  Yes    Patients : Spouse     Patient is aware that pre-op RN from the procedure center will call 2-3 days prior to scheduled procedure to confirm arrival time and review any instructions:  Yes       Additional Comments: N/A        Cecelia Verdin MA on 1/27/2025 at 11:58 AM      P: [phone]*

## 2025-02-25 ENCOUNTER — TELEPHONE (OUTPATIENT)
Dept: PHYSICAL MEDICINE AND REHAB | Facility: CLINIC | Age: 75
End: 2025-02-25
Payer: COMMERCIAL

## 2025-02-25 NOTE — TELEPHONE ENCOUNTER
Patient decided to cancel injection procedure on 3/14/25 with dr. Rosario. Patient does not want to reschedule.

## 2025-04-24 ENCOUNTER — MYC MEDICAL ADVICE (OUTPATIENT)
Dept: FAMILY MEDICINE | Facility: CLINIC | Age: 75
End: 2025-04-24
Payer: COMMERCIAL

## 2025-04-24 NOTE — TELEPHONE ENCOUNTER
Patient Quality Outreach    Patient is due for the following:   Hypertension -  BP check    Action(s) Taken:   Schedule a nurse only visit for BP check    Type of outreach:    Sent [employer] message.    Questions for provider review:    None         Nella Ray MA  Chart routed to Care Team.

## 2025-05-15 DIAGNOSIS — I10 ESSENTIAL HYPERTENSION, BENIGN: ICD-10-CM

## 2025-05-15 RX ORDER — LISINOPRIL 20 MG/1
30 TABLET ORAL DAILY
Qty: 135 TABLET | Refills: 1 | Status: SHIPPED | OUTPATIENT
Start: 2025-05-15

## 2025-05-15 NOTE — TELEPHONE ENCOUNTER
Pt stopped by and has been waiting 1 week for:    Lisinopril (ZESTRIL) 20 MG tablet twice daily    He uses [facility] in [place] @ [employer]   [address], [place] [address] [phone]    Pt has been out for over a week of this medication    Please call patient @ [phone]

## 2025-05-15 NOTE — TELEPHONE ENCOUNTER
Last seen 01/06 by alexandre  Last seen 12/23/24     Requesting lisinopril    Socrates Condon RN  [facility] Triage [place]  May 15, 2025

## 2025-05-20 ENCOUNTER — ALLIED HEALTH/NURSE VISIT (OUTPATIENT)
Dept: FAMILY MEDICINE | Facility: CLINIC | Age: 75
End: 2025-05-20
Payer: COMMERCIAL

## 2025-05-20 VITALS — SYSTOLIC BLOOD PRESSURE: 136 MMHG | DIASTOLIC BLOOD PRESSURE: 62 MMHG

## 2025-05-20 DIAGNOSIS — I10 ESSENTIAL HYPERTENSION, BENIGN: Primary | ICD-10-CM

## 2025-05-20 PROCEDURE — 3078F DIAST BP <80 MM HG: CPT

## 2025-05-20 PROCEDURE — 99207 PR NO CHARGE NURSE ONLY: CPT

## 2025-05-20 PROCEDURE — 3075F SYST BP GE 130 - 139MM HG: CPT

## 2025-05-20 NOTE — PROGRESS NOTES
Blood pressure recheck after sitting for 10-15 minutes    Vitals:    05/20/25 0958 05/20/25 1015   BP: (!) 142/66 136/62       Blood pressure was taken, previous encounter was reviewed, recorded blood pressure below 140/90.  Patient was discharged and the note will be sent to the provider for final review.    Marcella Watts CMA ([facility])

## 2025-05-20 NOTE — PROGRESS NOTES
Chief Complaint   Patient presents with    Allied Health Visit     Blood pressure check     I met with Robert Archer at the request of Dr Sepulveda to recheck his blood pressure.  Blood pressure medications on the med list were reviewed with patient.    Patient has taken all medications as per usual regimen: Yes-takes around noon   Patient reports tolerating them without any issues or concerns: Yes    Vitals:    05/20/25 0958   BP: (!) 142/66       After 5 minutes, the patient's blood pressure remained greater than or equal to 140/90.    Is the patient currently having any chest pain? No  Does the patient currently have a headache? No  Does the patient currently have any vision changes? No  Does the patient currently have any nausea? No  Does the patient currently have any abdominal pain? No    Patient was instructed to sit for 10-15 minutes and will recheck blood pressure at that time..    Marcella Watts CMA ([facility])

## 2025-05-20 NOTE — Clinical Note
Stated to send any follow ups or changes via moblit.  Marcella Watts CMA ([facility])

## 2025-05-27 DIAGNOSIS — G20.A1 PARKINSON'S DISEASE WITHOUT DYSKINESIA OR FLUCTUATING MANIFESTATIONS (H): ICD-10-CM

## 2025-05-27 RX ORDER — CARBIDOPA AND LEVODOPA 25; 100 MG/1; MG/1
2 TABLET ORAL 3 TIMES DAILY
Qty: 360 TABLET | Refills: 2 | Status: SHIPPED | OUTPATIENT
Start: 2025-05-27

## 2025-05-27 NOTE — TELEPHONE ENCOUNTER
[facility] Call Center    Phone Message    May a detailed message be left on voicemail: yes     Reason for Call: Medication Question or concern regarding medication   Prescription Clarification  Name of Medication:  carbidopa-levodopa (SINEMET)  MG tablet    Prescribing Provider: Dr. Jovel    Pharmacy:   [facility][id number] - [place], [place] - [address] AT [facility]      What on the order needs clarification?   Patient ran out of medication 3 days ago and can not refill yet for 1 more month until pharmacy heard from clinic. Patient asking for care team to send a 1 time refill to [facility] pharmacy that is listed above       Action Taken: Other: wb neurology    Travel Screening: Not Applicable     Date of Service:

## 2025-05-27 NOTE — TELEPHONE ENCOUNTER
Contacts       Contact Date/Time Type Contact Phone/Fax    05/27/2025 09:03 AM CDT Phone (Incoming) Robert Archer (Self) [phone] (M)    05/27/2025 04:39 PM CDT Phone (Outgoing) Robert Archer (Self) [phone] (M)    Left Message           Attempted to reach patient to: Relay a message    Regarding: refill for carbidopa-levodopa (SINEMET)  MG tablet     Action to take: OK to relay (verbatim): prescription sent to [facility] for you and can pick this up when you are able to.      Marcella Watts CMA ([facility])

## 2025-05-27 NOTE — TELEPHONE ENCOUNTER
Refill request for the following medication (s) listed below.    Pending Prescriptions:                       Disp   Refills    carbidopa-levodopa (SINEMET)  MG ta*360 ta*2            Sig: Take 2 tablets by mouth 3 times daily.      60 day supply with 2 refills     Last office visit provider:  1/13/25  Next appointment scheduled: 6/23/25      Medication T'd for review and signature  Eyal MERCADO ATC

## 2025-05-27 NOTE — TELEPHONE ENCOUNTER
Pt stopped in @ clinic today.  He states he ran out of this medication 3 days ago.  Wasn't certain he received the full dosage last fill.    Patient uses [facility]  [address], [place] [address]

## 2025-06-18 DIAGNOSIS — G20.A1 PARKINSON'S DISEASE WITHOUT DYSKINESIA OR FLUCTUATING MANIFESTATIONS (H): ICD-10-CM

## 2025-06-18 NOTE — PROGRESS NOTES
Department of Neurology  Movement Disorders Division   Follow-up Note    Patient: Robert Archer   MRN: [id number]   : 1950   Date of Visit: 2025    Robert Archer is a 74 year old R-handed male who returns to clinic for follow up of Parkinson's disease (dx 3/2024, sx since ). He was last seen 25 at which time no medication changes were made.    Relevant prior history:  He was seen by Dr. Garcia 24 for balance changes. He was found to have bradykinesia, masked facies, rigidity, and a shuffling gait on exam, so a Dorothy Scan and EMG were ordered. DaTScan was positive for pre-synaptic dopaminergic deficit, EMG was normal.    Started carbidopa/levodopa 25  He is doing well overall, perhaps with slightly worse balance. As his PD has been predominantly characterized by slowing and stooped posture, he may experience more gait instability over a relatively shorter period of time compared to tremor-predominant PD.     INTERVAL EVENTS:  He presents with his wife Ioana.    Overall he feels okay.    Walking is about the same, uses two canes to get around most of the time. No falls.    Back pain is better overall.     Medications are helping with stiffness. No side effects, no dyskinesias. Endorses possible wearing off in the afternoon ~3pm characterized by generalized weakness and poor balance.    Denies any difficulty with hand stiffness or slowness. He spends his time doing things around the house, yardwork, has not had major issues with this.     Sleep is fine, no dream enactment behaviors. Denies hallucinations.    Occasional constipation. Denies urinary changes. Endorses some lightheadedness with standing, no syncope and usually can stay standing, this is stable.       Related Medications 7am noon 5pm   Carbidopa/levodopa IR 25/100 2 2 2   Last taken ~noon      ROS:  All others negative except as listed above.      Current Outpatient Medications   Medication Sig Dispense Refill     "alendronate (FOSAMAX) 70 MG tablet Take 1 tablet (70 mg) by mouth every 7 days. 12 tablet 3    carbidopa-levodopa (SINEMET)  MG tablet Take 2 tablets by mouth 3 times daily. 540 tablet 1    fluorouracil (EFUDEX) 5 % external cream Apply topically 2 times daily. To face and ears x2 weeks 40 g 3    lisinopril (ZESTRIL) 20 MG tablet Take 1.5 tablets (30 mg) by mouth daily. 135 tablet 1    metoprolol succinate ER (TOPROL XL) 25 MG 24 hr tablet Take 1 tablet (25 mg) by mouth daily. 90 tablet 3    rosuvastatin (CRESTOR) 20 MG tablet Take 1 tablet (20 mg) by mouth daily. 90 tablet 3    vitamin D3 (CHOLECALCIFEROL) 50 mcg (2000 units) tablet Take 1 tablet (50 mcg) by mouth daily. 90 tablet 3       No Known Allergies       PHYSICAL EXAM:  BP (!) 168/92   Pulse 65   Ht 1.76 m (5' 9.29\")   Wt 75.9 kg (167 lb 6 oz)   SpO2 98%   BMI 24.51 kg/m          6/23/2025     4:00 PM   UPDRS Motor Scale   Time: 16:15   Medication On   R Brain DBS: None   L Brain DBS: None   Dyskinesia (LID) No   Speech 1   Facial Expression 3   Rigidity Neck 0   Rigidity RUE 0   Rigidity LUE 0   Rigidity RLE 1   Rigidity LLE 1   Finger Taps R 0   Finger Taps L 2   Hand Mvt R 0   Hand Mvt L 1   Pron-/Supinate R 0   Pron-/Supinate L 1   Toe Tap R 0   Toe Tap L 0   Leg Agility R 0   Leg Agility L 0   Arise From Chair 1   Gait 1   Gait Freezing 0   Postural Stability 3   Posture 3   Global Spont Mvt 1   Postural Tremor RUE 1   Postural Tremor LUE 0   Kinetic Tremor RUE 0   Kinetic Tremor LUE 0   Rest Tremor RUE 0   Rest Tremor LUE 0   Rest Tremor RLE 0   Rest Tremor LLE 0   Rest Tremor Lip/Jaw 0   Rest Tremor Constancy 0   Total Right 2   Total Left 5   Axial Total 13   Total 20     Gait stable, decreased stride height and length with okay voluntary arm swing. There is some unsteadiness with listing to either side.      ASSESSMENT/PLAN:  Robert Archer is a 74 year old R-handed male who returns to clinic for follow up of Parkinson's disease.    He is " doing well overall, now with more overtly worse balance. As his PD has been predominantly characterized by slowing and stooped posture, he may experience more gait instability over a relatively shorter period of time compared to tremor-predominant PD.     He may be having some wearing off, though the description of symptoms are relatively nonspecific and may represent some afternoon fatigue. He was counseled to consider adjusting his pm doses to see if this helps.     Finally, with his balance issues we will have him see PT for gait training.    He was counseled to stay active as safely as possible.    - medications as below, no changes  Related Medications 7am noon 5pm   Carbidopa/levodopa IR 25/100 2 2 2    - consider 7am/11pm/4pm    Return to clinic in 6mp    Donovan Jovel MD  Fellow  Movement Disorders Division  [facility]    30 minutes spent on date of encounter doing chart reviews, obtaining history, performing physical exam, counseling, documentation, and further activities as noted above.

## 2025-06-19 RX ORDER — CARBIDOPA AND LEVODOPA 25; 100 MG/1; MG/1
2 TABLET ORAL 3 TIMES DAILY
Qty: 540 TABLET | Refills: 1 | Status: SHIPPED | OUTPATIENT
Start: 2025-06-19

## 2025-06-23 ENCOUNTER — OFFICE VISIT (OUTPATIENT)
Dept: NEUROLOGY | Facility: CLINIC | Age: 75
End: 2025-06-23
Payer: COMMERCIAL

## 2025-06-23 VITALS
BODY MASS INDEX: 24.79 KG/M2 | SYSTOLIC BLOOD PRESSURE: 168 MMHG | HEART RATE: 65 BPM | HEIGHT: 69 IN | DIASTOLIC BLOOD PRESSURE: 92 MMHG | WEIGHT: 167.38 LBS | OXYGEN SATURATION: 98 %

## 2025-06-23 DIAGNOSIS — G20.A1 PARKINSON'S DISEASE WITHOUT DYSKINESIA OR FLUCTUATING MANIFESTATIONS (H): Primary | ICD-10-CM

## 2025-06-23 PROCEDURE — 99214 OFFICE O/P EST MOD 30 MIN: CPT | Performed by: INTERNAL MEDICINE

## 2025-06-23 PROCEDURE — 3077F SYST BP >= 140 MM HG: CPT | Performed by: INTERNAL MEDICINE

## 2025-06-23 PROCEDURE — 3080F DIAST BP >= 90 MM HG: CPT | Performed by: INTERNAL MEDICINE

## 2025-06-23 RX ORDER — CARBIDOPA AND LEVODOPA 25; 100 MG/1; MG/1
2 TABLET ORAL 3 TIMES DAILY
Qty: 540 TABLET | Refills: 3 | Status: SHIPPED | OUTPATIENT
Start: 2025-06-23

## 2025-06-23 ASSESSMENT — UNIFIED PARKINSONS DISEASE RATING SCALE (UPDRS)
CONSTANCY_TREMOR_ATREST: (0) NORMAL: NO TREMOR.
TOTAL_SCORE_LEFT: 5
AMPLITUDE_RLE: (0) NORMAL: NO TREMOR.
POSTURAL_STABILITY: (3) MODERATE: STANDS SAFELY, BUT WITH ABSENCE OF POSTURAL RESPONSE, FALLS IF NOT CAUGHT BY EXAMINER.
RIGIDITY_RLE: (1) SLIGHT: RIGIDITY ONLY DETECTED WITH ACTIVATION MANEUVER.
POSTURE: (3) MODERATE: STOOPED POSTURE, SCOLIOSIS, OR LEANING TO ONE SIDE THAT CANNOT BE CORRECTED VOLITIONALLY TO A NORMAL POSTURE BY THE PATIENT.
FREEZING_GAIT: (0) NORMAL: NO FREEZING.
SPONTANEITY_OF_MOVEMENT: (1) SLIGHT: SLIGHT GLOBAL SLOWNESS AND POVERTY OF SPONTANEOUS MOVEMENTS.
AXIAL_SCORE: 13
PRONATION_SUPINATION_RIGHT: (0) NORMAL: NO PROBLEMS.
PRONATION_SUPINATION_LEFT: (1) SLIGHT: ANY OF THE FOLLOWING: A) THE REGULAR RHYTHM IS BROKEN WITH ONE WITH ONE OR TWO INTERRUPTIONS OR HESITATIONS OF THE MOVEMENT  B) SLIGHT SLOWING  C) THE AMPLITUDE DECREMENTS NEAR THE END OF THE 10 MOVEMENTS.
RIGIDITY_LLE: (1) SLIGHT: RIGIDITY ONLY DETECTED WITH ACTIVATION MANEUVER.
HANDMOVEMENTS_RIGHT: (0) NORMAL: NO PROBLEMS.
AMPLITUDE_LIP_JAW: (0) NORMAL: NO TREMOR.
RIGIDITY_RUE: (0) NORMAL: NO RIGIDITY.
AMPLITUDE_LUE: (0) NORMAL: NO TREMOR.
SPEECH: (1) SLIGHT: LOSS OF MODULATION, DICTION OR VOLUME, BUT STILL ALL WORDS EASY TO UNDERSTAND.
LEG_AGILITY_LEFT: (0) NORMAL: NO PROBLEMS.
ARISING_CHAIR: (1) SLIGHT: ARISING IS SLOWER THAN NORMAL, OR MAY NEED MORE THAN ONE ATTEMPT, OR MAY NEED TO MOVE FORWARD IN THE CHAIR TO ARISE. NO NEED TO USE THE ARMS OF THE CHAIR.
RIGIDITY_LUE: (0) NORMAL: NO RIGIDITY.
TOTAL_SCORE: 2
DYSKINESIAS_PRESENT: NO
FINGER_TAPPING_LEFT: (2) MILD: ANY OF THE FOLLOWING: A) 3 TO 5 INTERRUPTIONS DURING TAPPING  B) MILD SLOWING  C) THE AMPLITUDE DECREMENTS MIDWAY IN THE 10-MOVEMENT SEQUENCE.
PARKINSONS_MEDS: ON
FINGER_TAPPING_RIGHT: (0) NORMAL: NO PROBLEMS.
RIGIDITY_NECK: (0) NORMAL: NO RIGIDITY.
TOETAPPING_LEFT: (0) NORMAL: NO PROBLEMS.
LEG_AGILITY_RIGHT: (0) NORMAL: NO PROBLEMS.
AMPLITUDE_LLE: (0) NORMAL: NO TREMOR.
TOTAL_SCORE: 20
FACIAL_EXPRESSION: (3) MASKED FACIES WITH LIPS PARTED SOME OF THE TIME WHEN THE MOUTH IS AT REST.
TOETAPPING_RIGHT: (0) NORMAL: NO PROBLEMS.
AMPLITUDE_RUE: (0) NORMAL: NO TREMOR.
HANDMOVEMENTS_LEFT: (1) SLIGHT: ANY OF THE FOLLOWING: A) THE REGULAR RHYTHM IS BROKEN WITH ONE WITH ONE OR TWO INTERRUPTIONS OR HESITATIONS OF THE MOVEMENT  B) SLIGHT SLOWING  C) THE AMPLITUDE DECREMENTS NEAR THE END OF THE 10 MOVEMENTS.
GAIT: (1) SLIGHT: INDEPENDENT WALKING WITH MINOR GAIT IMPAIRMENT.

## 2025-06-23 NOTE — NURSING NOTE
"Robert Archer is a 74 year old male who presents for:  Chief Complaint   Patient presents with    Tremors     Tremors right hand. Walking slow        Initial Vitals:  BP (!) 168/92   Pulse 65   Ht 1.76 m (5' 9.29\")   Wt 75.9 kg (167 lb 6 oz)   SpO2 98%   BMI 24.51 kg/m   Estimated body mass index is 24.51 kg/m  as calculated from the following:    Height as of this encounter: 1.76 m (5' 9.29\").    Weight as of this encounter: 75.9 kg (167 lb 6 oz).. Body surface area is 1.93 meters squared. BP completed using cuff size: regular    Aye Rathai   "

## 2025-06-23 NOTE — PATIENT INSTRUCTIONS
You are doing well overall!    Continue your carbidopa/levodopa as is for now. You can try the noon and 5pm doses an hour earlier to see if this helps your 3pm fatigue and weakness.    Stay active as much as you can while staying safe. I placed a physical therapy referral to help with your balance.

## 2025-06-23 NOTE — LETTER
2025      Robert Archer  [address] [place] [address]      Dear Colleague,    Thank you for referring your patient, Robert Archer, to the [facility]. Please see a copy of my visit note below.    Department of Neurology  Movement Disorders Division   Follow-up Note    Patient: Robert Archer   MRN: [id number]   : 1950   Date of Visit: 2025    Robert Archer is a 74 year old R-handed male who returns to clinic for follow up of Parkinson's disease (dx 3/2024, sx since ). He was last seen 25 at which time no medication changes were made.    Relevant prior history:  He was seen by Dr. Garcia 24 for balance changes. He was found to have bradykinesia, masked facies, rigidity, and a shuffling gait on exam, so a Dorothy Scan and EMG were ordered. DaTScan was positive for pre-synaptic dopaminergic deficit, EMG was normal.    Started carbidopa/levodopa 25  He is doing well overall, perhaps with slightly worse balance. As his PD has been predominantly characterized by slowing and stooped posture, he may experience more gait instability over a relatively shorter period of time compared to tremor-predominant PD.     INTERVAL EVENTS:  He presents with his wife Ioana.    Overall he feels okay.    Walking is about the same, uses two canes to get around most of the time. No falls.    Back pain is better overall.     Medications are helping with stiffness. No side effects, no dyskinesias. Endorses possible wearing off in the afternoon ~3pm characterized by generalized weakness and poor balance.    Denies any difficulty with hand stiffness or slowness. He spends his time doing things around the house, yardwork, has not had major issues with this.     Sleep is fine, no dream enactment behaviors. Denies hallucinations.    Occasional constipation. Denies urinary changes. Endorses some lightheadedness with standing, no syncope and usually can stay standing, this is  "stable.       Related Medications 7am noon 5pm   Carbidopa/levodopa IR 25/100 2 2 2   Last taken ~noon      ROS:  All others negative except as listed above.      Current Outpatient Medications   Medication Sig Dispense Refill     alendronate (FOSAMAX) 70 MG tablet Take 1 tablet (70 mg) by mouth every 7 days. 12 tablet 3     carbidopa-levodopa (SINEMET)  MG tablet Take 2 tablets by mouth 3 times daily. 540 tablet 1     fluorouracil (EFUDEX) 5 % external cream Apply topically 2 times daily. To face and ears x2 weeks 40 g 3     lisinopril (ZESTRIL) 20 MG tablet Take 1.5 tablets (30 mg) by mouth daily. 135 tablet 1     metoprolol succinate ER (TOPROL XL) 25 MG 24 hr tablet Take 1 tablet (25 mg) by mouth daily. 90 tablet 3     rosuvastatin (CRESTOR) 20 MG tablet Take 1 tablet (20 mg) by mouth daily. 90 tablet 3     vitamin D3 (CHOLECALCIFEROL) 50 mcg (2000 units) tablet Take 1 tablet (50 mcg) by mouth daily. 90 tablet 3       No Known Allergies       PHYSICAL EXAM:  BP (!) 168/92   Pulse 65   Ht 1.76 m (5' 9.29\")   Wt 75.9 kg (167 lb 6 oz)   SpO2 98%   BMI 24.51 kg/m          6/23/2025     4:00 PM   UPDRS Motor Scale   Time: 16:15   Medication On   R Brain DBS: None   L Brain DBS: None   Dyskinesia (LID) No   Speech 1   Facial Expression 3   Rigidity Neck 0   Rigidity RUE 0   Rigidity LUE 0   Rigidity RLE 1   Rigidity LLE 1   Finger Taps R 0   Finger Taps L 2   Hand Mvt R 0   Hand Mvt L 1   Pron-/Supinate R 0   Pron-/Supinate L 1   Toe Tap R 0   Toe Tap L 0   Leg Agility R 0   Leg Agility L 0   Arise From Chair 1   Gait 1   Gait Freezing 0   Postural Stability 3   Posture 3   Global Spont Mvt 1   Postural Tremor RUE 1   Postural Tremor LUE 0   Kinetic Tremor RUE 0   Kinetic Tremor LUE 0   Rest Tremor RUE 0   Rest Tremor LUE 0   Rest Tremor RLE 0   Rest Tremor LLE 0   Rest Tremor Lip/Jaw 0   Rest Tremor Constancy 0   Total Right 2   Total Left 5   Axial Total 13   Total 20     Gait stable, decreased stride " height and length with okay voluntary arm swing. There is some unsteadiness with listing to either side.      ASSESSMENT/PLAN:  Robert Archer is a 74 year old R-handed male who returns to clinic for follow up of Parkinson's disease.    He is doing well overall, now with more overtly worse balance. As his PD has been predominantly characterized by slowing and stooped posture, he may experience more gait instability over a relatively shorter period of time compared to tremor-predominant PD.     He may be having some wearing off, though the description of symptoms are relatively nonspecific and may represent some afternoon fatigue. He was counseled to consider adjusting his pm doses to see if this helps.     Finally, with his balance issues we will have him see PT for gait training.    He was counseled to stay active as safely as possible.    - medications as below, no changes  Related Medications 7am noon 5pm   Carbidopa/levodopa IR 25/100 2 2 2    - consider 7am/11pm/4pm    Return to clinic in 6mp    Donovan Jovel MD  Fellow  Movement Disorders Division  [facility]    30 minutes spent on date of encounter doing chart reviews, obtaining history, performing physical exam, counseling, documentation, and further activities as noted above.       Again, thank you for allowing me to participate in the care of your patient.        Sincerely,        Donovan Jovel MD    Electronically signed

## 2025-07-08 ENCOUNTER — TELEPHONE (OUTPATIENT)
Dept: PALLIATIVE MEDICINE | Facility: CLINIC | Age: 75
End: 2025-07-08
Payer: COMMERCIAL

## 2025-07-08 NOTE — TELEPHONE ENCOUNTER
[facility] Call Center    Phone Message    May a detailed message be left on voicemail: yes     Reason for Call: Other: Patient calling asking if Dr. Rosario can put in an order for him to get another injection or does he need to see him first?  Please call back to advise.      Action Taken: Other: [facility]    Travel Screening: Not Applicable     Date of Service:

## 2025-07-09 ENCOUNTER — TELEPHONE (OUTPATIENT)
Dept: PHYSICAL MEDICINE AND REHAB | Facility: CLINIC | Age: 75
End: 2025-07-09
Payer: COMMERCIAL

## 2025-07-09 NOTE — TELEPHONE ENCOUNTER
Medication list reviewed. Pre-procedure instructions have been sent to patient via [employer] message.      JANN Walters RN Care Coordinator  Neurology/Neurosurgery/PM&R/Pain Management

## 2025-07-09 NOTE — TELEPHONE ENCOUNTER
Called patient to schedule procedure with Dr. Rosario    Date of Procedure: 8/22/25    Arrival time given: Yes: Arrival Time 1:45pm      Procedure Location: [facility] [place]     Verified Location with Patient:  Yes  Address provided to the patient     Pre-op H&P Required:  No: Moderate Sedation      Post-Op/Follow Up Appt:  Yes: 10/8/25 @10am      Informed patient they will need a  to drive them home:  Yes    Patients : Spouse     Patient is aware that pre-op RN from the procedure center will call 2-3 days prior to scheduled procedure to confirm arrival time and review any instructions:  Yes       Additional Comments: N/A        Cecelia Verdin MA on 7/9/2025 at 3:12 PM      P: [phone]

## 2025-07-15 NOTE — TELEPHONE ENCOUNTER
RN attempted to return call to patient. Left a detailed VM explaining that patient is scheduled for a L4-5, L5-S1 RFA on 8/22 with Dr. Rosario with a plan to follow up with Diane Comer CNP 6 weeks later. Writer requested a call back with any further questions/concerns regarding the plan.      GORDY WaltersN RN Care Coordinator  Neurology/Neurosurgery/PM&R/Pain Management

## 2025-07-15 NOTE — TELEPHONE ENCOUNTER
[facility] Call Center    Phone Message    May a detailed message be left on voicemail: yes     Reason for Call: Other: Robert calling back with some questions.  Please call him back to advise.      Action Taken: Other: [facility]    Travel Screening: Not Applicable     Date of Service:

## 2025-07-16 NOTE — CONFIDENTIAL NOTE
RN called the pt back and he states that his questions were answered yesterday. I did see that he has not viewed his [employer] message explaining the pre-procedure instructions. I have reviewed this with him over the phone and encouraged him to log in and refer back to these instructions if he has any questions and reach out if anything else comes up.     Lalita Woodward RN Care Coordinator   Neurology/Neurosurgery/PM&R/ Pain Management

## 2025-08-06 ENCOUNTER — TELEPHONE (OUTPATIENT)
Dept: FAMILY MEDICINE | Facility: CLINIC | Age: 75
End: 2025-08-06

## 2025-08-06 ENCOUNTER — THERAPY VISIT (OUTPATIENT)
Dept: PHYSICAL THERAPY | Facility: CLINIC | Age: 75
End: 2025-08-06
Attending: INTERNAL MEDICINE
Payer: COMMERCIAL

## 2025-08-06 DIAGNOSIS — G20.A1 PARKINSON'S DISEASE WITHOUT DYSKINESIA OR FLUCTUATING MANIFESTATIONS (H): Primary | ICD-10-CM

## 2025-08-06 PROCEDURE — 97161 PT EVAL LOW COMPLEX 20 MIN: CPT | Mod: GP | Performed by: PHYSICAL THERAPIST

## 2025-08-06 PROCEDURE — 97110 THERAPEUTIC EXERCISES: CPT | Mod: GP | Performed by: PHYSICAL THERAPIST

## 2025-08-22 ENCOUNTER — ANCILLARY PROCEDURE (OUTPATIENT)
Dept: GENERAL RADIOLOGY | Facility: CLINIC | Age: 75
End: 2025-08-22
Attending: PHYSICAL MEDICINE & REHABILITATION
Payer: COMMERCIAL

## 2025-08-22 ENCOUNTER — HOSPITAL ENCOUNTER (OUTPATIENT)
Facility: AMBULATORY SURGERY CENTER | Age: 75
Discharge: HOME | End: 2025-08-22
Attending: PHYSICAL MEDICINE & REHABILITATION | Admitting: PHYSICAL MEDICINE & REHABILITATION
Payer: COMMERCIAL

## 2025-08-22 VITALS
HEART RATE: 49 BPM | SYSTOLIC BLOOD PRESSURE: 141 MMHG | TEMPERATURE: 97.2 F | OXYGEN SATURATION: 98 % | RESPIRATION RATE: 18 BRPM | DIASTOLIC BLOOD PRESSURE: 70 MMHG

## 2025-08-22 DIAGNOSIS — M54.59 LUMBAR FACET JOINT PAIN: ICD-10-CM

## 2025-08-22 DIAGNOSIS — R52 PAIN: ICD-10-CM

## 2025-08-22 DIAGNOSIS — M47.897 OTHER SPONDYLOSIS, LUMBOSACRAL REGION: Primary | ICD-10-CM

## 2025-08-22 DIAGNOSIS — M47.816 LUMBAR FACET ARTHROPATHY: ICD-10-CM

## 2025-08-22 PROCEDURE — 64635 DESTROY LUMB/SAC FACET JNT: CPT | Mod: RT

## 2025-08-22 PROCEDURE — 99152 MOD SED SAME PHYS/QHP 5/>YRS: CPT | Performed by: PHYSICAL MEDICINE & REHABILITATION

## 2025-08-22 PROCEDURE — 64636 DESTROY L/S FACET JNT ADDL: CPT | Mod: RT

## 2025-08-22 PROCEDURE — G8918 PT W/O PREOP ORDER IV AB PRO: HCPCS

## 2025-08-22 PROCEDURE — 64636 DESTROY L/S FACET JNT ADDL: CPT | Mod: 50 | Performed by: PHYSICAL MEDICINE & REHABILITATION

## 2025-08-22 PROCEDURE — G8907 PT DOC NO EVENTS ON DISCHARG: HCPCS

## 2025-08-22 PROCEDURE — 64635 DESTROY LUMB/SAC FACET JNT: CPT | Mod: 50 | Performed by: PHYSICAL MEDICINE & REHABILITATION

## 2025-08-22 RX ORDER — FENTANYL CITRATE 50 UG/ML
INJECTION, SOLUTION INTRAMUSCULAR; INTRAVENOUS PRN
Status: DISCONTINUED | OUTPATIENT
Start: 2025-08-22 | End: 2025-08-22 | Stop reason: HOSPADM

## 2025-08-22 RX ORDER — LIDOCAINE HYDROCHLORIDE 40 MG/ML
INJECTION, SOLUTION RETROBULBAR PRN
Status: DISCONTINUED | OUTPATIENT
Start: 2025-08-22 | End: 2025-08-22 | Stop reason: HOSPADM

## 2025-08-22 RX ORDER — LIDOCAINE HYDROCHLORIDE 10 MG/ML
INJECTION, SOLUTION EPIDURAL; INFILTRATION; INTRACAUDAL; PERINEURAL PRN
Status: DISCONTINUED | OUTPATIENT
Start: 2025-08-22 | End: 2025-08-22 | Stop reason: HOSPADM

## 2025-08-22 RX ORDER — KETOROLAC TROMETHAMINE 30 MG/ML
15 INJECTION, SOLUTION INTRAMUSCULAR; INTRAVENOUS ONCE
Status: DISCONTINUED | OUTPATIENT
Start: 2025-08-22 | End: 2025-08-23 | Stop reason: HOSPADM

## 2025-08-22 RX ORDER — SODIUM CHLORIDE 9 MG/ML
500 INJECTION, SOLUTION INTRAVENOUS CONTINUOUS
Status: DISPENSED | OUTPATIENT
Start: 2025-08-22 | End: 2025-08-22

## 2025-08-22 RX ORDER — LIDOCAINE 40 MG/G
CREAM TOPICAL
Status: DISCONTINUED | OUTPATIENT
Start: 2025-08-22 | End: 2025-08-23 | Stop reason: HOSPADM

## 2025-08-25 ENCOUNTER — OFFICE VISIT (OUTPATIENT)
Dept: DERMATOLOGY | Facility: CLINIC | Age: 75
End: 2025-08-25
Payer: COMMERCIAL

## 2025-08-25 DIAGNOSIS — L57.0 ACTINIC KERATOSIS: Primary | ICD-10-CM

## 2025-08-25 DIAGNOSIS — D22.9 MULTIPLE MELANOCYTIC NEVI: ICD-10-CM

## 2025-08-25 DIAGNOSIS — D18.01 CHERRY ANGIOMA: ICD-10-CM

## 2025-08-25 DIAGNOSIS — L81.4 SOLAR LENTIGO: ICD-10-CM

## 2025-08-25 DIAGNOSIS — D48.5 NEOPLASM OF UNCERTAIN BEHAVIOR OF SKIN: ICD-10-CM

## 2025-08-25 DIAGNOSIS — L82.1 SEBORRHEIC KERATOSIS: ICD-10-CM

## 2025-08-25 ASSESSMENT — PAIN SCALES - GENERAL: PAINLEVEL_OUTOF10: NO PAIN (0)

## 2025-09-02 LAB
PATH REPORT.COMMENTS IMP SPEC: NORMAL
PATH REPORT.COMMENTS IMP SPEC: NORMAL
PATH REPORT.FINAL DX SPEC: NORMAL
PATH REPORT.GROSS SPEC: NORMAL
PATH REPORT.MICROSCOPIC SPEC OTHER STN: NORMAL
PATH REPORT.RELEVANT HX SPEC: NORMAL

## 2025-09-04 ENCOUNTER — TELEPHONE (OUTPATIENT)
Dept: DERMATOLOGY | Facility: CLINIC | Age: 75
End: 2025-09-04
Payer: COMMERCIAL

## (undated) DEVICE — TRAY PAIN INJECTION 97A 640

## (undated) DEVICE — PREP CHLORAPREP W/ORANGE TINT 10.5ML 930715

## (undated) DEVICE — TUBING EXTENSION SET MICROBORE 6" LL 2N1194

## (undated) DEVICE — DRSG PRIMAPORE 03 1/8X6" 66000318

## (undated) DEVICE — NDL SPINAL 25GA 3.5" QUINCKE 405180

## (undated) DEVICE — GLOVE BIOGEL PI ULTRATOUCH G SZ 7.0 42170

## (undated) DEVICE — SYR 10ML PERFIX LL 332152

## (undated) DEVICE — LINEN TOWEL PACK X5 5464

## (undated) DEVICE — PREP CHLORAPREP W/ORANGE TINT 10.5ML 260715

## (undated) DEVICE — GLOVE PROTEXIS POWDER FREE ORANGE 7.0  2D72PT70X

## (undated) DEVICE — GLOVE BIOGEL PI ULTRATOUCH SZ 7.0 41170

## (undated) DEVICE — NEEDLE EPIDURAL TUOHY 20G X4.5 18324

## (undated) DEVICE — TUBING EXTENSION SET STD BORE 6" LL

## (undated) RX ORDER — LIDOCAINE HYDROCHLORIDE 10 MG/ML
INJECTION, SOLUTION EPIDURAL; INFILTRATION; INTRACAUDAL; PERINEURAL
Status: DISPENSED
Start: 2024-06-21

## (undated) RX ORDER — FENTANYL CITRATE 50 UG/ML
INJECTION, SOLUTION INTRAMUSCULAR; INTRAVENOUS
Status: DISPENSED
Start: 2025-08-22

## (undated) RX ORDER — LIDOCAINE HYDROCHLORIDE 10 MG/ML
INJECTION, SOLUTION EPIDURAL; INFILTRATION; INTRACAUDAL; PERINEURAL
Status: DISPENSED
Start: 2025-01-03

## (undated) RX ORDER — DEXAMETHASONE SODIUM PHOSPHATE 10 MG/ML
INJECTION, SOLUTION INTRAMUSCULAR; INTRAVENOUS
Status: DISPENSED
Start: 2025-01-03

## (undated) RX ORDER — IOPAMIDOL 408 MG/ML
INJECTION, SOLUTION INTRATHECAL
Status: DISPENSED
Start: 2025-01-03

## (undated) RX ORDER — BUPIVACAINE HYDROCHLORIDE 5 MG/ML
INJECTION, SOLUTION EPIDURAL; INTRACAUDAL
Status: DISPENSED
Start: 2025-01-03